# Patient Record
Sex: MALE | Race: WHITE | Employment: OTHER | URBAN - METROPOLITAN AREA
[De-identification: names, ages, dates, MRNs, and addresses within clinical notes are randomized per-mention and may not be internally consistent; named-entity substitution may affect disease eponyms.]

---

## 2017-02-24 ENCOUNTER — OFFICE VISIT (OUTPATIENT)
Dept: UROLOGY | Age: 72
End: 2017-02-24

## 2017-02-24 VITALS
HEIGHT: 71 IN | DIASTOLIC BLOOD PRESSURE: 88 MMHG | HEART RATE: 61 BPM | BODY MASS INDEX: 36.4 KG/M2 | WEIGHT: 260 LBS | SYSTOLIC BLOOD PRESSURE: 134 MMHG

## 2017-02-24 DIAGNOSIS — Z85.46 H/O PROSTATE CANCER: ICD-10-CM

## 2017-02-24 DIAGNOSIS — N52.9 ERECTILE DYSFUNCTION, UNSPECIFIED ERECTILE DYSFUNCTION TYPE: Primary | ICD-10-CM

## 2017-02-24 PROCEDURE — 99214 OFFICE O/P EST MOD 30 MIN: CPT | Performed by: UROLOGY

## 2017-02-24 RX ORDER — PANTOPRAZOLE SODIUM 40 MG/1
40 TABLET, DELAYED RELEASE ORAL DAILY
COMMUNITY
Start: 2017-01-25 | End: 2022-08-15 | Stop reason: ALTCHOICE

## 2017-02-24 RX ORDER — SILDENAFIL 50 MG/1
50 TABLET, FILM COATED ORAL PRN
Qty: 18 TABLET | Refills: 3 | Status: SHIPPED | OUTPATIENT
Start: 2017-02-24 | End: 2019-01-10 | Stop reason: ALTCHOICE

## 2017-02-24 RX ORDER — POTASSIUM CHLORIDE 1500 MG/1
TABLET, EXTENDED RELEASE ORAL
COMMUNITY
Start: 2017-01-25 | End: 2019-01-10 | Stop reason: ALTCHOICE

## 2017-02-24 ASSESSMENT — ENCOUNTER SYMPTOMS: SHORTNESS OF BREATH: 0

## 2017-03-21 ENCOUNTER — HOSPITAL ENCOUNTER (OUTPATIENT)
Dept: INFUSION THERAPY | Age: 72
Setting detail: INFUSION SERIES
Discharge: HOME | End: 2017-03-21
Payer: MEDICARE

## 2017-03-21 VITALS
DIASTOLIC BLOOD PRESSURE: 79 MMHG | TEMPERATURE: 98.5 F | RESPIRATION RATE: 18 BRPM | SYSTOLIC BLOOD PRESSURE: 144 MMHG | HEART RATE: 60 BPM

## 2017-03-21 DIAGNOSIS — M05.711 RHEUMATOID ARTHRITIS INVOLVING RIGHT SHOULDER WITH POSITIVE RHEUMATOID FACTOR (HCC): ICD-10-CM

## 2017-03-21 PROCEDURE — 96366 THER/PROPH/DIAG IV INF ADDON: CPT

## 2017-03-21 PROCEDURE — 2580000003 HC RX 258: Performed by: INTERNAL MEDICINE

## 2017-03-21 PROCEDURE — 96365 THER/PROPH/DIAG IV INF INIT: CPT

## 2017-03-21 PROCEDURE — 6360000002 HC RX W HCPCS: Performed by: INTERNAL MEDICINE

## 2017-03-21 PROCEDURE — 96413 CHEMO IV INFUSION 1 HR: CPT

## 2017-03-21 PROCEDURE — 6370000000 HC RX 637 (ALT 250 FOR IP): Performed by: INTERNAL MEDICINE

## 2017-03-21 PROCEDURE — 2580000003 HC RX 258

## 2017-03-21 PROCEDURE — 96415 CHEMO IV INFUSION ADDL HR: CPT

## 2017-03-21 RX ORDER — DIPHENHYDRAMINE HYDROCHLORIDE 50 MG/ML
25 INJECTION INTRAMUSCULAR; INTRAVENOUS ONCE
Status: CANCELLED | OUTPATIENT
Start: 2017-03-21 | End: 2017-03-21

## 2017-03-21 RX ORDER — METHYLPREDNISOLONE SODIUM SUCCINATE 125 MG/2ML
100 INJECTION, POWDER, LYOPHILIZED, FOR SOLUTION INTRAMUSCULAR; INTRAVENOUS ONCE
Status: CANCELLED | OUTPATIENT
Start: 2017-03-21 | End: 2017-03-21

## 2017-03-21 RX ORDER — SODIUM CHLORIDE 9 MG/ML
20 INJECTION, SOLUTION INTRAVENOUS ONCE
Status: CANCELLED | OUTPATIENT
Start: 2017-03-21 | End: 2017-03-21

## 2017-03-21 RX ORDER — DIPHENHYDRAMINE HYDROCHLORIDE 50 MG/ML
25 INJECTION INTRAMUSCULAR; INTRAVENOUS ONCE
Status: COMPLETED | OUTPATIENT
Start: 2017-03-21 | End: 2017-03-21

## 2017-03-21 RX ORDER — SODIUM CHLORIDE 9 MG/ML
20 INJECTION, SOLUTION INTRAVENOUS ONCE
Status: COMPLETED | OUTPATIENT
Start: 2017-03-21 | End: 2017-03-21

## 2017-03-21 RX ORDER — SODIUM CHLORIDE 9 MG/ML
INJECTION, SOLUTION INTRAVENOUS
Status: COMPLETED
Start: 2017-03-21 | End: 2017-03-21

## 2017-03-21 RX ORDER — METHYLPREDNISOLONE SODIUM SUCCINATE 125 MG/2ML
100 INJECTION, POWDER, LYOPHILIZED, FOR SOLUTION INTRAMUSCULAR; INTRAVENOUS ONCE
Status: COMPLETED | OUTPATIENT
Start: 2017-03-21 | End: 2017-03-21

## 2017-03-21 RX ORDER — ACETAMINOPHEN 500 MG
1000 TABLET ORAL ONCE
Status: COMPLETED | OUTPATIENT
Start: 2017-03-21 | End: 2017-03-21

## 2017-03-21 RX ORDER — ACETAMINOPHEN 500 MG
1000 TABLET ORAL ONCE
Status: CANCELLED | OUTPATIENT
Start: 2017-03-21 | End: 2017-03-21

## 2017-03-21 RX ADMIN — RITUXIMAB: 10 INJECTION, SOLUTION INTRAVENOUS at 11:31

## 2017-03-21 RX ADMIN — DIPHENHYDRAMINE HYDROCHLORIDE 25 MG: 50 INJECTION, SOLUTION INTRAMUSCULAR; INTRAVENOUS at 10:51

## 2017-03-21 RX ADMIN — ACETAMINOPHEN 1000 MG: 500 TABLET ORAL at 10:48

## 2017-03-21 RX ADMIN — SODIUM CHLORIDE 20 ML/HR: 9 INJECTION, SOLUTION INTRAVENOUS at 10:57

## 2017-03-21 RX ADMIN — METHYLPREDNISOLONE SODIUM SUCCINATE 100 MG: 125 INJECTION, POWDER, FOR SOLUTION INTRAMUSCULAR; INTRAVENOUS at 10:54

## 2017-03-21 ASSESSMENT — PAIN DESCRIPTION - LOCATION
LOCATION_2: SHOULDER
LOCATION: ANKLE;SHOULDER

## 2017-03-21 ASSESSMENT — PAIN DESCRIPTION - PROGRESSION: CLINICAL_PROGRESSION: NOT CHANGED

## 2017-03-21 ASSESSMENT — PAIN DESCRIPTION - ONSET
ONSET: ON-GOING
ONSET_2: AWAKENED FROM SLEEP

## 2017-03-21 ASSESSMENT — PAIN DESCRIPTION - DURATION: DURATION_2: CONTINUOUS

## 2017-03-21 ASSESSMENT — PAIN DESCRIPTION - FREQUENCY: FREQUENCY: CONTINUOUS

## 2017-03-21 ASSESSMENT — PAIN DESCRIPTION - INTENSITY: RATING_2: 4

## 2017-03-21 ASSESSMENT — PAIN DESCRIPTION - ORIENTATION
ORIENTATION: RIGHT
ORIENTATION_2: RIGHT;LEFT

## 2017-03-21 ASSESSMENT — PAIN DESCRIPTION - DESCRIPTORS
DESCRIPTORS: ACHING;SORE
DESCRIPTORS_2: ACHING

## 2017-03-21 ASSESSMENT — PAIN SCALES - GENERAL: PAINLEVEL_OUTOF10: 4

## 2017-04-05 ENCOUNTER — HOSPITAL ENCOUNTER (OUTPATIENT)
Dept: INFUSION THERAPY | Age: 72
Setting detail: INFUSION SERIES
Discharge: HOME | End: 2017-04-05
Payer: MEDICARE

## 2017-04-05 VITALS
TEMPERATURE: 98.3 F | SYSTOLIC BLOOD PRESSURE: 165 MMHG | RESPIRATION RATE: 20 BRPM | DIASTOLIC BLOOD PRESSURE: 68 MMHG | HEART RATE: 53 BPM

## 2017-04-05 DIAGNOSIS — M05.711 RHEUMATOID ARTHRITIS INVOLVING RIGHT SHOULDER WITH POSITIVE RHEUMATOID FACTOR (HCC): ICD-10-CM

## 2017-04-05 PROCEDURE — 2580000003 HC RX 258

## 2017-04-05 PROCEDURE — 6360000002 HC RX W HCPCS: Performed by: INTERNAL MEDICINE

## 2017-04-05 PROCEDURE — 96413 CHEMO IV INFUSION 1 HR: CPT

## 2017-04-05 PROCEDURE — 6370000000 HC RX 637 (ALT 250 FOR IP): Performed by: INTERNAL MEDICINE

## 2017-04-05 PROCEDURE — 96415 CHEMO IV INFUSION ADDL HR: CPT

## 2017-04-05 PROCEDURE — 2580000003 HC RX 258: Performed by: INTERNAL MEDICINE

## 2017-04-05 PROCEDURE — 96375 TX/PRO/DX INJ NEW DRUG ADDON: CPT

## 2017-04-05 RX ORDER — SODIUM CHLORIDE 9 MG/ML
INJECTION, SOLUTION INTRAVENOUS
Status: COMPLETED
Start: 2017-04-05 | End: 2017-04-05

## 2017-04-05 RX ORDER — METHYLPREDNISOLONE SODIUM SUCCINATE 125 MG/2ML
100 INJECTION, POWDER, LYOPHILIZED, FOR SOLUTION INTRAMUSCULAR; INTRAVENOUS ONCE
Status: CANCELLED | OUTPATIENT
Start: 2017-04-05 | End: 2017-04-05

## 2017-04-05 RX ORDER — DIPHENHYDRAMINE HYDROCHLORIDE 50 MG/ML
25 INJECTION INTRAMUSCULAR; INTRAVENOUS ONCE
Status: CANCELLED | OUTPATIENT
Start: 2017-04-05 | End: 2017-04-05

## 2017-04-05 RX ORDER — ACETAMINOPHEN 500 MG
1000 TABLET ORAL ONCE
Status: CANCELLED | OUTPATIENT
Start: 2017-04-05 | End: 2017-04-05

## 2017-04-05 RX ORDER — DIPHENHYDRAMINE HYDROCHLORIDE 50 MG/ML
25 INJECTION INTRAMUSCULAR; INTRAVENOUS ONCE
Status: COMPLETED | OUTPATIENT
Start: 2017-04-05 | End: 2017-04-05

## 2017-04-05 RX ORDER — ACETAMINOPHEN 500 MG
1000 TABLET ORAL ONCE
Status: COMPLETED | OUTPATIENT
Start: 2017-04-05 | End: 2017-04-05

## 2017-04-05 RX ORDER — SODIUM CHLORIDE 9 MG/ML
20 INJECTION, SOLUTION INTRAVENOUS ONCE
Status: COMPLETED | OUTPATIENT
Start: 2017-04-05 | End: 2017-04-05

## 2017-04-05 RX ORDER — METHYLPREDNISOLONE SODIUM SUCCINATE 125 MG/2ML
100 INJECTION, POWDER, LYOPHILIZED, FOR SOLUTION INTRAMUSCULAR; INTRAVENOUS ONCE
Status: COMPLETED | OUTPATIENT
Start: 2017-04-05 | End: 2017-04-05

## 2017-04-05 RX ORDER — SODIUM CHLORIDE 9 MG/ML
20 INJECTION, SOLUTION INTRAVENOUS ONCE
Status: CANCELLED | OUTPATIENT
Start: 2017-04-05 | End: 2017-04-05

## 2017-04-05 RX ADMIN — RITUXIMAB 1000 MG: 10 INJECTION, SOLUTION INTRAVENOUS at 11:49

## 2017-04-05 RX ADMIN — ACETAMINOPHEN 1000 MG: 500 TABLET ORAL at 11:11

## 2017-04-05 RX ADMIN — METHYLPREDNISOLONE SODIUM SUCCINATE 100 MG: 125 INJECTION, POWDER, FOR SOLUTION INTRAMUSCULAR; INTRAVENOUS at 11:20

## 2017-04-05 RX ADMIN — SODIUM CHLORIDE 20 ML/HR: 9 INJECTION, SOLUTION INTRAVENOUS at 11:46

## 2017-04-05 RX ADMIN — DIPHENHYDRAMINE HYDROCHLORIDE 25 MG: 50 INJECTION, SOLUTION INTRAMUSCULAR; INTRAVENOUS at 11:20

## 2017-04-05 ASSESSMENT — PAIN SCALES - GENERAL: PAINLEVEL_OUTOF10: 5

## 2017-04-05 ASSESSMENT — PAIN DESCRIPTION - PAIN TYPE: TYPE: CHRONIC PAIN

## 2017-04-05 ASSESSMENT — PAIN DESCRIPTION - LOCATION: LOCATION: ANKLE

## 2017-05-01 ENCOUNTER — APPOINTMENT (OUTPATIENT)
Dept: GENERAL RADIOLOGY | Age: 72
End: 2017-05-01
Payer: MEDICARE

## 2017-05-01 ENCOUNTER — HOSPITAL ENCOUNTER (EMERGENCY)
Age: 72
Discharge: HOME | End: 2017-05-01
Payer: MEDICARE

## 2017-05-01 VITALS
DIASTOLIC BLOOD PRESSURE: 73 MMHG | WEIGHT: 270 LBS | RESPIRATION RATE: 18 BRPM | HEIGHT: 70 IN | HEART RATE: 49 BPM | BODY MASS INDEX: 38.65 KG/M2 | TEMPERATURE: 99.1 F | OXYGEN SATURATION: 98 % | SYSTOLIC BLOOD PRESSURE: 168 MMHG

## 2017-05-01 DIAGNOSIS — M62.838 SPASM OF MUSCLE: ICD-10-CM

## 2017-05-01 DIAGNOSIS — M79.18 MUSCULOSKELETAL PAIN: Primary | ICD-10-CM

## 2017-05-01 PROCEDURE — 6360000002 HC RX W HCPCS: Performed by: PHYSICIAN ASSISTANT

## 2017-05-01 PROCEDURE — 72050 X-RAY EXAM NECK SPINE 4/5VWS: CPT

## 2017-05-01 PROCEDURE — 93005 ELECTROCARDIOGRAM TRACING: CPT

## 2017-05-01 PROCEDURE — 6370000000 HC RX 637 (ALT 250 FOR IP): Performed by: PHYSICIAN ASSISTANT

## 2017-05-01 PROCEDURE — 99284 EMERGENCY DEPT VISIT MOD MDM: CPT

## 2017-05-01 PROCEDURE — 96374 THER/PROPH/DIAG INJ IV PUSH: CPT

## 2017-05-01 PROCEDURE — 96375 TX/PRO/DX INJ NEW DRUG ADDON: CPT

## 2017-05-01 RX ORDER — HYDROCODONE BITARTRATE AND ACETAMINOPHEN 5; 325 MG/1; MG/1
1 TABLET ORAL EVERY 6 HOURS PRN
Qty: 12 TABLET | Refills: 0 | Status: SHIPPED | OUTPATIENT
Start: 2017-05-01 | End: 2017-05-08

## 2017-05-01 RX ORDER — CYCLOBENZAPRINE HCL 10 MG
10 TABLET ORAL ONCE
Status: COMPLETED | OUTPATIENT
Start: 2017-05-01 | End: 2017-05-01

## 2017-05-01 RX ORDER — CYCLOBENZAPRINE HCL 10 MG
10 TABLET ORAL 3 TIMES DAILY PRN
Qty: 12 TABLET | Refills: 0 | Status: SHIPPED | OUTPATIENT
Start: 2017-05-01 | End: 2017-05-11

## 2017-05-01 RX ORDER — KETOROLAC TROMETHAMINE 30 MG/ML
15 INJECTION, SOLUTION INTRAMUSCULAR; INTRAVENOUS ONCE
Status: COMPLETED | OUTPATIENT
Start: 2017-05-01 | End: 2017-05-01

## 2017-05-01 RX ORDER — ONDANSETRON 2 MG/ML
4 INJECTION INTRAMUSCULAR; INTRAVENOUS ONCE
Status: COMPLETED | OUTPATIENT
Start: 2017-05-01 | End: 2017-05-01

## 2017-05-01 RX ORDER — MORPHINE SULFATE 4 MG/ML
4 INJECTION, SOLUTION INTRAMUSCULAR; INTRAVENOUS ONCE
Status: COMPLETED | OUTPATIENT
Start: 2017-05-01 | End: 2017-05-01

## 2017-05-01 RX ADMIN — KETOROLAC TROMETHAMINE 15 MG: 30 INJECTION, SOLUTION INTRAMUSCULAR; INTRAVENOUS at 18:43

## 2017-05-01 RX ADMIN — MORPHINE SULFATE 4 MG: 4 INJECTION, SOLUTION INTRAMUSCULAR; INTRAVENOUS at 19:41

## 2017-05-01 RX ADMIN — ONDANSETRON 4 MG: 2 INJECTION, SOLUTION INTRAMUSCULAR; INTRAVENOUS at 19:41

## 2017-05-01 RX ADMIN — CYCLOBENZAPRINE HYDROCHLORIDE 10 MG: 10 TABLET, FILM COATED ORAL at 18:43

## 2017-05-01 ASSESSMENT — PAIN DESCRIPTION - ORIENTATION: ORIENTATION: LEFT

## 2017-05-01 ASSESSMENT — ENCOUNTER SYMPTOMS
ABDOMINAL PAIN: 0
VOMITING: 0
EYE DISCHARGE: 0
VOICE CHANGE: 0
PHOTOPHOBIA: 0
EYE PAIN: 0
COUGH: 0
ANAL BLEEDING: 0
SHORTNESS OF BREATH: 0
NAUSEA: 0
BACK PAIN: 0
ABDOMINAL DISTENTION: 0
APNEA: 0

## 2017-05-01 ASSESSMENT — PAIN SCALES - GENERAL
PAINLEVEL_OUTOF10: 7
PAINLEVEL_OUTOF10: 8
PAINLEVEL_OUTOF10: 5
PAINLEVEL_OUTOF10: 6

## 2017-05-01 ASSESSMENT — PAIN DESCRIPTION - PAIN TYPE: TYPE: ACUTE PAIN

## 2017-05-01 ASSESSMENT — PAIN DESCRIPTION - LOCATION: LOCATION: NECK

## 2017-05-01 ASSESSMENT — PAIN DESCRIPTION - FREQUENCY: FREQUENCY: CONTINUOUS

## 2017-05-01 ASSESSMENT — PAIN DESCRIPTION - DESCRIPTORS: DESCRIPTORS: SHARP;SHOOTING

## 2017-05-04 LAB
EKG ATRIAL RATE: 50 BPM
EKG P AXIS: 32 DEGREES
EKG P-R INTERVAL: 248 MS
EKG Q-T INTERVAL: 492 MS
EKG QRS DURATION: 82 MS
EKG QTC CALCULATION (BAZETT): 448 MS
EKG R AXIS: -2 DEGREES
EKG T AXIS: 9 DEGREES
EKG VENTRICULAR RATE: 50 BPM

## 2017-05-26 ENCOUNTER — HOSPITAL ENCOUNTER (EMERGENCY)
Age: 72
Discharge: HOME | End: 2017-05-26
Payer: MEDICARE

## 2017-05-26 VITALS
RESPIRATION RATE: 18 BRPM | TEMPERATURE: 98.7 F | HEART RATE: 59 BPM | SYSTOLIC BLOOD PRESSURE: 179 MMHG | BODY MASS INDEX: 38.5 KG/M2 | DIASTOLIC BLOOD PRESSURE: 92 MMHG | WEIGHT: 275 LBS | HEIGHT: 71 IN | OXYGEN SATURATION: 94 %

## 2017-05-26 DIAGNOSIS — J06.9 ACUTE UPPER RESPIRATORY INFECTION: Primary | ICD-10-CM

## 2017-05-26 LAB — S PYO AG THROAT QL: NEGATIVE

## 2017-05-26 PROCEDURE — 87081 CULTURE SCREEN ONLY: CPT

## 2017-05-26 PROCEDURE — 6360000002 HC RX W HCPCS: Performed by: PHYSICIAN ASSISTANT

## 2017-05-26 PROCEDURE — 87880 STREP A ASSAY W/OPTIC: CPT

## 2017-05-26 PROCEDURE — 99283 EMERGENCY DEPT VISIT LOW MDM: CPT

## 2017-05-26 RX ORDER — DEXAMETHASONE SODIUM PHOSPHATE 10 MG/ML
10 INJECTION, SOLUTION INTRAMUSCULAR; INTRAVENOUS ONCE
Status: COMPLETED | OUTPATIENT
Start: 2017-05-26 | End: 2017-05-26

## 2017-05-26 RX ORDER — AZITHROMYCIN 250 MG/1
TABLET, FILM COATED ORAL
Qty: 1 PACKET | Refills: 0 | Status: SHIPPED | OUTPATIENT
Start: 2017-05-26 | End: 2017-06-05

## 2017-05-26 RX ADMIN — DEXAMETHASONE SODIUM PHOSPHATE 10 MG: 10 INJECTION INTRAMUSCULAR; INTRAVENOUS at 18:33

## 2017-05-26 ASSESSMENT — ENCOUNTER SYMPTOMS
VOMITING: 0
SHORTNESS OF BREATH: 0
COUGH: 1
ABDOMINAL PAIN: 0
DIARRHEA: 0
SORE THROAT: 1
NAUSEA: 0

## 2017-05-26 ASSESSMENT — PAIN DESCRIPTION - LOCATION: LOCATION: THROAT

## 2017-05-26 ASSESSMENT — PAIN SCALES - GENERAL: PAINLEVEL_OUTOF10: 8

## 2017-05-26 ASSESSMENT — PAIN DESCRIPTION - ONSET: ONSET: PROGRESSIVE

## 2017-05-26 ASSESSMENT — PAIN DESCRIPTION - DESCRIPTORS: DESCRIPTORS: SHARP

## 2017-05-26 ASSESSMENT — PAIN DESCRIPTION - PAIN TYPE: TYPE: ACUTE PAIN

## 2017-05-26 ASSESSMENT — PAIN DESCRIPTION - FREQUENCY: FREQUENCY: INTERMITTENT

## 2017-05-29 LAB — S PYO THROAT QL CULT: NORMAL

## 2017-06-12 ENCOUNTER — HOSPITAL ENCOUNTER (OUTPATIENT)
Dept: GENERAL RADIOLOGY | Age: 72
Discharge: HOME | End: 2017-06-12
Payer: MEDICARE

## 2017-06-12 DIAGNOSIS — J20.9 BRONCHITIS, ACUTE, WITH BRONCHOSPASM: ICD-10-CM

## 2017-06-12 PROCEDURE — 71020 XR CHEST STANDARD TWO VW: CPT

## 2017-07-11 ENCOUNTER — ANESTHESIA EVENT (OUTPATIENT)
Dept: OPERATING ROOM | Age: 72
End: 2017-07-11
Payer: MEDICARE

## 2017-07-11 ENCOUNTER — HOSPITAL ENCOUNTER (OUTPATIENT)
Age: 72
Setting detail: OUTPATIENT SURGERY
Discharge: HOME | End: 2017-07-11
Attending: INTERNAL MEDICINE | Admitting: INTERNAL MEDICINE
Payer: MEDICARE

## 2017-07-11 ENCOUNTER — ANESTHESIA (OUTPATIENT)
Dept: OPERATING ROOM | Age: 72
End: 2017-07-11
Payer: MEDICARE

## 2017-07-11 VITALS
SYSTOLIC BLOOD PRESSURE: 103 MMHG | RESPIRATION RATE: 10 BRPM | OXYGEN SATURATION: 98 % | DIASTOLIC BLOOD PRESSURE: 53 MMHG

## 2017-07-11 VITALS
TEMPERATURE: 97.2 F | HEART RATE: 54 BPM | OXYGEN SATURATION: 96 % | DIASTOLIC BLOOD PRESSURE: 68 MMHG | SYSTOLIC BLOOD PRESSURE: 159 MMHG | RESPIRATION RATE: 16 BRPM | HEIGHT: 71 IN | WEIGHT: 275 LBS | BODY MASS INDEX: 38.5 KG/M2

## 2017-07-11 PROBLEM — K63.5 COLON POLYPS: Status: ACTIVE | Noted: 2017-07-11

## 2017-07-11 PROCEDURE — 7100000011 HC PHASE II RECOVERY - ADDTL 15 MIN: Performed by: INTERNAL MEDICINE

## 2017-07-11 PROCEDURE — 2500000003 HC RX 250 WO HCPCS: Performed by: NURSE ANESTHETIST, CERTIFIED REGISTERED

## 2017-07-11 PROCEDURE — 7100000010 HC PHASE II RECOVERY - FIRST 15 MIN: Performed by: INTERNAL MEDICINE

## 2017-07-11 PROCEDURE — 3700000001 HC ADD 15 MINUTES (ANESTHESIA): Performed by: INTERNAL MEDICINE

## 2017-07-11 PROCEDURE — 3700000000 HC ANESTHESIA ATTENDED CARE: Performed by: INTERNAL MEDICINE

## 2017-07-11 PROCEDURE — 88305 TISSUE EXAM BY PATHOLOGIST: CPT

## 2017-07-11 PROCEDURE — 6360000002 HC RX W HCPCS: Performed by: NURSE ANESTHETIST, CERTIFIED REGISTERED

## 2017-07-11 PROCEDURE — 3609027000 HC COLONOSCOPY: Performed by: INTERNAL MEDICINE

## 2017-07-11 RX ORDER — LIDOCAINE HYDROCHLORIDE 10 MG/ML
1 INJECTION, SOLUTION EPIDURAL; INFILTRATION; INTRACAUDAL; PERINEURAL
Status: DISCONTINUED | OUTPATIENT
Start: 2017-07-11 | End: 2017-07-11 | Stop reason: HOSPADM

## 2017-07-11 RX ORDER — PROPOFOL 10 MG/ML
INJECTION, EMULSION INTRAVENOUS PRN
Status: DISCONTINUED | OUTPATIENT
Start: 2017-07-11 | End: 2017-07-11 | Stop reason: SDUPTHER

## 2017-07-11 RX ORDER — SODIUM CHLORIDE 0.9 % (FLUSH) 0.9 %
10 SYRINGE (ML) INJECTION PRN
Status: DISCONTINUED | OUTPATIENT
Start: 2017-07-11 | End: 2017-07-11 | Stop reason: HOSPADM

## 2017-07-11 RX ORDER — SODIUM CHLORIDE, SODIUM LACTATE, POTASSIUM CHLORIDE, CALCIUM CHLORIDE 600; 310; 30; 20 MG/100ML; MG/100ML; MG/100ML; MG/100ML
INJECTION, SOLUTION INTRAVENOUS CONTINUOUS
Status: DISCONTINUED | OUTPATIENT
Start: 2017-07-11 | End: 2017-07-11 | Stop reason: HOSPADM

## 2017-07-11 RX ORDER — SODIUM CHLORIDE 0.9 % (FLUSH) 0.9 %
10 SYRINGE (ML) INJECTION EVERY 12 HOURS SCHEDULED
Status: DISCONTINUED | OUTPATIENT
Start: 2017-07-11 | End: 2017-07-11 | Stop reason: HOSPADM

## 2017-07-11 RX ORDER — GLYCOPYRROLATE 0.2 MG/ML
INJECTION INTRAMUSCULAR; INTRAVENOUS PRN
Status: DISCONTINUED | OUTPATIENT
Start: 2017-07-11 | End: 2017-07-11 | Stop reason: SDUPTHER

## 2017-07-11 RX ORDER — ONDANSETRON 2 MG/ML
4 INJECTION INTRAMUSCULAR; INTRAVENOUS
Status: DISCONTINUED | OUTPATIENT
Start: 2017-07-11 | End: 2017-07-11 | Stop reason: HOSPADM

## 2017-07-11 RX ORDER — LIDOCAINE HYDROCHLORIDE 20 MG/ML
INJECTION, SOLUTION INFILTRATION; PERINEURAL PRN
Status: DISCONTINUED | OUTPATIENT
Start: 2017-07-11 | End: 2017-07-11 | Stop reason: SDUPTHER

## 2017-07-11 RX ADMIN — PROPOFOL 20 MG: 10 INJECTION, EMULSION INTRAVENOUS at 15:52

## 2017-07-11 RX ADMIN — LIDOCAINE HYDROCHLORIDE 40 MG: 20 INJECTION, SOLUTION INFILTRATION; PERINEURAL at 15:42

## 2017-07-11 RX ADMIN — PROPOFOL 20 MG: 10 INJECTION, EMULSION INTRAVENOUS at 15:56

## 2017-07-11 RX ADMIN — PROPOFOL 20 MG: 10 INJECTION, EMULSION INTRAVENOUS at 15:50

## 2017-07-11 RX ADMIN — PROPOFOL 20 MG: 10 INJECTION, EMULSION INTRAVENOUS at 15:54

## 2017-07-11 RX ADMIN — GLYCOPYRROLATE 0.2 MG: 0.2 INJECTION INTRAMUSCULAR; INTRAVENOUS at 15:36

## 2017-07-11 RX ADMIN — PROPOFOL 10 MG: 10 INJECTION, EMULSION INTRAVENOUS at 15:46

## 2017-07-11 RX ADMIN — PROPOFOL 20 MG: 10 INJECTION, EMULSION INTRAVENOUS at 15:48

## 2017-07-11 RX ADMIN — PROPOFOL 20 MG: 10 INJECTION, EMULSION INTRAVENOUS at 15:44

## 2017-07-11 RX ADMIN — PROPOFOL 30 MG: 10 INJECTION, EMULSION INTRAVENOUS at 15:47

## 2017-07-11 RX ADMIN — GLYCOPYRROLATE 0.2 MG: 0.2 INJECTION INTRAMUSCULAR; INTRAVENOUS at 15:18

## 2017-07-11 RX ADMIN — GLYCOPYRROLATE 0.2 MG: 0.2 INJECTION INTRAMUSCULAR; INTRAVENOUS at 15:39

## 2017-07-11 RX ADMIN — PROPOFOL 50 MG: 10 INJECTION, EMULSION INTRAVENOUS at 15:42

## 2017-07-11 ASSESSMENT — PAIN - FUNCTIONAL ASSESSMENT: PAIN_FUNCTIONAL_ASSESSMENT: 0-10

## 2017-10-30 ENCOUNTER — HOSPITAL ENCOUNTER (OUTPATIENT)
Dept: PREADMISSION TESTING | Age: 72
Discharge: HOME | End: 2017-10-30
Payer: MEDICARE

## 2017-10-30 VITALS
RESPIRATION RATE: 16 BRPM | WEIGHT: 294 LBS | DIASTOLIC BLOOD PRESSURE: 75 MMHG | BODY MASS INDEX: 41.16 KG/M2 | HEART RATE: 53 BPM | HEIGHT: 71 IN | OXYGEN SATURATION: 96 % | SYSTOLIC BLOOD PRESSURE: 155 MMHG | TEMPERATURE: 96.3 F

## 2017-10-30 DIAGNOSIS — Z96.9 RETAINED ORTHOPEDIC HARDWARE: ICD-10-CM

## 2017-10-30 LAB
ANION GAP SERPL CALCULATED.3IONS-SCNC: 16 MEQ/L (ref 7–13)
BUN BLDV-MCNC: 29 MG/DL (ref 8–23)
CALCIUM SERPL-MCNC: 9.2 MG/DL (ref 8.6–10.2)
CHLORIDE BLD-SCNC: 104 MEQ/L (ref 98–107)
CO2: 23 MEQ/L (ref 22–29)
CREAT SERPL-MCNC: 1.1 MG/DL (ref 0.7–1.2)
EKG ATRIAL RATE: 54 BPM
EKG P AXIS: 17 DEGREES
EKG P-R INTERVAL: 204 MS
EKG Q-T INTERVAL: 468 MS
EKG QRS DURATION: 80 MS
EKG QTC CALCULATION (BAZETT): 443 MS
EKG R AXIS: 14 DEGREES
EKG T AXIS: 20 DEGREES
EKG VENTRICULAR RATE: 54 BPM
GFR AFRICAN AMERICAN: >60
GFR NON-AFRICAN AMERICAN: >60
GLUCOSE BLD-MCNC: 168 MG/DL (ref 74–109)
HCT VFR BLD CALC: 43.4 % (ref 42–52)
HEMOGLOBIN: 14.4 G/DL (ref 14–18)
MCH RBC QN AUTO: 32.3 PG (ref 27–31.3)
MCHC RBC AUTO-ENTMCNC: 33.2 % (ref 33–37)
MCV RBC AUTO: 97.1 FL (ref 80–100)
PDW BLD-RTO: 14 % (ref 11.5–14.5)
PLATELET # BLD: 274 K/UL (ref 130–400)
POTASSIUM SERPL-SCNC: 4 MEQ/L (ref 3.5–5.1)
RBC # BLD: 4.47 M/UL (ref 4.7–6.1)
SODIUM BLD-SCNC: 143 MEQ/L (ref 132–144)
WBC # BLD: 9 K/UL (ref 4.8–10.8)

## 2017-10-30 PROCEDURE — 85027 COMPLETE CBC AUTOMATED: CPT

## 2017-10-30 PROCEDURE — 80048 BASIC METABOLIC PNL TOTAL CA: CPT

## 2017-10-30 PROCEDURE — 93005 ELECTROCARDIOGRAM TRACING: CPT

## 2017-10-30 PROCEDURE — 93010 ELECTROCARDIOGRAM REPORT: CPT | Performed by: INTERNAL MEDICINE

## 2017-10-30 RX ORDER — SODIUM CHLORIDE 0.9 % (FLUSH) 0.9 %
10 SYRINGE (ML) INJECTION EVERY 12 HOURS SCHEDULED
Status: CANCELLED | OUTPATIENT
Start: 2017-10-30

## 2017-10-30 RX ORDER — ACETAMINOPHEN 325 MG/1
500 TABLET ORAL EVERY 4 HOURS PRN
COMMUNITY

## 2017-10-30 RX ORDER — SODIUM CHLORIDE 0.9 % (FLUSH) 0.9 %
10 SYRINGE (ML) INJECTION PRN
Status: CANCELLED | OUTPATIENT
Start: 2017-10-30

## 2017-10-30 RX ORDER — SODIUM CHLORIDE, SODIUM LACTATE, POTASSIUM CHLORIDE, CALCIUM CHLORIDE 600; 310; 30; 20 MG/100ML; MG/100ML; MG/100ML; MG/100ML
INJECTION, SOLUTION INTRAVENOUS CONTINUOUS
Status: CANCELLED | OUTPATIENT
Start: 2017-11-06

## 2017-10-30 RX ORDER — LIDOCAINE HYDROCHLORIDE 10 MG/ML
1 INJECTION, SOLUTION EPIDURAL; INFILTRATION; INTRACAUDAL; PERINEURAL
Status: CANCELLED | OUTPATIENT
Start: 2017-10-30 | End: 2017-10-30

## 2017-11-03 ENCOUNTER — ANESTHESIA EVENT (OUTPATIENT)
Dept: OPERATING ROOM | Age: 72
End: 2017-11-03
Payer: MEDICARE

## 2017-11-06 ENCOUNTER — ANESTHESIA (OUTPATIENT)
Dept: OPERATING ROOM | Age: 72
End: 2017-11-06
Payer: MEDICARE

## 2017-11-06 ENCOUNTER — HOSPITAL ENCOUNTER (OUTPATIENT)
Dept: GENERAL RADIOLOGY | Age: 72
Setting detail: OUTPATIENT SURGERY
Discharge: HOME | End: 2017-11-06
Attending: ORTHOPAEDIC SURGERY
Payer: MEDICARE

## 2017-11-06 ENCOUNTER — HOSPITAL ENCOUNTER (OUTPATIENT)
Age: 72
Setting detail: OUTPATIENT SURGERY
Discharge: HOME | End: 2017-11-06
Attending: ORTHOPAEDIC SURGERY | Admitting: ORTHOPAEDIC SURGERY
Payer: MEDICARE

## 2017-11-06 VITALS
OXYGEN SATURATION: 93 % | RESPIRATION RATE: 16 BRPM | HEIGHT: 71 IN | SYSTOLIC BLOOD PRESSURE: 174 MMHG | WEIGHT: 290 LBS | BODY MASS INDEX: 40.6 KG/M2 | HEART RATE: 51 BPM | DIASTOLIC BLOOD PRESSURE: 93 MMHG | TEMPERATURE: 97.4 F

## 2017-11-06 VITALS
SYSTOLIC BLOOD PRESSURE: 109 MMHG | RESPIRATION RATE: 12 BRPM | OXYGEN SATURATION: 92 % | DIASTOLIC BLOOD PRESSURE: 55 MMHG

## 2017-11-06 DIAGNOSIS — R52 PAIN: ICD-10-CM

## 2017-11-06 PROCEDURE — 3209999900 FLUORO FOR SURGICAL PROCEDURES

## 2017-11-06 PROCEDURE — 2500000003 HC RX 250 WO HCPCS: Performed by: NURSE ANESTHETIST, CERTIFIED REGISTERED

## 2017-11-06 PROCEDURE — 3600000013 HC SURGERY LEVEL 3 ADDTL 15MIN: Performed by: ORTHOPAEDIC SURGERY

## 2017-11-06 PROCEDURE — 2500000003 HC RX 250 WO HCPCS: Performed by: ORTHOPAEDIC SURGERY

## 2017-11-06 PROCEDURE — 3700000000 HC ANESTHESIA ATTENDED CARE: Performed by: ORTHOPAEDIC SURGERY

## 2017-11-06 PROCEDURE — 7100000011 HC PHASE II RECOVERY - ADDTL 15 MIN: Performed by: ORTHOPAEDIC SURGERY

## 2017-11-06 PROCEDURE — 7100000000 HC PACU RECOVERY - FIRST 15 MIN: Performed by: ORTHOPAEDIC SURGERY

## 2017-11-06 PROCEDURE — 2500000003 HC RX 250 WO HCPCS: Performed by: STUDENT IN AN ORGANIZED HEALTH CARE EDUCATION/TRAINING PROGRAM

## 2017-11-06 PROCEDURE — 2580000003 HC RX 258: Performed by: ORTHOPAEDIC SURGERY

## 2017-11-06 PROCEDURE — 3600000003 HC SURGERY LEVEL 3 BASE: Performed by: ORTHOPAEDIC SURGERY

## 2017-11-06 PROCEDURE — 6360000002 HC RX W HCPCS: Performed by: NURSE ANESTHETIST, CERTIFIED REGISTERED

## 2017-11-06 PROCEDURE — 7100000010 HC PHASE II RECOVERY - FIRST 15 MIN: Performed by: ORTHOPAEDIC SURGERY

## 2017-11-06 PROCEDURE — 7100000001 HC PACU RECOVERY - ADDTL 15 MIN: Performed by: ORTHOPAEDIC SURGERY

## 2017-11-06 PROCEDURE — 3700000001 HC ADD 15 MINUTES (ANESTHESIA): Performed by: ORTHOPAEDIC SURGERY

## 2017-11-06 PROCEDURE — 6360000002 HC RX W HCPCS: Performed by: ORTHOPAEDIC SURGERY

## 2017-11-06 PROCEDURE — 2580000003 HC RX 258: Performed by: STUDENT IN AN ORGANIZED HEALTH CARE EDUCATION/TRAINING PROGRAM

## 2017-11-06 PROCEDURE — 6360000002 HC RX W HCPCS: Performed by: STUDENT IN AN ORGANIZED HEALTH CARE EDUCATION/TRAINING PROGRAM

## 2017-11-06 PROCEDURE — 94640 AIRWAY INHALATION TREATMENT: CPT

## 2017-11-06 RX ORDER — SODIUM CHLORIDE, SODIUM LACTATE, POTASSIUM CHLORIDE, CALCIUM CHLORIDE 600; 310; 30; 20 MG/100ML; MG/100ML; MG/100ML; MG/100ML
INJECTION, SOLUTION INTRAVENOUS CONTINUOUS
Status: DISCONTINUED | OUTPATIENT
Start: 2017-11-06 | End: 2017-11-06 | Stop reason: HOSPADM

## 2017-11-06 RX ORDER — PROPOFOL 10 MG/ML
INJECTION, EMULSION INTRAVENOUS CONTINUOUS PRN
Status: DISCONTINUED | OUTPATIENT
Start: 2017-11-06 | End: 2017-11-06 | Stop reason: SDUPTHER

## 2017-11-06 RX ORDER — ONDANSETRON 2 MG/ML
4 INJECTION INTRAMUSCULAR; INTRAVENOUS EVERY 6 HOURS PRN
Status: DISCONTINUED | OUTPATIENT
Start: 2017-11-06 | End: 2017-11-06 | Stop reason: HOSPADM

## 2017-11-06 RX ORDER — LIDOCAINE HYDROCHLORIDE 10 MG/ML
INJECTION, SOLUTION EPIDURAL; INFILTRATION; INTRACAUDAL; PERINEURAL PRN
Status: DISCONTINUED | OUTPATIENT
Start: 2017-11-06 | End: 2017-11-06 | Stop reason: HOSPADM

## 2017-11-06 RX ORDER — MORPHINE SULFATE 4 MG/ML
4 INJECTION, SOLUTION INTRAMUSCULAR; INTRAVENOUS
Status: DISCONTINUED | OUTPATIENT
Start: 2017-11-06 | End: 2017-11-06 | Stop reason: HOSPADM

## 2017-11-06 RX ORDER — ONDANSETRON 2 MG/ML
INJECTION INTRAMUSCULAR; INTRAVENOUS PRN
Status: DISCONTINUED | OUTPATIENT
Start: 2017-11-06 | End: 2017-11-06 | Stop reason: SDUPTHER

## 2017-11-06 RX ORDER — FENTANYL CITRATE 50 UG/ML
50 INJECTION, SOLUTION INTRAMUSCULAR; INTRAVENOUS EVERY 10 MIN PRN
Status: DISCONTINUED | OUTPATIENT
Start: 2017-11-06 | End: 2017-11-06 | Stop reason: HOSPADM

## 2017-11-06 RX ORDER — ACETAMINOPHEN 325 MG/1
650 TABLET ORAL EVERY 4 HOURS PRN
Status: DISCONTINUED | OUTPATIENT
Start: 2017-11-06 | End: 2017-11-06 | Stop reason: HOSPADM

## 2017-11-06 RX ORDER — GLYCOPYRROLATE 0.2 MG/ML
INJECTION INTRAMUSCULAR; INTRAVENOUS PRN
Status: DISCONTINUED | OUTPATIENT
Start: 2017-11-06 | End: 2017-11-06 | Stop reason: SDUPTHER

## 2017-11-06 RX ORDER — SODIUM CHLORIDE 0.9 % (FLUSH) 0.9 %
10 SYRINGE (ML) INJECTION EVERY 12 HOURS SCHEDULED
Status: DISCONTINUED | OUTPATIENT
Start: 2017-11-06 | End: 2017-11-06 | Stop reason: HOSPADM

## 2017-11-06 RX ORDER — DIPHENHYDRAMINE HYDROCHLORIDE 50 MG/ML
12.5 INJECTION INTRAMUSCULAR; INTRAVENOUS
Status: DISCONTINUED | OUTPATIENT
Start: 2017-11-06 | End: 2017-11-06 | Stop reason: HOSPADM

## 2017-11-06 RX ORDER — DEXAMETHASONE SODIUM PHOSPHATE 10 MG/ML
INJECTION INTRAMUSCULAR; INTRAVENOUS PRN
Status: DISCONTINUED | OUTPATIENT
Start: 2017-11-06 | End: 2017-11-06 | Stop reason: SDUPTHER

## 2017-11-06 RX ORDER — SODIUM CHLORIDE 0.9 % (FLUSH) 0.9 %
10 SYRINGE (ML) INJECTION PRN
Status: DISCONTINUED | OUTPATIENT
Start: 2017-11-06 | End: 2017-11-06 | Stop reason: HOSPADM

## 2017-11-06 RX ORDER — METOCLOPRAMIDE HYDROCHLORIDE 5 MG/ML
10 INJECTION INTRAMUSCULAR; INTRAVENOUS
Status: DISCONTINUED | OUTPATIENT
Start: 2017-11-06 | End: 2017-11-06 | Stop reason: HOSPADM

## 2017-11-06 RX ORDER — MEPERIDINE HYDROCHLORIDE 25 MG/ML
12.5 INJECTION INTRAMUSCULAR; INTRAVENOUS; SUBCUTANEOUS EVERY 5 MIN PRN
Status: DISCONTINUED | OUTPATIENT
Start: 2017-11-06 | End: 2017-11-06 | Stop reason: HOSPADM

## 2017-11-06 RX ORDER — TRAMADOL HYDROCHLORIDE 50 MG/1
50 TABLET ORAL EVERY 6 HOURS PRN
Qty: 10 TABLET | Refills: 0 | Status: SHIPPED | OUTPATIENT
Start: 2017-11-06 | End: 2017-11-16

## 2017-11-06 RX ORDER — MORPHINE SULFATE 2 MG/ML
2 INJECTION, SOLUTION INTRAMUSCULAR; INTRAVENOUS
Status: DISCONTINUED | OUTPATIENT
Start: 2017-11-06 | End: 2017-11-06 | Stop reason: HOSPADM

## 2017-11-06 RX ORDER — OXYCODONE HYDROCHLORIDE AND ACETAMINOPHEN 5; 325 MG/1; MG/1
1 TABLET ORAL EVERY 4 HOURS PRN
Status: DISCONTINUED | OUTPATIENT
Start: 2017-11-06 | End: 2017-11-06 | Stop reason: HOSPADM

## 2017-11-06 RX ORDER — MIDAZOLAM HYDROCHLORIDE 1 MG/ML
INJECTION INTRAMUSCULAR; INTRAVENOUS PRN
Status: DISCONTINUED | OUTPATIENT
Start: 2017-11-06 | End: 2017-11-06 | Stop reason: SDUPTHER

## 2017-11-06 RX ORDER — LIDOCAINE HYDROCHLORIDE 10 MG/ML
1 INJECTION, SOLUTION EPIDURAL; INFILTRATION; INTRACAUDAL; PERINEURAL
Status: COMPLETED | OUTPATIENT
Start: 2017-11-06 | End: 2017-11-06

## 2017-11-06 RX ORDER — PROPOFOL 10 MG/ML
INJECTION, EMULSION INTRAVENOUS PRN
Status: DISCONTINUED | OUTPATIENT
Start: 2017-11-06 | End: 2017-11-06 | Stop reason: SDUPTHER

## 2017-11-06 RX ORDER — ALBUTEROL SULFATE 2.5 MG/3ML
2.5 SOLUTION RESPIRATORY (INHALATION) ONCE
Status: COMPLETED | OUTPATIENT
Start: 2017-11-06 | End: 2017-11-06

## 2017-11-06 RX ORDER — MAGNESIUM HYDROXIDE 1200 MG/15ML
LIQUID ORAL CONTINUOUS PRN
Status: DISCONTINUED | OUTPATIENT
Start: 2017-11-06 | End: 2017-11-06 | Stop reason: HOSPADM

## 2017-11-06 RX ORDER — ONDANSETRON 2 MG/ML
4 INJECTION INTRAMUSCULAR; INTRAVENOUS
Status: DISCONTINUED | OUTPATIENT
Start: 2017-11-06 | End: 2017-11-06 | Stop reason: HOSPADM

## 2017-11-06 RX ORDER — FENTANYL CITRATE 50 UG/ML
INJECTION, SOLUTION INTRAMUSCULAR; INTRAVENOUS PRN
Status: DISCONTINUED | OUTPATIENT
Start: 2017-11-06 | End: 2017-11-06 | Stop reason: SDUPTHER

## 2017-11-06 RX ORDER — HYDROCODONE BITARTRATE AND ACETAMINOPHEN 5; 325 MG/1; MG/1
1 TABLET ORAL PRN
Status: DISCONTINUED | OUTPATIENT
Start: 2017-11-06 | End: 2017-11-06 | Stop reason: HOSPADM

## 2017-11-06 RX ORDER — LIDOCAINE HYDROCHLORIDE 20 MG/ML
INJECTION, SOLUTION INFILTRATION; PERINEURAL PRN
Status: DISCONTINUED | OUTPATIENT
Start: 2017-11-06 | End: 2017-11-06 | Stop reason: SDUPTHER

## 2017-11-06 RX ORDER — OXYCODONE HYDROCHLORIDE AND ACETAMINOPHEN 5; 325 MG/1; MG/1
2 TABLET ORAL EVERY 4 HOURS PRN
Status: DISCONTINUED | OUTPATIENT
Start: 2017-11-06 | End: 2017-11-06 | Stop reason: HOSPADM

## 2017-11-06 RX ORDER — HYDROCODONE BITARTRATE AND ACETAMINOPHEN 5; 325 MG/1; MG/1
2 TABLET ORAL PRN
Status: DISCONTINUED | OUTPATIENT
Start: 2017-11-06 | End: 2017-11-06 | Stop reason: HOSPADM

## 2017-11-06 RX ORDER — LABETALOL HYDROCHLORIDE 5 MG/ML
INJECTION, SOLUTION INTRAVENOUS PRN
Status: DISCONTINUED | OUTPATIENT
Start: 2017-11-06 | End: 2017-11-06 | Stop reason: SDUPTHER

## 2017-11-06 RX ADMIN — Medication 3 G: at 09:50

## 2017-11-06 RX ADMIN — PROPOFOL 40 MG: 10 INJECTION, EMULSION INTRAVENOUS at 09:56

## 2017-11-06 RX ADMIN — MIDAZOLAM HYDROCHLORIDE 2 MG: 1 INJECTION, SOLUTION INTRAMUSCULAR; INTRAVENOUS at 09:49

## 2017-11-06 RX ADMIN — LIDOCAINE HYDROCHLORIDE 30 MG: 20 INJECTION, SOLUTION INFILTRATION; PERINEURAL at 09:56

## 2017-11-06 RX ADMIN — PROPOFOL 100 MCG/KG/MIN: 10 INJECTION, EMULSION INTRAVENOUS at 09:56

## 2017-11-06 RX ADMIN — LIDOCAINE HYDROCHLORIDE 0.1 ML: 10 INJECTION, SOLUTION EPIDURAL; INFILTRATION; INTRACAUDAL; PERINEURAL at 09:16

## 2017-11-06 RX ADMIN — DEXAMETHASONE SODIUM PHOSPHATE 4 MG: 10 INJECTION INTRAMUSCULAR; INTRAVENOUS at 10:10

## 2017-11-06 RX ADMIN — FENTANYL CITRATE 25 MCG: 50 INJECTION, SOLUTION INTRAMUSCULAR; INTRAVENOUS at 10:07

## 2017-11-06 RX ADMIN — ONDANSETRON 4 MG: 2 INJECTION INTRAMUSCULAR; INTRAVENOUS at 10:19

## 2017-11-06 RX ADMIN — GLYCOPYRROLATE 0.2 MG: 0.2 INJECTION INTRAMUSCULAR; INTRAVENOUS at 09:57

## 2017-11-06 RX ADMIN — FENTANYL CITRATE 25 MCG: 50 INJECTION, SOLUTION INTRAMUSCULAR; INTRAVENOUS at 10:00

## 2017-11-06 RX ADMIN — LABETALOL HYDROCHLORIDE 5 MG: 5 INJECTION, SOLUTION INTRAVENOUS at 10:04

## 2017-11-06 RX ADMIN — FENTANYL CITRATE 25 MCG: 50 INJECTION, SOLUTION INTRAMUSCULAR; INTRAVENOUS at 10:06

## 2017-11-06 RX ADMIN — FENTANYL CITRATE 25 MCG: 50 INJECTION, SOLUTION INTRAMUSCULAR; INTRAVENOUS at 10:05

## 2017-11-06 RX ADMIN — SODIUM CHLORIDE, POTASSIUM CHLORIDE, SODIUM LACTATE AND CALCIUM CHLORIDE: 600; 310; 30; 20 INJECTION, SOLUTION INTRAVENOUS at 09:16

## 2017-11-06 RX ADMIN — ALBUTEROL SULFATE 2.5 MG: 2.5 SOLUTION RESPIRATORY (INHALATION) at 10:57

## 2017-11-06 NOTE — H&P
History and physical note from 11/6/2017 was reviewed. The patient was re-evaluated and there are no changes to the exam or medical history. The procedure and expected postoperative course were discussed and reviewed with the patient. The risks and benefits were discussed and reviewed. The consent was reviewed and confirmed.     Debi Zamudio MD

## 2017-11-06 NOTE — ANESTHESIA PRE PROCEDURE
every 5 minutes as needed for Chest pain. 6/18/14   Camilo Fierro MD   ibuprofen (ADVIL;MOTRIN) 600 MG tablet Take 600 mg by mouth 2 times daily. Historical Provider, MD   colchicine (COLCRYS) 0.6 MG tablet Take 1 tablet by mouth 4 times daily as needed. 11/1/11 10/31/12  Ad Lucas MD       Current medications:    Current Facility-Administered Medications   Medication Dose Route Frequency Provider Last Rate Last Dose    lactated ringers infusion   Intravenous Continuous Omar Leyva, DO        lidocaine PF 1 % injection 1 mL  1 mL Intradermal Once PRN Walter Jacqueline, DO        sodium chloride flush 0.9 % injection 10 mL  10 mL Intravenous 2 times per day Walter Red House, DO        sodium chloride flush 0.9 % injection 10 mL  10 mL Intravenous PRN Walter Jacqueline, DO        ceFAZolin (ANCEF) 3 g in dextrose 5 % 100 mL IVPB  3 g Intravenous Once Po Snow MD           Allergies:     Allergies   Allergen Reactions    Ace Inhibitors      cough    Statins Depletion Therapy Other (See Comments)     OKAY WITH CRESTOR, weakness    Synvisc [Hylan G-F 20] Other (See Comments)     Swelling in leg       Problem List:    Patient Active Problem List   Diagnosis Code    Bradycardia R00.1    Hyperlipidemia E78.5    Osteoarthritis M19.90    Depression F32.9    HSV-2 (herpes simplex virus 2) infection B00.9    CAD (coronary artery disease) I25.10    RA [facility]heumatoid arthritis) ([facility]) M06.9    HTN (hypertension) I10    Numbness in feet R20.0    Stented coronary artery Z95.5    ACE-inhibitor cough R05, T46.4X5A    History of prostate cancer Z85.46    Acute pericarditis I30.9    Colon polyps K63.5    Retained orthopedic hardware Z96.9       Past Medical History:        Diagnosis Date    Bradycardia     CAD (coronary artery disease)     Cancer (HCC)     prostate- radiation     CHF (congestive heart failure) (HCC)     Depression     HSV-2 (herpes simplex virus 2) infection     Hyperlipidemia     Hypertension     Left knee DJD     Osteoarthritis     Rheumatoid arthritis(714.0)        Past Surgical History:        Procedure Laterality Date    ANKLE SURGERY Right     pin    APPENDECTOMY      ARTHRODESIS Right 10/31/2016    RIGHT ANKLE ARTHRODESIS OF RIGHT ANKLE AND SUBTALAR JOINT, C-ARM, ABHIJEET EQUIPMENT SYNTHETIC BONE GRAFT, ALLOGRAFT CANCELLOUS, SHARON ANKLE FUSION NAIL, SHANDA IMPLANT BONE STIMULATOR, CHOICE ANESTHESIA, BLOCK  performed by Jun Marcial MD at [place] [address]      stent x 5    COLONOSCOPY  7-19-11    FRACTURE SURGERY      right ankle    JOINT REPLACEMENT Bilateral     knees    [place] COLON CA SCRN NOT HI RSK IND N/A 7/11/2017    COLONOSCOPY performed by Milly Allen DO at [facility] Left     TONSILLECTOMY AND ADENOIDECTOMY         Social History:    Social History   Substance Use Topics    Smoking status: Former Smoker     Years: 2.00     Types: Cigarettes     Quit date: 6/3/1992    Smokeless tobacco: Never Used    Alcohol use 0.0 oz/week      Comment: weekly- 2 beers or wine                                Counseling given: Not Answered      Vital Signs (Current):   Vitals:    11/06/17 0837   BP: (!) 153/82   Pulse: (!) 46   Resp: 16   Temp: 97.8 °F (36.6 °C)   TempSrc: Temporal   SpO2: 95%   Weight: 290 lb (131.5 kg)   Height: 5' 11\" (1.803 m)                                              BP Readings from Last 3 Encounters:   11/06/17 (!) 153/82   10/30/17 (!) 155/75   07/11/17 (!) 159/68       NPO Status: Time of last liquid consumption: 0000                        Time of last solid consumption: 0000                        Date of last liquid consumption: 11/06/17                        Date of last solid food consumption: 11/06/17    BMI:   Wt Readings from Last 3 Encounters:   11/06/17 290 lb (131.5 kg)   10/30/17 294 lb (133.4 kg)   07/11/17 275 lb (124.7 kg)     Body mass index is 40.45 kg/m².     CBC:   Lab Results abnormality now evident in Lateral leads     Neuro/Psych:   (+) psychiatric history:            GI/Hepatic/Renal:             Endo/Other:    (+) : arthritis:. Pt had PAT visit. Abdominal:           Vascular:                                    Anesthesia Plan      MAC     ASA 3       Induction: intravenous. MIPS: Postoperative opioids intended and Prophylactic antiemetics administered. Anesthetic plan and risks discussed with patient. Plan discussed with CRNA.     Attending anesthesiologist reviewed and agrees with Pre Eval content              [address] [place] [address]   11/6/2017

## 2017-11-06 NOTE — OP NOTE
Orthopaedic Surgery Operative Note (CSN: [id number])  Date of Surgery: 11/6/2017  Admit Date: 11/6/2017      Preoperative Diagnosis: Retained orthopaedic hardware, right ankle    Postoperative Diagnosis: Same    Procedure: Right ankle hardware removal, deep     Surgeon: Zena Montemayor MD    Assistant: Roshni Hodge PA-C    Anesthesia: Monitor Anesthesia Care and local     Estimated Blood Loss: 2 mL    Complications: None known    Specimens: None    Indications for Surgery: Fanny Holt is a 67 y.o. male who had undergone a fusion of the tibiotalocalcaneal complex with a nail. One of the screws had backed out of the nail and was becoming irritating at the skin. He requested removal of the screw due to the soft tissue irritation. The benefits and risks of operative and nonoperative management were discussed prior to surgery. Risks of the procedure including but not limited to the risk of infection, bleeding, injury to nerves, tendons, and surrounding structures, and anesthesia risks were discussed. Additional risks of persistent pain, hardware failure, need for further surgery, and loss of life or limb were also discussed. The patient indicated an understanding of these risks, benefits, alternatives, and possible complications and consented to the procedure. Surgical Technique:    The patient was identified in the preoperative holding area where the surgical site was marked with indelible marker. The patient was taken to the OR and transferred to the operating table. Preoperative antibiotics were dosed and given. MAC anesthesia was administered. A well-padded pneumatic tourniquet was placed about the patient's right lower extremity but it was not used during the case. The patient's right lower extremity was prepped and draped in the usual sterile fashion. A preoperative timeout was called and the correct patient, correct procedure, correct operative site, and correct surgeon were confirmed by all present. The screw was palpable under the skin and this area was injected with 1% Lidocaine. Incision was made over the screw head and soft tissue was cleared. The screw was removed intact. Fluoroscopy confirmed complete removal of the screw. The wound was copiously irrigated with normal saline. 4-0 nylon was used to close the skin. A sterile dressing of Xeroform gauze, sterile gauze, and kerlix was applied. The foot and ankle were wrapped with an Ace wrap. All counts were determined to be correct. Anesthesia was reversed and the patient brought to the PACU in stable condition having tolerated the procedure well. Manolo Osei PA-C was present throughout the procedure and was integral in patient positioning and draping, limb manipulation and surgical assisting, and wound closure. Post-op Plan/Instructions: The patient will be discharged as an outpatient once adequate pain control is acheived. Weight-bearing as tolerated with the operative extremity. Follow-up in the office in 10-14 days for wound check and suture removal if appropriate. No X-rays needed unless clinically indicated.      Gus Finley   Date: 11/6/2017  Time: 10:38 AM

## 2018-02-26 ENCOUNTER — TELEPHONE (OUTPATIENT)
Dept: UROLOGY | Age: 73
End: 2018-02-26

## 2018-02-26 DIAGNOSIS — Z85.46 HISTORY OF PROSTATE CANCER: ICD-10-CM

## 2018-02-26 DIAGNOSIS — Z85.46 HISTORY OF PROSTATE CANCER: Primary | ICD-10-CM

## 2018-02-26 LAB — PROSTATE SPECIFIC ANTIGEN: <0.01 NG/ML (ref 0–6.22)

## 2018-03-05 ENCOUNTER — OFFICE VISIT (OUTPATIENT)
Dept: UROLOGY | Age: 73
End: 2018-03-05
Payer: MEDICARE

## 2018-03-05 VITALS
HEART RATE: 48 BPM | BODY MASS INDEX: 42 KG/M2 | DIASTOLIC BLOOD PRESSURE: 86 MMHG | HEIGHT: 71 IN | WEIGHT: 300 LBS | SYSTOLIC BLOOD PRESSURE: 144 MMHG

## 2018-03-05 DIAGNOSIS — N40.1 BPH WITH OBSTRUCTION/LOWER URINARY TRACT SYMPTOMS: Primary | ICD-10-CM

## 2018-03-05 DIAGNOSIS — Z85.46 H/O PROSTATE CANCER: ICD-10-CM

## 2018-03-05 DIAGNOSIS — N13.8 BPH WITH OBSTRUCTION/LOWER URINARY TRACT SYMPTOMS: Primary | ICD-10-CM

## 2018-03-05 PROCEDURE — 99213 OFFICE O/P EST LOW 20 MIN: CPT | Performed by: UROLOGY

## 2018-03-05 RX ORDER — TAMSULOSIN HYDROCHLORIDE 0.4 MG/1
0.4 CAPSULE ORAL DAILY
Qty: 90 CAPSULE | Refills: 0 | Status: SHIPPED | OUTPATIENT
Start: 2018-03-05 | End: 2018-06-09 | Stop reason: SDUPTHER

## 2018-03-05 ASSESSMENT — ENCOUNTER SYMPTOMS
ABDOMINAL PAIN: 0
SHORTNESS OF BREATH: 0

## 2018-03-05 NOTE — PROGRESS NOTES
Subjective:      Patient ID: Polina Diaz is a 67 y.o. male. HPI  this is a 68 yo white male with CAD, HTN, AFib, BPH w/LUTs on Flomax and [place] 6 prostate cancer s/p seed implant 10/09 at [facility] back in follow-up. Since last seen on 2/24/17, he has occasional frequency, but no urgency or UI. He denies dysuria or hematuria. He continues on Flomax daily. He has a good FOS and no pain. He denies UTIs and has no GI complaints. He has no bone pains or wt loss. He has no new medical problems. I reviewed his interval PSA.        Past Medical History:   Diagnosis Date    Bradycardia     CAD (coronary artery disease)     Cancer (HCC)     prostate- radiation     CHF (congestive heart failure) (HCC)     Depression     HSV-2 (herpes simplex virus 2) infection     Hyperlipidemia     Hypertension     Left knee DJD     Osteoarthritis     Rheumatoid arthritis(714.0)      Past Surgical History:   Procedure Laterality Date    ANKLE SURGERY Right     pin    APPENDECTOMY      ARTHRODESIS Right 10/31/2016    RIGHT ANKLE ARTHRODESIS OF RIGHT ANKLE AND SUBTALAR JOINT, C-ARM, ABHIJEET EQUIPMENT SYNTHETIC BONE GRAFT, ALLOGRAFT CANCELLOUS, SHARON ANKLE FUSION NAIL, SHANDA IMPLANT BONE STIMULATOR, CHOICE ANESTHESIA, BLOCK  performed by Justus G[facility]mez MD at [facility]      stent x 5    COLONOSCOPY  7-19-11    FRACTURE SURGERY      right ankle    HARDWARE REMOVAL FOOT / ANKLE Right 11/6/2017    RIGHT ANKLE HARDWARE REMOVAL performed by Bertrand Hopson MD at [address] [place] [address] Bilateral     knees    [place] COLON CA SCRN NOT  [address] IND N/A 7/11/2017    COLONOSCOPY performed by Nancy Clement DO at [facility] Left     TONSILLECTOMY AND ADENOIDECTOMY       Social History     Social History    Marital status:      Spouse name: N/A    Number of children: N/A    Years of education: N/A     Social History Main Topics    Smoking status: Former Smoker     Years: 2.00 Types: Cigarettes     Quit date: 6/3/1992    Smokeless tobacco: Never Used    Alcohol use 0.0 oz/week      Comment: weekly- 2 beers or wine    Drug use: No    Sexual activity: Not Asked     Other Topics Concern    None     Social History Narrative    None     Family History   Problem Relation Age of Onset    Heart Attack Father     Cancer Father      colon    High Cholesterol Mother     Heart Disease Mother     Macular Degen Mother     Arthritis Sister      hip replacement    Other Brother      vertigo    No Known Problems Son     No Known Problems Son      Current Outpatient Prescriptions   Medication Sig Dispense Refill    acetaminophen (TYLENOL) 500 MG tablet Take 1,000 mg by mouth every 6 hours as needed for Pain      fluticasone (FLONASE) 50 MCG/ACT nasal spray 2 sprays by Nasal route daily 1 Bottle 0    cetirizine (ZYRTEC) 10 MG tablet Take 1 tablet by mouth daily 30 tablet 0    KLOR-CON M20 20 MEQ extended release tablet       pantoprazole (PROTONIX) 40 MG tablet       sildenafil (VIAGRA) 50 MG tablet Take 1 tablet by mouth as needed for Erectile Dysfunction 18 tablet 3    predniSONE (DELTASONE) 5 MG tablet Take 0.5 tablets by mouth daily 15 tablet 0    sotalol (BETAPACE) 80 MG tablet Take 0.5 tablets by mouth every 12 hours 60 tablet 3    LORazepam (ATIVAN) 0.5 MG tablet Take 0.5 mg by mouth 2 times daily as needed for Anxiety      naproxen-diphenhydramine (ALEVE PM) 220-25 MG TABS Take 2 tablets by mouth nightly as needed      aspirin 81 MG tablet Take 81 mg by mouth daily      furosemide (LASIX) 20 MG tablet nightly       nitroGLYCERIN (NITROSTAT) 0.4 MG SL tablet Place 1 tablet under the tongue every 5 minutes as needed for Chest pain. 25 tablet 1    tamsulosin (FLOMAX) 0.4 MG capsule Take 1 capsule by mouth daily. 90 capsule 3    rosuvastatin (CRESTOR) 20 MG tablet Take 1 tablet by mouth daily.  90 tablet 3    ibuprofen (ADVIL;MOTRIN) 600 MG tablet Take 600 mg by mouth 2

## 2018-06-09 DIAGNOSIS — N13.8 BPH WITH OBSTRUCTION/LOWER URINARY TRACT SYMPTOMS: ICD-10-CM

## 2018-06-09 DIAGNOSIS — N40.1 BPH WITH OBSTRUCTION/LOWER URINARY TRACT SYMPTOMS: ICD-10-CM

## 2018-06-12 RX ORDER — TAMSULOSIN HYDROCHLORIDE 0.4 MG/1
0.4 CAPSULE ORAL DAILY
Qty: 90 CAPSULE | Refills: 3 | Status: SHIPPED | OUTPATIENT
Start: 2018-06-12 | End: 2019-04-10

## 2019-01-10 ENCOUNTER — OFFICE VISIT (OUTPATIENT)
Dept: FAMILY MEDICINE CLINIC | Age: 74
End: 2019-01-10
Payer: MEDICARE

## 2019-01-10 VITALS
DIASTOLIC BLOOD PRESSURE: 70 MMHG | SYSTOLIC BLOOD PRESSURE: 110 MMHG | BODY MASS INDEX: 40.6 KG/M2 | HEIGHT: 71 IN | OXYGEN SATURATION: 97 % | TEMPERATURE: 97.7 F | WEIGHT: 290 LBS | RESPIRATION RATE: 12 BRPM | HEART RATE: 52 BPM

## 2019-01-10 DIAGNOSIS — R80.9 TYPE 2 DIABETES MELLITUS WITH MICROALBUMINURIA, WITHOUT LONG-TERM CURRENT USE OF INSULIN (HCC): ICD-10-CM

## 2019-01-10 DIAGNOSIS — I25.10 CORONARY ARTERY DISEASE INVOLVING NATIVE CORONARY ARTERY OF NATIVE HEART WITHOUT ANGINA PECTORIS: ICD-10-CM

## 2019-01-10 DIAGNOSIS — E55.9 VITAMIN D DEFICIENCY: ICD-10-CM

## 2019-01-10 DIAGNOSIS — E79.0 HYPERURICEMIA: ICD-10-CM

## 2019-01-10 DIAGNOSIS — I10 ESSENTIAL HYPERTENSION: Primary | ICD-10-CM

## 2019-01-10 DIAGNOSIS — E78.2 MIXED HYPERLIPIDEMIA: ICD-10-CM

## 2019-01-10 DIAGNOSIS — E11.29 TYPE 2 DIABETES MELLITUS WITH MICROALBUMINURIA, WITHOUT LONG-TERM CURRENT USE OF INSULIN (HCC): ICD-10-CM

## 2019-01-10 DIAGNOSIS — K29.50 CHRONIC GASTRITIS WITHOUT BLEEDING, UNSPECIFIED GASTRITIS TYPE: ICD-10-CM

## 2019-01-10 DIAGNOSIS — Z86.010 HISTORY OF COLON POLYPS: ICD-10-CM

## 2019-01-10 PROBLEM — E11.9 TYPE 2 DIABETES MELLITUS WITHOUT COMPLICATION, WITHOUT LONG-TERM CURRENT USE OF INSULIN (HCC): Status: ACTIVE | Noted: 2019-01-10

## 2019-01-10 PROCEDURE — 99203 OFFICE O/P NEW LOW 30 MIN: CPT | Performed by: FAMILY MEDICINE

## 2019-01-10 RX ORDER — TELMISARTAN 40 MG/1
40 TABLET ORAL DAILY
COMMUNITY
End: 2019-04-10

## 2019-01-10 RX ORDER — AMLODIPINE BESYLATE 5 MG/1
5 TABLET ORAL DAILY
Status: ON HOLD | COMMUNITY
End: 2021-12-15 | Stop reason: HOSPADM

## 2019-01-10 RX ORDER — MELOXICAM 15 MG/1
15 TABLET ORAL EVERY EVENING
COMMUNITY
End: 2022-08-15 | Stop reason: ALTCHOICE

## 2019-01-10 RX ORDER — ALLOPURINOL 100 MG/1
100 TABLET ORAL EVERY EVENING
COMMUNITY
End: 2022-08-15 | Stop reason: ALTCHOICE

## 2019-01-10 RX ORDER — FOLIC ACID 1 MG/1
1 TABLET ORAL DAILY
COMMUNITY
End: 2022-08-15 | Stop reason: ALTCHOICE

## 2019-01-10 RX ORDER — VALSARTAN AND HYDROCHLOROTHIAZIDE 160; 12.5 MG/1; MG/1
1 TABLET, FILM COATED ORAL DAILY
COMMUNITY
End: 2019-04-10

## 2019-01-10 ASSESSMENT — ENCOUNTER SYMPTOMS
RESPIRATORY NEGATIVE: 1
GASTROINTESTINAL NEGATIVE: 1
ALLERGIC/IMMUNOLOGIC NEGATIVE: 1
EYES NEGATIVE: 1

## 2019-01-10 ASSESSMENT — PATIENT HEALTH QUESTIONNAIRE - PHQ9
SUM OF ALL RESPONSES TO PHQ QUESTIONS 1-9: 0
2. FEELING DOWN, DEPRESSED OR HOPELESS: 0
SUM OF ALL RESPONSES TO PHQ9 QUESTIONS 1 & 2: 0
SUM OF ALL RESPONSES TO PHQ QUESTIONS 1-9: 0
DEPRESSION UNABLE TO ASSESS: FUNCTIONAL CAPACITY MOTIVATION LIMITS ACCURACY
1. LITTLE INTEREST OR PLEASURE IN DOING THINGS: 0

## 2019-01-16 RX ORDER — AZITHROMYCIN 250 MG/1
250 TABLET, FILM COATED ORAL SEE ADMIN INSTRUCTIONS
Qty: 6 TABLET | Refills: 0 | Status: SHIPPED | OUTPATIENT
Start: 2019-01-16 | End: 2019-01-21

## 2019-02-27 ENCOUNTER — TELEPHONE (OUTPATIENT)
Dept: FAMILY MEDICINE CLINIC | Age: 74
End: 2019-02-27

## 2019-03-04 DIAGNOSIS — Z85.46 H/O PROSTATE CANCER: ICD-10-CM

## 2019-03-04 LAB — PROSTATE SPECIFIC ANTIGEN: <0.01 NG/ML (ref 0–6.22)

## 2019-03-07 ENCOUNTER — OFFICE VISIT (OUTPATIENT)
Dept: UROLOGY | Age: 74
End: 2019-03-07
Payer: MEDICARE

## 2019-03-07 ENCOUNTER — TELEPHONE (OUTPATIENT)
Dept: FAMILY MEDICINE CLINIC | Age: 74
End: 2019-03-07

## 2019-03-07 VITALS
SYSTOLIC BLOOD PRESSURE: 132 MMHG | WEIGHT: 295 LBS | BODY MASS INDEX: 41.3 KG/M2 | HEART RATE: 60 BPM | DIASTOLIC BLOOD PRESSURE: 86 MMHG | HEIGHT: 71 IN

## 2019-03-07 DIAGNOSIS — N52.9 ERECTILE DYSFUNCTION, UNSPECIFIED ERECTILE DYSFUNCTION TYPE: ICD-10-CM

## 2019-03-07 DIAGNOSIS — Z85.46 H/O PROSTATE CANCER: Primary | ICD-10-CM

## 2019-03-07 PROCEDURE — 99213 OFFICE O/P EST LOW 20 MIN: CPT | Performed by: UROLOGY

## 2019-03-07 RX ORDER — TAMSULOSIN HYDROCHLORIDE 0.4 MG/1
0.4 CAPSULE ORAL DAILY
Qty: 90 CAPSULE | Refills: 3 | Status: SHIPPED | OUTPATIENT
Start: 2019-03-07

## 2019-03-07 RX ORDER — SILDENAFIL 100 MG/1
100 TABLET, FILM COATED ORAL PRN
Qty: 6 TABLET | Refills: 3 | Status: ON HOLD | OUTPATIENT
Start: 2019-03-07 | End: 2021-12-15 | Stop reason: HOSPADM

## 2019-03-07 ASSESSMENT — ENCOUNTER SYMPTOMS
SHORTNESS OF BREATH: 0
ABDOMINAL DISTENTION: 0
ABDOMINAL PAIN: 0

## 2019-04-10 ENCOUNTER — OFFICE VISIT (OUTPATIENT)
Dept: FAMILY MEDICINE CLINIC | Age: 74
End: 2019-04-10
Payer: MEDICARE

## 2019-04-10 VITALS
WEIGHT: 301.8 LBS | HEIGHT: 71 IN | HEART RATE: 62 BPM | RESPIRATION RATE: 14 BRPM | DIASTOLIC BLOOD PRESSURE: 72 MMHG | TEMPERATURE: 96.5 F | SYSTOLIC BLOOD PRESSURE: 168 MMHG | BODY MASS INDEX: 42.25 KG/M2 | OXYGEN SATURATION: 98 %

## 2019-04-10 DIAGNOSIS — I25.10 CORONARY ARTERY DISEASE INVOLVING NATIVE CORONARY ARTERY OF NATIVE HEART WITHOUT ANGINA PECTORIS: ICD-10-CM

## 2019-04-10 DIAGNOSIS — M25.571 CHRONIC PAIN OF RIGHT ANKLE: ICD-10-CM

## 2019-04-10 DIAGNOSIS — R80.9 TYPE 2 DIABETES MELLITUS WITH MICROALBUMINURIA, WITHOUT LONG-TERM CURRENT USE OF INSULIN (HCC): ICD-10-CM

## 2019-04-10 DIAGNOSIS — E11.29 TYPE 2 DIABETES MELLITUS WITH MICROALBUMINURIA, WITHOUT LONG-TERM CURRENT USE OF INSULIN (HCC): ICD-10-CM

## 2019-04-10 DIAGNOSIS — I10 ESSENTIAL HYPERTENSION: Primary | ICD-10-CM

## 2019-04-10 DIAGNOSIS — G89.29 CHRONIC PAIN OF RIGHT ANKLE: ICD-10-CM

## 2019-04-10 LAB — HBA1C MFR BLD: 6.2 % (ref 4.8–5.9)

## 2019-04-10 PROCEDURE — 99214 OFFICE O/P EST MOD 30 MIN: CPT | Performed by: FAMILY MEDICINE

## 2019-04-10 RX ORDER — KETOROLAC TROMETHAMINE 4 MG/ML
SOLUTION/ DROPS OPHTHALMIC
COMMUNITY
Start: 2019-04-08 | End: 2019-04-10

## 2019-04-10 RX ORDER — HYDRALAZINE HYDROCHLORIDE 50 MG/1
50 TABLET, FILM COATED ORAL NIGHTLY
COMMUNITY
End: 2022-08-15 | Stop reason: ALTCHOICE

## 2019-04-10 RX ORDER — KETOROLAC TROMETHAMINE 4 MG/ML
1 SOLUTION/ DROPS OPHTHALMIC
COMMUNITY
Start: 2019-04-07 | End: 2019-04-10

## 2019-04-10 ASSESSMENT — ENCOUNTER SYMPTOMS
ABDOMINAL PAIN: 0
SHORTNESS OF BREATH: 0

## 2019-04-10 NOTE — PROGRESS NOTES
Packs/day: 0.25     Years: 7.00     Pack years: 1.75     Types: Cigarettes     Start date: 5     Last attempt to quit: 6/3/1992     Years since quittin.8    Smokeless tobacco: Never Used   Substance and Sexual Activity    Alcohol use:  Yes     Alcohol/week: 0.0 oz     Comment: weekly- 2 beers or wine    Drug use: No    Sexual activity: None   Lifestyle    Physical activity:     Days per week: None     Minutes per session: None    Stress: None   Relationships    Social connections:     Talks on phone: None     Gets together: None     Attends Christianity service: None     Active member of club or organization: None     Attends meetings of clubs or organizations: None     Relationship status: None    Intimate partner violence:     Fear of current or ex partner: None     Emotionally abused: None     Physically abused: None     Forced sexual activity: None   Other Topics Concern    None   Social History Narrative    None     Current Outpatient Medications   Medication Sig Dispense Refill    tamsulosin (FLOMAX) 0.4 MG capsule Take 1 capsule by mouth daily 90 capsule 3    sildenafil (VIAGRA) 100 MG tablet Take 1 tablet by mouth as needed for Erectile Dysfunction 6 tablet 3    folic acid (FOLVITE) 1 MG tablet Take 1 mg by mouth daily      amLODIPine (NORVASC) 5 MG tablet Take 5 mg by mouth daily      telmisartan (MICARDIS) 40 MG tablet Take 40 mg by mouth daily      valsartan-hydrochlorothiazide (DIOVAN HCT) 160-12.5 MG per tablet Take 1 tablet by mouth daily      meloxicam (MOBIC) 15 MG tablet Take 15 mg by mouth daily      etanercept (ENBREL) 25 MG/0.5ML SOSY Inject 40 mg into the skin once a week      allopurinol (ZYLOPRIM) 100 MG tablet Take 100 mg by mouth daily      acetaminophen (TYLENOL) 500 MG tablet Take 1,000 mg by mouth every 6 hours as needed for Pain      pantoprazole (PROTONIX) 40 MG tablet Take 40 mg by mouth daily       aspirin 81 MG tablet Take 81 mg by mouth daily      artery of native heart without angina pectoris     4.  Chronic pain of right ankle  External Referral - Rubin Woods MD         Plan:    increase hydralazine to 50 mg bid   Orders Placed This Encounter   Procedures    Hemoglobin A1C     Standing Status:   Future     Standing Expiration Date:   4/10/2020    External Referral - Rubin Woods MD     Referral Priority:   Routine     Referral Type:   Eval and Treat     Referral Reason:   Specialty Services Required     Referred to Provider:   Bonifacio Nava MD     Requested Specialty:   Orthopedic Surgery     Number of Visits Requested:   1   f/u in 2 weeks

## 2019-04-24 ENCOUNTER — TELEPHONE (OUTPATIENT)
Dept: FAMILY MEDICINE CLINIC | Age: 74
End: 2019-04-24

## 2019-04-24 ENCOUNTER — OFFICE VISIT (OUTPATIENT)
Dept: FAMILY MEDICINE CLINIC | Age: 74
End: 2019-04-24
Payer: MEDICARE

## 2019-04-24 VITALS
TEMPERATURE: 98 F | OXYGEN SATURATION: 98 % | HEIGHT: 71 IN | BODY MASS INDEX: 43.12 KG/M2 | WEIGHT: 308 LBS | HEART RATE: 67 BPM | DIASTOLIC BLOOD PRESSURE: 68 MMHG | SYSTOLIC BLOOD PRESSURE: 144 MMHG

## 2019-04-24 DIAGNOSIS — E11.29 TYPE 2 DIABETES MELLITUS WITH MICROALBUMINURIA, WITHOUT LONG-TERM CURRENT USE OF INSULIN (HCC): ICD-10-CM

## 2019-04-24 DIAGNOSIS — I10 ESSENTIAL HYPERTENSION: Primary | ICD-10-CM

## 2019-04-24 DIAGNOSIS — R80.9 TYPE 2 DIABETES MELLITUS WITH MICROALBUMINURIA, WITHOUT LONG-TERM CURRENT USE OF INSULIN (HCC): ICD-10-CM

## 2019-04-24 DIAGNOSIS — Z99.89 OSA ON CPAP: ICD-10-CM

## 2019-04-24 DIAGNOSIS — G47.33 OSA ON CPAP: ICD-10-CM

## 2019-04-24 PROCEDURE — 99213 OFFICE O/P EST LOW 20 MIN: CPT | Performed by: FAMILY MEDICINE

## 2019-04-24 ASSESSMENT — ENCOUNTER SYMPTOMS: SHORTNESS OF BREATH: 0

## 2019-04-24 NOTE — PROGRESS NOTES
Subjective:      Patient ID: Pama Burkitt is a 68 y.o. male    HPI  Here in follow up for htn. Tolerating increased dose of hydralazine since last visit. Using cpap of vickie and benefiting from using this -does need new supplies    Review of Systems   Respiratory: Negative for shortness of breath. Skin: Negative for rash. Neurological: Negative for dizziness and weakness. Reviewed allergy, medical, social, surgical, family and med list changes and updated   Files--reviewed blood work which was acceptable      Social History     Socioeconomic History    Marital status:      Spouse name: None    Number of children: None    Years of education: None    Highest education level: None   Occupational History    None   Social Needs    Financial resource strain: None    Food insecurity:     Worry: None     Inability: None    Transportation needs:     Medical: None     Non-medical: None   Tobacco Use    Smoking status: Former Smoker     Packs/day: 0.25     Years: 7.00     Pack years: 1.75     Types: Cigarettes     Start date:      Last attempt to quit: 6/3/1992     Years since quittin.9    Smokeless tobacco: Never Used   Substance and Sexual Activity    Alcohol use:  Yes     Alcohol/week: 0.0 oz     Comment: weekly- 2 beers or wine    Drug use: No    Sexual activity: None   Lifestyle    Physical activity:     Days per week: None     Minutes per session: None    Stress: None   Relationships    Social connections:     Talks on phone: None     Gets together: None     Attends Christianity service: None     Active member of club or organization: None     Attends meetings of clubs or organizations: None     Relationship status: None    Intimate partner violence:     Fear of current or ex partner: None     Emotionally abused: None     Physically abused: None     Forced sexual activity: None   Other Topics Concern    None   Social History Narrative    None     Current Outpatient

## 2019-07-31 ENCOUNTER — TELEPHONE (OUTPATIENT)
Dept: FAMILY MEDICINE CLINIC | Age: 74
End: 2019-07-31

## 2019-07-31 DIAGNOSIS — E78.2 MIXED HYPERLIPIDEMIA: ICD-10-CM

## 2019-07-31 DIAGNOSIS — E11.29 TYPE 2 DIABETES MELLITUS WITH MICROALBUMINURIA, WITHOUT LONG-TERM CURRENT USE OF INSULIN (HCC): Primary | ICD-10-CM

## 2019-07-31 DIAGNOSIS — R80.9 TYPE 2 DIABETES MELLITUS WITH MICROALBUMINURIA, WITHOUT LONG-TERM CURRENT USE OF INSULIN (HCC): Primary | ICD-10-CM

## 2019-07-31 NOTE — TELEPHONE ENCOUNTER
Pt called office asking what blood work Dr Noe Serna would like to order prior to his appt.    Pt's f/u scheduled 8/8/2019

## 2019-08-02 DIAGNOSIS — E78.2 MIXED HYPERLIPIDEMIA: ICD-10-CM

## 2019-08-02 DIAGNOSIS — R80.9 TYPE 2 DIABETES MELLITUS WITH MICROALBUMINURIA, WITHOUT LONG-TERM CURRENT USE OF INSULIN (HCC): ICD-10-CM

## 2019-08-02 DIAGNOSIS — E11.29 TYPE 2 DIABETES MELLITUS WITH MICROALBUMINURIA, WITHOUT LONG-TERM CURRENT USE OF INSULIN (HCC): ICD-10-CM

## 2019-08-02 LAB
ALBUMIN SERPL-MCNC: 4.4 G/DL (ref 3.5–4.6)
ALP BLD-CCNC: 106 U/L (ref 35–104)
ALT SERPL-CCNC: 32 U/L (ref 0–41)
ANION GAP SERPL CALCULATED.3IONS-SCNC: 15 MEQ/L (ref 9–15)
AST SERPL-CCNC: 27 U/L (ref 0–40)
BASOPHILS ABSOLUTE: 0.1 K/UL (ref 0–0.2)
BASOPHILS RELATIVE PERCENT: 0.8 %
BILIRUB SERPL-MCNC: 0.7 MG/DL (ref 0.2–0.7)
BUN BLDV-MCNC: 22 MG/DL (ref 8–23)
CALCIUM SERPL-MCNC: 9.1 MG/DL (ref 8.5–9.9)
CHLORIDE BLD-SCNC: 103 MEQ/L (ref 95–107)
CHOLESTEROL, TOTAL: 160 MG/DL (ref 0–199)
CO2: 26 MEQ/L (ref 20–31)
CREAT SERPL-MCNC: 0.9 MG/DL (ref 0.7–1.2)
EOSINOPHILS ABSOLUTE: 0.2 K/UL (ref 0–0.7)
EOSINOPHILS RELATIVE PERCENT: 2.5 %
GFR AFRICAN AMERICAN: >60
GFR NON-AFRICAN AMERICAN: >60
GLOBULIN: 2.6 G/DL (ref 2.3–3.5)
GLUCOSE BLD-MCNC: 121 MG/DL (ref 70–99)
HBA1C MFR BLD: 6.5 % (ref 4.8–5.9)
HCT VFR BLD CALC: 40.8 % (ref 42–52)
HDLC SERPL-MCNC: 50 MG/DL (ref 40–59)
HEMOGLOBIN: 14.2 G/DL (ref 14–18)
LDL CHOLESTEROL CALCULATED: 82 MG/DL (ref 0–129)
LYMPHOCYTES ABSOLUTE: 1.2 K/UL (ref 1–4.8)
LYMPHOCYTES RELATIVE PERCENT: 16.9 %
MCH RBC QN AUTO: 34 PG (ref 27–31.3)
MCHC RBC AUTO-ENTMCNC: 34.9 % (ref 33–37)
MCV RBC AUTO: 97.5 FL (ref 80–100)
MONOCYTES ABSOLUTE: 0.6 K/UL (ref 0.2–0.8)
MONOCYTES RELATIVE PERCENT: 9.1 %
NEUTROPHILS ABSOLUTE: 4.9 K/UL (ref 1.4–6.5)
NEUTROPHILS RELATIVE PERCENT: 70.7 %
PDW BLD-RTO: 14.8 % (ref 11.5–14.5)
PLATELET # BLD: 231 K/UL (ref 130–400)
POTASSIUM SERPL-SCNC: 4.2 MEQ/L (ref 3.4–4.9)
RBC # BLD: 4.19 M/UL (ref 4.7–6.1)
SODIUM BLD-SCNC: 144 MEQ/L (ref 135–144)
TOTAL PROTEIN: 7 G/DL (ref 6.3–8)
TRIGL SERPL-MCNC: 140 MG/DL (ref 0–150)
WBC # BLD: 6.9 K/UL (ref 4.8–10.8)

## 2019-08-08 ENCOUNTER — OFFICE VISIT (OUTPATIENT)
Dept: FAMILY MEDICINE CLINIC | Age: 74
End: 2019-08-08
Payer: MEDICARE

## 2019-08-08 VITALS
SYSTOLIC BLOOD PRESSURE: 128 MMHG | HEART RATE: 56 BPM | RESPIRATION RATE: 14 BRPM | DIASTOLIC BLOOD PRESSURE: 72 MMHG | TEMPERATURE: 97.2 F | BODY MASS INDEX: 42.98 KG/M2 | WEIGHT: 307 LBS | OXYGEN SATURATION: 98 % | HEIGHT: 71 IN

## 2019-08-08 DIAGNOSIS — E11.29 TYPE 2 DIABETES MELLITUS WITH MICROALBUMINURIA, WITHOUT LONG-TERM CURRENT USE OF INSULIN (HCC): ICD-10-CM

## 2019-08-08 DIAGNOSIS — I10 ESSENTIAL HYPERTENSION: ICD-10-CM

## 2019-08-08 DIAGNOSIS — E78.2 MIXED HYPERLIPIDEMIA: ICD-10-CM

## 2019-08-08 DIAGNOSIS — E11.29 TYPE 2 DIABETES MELLITUS WITH MICROALBUMINURIA, WITHOUT LONG-TERM CURRENT USE OF INSULIN (HCC): Primary | ICD-10-CM

## 2019-08-08 DIAGNOSIS — R80.9 TYPE 2 DIABETES MELLITUS WITH MICROALBUMINURIA, WITHOUT LONG-TERM CURRENT USE OF INSULIN (HCC): ICD-10-CM

## 2019-08-08 DIAGNOSIS — R80.9 TYPE 2 DIABETES MELLITUS WITH MICROALBUMINURIA, WITHOUT LONG-TERM CURRENT USE OF INSULIN (HCC): Primary | ICD-10-CM

## 2019-08-08 LAB
CREATININE URINE: 56.9 MG/DL
MICROALBUMIN UR-MCNC: 16.6 MG/DL
MICROALBUMIN/CREAT UR-RTO: 291.7 MG/G (ref 0–30)

## 2019-08-08 PROCEDURE — 99214 OFFICE O/P EST MOD 30 MIN: CPT | Performed by: FAMILY MEDICINE

## 2019-08-08 RX ORDER — PREDNISOLONE ACETATE 10 MG/ML
SUSPENSION/ DROPS OPHTHALMIC
COMMUNITY
Start: 2019-05-15 | End: 2021-12-20

## 2019-08-08 RX ORDER — ROSUVASTATIN CALCIUM 20 MG/1
20 TABLET, COATED ORAL
Status: ON HOLD | COMMUNITY
End: 2021-12-15 | Stop reason: HOSPADM

## 2019-08-08 RX ORDER — HYDRALAZINE HYDROCHLORIDE 50 MG/1
50 TABLET, FILM COATED ORAL
COMMUNITY
Start: 2019-05-26 | End: 2019-08-08

## 2019-08-08 RX ORDER — KETOROLAC TROMETHAMINE 4 MG/ML
1 SOLUTION/ DROPS OPHTHALMIC 2 TIMES DAILY
Refills: 1 | COMMUNITY
Start: 2019-05-19

## 2019-08-08 ASSESSMENT — ENCOUNTER SYMPTOMS
ABDOMINAL PAIN: 0
SHORTNESS OF BREATH: 0

## 2019-08-08 NOTE — PROGRESS NOTES
Assessment:       Diagnosis Orders   1. Type 2 diabetes mellitus with microalbuminuria, without long-term current use of insulin ([facility] [address]     2. Essential hypertension     3.  Mixed hyperlipidemia           Plan:       Orders Placed This Encounter   Procedures    Lipid Panel     Standing Status:   Future     Standing Expiration Date:   8/8/2020     Order Specific Question:   Is Patient Fasting?/# of Hours     Answer:   9    Hemoglobin A1C     Standing Status:   Future     Standing Expiration Date:   8/8/2020    Microalbumin / Creatinine Urine Ratio     Standing Status:   Future     Standing Expiration Date:   8/8/2020   continue current medications as he does not wish to increase crestor   Fasting blood work in 3 months and f/u after done

## 2019-08-31 ENCOUNTER — OFFICE VISIT (OUTPATIENT)
Dept: FAMILY MEDICINE CLINIC | Age: 74
End: 2019-08-31
Payer: MEDICARE

## 2019-08-31 VITALS
BODY MASS INDEX: 42.14 KG/M2 | TEMPERATURE: 97.2 F | DIASTOLIC BLOOD PRESSURE: 82 MMHG | SYSTOLIC BLOOD PRESSURE: 154 MMHG | HEART RATE: 60 BPM | OXYGEN SATURATION: 97 % | HEIGHT: 71 IN | WEIGHT: 301 LBS | RESPIRATION RATE: 15 BRPM

## 2019-08-31 DIAGNOSIS — T78.40XA ALLERGIC REACTION, INITIAL ENCOUNTER: ICD-10-CM

## 2019-08-31 DIAGNOSIS — T63.461A YELLOW JACKET STING, ACCIDENTAL OR UNINTENTIONAL, INITIAL ENCOUNTER: Primary | ICD-10-CM

## 2019-08-31 PROCEDURE — 96372 THER/PROPH/DIAG INJ SC/IM: CPT | Performed by: INTERNAL MEDICINE

## 2019-08-31 PROCEDURE — 99214 OFFICE O/P EST MOD 30 MIN: CPT | Performed by: INTERNAL MEDICINE

## 2019-08-31 RX ORDER — PREDNISONE 10 MG/1
TABLET ORAL
Qty: 16 TABLET | Refills: 0 | Status: SHIPPED | OUTPATIENT
Start: 2019-08-31 | End: 2020-03-06 | Stop reason: ALTCHOICE

## 2019-08-31 RX ORDER — TRIAMCINOLONE ACETONIDE 40 MG/ML
40 INJECTION, SUSPENSION INTRA-ARTICULAR; INTRAMUSCULAR ONCE
Status: COMPLETED | OUTPATIENT
Start: 2019-08-31 | End: 2019-08-31

## 2019-08-31 RX ADMIN — TRIAMCINOLONE ACETONIDE 40 MG: 40 INJECTION, SUSPENSION INTRA-ARTICULAR; INTRAMUSCULAR at 10:08

## 2019-08-31 ASSESSMENT — ENCOUNTER SYMPTOMS
NAUSEA: 0
PHOTOPHOBIA: 0
EYE DISCHARGE: 0
EYE PAIN: 0
DIARRHEA: 0
ABDOMINAL DISTENTION: 0
ABDOMINAL PAIN: 0
CHEST TIGHTNESS: 0
COUGH: 0
SHORTNESS OF BREATH: 0
RHINORRHEA: 0
SORE THROAT: 0
VOMITING: 0
SINUS PAIN: 0
RECTAL PAIN: 0
SINUS PRESSURE: 0
BACK PAIN: 0
FACIAL SWELLING: 0
TROUBLE SWALLOWING: 0
APNEA: 0
BLOOD IN STOOL: 0
EYE ITCHING: 0
EYE REDNESS: 0
WHEEZING: 0
CONSTIPATION: 0
VOICE CHANGE: 0
COLOR CHANGE: 0

## 2019-11-06 DIAGNOSIS — R80.9 TYPE 2 DIABETES MELLITUS WITH MICROALBUMINURIA, WITHOUT LONG-TERM CURRENT USE OF INSULIN (HCC): ICD-10-CM

## 2019-11-06 DIAGNOSIS — E11.29 TYPE 2 DIABETES MELLITUS WITH MICROALBUMINURIA, WITHOUT LONG-TERM CURRENT USE OF INSULIN (HCC): ICD-10-CM

## 2019-11-06 DIAGNOSIS — E78.2 MIXED HYPERLIPIDEMIA: ICD-10-CM

## 2019-11-06 DIAGNOSIS — I10 ESSENTIAL HYPERTENSION: ICD-10-CM

## 2019-11-06 LAB
CHOLESTEROL, TOTAL: 200 MG/DL (ref 0–199)
HBA1C MFR BLD: 6.6 % (ref 4.8–5.9)
HDLC SERPL-MCNC: 55 MG/DL (ref 40–59)
LDL CHOLESTEROL CALCULATED: 109 MG/DL (ref 0–129)
TRIGL SERPL-MCNC: 182 MG/DL (ref 0–150)

## 2019-11-13 ENCOUNTER — OFFICE VISIT (OUTPATIENT)
Dept: FAMILY MEDICINE CLINIC | Age: 74
End: 2019-11-13
Payer: MEDICARE

## 2019-11-13 VITALS
WEIGHT: 300 LBS | SYSTOLIC BLOOD PRESSURE: 120 MMHG | TEMPERATURE: 97.5 F | RESPIRATION RATE: 16 BRPM | BODY MASS INDEX: 42 KG/M2 | OXYGEN SATURATION: 97 % | DIASTOLIC BLOOD PRESSURE: 82 MMHG | HEART RATE: 69 BPM | HEIGHT: 71 IN

## 2019-11-13 DIAGNOSIS — E11.29 TYPE 2 DIABETES MELLITUS WITH MICROALBUMINURIA, WITHOUT LONG-TERM CURRENT USE OF INSULIN (HCC): ICD-10-CM

## 2019-11-13 DIAGNOSIS — R80.9 TYPE 2 DIABETES MELLITUS WITH MICROALBUMINURIA, WITHOUT LONG-TERM CURRENT USE OF INSULIN (HCC): ICD-10-CM

## 2019-11-13 DIAGNOSIS — E78.2 MIXED HYPERLIPIDEMIA: ICD-10-CM

## 2019-11-13 DIAGNOSIS — E79.0 HYPERURICEMIA: ICD-10-CM

## 2019-11-13 DIAGNOSIS — I10 ESSENTIAL HYPERTENSION: Primary | ICD-10-CM

## 2019-11-13 PROCEDURE — 99214 OFFICE O/P EST MOD 30 MIN: CPT | Performed by: FAMILY MEDICINE

## 2019-11-13 ASSESSMENT — ENCOUNTER SYMPTOMS
SHORTNESS OF BREATH: 0
ABDOMINAL PAIN: 0

## 2020-01-13 PROBLEM — Z01.818 ENCOUNTER FOR OTHER PREPROCEDURAL EXAMINATION: Status: ACTIVE | Noted: 2017-11-02

## 2020-01-13 PROBLEM — M19.071 PRIMARY OSTEOARTHRITIS, RIGHT ANKLE AND FOOT: Status: ACTIVE | Noted: 2017-09-29

## 2020-01-13 PROBLEM — Z98.1 ARTHRODESIS STATUS: Status: ACTIVE | Noted: 2017-11-02

## 2020-01-13 PROBLEM — H35.63 RETINAL HEMORRHAGE, BILATERAL: Status: ACTIVE | Noted: 2019-04-04

## 2020-01-13 PROBLEM — Z47.89 ENCOUNTER FOR OTHER ORTHOPEDIC AFTERCARE: Status: ACTIVE | Noted: 2017-09-29

## 2020-01-13 PROBLEM — H35.3132 INTERMEDIATE STAGE NONEXUDATIVE AGE-RELATED MACULAR DEGENERATION OF BOTH EYES: Status: ACTIVE | Noted: 2019-04-04

## 2020-01-13 PROBLEM — M25.571 PAIN IN RIGHT ANKLE: Status: ACTIVE | Noted: 2017-09-29

## 2020-02-12 PROBLEM — Z01.818 ENCOUNTER FOR OTHER PREPROCEDURAL EXAMINATION: Status: RESOLVED | Noted: 2017-11-02 | Resolved: 2020-02-12

## 2020-03-03 DIAGNOSIS — Z85.46 H/O PROSTATE CANCER: ICD-10-CM

## 2020-03-03 LAB — PROSTATE SPECIFIC ANTIGEN: <0.01 NG/ML (ref 0–6.22)

## 2020-03-06 ENCOUNTER — OFFICE VISIT (OUTPATIENT)
Dept: UROLOGY | Age: 75
End: 2020-03-06
Payer: MEDICARE

## 2020-03-06 VITALS
BODY MASS INDEX: 42 KG/M2 | HEART RATE: 50 BPM | SYSTOLIC BLOOD PRESSURE: 138 MMHG | WEIGHT: 300 LBS | HEIGHT: 71 IN | DIASTOLIC BLOOD PRESSURE: 78 MMHG

## 2020-03-06 PROCEDURE — 99213 OFFICE O/P EST LOW 20 MIN: CPT | Performed by: UROLOGY

## 2020-03-06 RX ORDER — SILDENAFIL 100 MG/1
100 TABLET, FILM COATED ORAL PRN
Qty: 6 TABLET | Refills: 3 | Status: ON HOLD | OUTPATIENT
Start: 2020-03-06 | End: 2021-12-15 | Stop reason: HOSPADM

## 2020-03-06 RX ORDER — TAMSULOSIN HYDROCHLORIDE 0.4 MG/1
0.4 CAPSULE ORAL DAILY
Qty: 90 CAPSULE | Refills: 3 | Status: SHIPPED | OUTPATIENT
Start: 2020-03-06 | End: 2021-06-10

## 2020-03-06 RX ORDER — SOTALOL HYDROCHLORIDE 120 MG/1
TABLET ORAL
Status: ON HOLD | COMMUNITY
Start: 2019-12-02 | End: 2021-12-15 | Stop reason: HOSPADM

## 2020-03-06 RX ORDER — POTASSIUM CHLORIDE 1500 MG/1
TABLET, EXTENDED RELEASE ORAL
Status: ON HOLD | COMMUNITY
Start: 2020-01-07 | End: 2021-12-15 | Stop reason: HOSPADM

## 2020-03-06 ASSESSMENT — ENCOUNTER SYMPTOMS
ABDOMINAL PAIN: 0
ABDOMINAL DISTENTION: 0
SHORTNESS OF BREATH: 0

## 2020-03-06 NOTE — PROGRESS NOTES
Subjective:      Patient ID: Wayne Steve is a 76 y.o. male. HPI  this is a 65 yo white male with CAD, HTN, AFib, BPH w/LUTs on Flomax and [place] 6 prostate cancer s/p seed implant 10/09 at [facility] back in follow-up. Since last seen on 3/7/19, he has no frequency, urgency or UI. He denies dysuria and has no PVD. He denies UTIs and has no GI complaints. He has no bone pains or wt loss. He has no new medical or surgical problems. I reviewed his interval PSA. he uses Viagra prn in the past and wants a refill. He knows not to use with NTG. He has no CP or CAD.  He has clearance from Dr Ciara Montaño to use the Viagra.      Past Medical History:   Diagnosis Date    Bradycardia     CAD (coronary artery disease)     Cancer (HCC)     prostate- radiation     CHF (congestive heart failure) (HCC)     Depression     HSV-2 (herpes simplex virus 2) infection     Hyperlipidemia     Hypertension     Left knee DJD     Osteoarthritis     Rheumatoid arthritis(714.0)     Type 2 diabetes mellitus without complication, without long-term current use of insulin ([facility] [address] 1/10/2019     Past Surgical History:   Procedure Laterality Date    ANKLE SURGERY Right     pin    APPENDECTOMY      ARTHRODESIS Right 10/31/2016    RIGHT ANKLE ARTHRODESIS OF RIGHT ANKLE AND SUBTALAR JOINT, C-ARM, ABHIJEET EQUIPMENT SYNTHETIC BONE GRAFT, ALLOGRAFT CANCELLOUS, SHARON ANKLE FUSION NAIL, SHANDA IMPLANT BONE STIMULATOR, CHOICE ANESTHESIA, BLOCK  performed by Tiffani Gutierrez MD at [facility]      stent x 5    COLONOSCOPY  7-19-11    FRACTURE SURGERY      right ankle    HARDWARE REMOVAL FOOT / ANKLE Right 11/6/2017    RIGHT ANKLE HARDWARE REMOVAL performed [facility] Dano Grace [facility] Bilateral     knees    [place] COLON CA SCRN NOT  [address] IND N/A 7/11/2017    COLONOSCOPY performed by Bri Shen DO at [facility]       Social History  prednisoLONE acetate (PRED FORTE) 1 % ophthalmic suspension       rosuvastatin (CRESTOR) 20 MG tablet Take 20 mg by mouth      hydrALAZINE (APRESOLINE) 50 MG tablet Take 50 mg by mouth daily      tamsulosin (FLOMAX) 0.4 MG capsule Take 1 capsule by mouth daily 90 capsule 3    sildenafil (VIAGRA) 100 MG tablet Take 1 tablet by mouth as needed for Erectile Dysfunction 6 tablet 3    folic acid (FOLVITE) 1 MG tablet Take 1 mg by mouth daily      amLODIPine (NORVASC) 5 MG tablet Take 5 mg by mouth daily      meloxicam (MOBIC) 15 MG tablet Take 15 mg by mouth daily      etanercept (ENBREL) 25 MG/0.5ML SOSY Inject 40 mg into the skin once a week      allopurinol (ZYLOPRIM) 100 MG tablet Take 100 mg by mouth daily      acetaminophen (TYLENOL) 500 MG tablet Take 1,000 mg by mouth every 6 hours as needed for Pain      pantoprazole (PROTONIX) 40 MG tablet Take 40 mg by mouth daily       aspirin 81 MG tablet Take 81 mg by mouth daily      furosemide (LASIX) 20 MG tablet Take 20 mg by mouth daily       nitroGLYCERIN (NITROSTAT) 0.4 MG SL tablet Place 1 tablet under the tongue every 5 minutes as needed for Chest pain. (Patient not taking: Reported on 3/6/2020) 25 tablet 1     No current facility-administered medications for this visit. Hylan g-f 20; Ace inhibitors; and Statins depletion therapy  reviewed    Review of Systems   Constitutional: Negative for unexpected weight change. Respiratory: Negative for shortness of breath. Cardiovascular: Negative for chest pain. Gastrointestinal: Negative for abdominal distention and abdominal pain. Genitourinary: Negative for difficulty urinating, dysuria, flank pain and hematuria. Objective:   Physical Exam  Abdominal:      Palpations: Abdomen is soft. There is no mass. Tenderness: There is no abdominal tenderness. There is no guarding or rebound. Neurological:      Mental Status: He is alert.    Psychiatric:         Mood and Affect: Mood normal. PSA   Date Value Ref Range Status   03/03/2020 <0.01 0.00 - 6.22 ng/mL Final   03/04/2019 <0.01 0.00 - 6.22 ng/mL Final   02/26/2018 <0.01 0.00 - 6.22 ng/mL Final   02/20/2017 <0.01 0.00 - 6.22 ng/mL Final   11/25/2015 0.02 0.00 - 6.22 ng/mL Final     Diagnostic Psa   Date Value Ref Range Status   11/21/2014 0.01 0.00 - 6.22 ng/mL Final   05/14/2013 0.0 0.0 - 4.0 ng/mL Final       Assessment: This is a 67 yo white male with CAD, HTN, AFib, BPH w/LUTs on Flomax (stable) and [place] 6 prostate cancer s/p seed implant in 10/09 with continued excellent/stable PSA response to radiation therapy. He has no other complaints and will continue with yearly follow-up. The proper dosing and SE of Viagra were again reviewed including but not limited to facial flushing,  headache, stomach upset, priapism, blind and deaf and pt wants to proceed. He knows not to use with NTG. Plan:      1. F/U 1 yr for PSA  2.    Orders Placed This Encounter   Medications    tamsulosin (FLOMAX) 0.4 MG capsule     Sig: Take 1 capsule by mouth daily     Dispense:  90 capsule     Refill:  3    sildenafil (VIAGRA) 100 MG tablet     Sig: Take 1 tablet by mouth as needed for Erectile Dysfunction     Dispense:  6 tablet     Refill:  3               Rebecca Newman MD

## 2020-03-16 DIAGNOSIS — I10 ESSENTIAL HYPERTENSION: ICD-10-CM

## 2020-03-16 DIAGNOSIS — E11.29 TYPE 2 DIABETES MELLITUS WITH MICROALBUMINURIA, WITHOUT LONG-TERM CURRENT USE OF INSULIN (HCC): ICD-10-CM

## 2020-03-16 DIAGNOSIS — E79.0 HYPERURICEMIA: ICD-10-CM

## 2020-03-16 DIAGNOSIS — R80.9 TYPE 2 DIABETES MELLITUS WITH MICROALBUMINURIA, WITHOUT LONG-TERM CURRENT USE OF INSULIN (HCC): ICD-10-CM

## 2020-03-16 DIAGNOSIS — E78.2 MIXED HYPERLIPIDEMIA: ICD-10-CM

## 2020-03-16 LAB
ALBUMIN SERPL-MCNC: 4.3 G/DL (ref 3.5–4.6)
ALP BLD-CCNC: 92 U/L (ref 35–104)
ALT SERPL-CCNC: 21 U/L (ref 0–41)
ANION GAP SERPL CALCULATED.3IONS-SCNC: 14 MEQ/L (ref 9–15)
AST SERPL-CCNC: 22 U/L (ref 0–40)
BASOPHILS ABSOLUTE: 0.1 K/UL (ref 0–0.2)
BASOPHILS RELATIVE PERCENT: 0.8 %
BILIRUB SERPL-MCNC: 0.6 MG/DL (ref 0.2–0.7)
BUN BLDV-MCNC: 29 MG/DL (ref 8–23)
CALCIUM SERPL-MCNC: 9.2 MG/DL (ref 8.5–9.9)
CHLORIDE BLD-SCNC: 103 MEQ/L (ref 95–107)
CHOLESTEROL, TOTAL: 151 MG/DL (ref 0–199)
CO2: 23 MEQ/L (ref 20–31)
CREAT SERPL-MCNC: 0.87 MG/DL (ref 0.7–1.2)
EOSINOPHILS ABSOLUTE: 0.3 K/UL (ref 0–0.7)
EOSINOPHILS RELATIVE PERCENT: 4.1 %
GFR AFRICAN AMERICAN: >60
GFR NON-AFRICAN AMERICAN: >60
GLOBULIN: 2.4 G/DL (ref 2.3–3.5)
GLUCOSE BLD-MCNC: 138 MG/DL (ref 70–99)
HBA1C MFR BLD: 6.5 % (ref 4.8–5.9)
HCT VFR BLD CALC: 43.9 % (ref 42–52)
HDLC SERPL-MCNC: 44 MG/DL (ref 40–59)
HEMOGLOBIN: 14.6 G/DL (ref 14–18)
LDL CHOLESTEROL CALCULATED: 72 MG/DL (ref 0–129)
LYMPHOCYTES ABSOLUTE: 1.3 K/UL (ref 1–4.8)
LYMPHOCYTES RELATIVE PERCENT: 16.1 %
MCH RBC QN AUTO: 32.4 PG (ref 27–31.3)
MCHC RBC AUTO-ENTMCNC: 33.3 % (ref 33–37)
MCV RBC AUTO: 97.1 FL (ref 80–100)
MONOCYTES ABSOLUTE: 0.9 K/UL (ref 0.2–0.8)
MONOCYTES RELATIVE PERCENT: 10.9 %
NEUTROPHILS ABSOLUTE: 5.5 K/UL (ref 1.4–6.5)
NEUTROPHILS RELATIVE PERCENT: 68.1 %
PDW BLD-RTO: 14.5 % (ref 11.5–14.5)
PLATELET # BLD: 231 K/UL (ref 130–400)
POTASSIUM SERPL-SCNC: 4.9 MEQ/L (ref 3.4–4.9)
RBC # BLD: 4.52 M/UL (ref 4.7–6.1)
SODIUM BLD-SCNC: 140 MEQ/L (ref 135–144)
TOTAL PROTEIN: 6.7 G/DL (ref 6.3–8)
TRIGL SERPL-MCNC: 173 MG/DL (ref 0–150)
URIC ACID, SERUM: 5.9 MG/DL (ref 3.4–7)
WBC # BLD: 8.1 K/UL (ref 4.8–10.8)

## 2020-03-25 PROBLEM — M19.071 PRIMARY OSTEOARTHRITIS, RIGHT ANKLE AND FOOT: Status: RESOLVED | Noted: 2017-09-29 | Resolved: 2020-03-24

## 2020-03-25 PROBLEM — Z98.1 ARTHRODESIS STATUS: Status: RESOLVED | Noted: 2017-11-02 | Resolved: 2020-03-24

## 2020-06-01 ENCOUNTER — TELEPHONE (OUTPATIENT)
Dept: FAMILY MEDICINE CLINIC | Age: 75
End: 2020-06-01

## 2020-06-01 ENCOUNTER — OFFICE VISIT (OUTPATIENT)
Dept: FAMILY MEDICINE CLINIC | Age: 75
End: 2020-06-01
Payer: MEDICARE

## 2020-06-01 VITALS
SYSTOLIC BLOOD PRESSURE: 130 MMHG | BODY MASS INDEX: 40.88 KG/M2 | WEIGHT: 292 LBS | DIASTOLIC BLOOD PRESSURE: 78 MMHG | RESPIRATION RATE: 16 BRPM | TEMPERATURE: 98.4 F | HEART RATE: 69 BPM | HEIGHT: 71 IN | OXYGEN SATURATION: 97 %

## 2020-06-01 PROCEDURE — 99214 OFFICE O/P EST MOD 30 MIN: CPT | Performed by: FAMILY MEDICINE

## 2020-06-01 RX ORDER — ROSUVASTATIN CALCIUM 40 MG/1
40 TABLET, COATED ORAL EVERY EVENING
COMMUNITY
Start: 2020-04-14

## 2020-06-01 ASSESSMENT — ENCOUNTER SYMPTOMS
SHORTNESS OF BREATH: 0
DIARRHEA: 0
VOMITING: 0

## 2020-06-09 ENCOUNTER — TELEPHONE (OUTPATIENT)
Dept: FAMILY MEDICINE CLINIC | Age: 75
End: 2020-06-09

## 2021-01-07 ENCOUNTER — VIRTUAL VISIT (OUTPATIENT)
Dept: FAMILY MEDICINE CLINIC | Age: 76
End: 2021-01-07
Payer: MEDICARE

## 2021-01-07 DIAGNOSIS — B34.9 ACUTE VIRAL SYNDROME: Primary | ICD-10-CM

## 2021-01-07 DIAGNOSIS — Z20.822 CLOSE EXPOSURE TO COVID-19 VIRUS: ICD-10-CM

## 2021-01-07 PROCEDURE — 99441 PR PHYS/QHP TELEPHONE EVALUATION 5-10 MIN: CPT | Performed by: NURSE PRACTITIONER

## 2021-01-07 SDOH — ECONOMIC STABILITY: INCOME INSECURITY: HOW HARD IS IT FOR YOU TO PAY FOR THE VERY BASICS LIKE FOOD, HOUSING, MEDICAL CARE, AND HEATING?: NOT ASKED

## 2021-01-07 ASSESSMENT — ENCOUNTER SYMPTOMS
WHEEZING: 0
SHORTNESS OF BREATH: 1
VOMITING: 0
RHINORRHEA: 1
SINUS PAIN: 0
DIARRHEA: 1
NAUSEA: 0
SINUS PRESSURE: 0
SORE THROAT: 0
COUGH: 1

## 2021-01-07 NOTE — PROGRESS NOTES
TELEHEALTH EVALUATION -- Audio/Visual (During URNXD-77 public health emergency)    -   Tish Cai is a 76 y.o. male being evaluated by a Virtual Visit (video visit) encounter to address concerns as mentioned above. A caregiver was present when appropriate. Due to this being a TeleHealth encounter (During IYESU-33 public health emergency), evaluation of the following organ systems was limited: Vitals/Constitutional/EENT/Resp/CV/GI//MS/Neuro/Skin/Heme-Lymph-Imm. Pursuant to the emergency declaration under the [address] [place] [address], [address] [place] [address] authority and the [employer] Act, this Virtual Visit was conducted with patient's (and/or legal guardian's) consent, to reduce the patient's risk of exposure to COVID-19 and provide necessary medical care. The patient (and/or legal guardian) has also been advised to contact this office for worsening conditions or problems, and seek emergency medical treatment and/or call 911 if deemed necessary. Patient was contacted and agreed to proceed with a virtual visit via Telephone Visit  The risks and benefits of converting to a virtual visit were discussed in light of the current infectious disease epidemic. Patient also understood that insurance coverage and co-pays are up to their individual insurance plans. Patient was located at their home. Provider was located at their office. 2021  Tish Cai (:  ) has requested an audio/video evaluation for the following concern(s):    HPI       His nose is runny  Cough  Achy  No chronics ;lung problems  He got sick 1/3  His sister in law was positive for covid and they were with them         Review of Systems   Constitutional: Positive for chills and fatigue. Negative for fever. HENT: Positive for rhinorrhea. Negative for congestion, sinus pressure, sinus pain and sore throat. Respiratory: Positive for cough and shortness of breath (With excertion). Negative for wheezing. Gastrointestinal: Positive for diarrhea. Negative for nausea and vomiting. Musculoskeletal: Positive for myalgias. Skin: Negative for rash. Neurological: Negative for dizziness, light-headedness and headaches. Prior to Visit Medications    Medication Sig Taking? Authorizing Provider   rosuvastatin (CRESTOR) 40 MG tablet   Historical Provider, MD   KLOR-CON M20 20 MEQ extended release tablet   Historical Provider, MD   sotalol (BETAPACE) 120 MG tablet   Historical Provider, MD   tamsulosin (FLOMAX) 0.4 MG capsule Take 1 capsule by mouth daily  Aiyana Denton MD   sildenafil (VIAGRA) 100 MG tablet Take 1 tablet by mouth as needed for Erectile Dysfunction  Aiyana Denton MD   ketorolac 0.4 % SOLN ophthalmic solution Use 1 Drop in both eyes twice daily.   Historical Provider, MD   prednisoLONE acetate (PRED FORTE) 1 % ophthalmic suspension   Historical Provider, MD   rosuvastatin (CRESTOR) 20 MG tablet Take 20 mg by mouth  Historical Provider, MD   hydrALAZINE (APRESOLINE) 50 MG tablet Take 50 mg by mouth daily  Historical Provider, MD   tamsulosin (FLOMAX) 0.4 MG capsule Take 1 capsule by mouth daily  Aiyana Denton MD   sildenafil (VIAGRA) 100 MG tablet Take 1 tablet by mouth as needed for Erectile Dysfunction  Aiyana Denton MD   folic acid (FOLVITE) 1 MG tablet Take 1 mg by mouth daily  Historical Provider, MD   amLODIPine (NORVASC) 5 MG tablet Take 5 mg by mouth daily  Historical Provider, MD   meloxicam (MOBIC) 15 MG tablet Take 15 mg by mouth daily  Historical Provider, MD   etanercept (ENBREL) 25 MG/0.5ML SOSY Inject 40 mg into the skin once a week  Historical Provider, MD   allopurinol (ZYLOPRIM) 100 MG tablet Take 100 mg by mouth daily  Historical Provider, MD   acetaminophen (TYLENOL) 500 MG tablet Take 1,000 mg by mouth every 6 hours as needed for Pain  Historical Provider, MD pantoprazole (PROTONIX) 40 MG tablet Take 40 mg by mouth daily   Historical Provider, MD   aspirin 81 MG tablet Take 81 mg by mouth daily  Historical Provider, MD   furosemide (LASIX) 20 MG tablet Take 20 mg by mouth daily   Historical Provider, MD   nitroGLYCERIN (NITROSTAT) 0.4 MG SL tablet Place 1 tablet under the tongue every 5 minutes as needed for Chest pain.   Patient not taking: Reported on 3/6/2020  Rancho Rainey MD       Past Medical History:   Diagnosis Date    Bradycardia     CAD (coronary artery disease)     Cancer [facility])     prostate- radiation     [facility]     Depression     HSV-2 (herpes simplex virus 2) infection     Hyperlipidemia     Hypertension     Left knee DJD     Osteoarthritis     Rheumatoid arthritis(714.0)     Type 2 diabetes mellitus without complication, without long-term current use of insulin [facility] 1/10/2019     Past Surgical History:   Procedure Laterality Date    ANKLE SURGERY Right     pin    APPENDECTOMY      ARTHRODESIS Right 10/31/2016    RIGHT ANKLE ARTHRODESIS OF RIGHT ANKLE AND SUBTALAR JOINT, C-ARM, ABHIJEET EQUIPMENT SYNTHETIC BONE GRAFT, ALLOGRAFT CANCELLOUS, SHARON ANKLE FUSION NAIL, SHANDA IMPLANT BONE STIMULATOR, CHOICE ANESTHESIA, BLOCK  performed by [facility] Barron [facility] at [facility]      stent x 5    COLONOSCOPY  7-19-11    FRACTURE SURGERY      right ankle    HARDWARE REMOVAL FOOT / ANKLE Right 11/6/2017    RIGHT ANKLE HARDWARE REMOVAL performed by Nate Aguilar MD at [facility] Bilateral     knees    CA COLON CA SCRN NOT  [address] [place] [address] N/A 7/11/2017    COLONOSCOPY performed by Keven Bruno DO at [facility] Left     TONSILLECTOMY AND ADENOIDECTOMY       Social History     Socioeconomic History    Marital status:      Spouse name: Not on file    Number of children: Not on file    Years of education: Not on file    Highest education level: Not on file updated. Health Maintenance reviewed. PHYSICAL EXAMINATION:  \"[x]\" Indicates a positive item  \"[]\" Indicates a negative item    Vital Signs: (As obtained by patient/caregiver or practitioner observation)    Blood pressure-  Heart rate-    Respiratory rate-    Temperature-  Pulse oximetry-     Constitutional: [] Appears well-developed and well-nourished [x] No apparent distress      [] Abnormal-   Mental status  [x] Alert and awake  [x] Oriented to person/place/time [x]Able to follow commands      Eyes:  EOM    []  Normal  [] Abnormal-  Sclera  []  Normal  [] Abnormal -         Discharge []  None visible  [] Abnormal -    HENT:   [] Normocephalic, atraumatic.   [] Abnormal   [] Mouth/Throat: Mucous membranes are moist.     External Ears [] Normal  [] Abnormal-     Neck: [] No visualized mass     Pulmonary/Chest: [x] Respiratory effort normal.  [x] No heard signs of difficulty breathing or respiratory distress        [] Abnormal-      Musculoskeletal:   [] Normal gait with no signs of ataxia         [] Normal range of motion of neck        [] Abnormal-       Neurological:       [] No Facial Asymmetry (Cranial nerve 7 motor function) (limited exam to video visit)          [] No gaze palsy        [] Abnormal-         Skin:        [] No significant exanthematous lesions or discoloration noted on facial skin         [] Abnormal-            Psychiatric:       [x] Normal Affect [x] No Hallucinations        [] Abnormal-     Other pertinent observable physical exam findings-   Results for orders placed or performed in visit on 03/16/20   Uric Acid   Result Value Ref Range    Uric Acid, Serum 5.9 3.4 - 7.0 mg/dL   Hemoglobin A1C   Result Value Ref Range    Hemoglobin A1C 6.5 (H) 4.8 - 5.9 %   CBC Auto Differential   Result Value Ref Range    WBC 8.1 4.8 - 10.8 K/uL    RBC 4.52 (L) 4.70 - 6.10 M/uL    Hemoglobin 14.6 14.0 - 18.0 g/dL    Hematocrit 43.9 42.0 - 52.0 %    MCV 97.1 80.0 - 100.0 fL    MCH 32.4 (H) 27.0 - 31.3 pg MCHC 33.3 33.0 - 37.0 %    RDW 14.5 11.5 - 14.5 %    Platelets 355 376 - 349 K/uL    Neutrophils % 68.1 %    Lymphocytes % 16.1 %    Monocytes % 10.9 %    Eosinophils % 4.1 %    Basophils % 0.8 %    Neutrophils Absolute 5.5 1.4 - 6.5 K/uL    Lymphocytes Absolute 1.3 1.0 - 4.8 K/uL    Monocytes Absolute 0.9 (H) 0.2 - 0.8 K/uL    Eosinophils Absolute 0.3 0.0 - 0.7 K/uL    Basophils Absolute 0.1 0.0 - 0.2 K/uL   Comprehensive Metabolic Panel   Result Value Ref Range    Sodium 140 135 - 144 mEq/L    Potassium 4.9 3.4 - 4.9 mEq/L    Chloride 103 95 - 107 mEq/L    CO2 23 20 - 31 mEq/L    Anion Gap 14 9 - 15 mEq/L    Glucose 138 (H) 70 - 99 mg/dL    BUN 29 (H) 8 - 23 mg/dL    CREATININE 0.87 0.70 - 1.20 mg/dL    GFR Non-African American >60.0 >60    GFR  >60.0 >60    Calcium 9.2 8.5 - 9.9 mg/dL    Total Protein 6.7 6.3 - 8.0 g/dL    Alb 4.3 3.5 - 4.6 g/dL    Total Bilirubin 0.6 0.2 - 0.7 mg/dL    Alkaline Phosphatase 92 35 - 104 U/L    ALT 21 0 - 41 U/L    AST 22 0 - 40 U/L    Globulin 2.4 2.3 - 3.5 g/dL   Lipid Panel   Result Value Ref Range    Cholesterol, Total 151 0 - 199 mg/dL    Triglycerides 173 (H) 0 - 150 mg/dL    HDL 44 40 - 59 mg/dL    LDL Calculated 72 0 - 129 mg/dL       ASSESSMENT/PLAN:      Will need testing for COVID-19 and quarantine until test resulted is negative with symptom improvement or 10 days from of onset of symptoms. Discussed OTC comfort care. Discussed hydration and self proning for coughing or SOB. Pt to f/u if not improving as expected or to Er if symptoms severe. Did discuss with him that his age is a risk factor. Spent much time educating the patient on COVID and answering questions. Assessment & Plan   Diagnoses and all orders for this visit:    Acute viral syndrome  -     COVID-19 Ambulatory; Future    Close exposure to COVID-19 virus  -     COVID-19 Ambulatory;  Future      Orders Placed This Encounter   Procedures    COVID-19 Ambulatory     Standing Status: Future     Standing Expiration Date:   1/7/2022     Scheduling Instructions:      Saline media preferred given current shortage of viral transport media but both acceptable     Order Specific Question:   Is this test for diagnosis or screening? Answer:   Diagnosis of ill patient     Order Specific Question:   Symptomatic for COVID-19 as defined by CDC? Answer:   Yes     Order Specific Question:   Date of Symptom Onset     Answer:   1/3/2021     Order Specific Question:   Hospitalized for COVID-19? Answer:   No     Order Specific Question:   Admitted to ICU for COVID-19? Answer:   No     Order Specific Question:   Employed in healthcare setting? Answer:   No     Order Specific Question:   Resident in a congregate (group) care setting? Answer:   No     Order Specific Question:   Pregnant: Answer:   No     Order Specific Question:   Previously tested for COVID-19? Answer:   No     No orders of the defined types were placed in this encounter. There are no discontinued medications. Return for Follow up with PCP. Reviewed with the patient: current clinical status, medications, activities and diet. Side effects, adverse effects of the medication prescribed today, as well as treatment plan/ rationale and result expectations have been discussed with the patient who expresses understanding and desires to proceed. Close follow up to evaluate treatment results and for coordination of care. I have reviewed the patient's medical history in detail and updated the computerized patient record. Patient identification was verified at the start of the visit: Yes    Total time spent on this encounter: Not billed by time      --GIUSEPPE Sinclair CNP on 1/7/2021 at 5:51 PM    An electronic signature was used to authenticate this note.

## 2021-01-07 NOTE — PATIENT INSTRUCTIONS
Patient Education        Learning About Coronavirus [phone])  Coronavirus [phone]): Overview  What is coronavirus (ZYNSY-74)? The coronavirus disease (COVID-19) is caused by a virus. It is an illness that was first found in December 2019. It has since spread worldwide. The virus can cause fever, cough, and trouble breathing. In severe cases, it can cause pneumonia and make it hard to breathe without help. It can cause death. This virus spreads person-to-person through droplets from coughing and sneezing. It can also spread when you are close to someone who is infected. And it can spread when you touch something that has the virus on it, such as a doorknob or a tabletop. Coronaviruses are a large group of viruses. They cause the common cold. They also cause more serious illnesses like Middle East respiratory syndrome (MERS) and severe acute respiratory syndrome (SARS). COVID-19 is caused by a novel coronavirus. That means it's a new type that has not been seen in people before. How is COVID-19 treated? Mild illness can be treated at home, but more serious illness needs to be treated in the hospital. Treatment may include medicines to reduce symptoms, plus breathing support such as oxygen therapy or a ventilator. Other treatments, such as antiviral medicines, may help people who have COVID-19. What can you do to protect yourself from COVID-19? The best way to protect yourself from getting sick is to:  · Avoid areas where there is an outbreak. · Avoid contact with people who may be infected. · Avoid crowds and try to stay at least 6 feet away from other people. · Wash your hands often, especially after you cough or sneeze. Use soap and water, and scrub for at least 20 seconds. If soap and water aren't available, use an alcohol-based hand . · Avoid touching your mouth, nose, and eyes. What can you do to avoid spreading the virus to others?   To help avoid spreading the virus to others:  · [employer] your hands often with soap or alcohol-based hand sanitizers. · Cover your mouth with a tissue when you cough or sneeze. Then throw the tissue in the trash. · Use a disinfectant to clean things that you touch often. These include doorknobs, remote controls, phones, and handles on your refrigerator and microwave. And don't forget countertops, tabletops, bathrooms, and computer keyboards. · Wear a cloth face cover if you have to go to public areas. If you know or suspect that you have COVID-19:  · Stay home. Don't go to school, work, or public areas. And don't use public transportation, ride-shares, or taxis unless you have no choice. · Leave your home only if you need to get medical care or testing. But call the doctor's office first so they know you're coming. And wear a face cover. · Limit contact with people in your home. If possible, stay in a separate bedroom and use a separate bathroom. · Wear a face cover whenever you're around other people. It can help stop the spread of the virus when you cough or sneeze. · Clean and disinfect your home every day. Use household  and disinfectant wipes or sprays. Take special care to clean things that you grab with your hands. · Self-isolate until it's safe to be around others again. ? If you have symptoms, it's safe when you haven't had a fever for 3 days and your symptoms have improved and it's been at least 10 days since your symptoms started. ? If you were exposed to the virus but don't have symptoms, it's safe to be around others 14 days after exposure. ? Talk to your doctor about whether you also need testing, especially if you have a weakened immune system. When to call for help  Call 911 anytime you think you may need emergency care. For example, call if:  · You have severe trouble breathing. (You can't talk at all.)  · You have constant chest pain or pressure. · You are severely dizzy or lightheaded.   · You are confused or can't think clearly. · Your face and lips have a blue color. · You passed out (lost consciousness) or are very hard to wake up. Call your doctor now if you develop symptoms such as:  · Shortness of breath. · Fever. · Cough. If you need to get care, call ahead to the doctor's office for instructions before you go. Make sure you wear a face cover to prevent exposing other people to the virus. Where can you get the latest information? The following health organizations are tracking and studying this virus. Their websites contain the most up-to-date information. [place] also learn what to do if you think you may have been exposed to the virus. · U.S. Centers for Disease Control and Prevention (CDC): The CDC provides updated news about the disease and travel advice. The website also tells you how to prevent the spread of infection. www.cdc.gov  · World Health Organization [facility]): WHO offers information about the virus outbreaks. WHO also has travel advice. www.who.int  Current as of: July 10, 2020               Content Version: 12.6  © 2006-2020 [employer]. Care instructions adapted under license by [facility] ([facility]). If you have questions about a medical condition or this instruction, always ask your healthcare professional. [address] any warranty or liability for your use of this information. Patient Education        Learning About Being High-Risk for COVID-19  Who is at high risk? COVID-19 causes a mild illness in many people who have it. But certain things may increase your risk for more serious illness. These include:  · Age. The risk increases with age. Older adults are at highest risk. · Smoking. · Obesity. · Living in a long-term care facility. · Having ongoing serious health issues. Some examples are:  ? Chronic lung disease or asthma. ? Heart problems. ? A weakened immune system. ? Cancer treatment. ? Diabetes. ?  Chronic kidney disease and having dialysis. ? Sickle cell disease. This is not a complete list. If you have a chronic health problem, ask your doctor if you should take extra precautions during the outbreak. If you are pregnant, it's safest to consider yourself at higher risk. You and your baby may be at risk for pregnancy problems, such as  birth. And you may be at higher risk for serious illness if you get COVID-19. How do you stay safe? · Stay home. ? Stay home as much as you can. This may be the easiest way to avoid exposure, as long as no one else in your household has the virus. ? If there are a lot of COVID-19 cases in your community, do not leave your home except to seek medical care. ? Limit visitors right now. It's especially important to avoid contact with anyone who is sick or who might have been exposed. Remember that people may have been exposed without knowing it or having any symptoms. ? Have enough food, medicines, and other supplies on hand so that you don't have to go out. Try some of these options if you don't have what you need. ? Use delivery and takeout services for groceries and meals. ? Have a healthy family member, friend, or neighbor shop for you. ? Ask your doctor for extra prescription medicine. ? Routinely clean and disinfect high-touch surfaces. These include countertops, faucets, door handles, doorknobs, and phones. ? Do not travel. · Wash your hands often and well. ? Wash your hands often, especially after you cough or sneeze. Use soap and water, and scrub for at least 20 seconds. If soap and water aren't available, use an alcohol-based hand . · Be extra careful if you have to go out. ? Avoid crowds and crowded places. Try to keep 6 feet of space between yourself and others. ? Don't use public transportation, ride-shares, or taxis unless you have no choice. ? Try not to touch things that many other people have touched.  Door handles, elevator buttons, shopping cart handles, and test results and keep a list of the medicines you take. How can you care for yourself at home? · Get extra rest. It can help you feel better. · Drink plenty of fluids. This helps replace fluids lost from fever. Fluids also help ease a scratchy throat. Water, soup, fruit juice, and hot tea with lemon are good choices. · Take acetaminophen (such as Tylenol) to reduce a fever. It may also help with muscle aches. Read and follow all instructions on the label. · Use petroleum jelly on sore skin. This can help if the skin around your nose and lips becomes sore from rubbing a lot with tissues. Tips for self-isolation  · Limit contact with people in your home. If possible, stay in a separate bedroom and use a separate bathroom. · Wear a cloth face cover when you are around other people. It can help stop the spread of the virus when you cough or sneeze. · If you have to leave home, avoid crowds and try to stay at least 6 feet away from other people. · Avoid contact with pets and other animals. · Cover your mouth and nose with a tissue when you cough or sneeze. Then throw it in the trash right away. · Wash your hands often, especially after you cough or sneeze. Use soap and water, and scrub for at least 20 seconds. If soap and water aren't available, use an alcohol-based hand . · Don't share personal household items. These include bedding, towels, cups and glasses, and eating utensils. · [address] in the warmest water allowed for the fabric type, and dry it completely. It's okay to wash other people's laundry with yours. · Clean and disinfect your home every day. Use household  and disinfectant wipes or sprays. Take special care to clean things that you grab with your hands. These include doorknobs, remote controls, phones, and handles on your refrigerator and microwave. And don't forget countertops, tabletops, bathrooms, and computer keyboards.   When you can end self-isolation  · If you know or suspect that you have COVID-19, stay in self-isolation until:  ? You haven't had a fever for 3 days, and  ? Your symptoms have improved, and  ? It's been at least 10 days since your symptoms started. · Talk to your doctor about whether you also need testing, especially if you have a weakened immune system. When should you call for help? Call 911 anytime you think you may need emergency care. For example, call if you have life-threatening symptoms, such as:    · You have severe trouble breathing. (You can't talk at all.)     · You have constant chest pain or pressure.     · You are severely dizzy or lightheaded.     · You are confused or can't think clearly.     · Your face and lips have a blue color.     · You pass out (lose consciousness) or are very hard to wake up. Call your doctor now or seek immediate medical care if:    · You have moderate trouble breathing. (You can't speak a full sentence.)     · You are coughing up blood (more than about 1 teaspoon).     · You have signs of low blood pressure. These include feeling lightheaded; being too weak to stand; and having cold, pale, clammy skin. Watch closely for changes in your health, and be sure to contact your doctor if:    · Your symptoms get worse.     · You are not getting better as expected. Call before you go to the doctor's office. Follow their instructions. And wear a cloth face cover. Current as of: July 10, 2020               Content Version: 12.6  © 2006-2020 [employer]. Care instructions adapted under license by [facility] ([facility]). If you have questions about a medical condition or this instruction, always ask your healthcare professional. [place] [address] any warranty or liability for your use of this information.

## 2021-01-08 ENCOUNTER — NURSE ONLY (OUTPATIENT)
Dept: PRIMARY CARE CLINIC | Age: 76
End: 2021-01-08

## 2021-01-08 DIAGNOSIS — B34.9 ACUTE VIRAL SYNDROME: ICD-10-CM

## 2021-01-08 DIAGNOSIS — Z20.822 CLOSE EXPOSURE TO COVID-19 VIRUS: ICD-10-CM

## 2021-01-11 ENCOUNTER — VIRTUAL VISIT (OUTPATIENT)
Dept: FAMILY MEDICINE CLINIC | Age: 76
End: 2021-01-11
Payer: MEDICARE

## 2021-01-11 DIAGNOSIS — M05.711 RHEUMATOID ARTHRITIS INVOLVING RIGHT SHOULDER WITH POSITIVE RHEUMATOID FACTOR (HCC): ICD-10-CM

## 2021-01-11 DIAGNOSIS — E11.29 TYPE 2 DIABETES MELLITUS WITH MICROALBUMINURIA, WITHOUT LONG-TERM CURRENT USE OF INSULIN (HCC): ICD-10-CM

## 2021-01-11 DIAGNOSIS — R80.9 TYPE 2 DIABETES MELLITUS WITH MICROALBUMINURIA, WITHOUT LONG-TERM CURRENT USE OF INSULIN (HCC): ICD-10-CM

## 2021-01-11 DIAGNOSIS — I25.10 CORONARY ARTERY DISEASE INVOLVING NATIVE CORONARY ARTERY OF NATIVE HEART WITHOUT ANGINA PECTORIS: ICD-10-CM

## 2021-01-11 DIAGNOSIS — U07.1 COVID-19: Primary | ICD-10-CM

## 2021-01-11 PROCEDURE — 99442 PR PHYS/QHP TELEPHONE EVALUATION 11-20 MIN: CPT | Performed by: FAMILY MEDICINE

## 2021-01-11 ASSESSMENT — ENCOUNTER SYMPTOMS: ABDOMINAL PAIN: 0

## 2021-01-11 NOTE — PROGRESS NOTES
Subjective:      Patient ID: Reese Whitehead is a 76 y.o. male    HPI  Here with continued fever and chills and cough. Symptoms started on Wednesday. Phlegm is yellow. No chest pain. Some sob with exertion. Did have diarrhea earlier in week which has resolved. Wife just dx'd with covid yesterday. Review of Systems   Constitutional: Positive for activity change and fatigue. Cardiovascular: Negative for chest pain. Gastrointestinal: Negative for abdominal pain. Musculoskeletal: Positive for myalgias. Skin: Negative for rash. Neurological: Negative for weakness. Reese Whitehead is a 76 y.o. male evaluated via telephone on 1/11/2021. Consent:  He and/or health care decision maker is aware that that he may receive a bill for this telephone service, depending on his insurance coverage, and has provided verbal consent to proceed: Yes      Documentation:  I communicated with the patient and/or health care decision maker about   Details of this discussion including any medical advice provided: This visit was a telephone encounter. Patient was located at their home. Provider was located at their ___ home or        ___x_ office. I affirm this is a Patient Initiated Episode with an Established Patient who has not had a related appointment within my department in the past 7 days or scheduled within the next 24 hours.     Total Time: minutes: 11-20 minutes    Note: not billable if this call serves to triage the patient into an appointment for the relevant concern      Ken Amor   Reviewed allergy, medical, social, surgical, family and med list changes and updated   Files     Social History     Socioeconomic History    Marital status:      Spouse name: Not on file    Number of children: Not on file    Years of education: Not on file    Highest education level: Not on file   Occupational History    Not on file   Social Needs    Financial resource strain: Not on file capsule Take 1 capsule by mouth daily 90 capsule 3    sildenafil (VIAGRA) 100 MG tablet Take 1 tablet by mouth as needed for Erectile Dysfunction 6 tablet 3    folic acid (FOLVITE) 1 MG tablet Take 1 mg by mouth daily      amLODIPine (NORVASC) 5 MG tablet Take 5 mg by mouth daily      meloxicam (MOBIC) 15 MG tablet Take 15 mg by mouth daily      etanercept (ENBREL) 25 MG/0.5ML SOSY Inject 40 mg into the skin once a week      allopurinol (ZYLOPRIM) 100 MG tablet Take 100 mg by mouth daily      acetaminophen (TYLENOL) 500 MG tablet Take 1,000 mg by mouth every 6 hours as needed for Pain      pantoprazole (PROTONIX) 40 MG tablet Take 40 mg by mouth daily       aspirin 81 MG tablet Take 81 mg by mouth daily      furosemide (LASIX) 20 MG tablet Take 20 mg by mouth daily       nitroGLYCERIN (NITROSTAT) 0.4 MG SL tablet Place 1 tablet under the tongue every 5 minutes as needed for Chest pain. (Patient not taking: Reported on 3/6/2020) 25 tablet 1     No current facility-administered medications for this visit. Family History   Problem Relation Age of Onset    Heart Attack Father     Cancer Father         colon    High Cholesterol Mother     Heart Disease Mother     Macular Degen Mother     Arthritis Sister         hip replacement    Other Brother         vertigo    No Known Problems Son     No Known Problems Son      Past Medical History:   Diagnosis Date    Bradycardia     CAD (coronary artery disease)     Cancer ([facility] [address]     prostate- radiation     CHF (congestive heart failure) (HCC)     Depression     HSV-2 (herpes simplex virus 2) infection     Hyperlipidemia     Hypertension     Left knee DJD     Osteoarthritis     Rheumatoid arthritis(714.0)     Type 2 diabetes mellitus without complication, without long-term current use of insulin ([facility] [address] 1/10/2019     Objective: There were no vitals taken for this visit. Physical Exam  No exam done   Assessment:       Diagnosis Orders   1.

## 2021-01-12 ENCOUNTER — TELEPHONE (OUTPATIENT)
Dept: FAMILY MEDICINE CLINIC | Age: 76
End: 2021-01-12

## 2021-01-12 LAB
SARS-COV-2: DETECTED
SOURCE: ABNORMAL

## 2021-01-12 NOTE — TELEPHONE ENCOUNTER
LVM for patient Per Dr. Onesimo Valentine We will need to hold off on Infusion until we get Positive Covid results. Spoke with lab today they have not received any info not sure what the hold up is.

## 2021-01-19 ENCOUNTER — VIRTUAL VISIT (OUTPATIENT)
Dept: FAMILY MEDICINE CLINIC | Age: 76
End: 2021-01-19
Payer: MEDICARE

## 2021-01-19 DIAGNOSIS — U07.1 COVID-19: Primary | ICD-10-CM

## 2021-01-19 PROCEDURE — 99442 PR PHYS/QHP TELEPHONE EVALUATION 11-20 MIN: CPT | Performed by: FAMILY MEDICINE

## 2021-01-19 ASSESSMENT — PATIENT HEALTH QUESTIONNAIRE - PHQ9
SUM OF ALL RESPONSES TO PHQ9 QUESTIONS 1 & 2: 0
SUM OF ALL RESPONSES TO PHQ QUESTIONS 1-9: 0
SUM OF ALL RESPONSES TO PHQ QUESTIONS 1-9: 0

## 2021-01-19 ASSESSMENT — ENCOUNTER SYMPTOMS: SHORTNESS OF BREATH: 0

## 2021-01-19 NOTE — PROGRESS NOTES
needed for Erectile Dysfunction 6 tablet 3    folic acid (FOLVITE) 1 MG tablet Take 1 mg by mouth daily      amLODIPine (NORVASC) 5 MG tablet Take 5 mg by mouth daily      meloxicam (MOBIC) 15 MG tablet Take 15 mg by mouth daily      etanercept (ENBREL) 25 MG/0.5ML SOSY Inject 40 mg into the skin once a week      allopurinol (ZYLOPRIM) 100 MG tablet Take 100 mg by mouth daily      acetaminophen (TYLENOL) 500 MG tablet Take 1,000 mg by mouth every 6 hours as needed for Pain      pantoprazole (PROTONIX) 40 MG tablet Take 40 mg by mouth daily       aspirin 81 MG tablet Take 81 mg by mouth daily      furosemide (LASIX) 20 MG tablet Take 20 mg by mouth daily       nitroGLYCERIN (NITROSTAT) 0.4 MG SL tablet Place 1 tablet under the tongue every 5 minutes as needed for Chest pain. (Patient not taking: Reported on 3/6/2020) 25 tablet 1     No current facility-administered medications for this visit. Family History   Problem Relation Age of Onset    Heart Attack Father     Cancer Father         colon    High Cholesterol Mother     Heart Disease Mother     Macular Degen Mother     Arthritis Sister         hip replacement    Other Brother         vertigo    No Known Problems Son     No Known Problems Son      Past Medical History:   Diagnosis Date    Bradycardia     CAD (coronary artery disease)     Cancer ([address]     prostate- radiation     CHF (congestive heart failure) (HCC)     Depression     HSV-2 (herpes simplex virus 2) infection     Hyperlipidemia     Hypertension     Left knee DJD     Osteoarthritis     Rheumatoid arthritis(714.0)     Type 2 diabetes mellitus without complication, without long-term current use of insulin ([facility] [address] 1/10/2019     Objective: There were no vitals taken for this visit. Physical Exam  No exam done  Assessment:       Diagnosis Orders   1. COVID-19           Plan:       At this point continues to improve from covid standpoint   F/u in a week if needed otherwise later in spring as already planned

## 2021-03-02 DIAGNOSIS — Z85.46 H/O PROSTATE CANCER: ICD-10-CM

## 2021-03-02 LAB — PROSTATE SPECIFIC ANTIGEN: <0.01 NG/ML (ref 0–6.22)

## 2021-03-05 ENCOUNTER — OFFICE VISIT (OUTPATIENT)
Dept: UROLOGY | Age: 76
End: 2021-03-05
Payer: MEDICARE

## 2021-03-05 VITALS
SYSTOLIC BLOOD PRESSURE: 144 MMHG | HEART RATE: 69 BPM | BODY MASS INDEX: 41.3 KG/M2 | DIASTOLIC BLOOD PRESSURE: 88 MMHG | HEIGHT: 71 IN | WEIGHT: 295 LBS

## 2021-03-05 DIAGNOSIS — N52.9 ERECTILE DYSFUNCTION, UNSPECIFIED ERECTILE DYSFUNCTION TYPE: ICD-10-CM

## 2021-03-05 DIAGNOSIS — N40.1 HYPERTROPHY OF PROSTATE WITH URINARY OBSTRUCTION: Primary | ICD-10-CM

## 2021-03-05 DIAGNOSIS — N13.8 HYPERTROPHY OF PROSTATE WITH URINARY OBSTRUCTION: Primary | ICD-10-CM

## 2021-03-05 PROCEDURE — 99213 OFFICE O/P EST LOW 20 MIN: CPT | Performed by: UROLOGY

## 2021-03-05 ASSESSMENT — ENCOUNTER SYMPTOMS
ABDOMINAL PAIN: 0
ABDOMINAL DISTENTION: 0
SHORTNESS OF BREATH: 0

## 2021-03-05 NOTE — PROGRESS NOTES
Subjective:      Patient ID: Emili Huerta is a 76 y.o. male. HPI  this is a 70 yo white male with CAD, HTN, AFib, BPH w/LUTs on Flomax and Mago 6 prostate cancer s/p seed implant 10/09 at [facility] in follow-up. Since last seen on 3/6/20, he has occasional urgency and frequency with coffee use. He denies UI. He denies dysuria and has no PVD. He denies UTIs and has no GI complaints. He has no bone pains or wt loss. He has no new medical or surgical problems. I reviewed his interval PSA. He uses Viagra prn and he has no CP or CAD.  He has clearance from Dr Rupert Garrett to use the Viagra.       Past Medical History:   Diagnosis Date    Bradycardia     CAD (coronary artery disease)     Cancer ([facility] [address]     prostate- radiation     CHF (congestive heart failure) (HCC)     Depression     HSV-2 (herpes simplex virus 2) infection     Hyperlipidemia     Hypertension     Left knee DJD     Osteoarthritis     Rheumatoid arthritis(714.0)     Type 2 diabetes mellitus without complication, without long-term current use of insulin ([facility] [address] 1/10/2019     Past Surgical History:   Procedure Laterality Date    ANKLE SURGERY Right     pin    APPENDECTOMY      ARTHRODESIS Right 10/31/2016    RIGHT ANKLE ARTHRODESIS OF RIGHT ANKLE AND SUBTALAR JOINT, C-ARM, ABHIJEET EQUIPMENT SYNTHETIC BONE GRAFT, ALLOGRAFT CANCELLOUS, SHARON ANKLE FUSION NAIL, SHANDA IMPLANT BONE STIMULATOR, CHOICE ANESTHESIA, BLOCK  performed by Myranda Park MD at [address]      stent x 5    COLONOSCOPY  7-19-11    FRACTURE SURGERY      right ankle    HARDWARE REMOVAL FOOT / ANKLE Right 11/6/2017    RIGHT ANKLE HARDWARE REMOVAL performed by Olena Muhammad MD at [address] Bilateral     knees    MD COLON CA SCRN NOT  [address] IND N/A 7/11/2017    COLONOSCOPY performed by Basilio Tinajero DO at [address] AND ADENOIDECTOMY       Social History     Socioeconomic History    Marital status:      Spouse name: None    Number of children: None    Years of education: None    Highest education level: None   Occupational History    None   Social Needs    Financial resource strain: None    Food insecurity     Worry: Never true     Inability: Never true    Transportation needs     Medical: No     Non-medical: No   Tobacco Use    Smoking status: Former Smoker     Packs/day: 0.25     Years: 7.00     Pack years: 1.75     Types: Cigarettes     Start date: 5     Quit date: 6/3/1992     Years since quittin.7    Smokeless tobacco: Never Used   Substance and Sexual Activity    Alcohol use:  Yes     Alcohol/week: 0.0 standard drinks     Comment: weekly- 2 beers or wine    Drug use: No    Sexual activity: None   Lifestyle    Physical activity     Days per week: None     Minutes per session: None    Stress: None   Relationships    Social connections     Talks on phone: None     Gets together: None     Attends [employer] service: None     Active member of club or organization: None     Attends meetings of clubs or organizations: None     Relationship status: None    Intimate partner violence     Fear of current or ex partner: None     Emotionally abused: None     Physically abused: None     Forced sexual activity: None   Other Topics Concern    None   Social History Narrative    None     Family History   Problem Relation Age of Onset    Heart Attack Father     Cancer Father         colon    High Cholesterol Mother     Heart Disease Mother     Macular Degen Mother     Arthritis Sister         hip replacement    Other Brother         vertigo    No Known Problems Son     No Known Problems Son      Current Outpatient Medications   Medication Sig Dispense Refill    rosuvastatin (CRESTOR) 40 MG tablet       KLOR-CON M20 20 MEQ extended release tablet       sotalol (BETAPACE) 120 MG tablet       tamsulosin (FLOMAX) 0.4 MG capsule Take 1 capsule by mouth daily 90 capsule 3    sildenafil (VIAGRA) 100 MG tablet Take 1 tablet by mouth as needed for Erectile Dysfunction 6 tablet 3    ketorolac 0.4 % SOLN ophthalmic solution Use 1 Drop in both eyes twice daily. 1    prednisoLONE acetate (PRED FORTE) 1 % ophthalmic suspension       rosuvastatin (CRESTOR) 20 MG tablet Take 20 mg by mouth      hydrALAZINE (APRESOLINE) 50 MG tablet Take 50 mg by mouth daily      tamsulosin (FLOMAX) 0.4 MG capsule Take 1 capsule by mouth daily 90 capsule 3    sildenafil (VIAGRA) 100 MG tablet Take 1 tablet by mouth as needed for Erectile Dysfunction 6 tablet 3    folic acid (FOLVITE) 1 MG tablet Take 1 mg by mouth daily      amLODIPine (NORVASC) 5 MG tablet Take 5 mg by mouth daily      meloxicam (MOBIC) 15 MG tablet Take 15 mg by mouth daily      etanercept (ENBREL) 25 MG/0.5ML SOSY Inject 40 mg into the skin once a week      allopurinol (ZYLOPRIM) 100 MG tablet Take 100 mg by mouth daily      acetaminophen (TYLENOL) 500 MG tablet Take 1,000 mg by mouth every 6 hours as needed for Pain      pantoprazole (PROTONIX) 40 MG tablet Take 40 mg by mouth daily       aspirin 81 MG tablet Take 81 mg by mouth daily      furosemide (LASIX) 20 MG tablet Take 20 mg by mouth daily       nitroGLYCERIN (NITROSTAT) 0.4 MG SL tablet Place 1 tablet under the tongue every 5 minutes as needed for Chest pain. 25 tablet 1     No current facility-administered medications for this visit. Hylan g-f 20, Ace inhibitors, and Statins depletion therapy  reviewed    Review of Systems   Constitutional: Negative for unexpected weight change. Respiratory: Negative for shortness of breath. Cardiovascular: Negative for chest pain. Gastrointestinal: Negative for abdominal distention and abdominal pain. Genitourinary: Negative for difficulty urinating, dysuria, enuresis, flank pain and hematuria. Objective:   Physical Exam  Constitutional:       Appearance: Normal appearance.    Genitourinary:     Prostate: Not enlarged, not tender and no nodules present. Rectum: No external hemorrhoid. Neurological:      Mental Status: He is alert. PSA   Date Value Ref Range Status   03/02/2021 <0.01 0.00 - 6.22 ng/mL Final   03/03/2020 <0.01 0.00 - 6.22 ng/mL Final   03/04/2019 <0.01 0.00 - 6.22 ng/mL Final   02/26/2018 <0.01 0.00 - 6.22 ng/mL Final   02/20/2017 <0.01 0.00 - 6.22 ng/mL Final     Diagnostic Psa   Date Value Ref Range Status   11/21/2014 0.01 0.00 - 6.22 ng/mL Final   05/14/2013 0.0 0.0 - 4.0 ng/mL Final       Assessment: This is a 70 yo white male with CAD, HTN, AFib, BPH w/LUTs on Flomax (stable) and Mago 6 prostate cancer s/p seed implant in 10/09 with continued excellent/stable PSA response to radiation therapy. He has no other complaints and will continue with yearly follow-up. He wants to try to stop Flomax and this is reasonable and if he develops worsened LUTs off Flomax, will resume. Plan:      1.  F/U 1 yr for PSA        Khurram Do MD

## 2021-03-30 LAB
ALBUMIN SERPL-MCNC: 4.2 G/DL (ref 3.5–4.6)
ALP BLD-CCNC: 97 U/L (ref 35–104)
ALT SERPL-CCNC: 31 U/L (ref 0–41)
AST SERPL-CCNC: 30 U/L (ref 0–40)
BILIRUB SERPL-MCNC: 0.6 MG/DL (ref 0.2–0.7)
BILIRUBIN DIRECT: <0.2 MG/DL (ref 0–0.4)
BILIRUBIN, INDIRECT: NORMAL MG/DL (ref 0–0.6)
CHOLESTEROL, TOTAL: 218 MG/DL (ref 0–199)
HDLC SERPL-MCNC: 58 MG/DL (ref 40–59)
LDL CHOLESTEROL CALCULATED: 128 MG/DL (ref 0–129)
TOTAL PROTEIN: 6.6 G/DL (ref 6.3–8)
TRIGL SERPL-MCNC: 162 MG/DL (ref 0–150)

## 2021-05-07 ENCOUNTER — TELEPHONE (OUTPATIENT)
Dept: FAMILY MEDICINE CLINIC | Age: 76
End: 2021-05-07

## 2021-05-27 ENCOUNTER — TELEPHONE (OUTPATIENT)
Dept: PRIMARY CARE CLINIC | Age: 76
End: 2021-05-27

## 2021-06-10 RX ORDER — TAMSULOSIN HYDROCHLORIDE 0.4 MG/1
CAPSULE ORAL
Qty: 90 CAPSULE | Refills: 3 | Status: ON HOLD | OUTPATIENT
Start: 2021-06-10 | End: 2021-12-15 | Stop reason: HOSPADM

## 2021-12-09 ENCOUNTER — APPOINTMENT (OUTPATIENT)
Dept: CT IMAGING | Age: 76
DRG: 948 | End: 2021-12-09
Payer: MEDICARE

## 2021-12-09 ENCOUNTER — HOSPITAL ENCOUNTER (INPATIENT)
Age: 76
LOS: 1 days | Discharge: HOME | DRG: 948 | End: 2021-12-10
Attending: EMERGENCY MEDICINE | Admitting: INTERNAL MEDICINE
Payer: MEDICARE

## 2021-12-09 DIAGNOSIS — I50.9 ACUTE ON CHRONIC CONGESTIVE HEART FAILURE, UNSPECIFIED HEART FAILURE TYPE (HCC): ICD-10-CM

## 2021-12-09 DIAGNOSIS — R53.1 GENERAL WEAKNESS: ICD-10-CM

## 2021-12-09 DIAGNOSIS — R53.1 GENERALIZED WEAKNESS: Primary | ICD-10-CM

## 2021-12-09 LAB
ALBUMIN SERPL-MCNC: 4.5 G/DL (ref 3.5–4.6)
ALP BLD-CCNC: 99 U/L (ref 35–104)
ALT SERPL-CCNC: 32 U/L (ref 0–41)
ANION GAP SERPL CALCULATED.3IONS-SCNC: 15 MEQ/L (ref 9–15)
AST SERPL-CCNC: 37 U/L (ref 0–40)
BACTERIA: NEGATIVE /HPF
BASOPHILS ABSOLUTE: 0.1 K/UL (ref 0–0.2)
BASOPHILS RELATIVE PERCENT: 0.4 %
BILIRUB SERPL-MCNC: 2.3 MG/DL (ref 0.2–0.7)
BILIRUBIN URINE: NEGATIVE
BLOOD, URINE: ABNORMAL
BUN BLDV-MCNC: 17 MG/DL (ref 8–23)
CALCIUM SERPL-MCNC: 9.7 MG/DL (ref 8.5–9.9)
CHLORIDE BLD-SCNC: 96 MEQ/L (ref 95–107)
CLARITY: CLEAR
CO2: 26 MEQ/L (ref 20–31)
COLOR: YELLOW
CREAT SERPL-MCNC: 0.74 MG/DL (ref 0.7–1.2)
EOSINOPHILS ABSOLUTE: 0.1 K/UL (ref 0–0.7)
EOSINOPHILS RELATIVE PERCENT: 1.1 %
EPITHELIAL CELLS, UA: ABNORMAL /HPF (ref 0–5)
GFR AFRICAN AMERICAN: >60
GFR NON-AFRICAN AMERICAN: >60
GLOBULIN: 2.7 G/DL (ref 2.3–3.5)
GLUCOSE BLD-MCNC: 123 MG/DL (ref 70–99)
GLUCOSE URINE: NEGATIVE MG/DL
HCT VFR BLD CALC: 52.6 % (ref 42–52)
HEMOGLOBIN: 17.4 G/DL (ref 14–18)
HYALINE CASTS: ABNORMAL /HPF (ref 0–5)
KETONES, URINE: ABNORMAL MG/DL
LEUKOCYTE ESTERASE, URINE: NEGATIVE
LYMPHOCYTES ABSOLUTE: 0.8 K/UL (ref 1–4.8)
LYMPHOCYTES RELATIVE PERCENT: 7 %
MCH RBC QN AUTO: 32.5 PG (ref 27–31.3)
MCHC RBC AUTO-ENTMCNC: 33 % (ref 33–37)
MCV RBC AUTO: 98.4 FL (ref 80–100)
MONOCYTES ABSOLUTE: 1.2 K/UL (ref 0.2–0.8)
MONOCYTES RELATIVE PERCENT: 10.5 %
NEUTROPHILS ABSOLUTE: 9.6 K/UL (ref 1.4–6.5)
NEUTROPHILS RELATIVE PERCENT: 81 %
NITRITE, URINE: NEGATIVE
PDW BLD-RTO: 15.9 % (ref 11.5–14.5)
PH UA: 6.5 (ref 5–9)
PLATELET # BLD: 178 K/UL (ref 130–400)
POTASSIUM SERPL-SCNC: 3.8 MEQ/L (ref 3.4–4.9)
PRO-BNP: 4130 PG/ML
PROTEIN UA: >=300 MG/DL
RBC # BLD: 5.35 M/UL (ref 4.7–6.1)
RBC UA: ABNORMAL /HPF (ref 0–5)
REASON FOR REJECTION: NORMAL
REJECTED TEST: NORMAL
SODIUM BLD-SCNC: 137 MEQ/L (ref 135–144)
SPECIFIC GRAVITY UA: 1.01 (ref 1–1.03)
TOTAL PROTEIN: 7.2 G/DL (ref 6.3–8)
TROPONIN: 0.03 NG/ML (ref 0–0.01)
URINE REFLEX TO CULTURE: ABNORMAL
UROBILINOGEN, URINE: 1 E.U./DL
WBC # BLD: 11.8 K/UL (ref 4.8–10.8)
WBC UA: ABNORMAL /HPF (ref 0–5)

## 2021-12-09 PROCEDURE — 84484 ASSAY OF TROPONIN QUANT: CPT

## 2021-12-09 PROCEDURE — 80053 COMPREHEN METABOLIC PANEL: CPT

## 2021-12-09 PROCEDURE — 36415 COLL VENOUS BLD VENIPUNCTURE: CPT

## 2021-12-09 PROCEDURE — 93005 ELECTROCARDIOGRAM TRACING: CPT | Performed by: EMERGENCY MEDICINE

## 2021-12-09 PROCEDURE — 83880 ASSAY OF NATRIURETIC PEPTIDE: CPT

## 2021-12-09 PROCEDURE — 81001 URINALYSIS AUTO W/SCOPE: CPT

## 2021-12-09 PROCEDURE — 71250 CT THORAX DX C-: CPT

## 2021-12-09 PROCEDURE — 6830039000 HC L3 TRAUMA ALERT

## 2021-12-09 PROCEDURE — 85025 COMPLETE CBC W/AUTO DIFF WBC: CPT

## 2021-12-09 PROCEDURE — 96374 THER/PROPH/DIAG INJ IV PUSH: CPT

## 2021-12-09 PROCEDURE — 99285 EMERGENCY DEPT VISIT HI MDM: CPT

## 2021-12-09 PROCEDURE — 72125 CT NECK SPINE W/O DYE: CPT

## 2021-12-09 PROCEDURE — 70450 CT HEAD/BRAIN W/O DYE: CPT

## 2021-12-09 PROCEDURE — 6360000002 HC RX W HCPCS: Performed by: EMERGENCY MEDICINE

## 2021-12-09 RX ORDER — FUROSEMIDE 10 MG/ML
60 INJECTION INTRAMUSCULAR; INTRAVENOUS ONCE
Status: COMPLETED | OUTPATIENT
Start: 2021-12-09 | End: 2021-12-09

## 2021-12-09 RX ADMIN — FUROSEMIDE 60 MG: 10 INJECTION INTRAMUSCULAR; INTRAVENOUS at 19:54

## 2021-12-09 ASSESSMENT — ENCOUNTER SYMPTOMS
PHOTOPHOBIA: 0
ABDOMINAL DISTENTION: 0
ABDOMINAL PAIN: 0
CHEST TIGHTNESS: 0
SORE THROAT: 0
VOMITING: 0
SHORTNESS OF BREATH: 1
WHEEZING: 0
EYE DISCHARGE: 0
COUGH: 0

## 2021-12-09 ASSESSMENT — PAIN SCALES - GENERAL: PAINLEVEL_OUTOF10: 7

## 2021-12-09 ASSESSMENT — PAIN DESCRIPTION - PAIN TYPE: TYPE: ACUTE PAIN

## 2021-12-09 ASSESSMENT — PAIN DESCRIPTION - LOCATION: LOCATION: GENERALIZED

## 2021-12-09 NOTE — ED TRIAGE NOTES
Pt presents to ED c/o generalized weakness x2-3 days. Pt c/o fall down 5-6 steps x24h ago. + head strike on staircase railing; -LOC. Abrasion to right FH. Pt states he is no longer taking his lasix because, \"I don't like having to pee\". Pt extremities +2 pitting edema.

## 2021-12-10 ENCOUNTER — HOSPITAL ENCOUNTER (INPATIENT)
Age: 76
LOS: 7 days | Discharge: NURSING, REHAB OR LONG-TERM CARE | DRG: 291 | End: 2021-12-17
Attending: INTERNAL MEDICINE | Admitting: INTERNAL MEDICINE
Payer: MEDICARE

## 2021-12-10 VITALS
BODY MASS INDEX: 42 KG/M2 | WEIGHT: 300 LBS | OXYGEN SATURATION: 93 % | HEART RATE: 46 BPM | TEMPERATURE: 97.1 F | HEIGHT: 71 IN | DIASTOLIC BLOOD PRESSURE: 58 MMHG | SYSTOLIC BLOOD PRESSURE: 120 MMHG | RESPIRATION RATE: 22 BRPM

## 2021-12-10 PROBLEM — R26.2 UNABLE TO AMBULATE: Status: ACTIVE | Noted: 2021-12-10

## 2021-12-10 PROBLEM — I48.0 PAF (PAROXYSMAL ATRIAL FIBRILLATION) (HCC): Status: ACTIVE | Noted: 2021-12-10

## 2021-12-10 LAB
ALBUMIN SERPL-MCNC: 3.6 G/DL (ref 3.5–4.6)
ALP BLD-CCNC: 86 U/L (ref 35–104)
ALT SERPL-CCNC: 29 U/L (ref 0–41)
ANION GAP SERPL CALCULATED.3IONS-SCNC: 19 MEQ/L (ref 9–15)
AST SERPL-CCNC: 40 U/L (ref 0–40)
BILIRUB SERPL-MCNC: 2.3 MG/DL (ref 0.2–0.7)
BUN BLDV-MCNC: 17 MG/DL (ref 8–23)
CALCIUM SERPL-MCNC: 9.2 MG/DL (ref 8.5–9.9)
CHLORIDE BLD-SCNC: 100 MEQ/L (ref 95–107)
CO2: 20 MEQ/L (ref 20–31)
CREAT SERPL-MCNC: 0.52 MG/DL (ref 0.7–1.2)
EKG ATRIAL RATE: 394 BPM
EKG ATRIAL RATE: 40 BPM
EKG Q-T INTERVAL: 446 MS
EKG Q-T INTERVAL: 474 MS
EKG QRS DURATION: 84 MS
EKG QRS DURATION: 84 MS
EKG QTC CALCULATION (BAZETT): 434 MS
EKG QTC CALCULATION (BAZETT): 485 MS
EKG R AXIS: -6 DEGREES
EKG R AXIS: -6 DEGREES
EKG T AXIS: 137 DEGREES
EKG T AXIS: 248 DEGREES
EKG VENTRICULAR RATE: 57 BPM
EKG VENTRICULAR RATE: 63 BPM
GFR AFRICAN AMERICAN: >60
GFR NON-AFRICAN AMERICAN: >60
GLOBULIN: 2.9 G/DL (ref 2.3–3.5)
GLUCOSE BLD-MCNC: 106 MG/DL (ref 70–99)
GLUCOSE BLD-MCNC: 136 MG/DL (ref 60–115)
GLUCOSE BLD-MCNC: 169 MG/DL (ref 60–115)
MAGNESIUM: 1.9 MG/DL (ref 1.7–2.4)
PERFORMED ON: ABNORMAL
PERFORMED ON: ABNORMAL
POTASSIUM SERPL-SCNC: 4.5 MEQ/L (ref 3.4–4.9)
REASON FOR REJECTION: NORMAL
REJECTED TEST: NORMAL
SARS-COV-2, NAAT: NOT DETECTED
SODIUM BLD-SCNC: 139 MEQ/L (ref 135–144)
TOTAL PROTEIN: 6.5 G/DL (ref 6.3–8)
TROPONIN: 0.04 NG/ML (ref 0–0.01)

## 2021-12-10 PROCEDURE — 87635 SARS-COV-2 COVID-19 AMP PRB: CPT

## 2021-12-10 PROCEDURE — G0378 HOSPITAL OBSERVATION PER HR: HCPCS

## 2021-12-10 PROCEDURE — 83735 ASSAY OF MAGNESIUM: CPT

## 2021-12-10 PROCEDURE — 2580000003 HC RX 258: Performed by: INTERNAL MEDICINE

## 2021-12-10 PROCEDURE — 93010 ELECTROCARDIOGRAM REPORT: CPT | Performed by: INTERNAL MEDICINE

## 2021-12-10 PROCEDURE — 1210000000 HC MED SURG R&B

## 2021-12-10 PROCEDURE — 6360000002 HC RX W HCPCS: Performed by: INTERNAL MEDICINE

## 2021-12-10 PROCEDURE — 2580000003 HC RX 258: Performed by: NURSE PRACTITIONER

## 2021-12-10 PROCEDURE — 96376 TX/PRO/DX INJ SAME DRUG ADON: CPT

## 2021-12-10 PROCEDURE — 93005 ELECTROCARDIOGRAM TRACING: CPT | Performed by: NURSE PRACTITIONER

## 2021-12-10 PROCEDURE — 6370000000 HC RX 637 (ALT 250 FOR IP): Performed by: INTERNAL MEDICINE

## 2021-12-10 PROCEDURE — 97162 PT EVAL MOD COMPLEX 30 MIN: CPT

## 2021-12-10 PROCEDURE — 80053 COMPREHEN METABOLIC PANEL: CPT

## 2021-12-10 PROCEDURE — 36415 COLL VENOUS BLD VENIPUNCTURE: CPT

## 2021-12-10 PROCEDURE — 84484 ASSAY OF TROPONIN QUANT: CPT

## 2021-12-10 PROCEDURE — 6360000002 HC RX W HCPCS: Performed by: NURSE PRACTITIONER

## 2021-12-10 RX ORDER — ACETAMINOPHEN 650 MG/1
650 SUPPOSITORY RECTAL EVERY 6 HOURS PRN
Status: DISCONTINUED | OUTPATIENT
Start: 2021-12-10 | End: 2021-12-10 | Stop reason: HOSPADM

## 2021-12-10 RX ORDER — HYDRALAZINE HYDROCHLORIDE 50 MG/1
50 TABLET, FILM COATED ORAL DAILY
Status: DISCONTINUED | OUTPATIENT
Start: 2021-12-10 | End: 2021-12-10 | Stop reason: HOSPADM

## 2021-12-10 RX ORDER — DEXTROSE MONOHYDRATE 25 G/50ML
12.5 INJECTION, SOLUTION INTRAVENOUS PRN
Status: DISCONTINUED | OUTPATIENT
Start: 2021-12-10 | End: 2021-12-11

## 2021-12-10 RX ORDER — SOTALOL HYDROCHLORIDE 120 MG/1
60 TABLET ORAL EVERY 12 HOURS SCHEDULED
Status: DISCONTINUED | OUTPATIENT
Start: 2021-12-10 | End: 2021-12-10

## 2021-12-10 RX ORDER — LIDOCAINE 4 G/G
1 PATCH TOPICAL DAILY
Status: DISCONTINUED | OUTPATIENT
Start: 2021-12-10 | End: 2021-12-10 | Stop reason: HOSPADM

## 2021-12-10 RX ORDER — HYDRALAZINE HYDROCHLORIDE 20 MG/ML
10 INJECTION INTRAMUSCULAR; INTRAVENOUS EVERY 4 HOURS PRN
Status: DISCONTINUED | OUTPATIENT
Start: 2021-12-10 | End: 2021-12-10 | Stop reason: HOSPADM

## 2021-12-10 RX ORDER — FUROSEMIDE 10 MG/ML
40 INJECTION INTRAMUSCULAR; INTRAVENOUS ONCE
Status: COMPLETED | OUTPATIENT
Start: 2021-12-10 | End: 2021-12-10

## 2021-12-10 RX ORDER — POLYETHYLENE GLYCOL 3350 17 G/17G
17 POWDER, FOR SOLUTION ORAL DAILY PRN
Status: DISCONTINUED | OUTPATIENT
Start: 2021-12-10 | End: 2021-12-10 | Stop reason: HOSPADM

## 2021-12-10 RX ORDER — TAMSULOSIN HYDROCHLORIDE 0.4 MG/1
0.4 CAPSULE ORAL DAILY
Status: DISCONTINUED | OUTPATIENT
Start: 2021-12-10 | End: 2021-12-10 | Stop reason: HOSPADM

## 2021-12-10 RX ORDER — ROSUVASTATIN CALCIUM 20 MG/1
20 TABLET, COATED ORAL NIGHTLY
Status: DISCONTINUED | OUTPATIENT
Start: 2021-12-10 | End: 2021-12-10 | Stop reason: HOSPADM

## 2021-12-10 RX ORDER — DEXTROSE MONOHYDRATE 50 MG/ML
100 INJECTION, SOLUTION INTRAVENOUS PRN
Status: DISCONTINUED | OUTPATIENT
Start: 2021-12-10 | End: 2021-12-11

## 2021-12-10 RX ORDER — ACETAMINOPHEN 325 MG/1
650 TABLET ORAL EVERY 6 HOURS PRN
Status: DISCONTINUED | OUTPATIENT
Start: 2021-12-10 | End: 2021-12-11

## 2021-12-10 RX ORDER — SODIUM CHLORIDE 0.9 % (FLUSH) 0.9 %
5-40 SYRINGE (ML) INJECTION PRN
Status: DISCONTINUED | OUTPATIENT
Start: 2021-12-10 | End: 2021-12-10 | Stop reason: HOSPADM

## 2021-12-10 RX ORDER — ASPIRIN 81 MG/1
81 TABLET, CHEWABLE ORAL DAILY
Status: DISCONTINUED | OUTPATIENT
Start: 2021-12-10 | End: 2021-12-10 | Stop reason: HOSPADM

## 2021-12-10 RX ORDER — MELOXICAM 7.5 MG/1
15 TABLET ORAL DAILY
Status: DISCONTINUED | OUTPATIENT
Start: 2021-12-10 | End: 2021-12-10 | Stop reason: HOSPADM

## 2021-12-10 RX ORDER — ACETAMINOPHEN 325 MG/1
650 TABLET ORAL EVERY 6 HOURS PRN
Status: DISCONTINUED | OUTPATIENT
Start: 2021-12-10 | End: 2021-12-10 | Stop reason: HOSPADM

## 2021-12-10 RX ORDER — DEXTROSE MONOHYDRATE 25 G/50ML
12.5 INJECTION, SOLUTION INTRAVENOUS PRN
Status: DISCONTINUED | OUTPATIENT
Start: 2021-12-10 | End: 2021-12-10 | Stop reason: HOSPADM

## 2021-12-10 RX ORDER — KETOROLAC TROMETHAMINE 5 MG/ML
1 SOLUTION OPHTHALMIC 2 TIMES DAILY
Status: DISCONTINUED | OUTPATIENT
Start: 2021-12-10 | End: 2021-12-10 | Stop reason: HOSPADM

## 2021-12-10 RX ORDER — TRAMADOL HYDROCHLORIDE 50 MG/1
50 TABLET ORAL EVERY 6 HOURS PRN
Status: DISCONTINUED | OUTPATIENT
Start: 2021-12-10 | End: 2021-12-10 | Stop reason: HOSPADM

## 2021-12-10 RX ORDER — PROMETHAZINE HYDROCHLORIDE 12.5 MG/1
12.5 TABLET ORAL EVERY 6 HOURS PRN
Status: DISCONTINUED | OUTPATIENT
Start: 2021-12-10 | End: 2021-12-17 | Stop reason: HOSPADM

## 2021-12-10 RX ORDER — ONDANSETRON 2 MG/ML
4 INJECTION INTRAMUSCULAR; INTRAVENOUS EVERY 6 HOURS PRN
Status: DISCONTINUED | OUTPATIENT
Start: 2021-12-10 | End: 2021-12-17 | Stop reason: HOSPADM

## 2021-12-10 RX ORDER — PANTOPRAZOLE SODIUM 40 MG/1
40 TABLET, DELAYED RELEASE ORAL DAILY
Status: DISCONTINUED | OUTPATIENT
Start: 2021-12-10 | End: 2021-12-10 | Stop reason: HOSPADM

## 2021-12-10 RX ORDER — SODIUM CHLORIDE 9 MG/ML
25 INJECTION, SOLUTION INTRAVENOUS PRN
Status: DISCONTINUED | OUTPATIENT
Start: 2021-12-10 | End: 2021-12-11

## 2021-12-10 RX ORDER — POLYETHYLENE GLYCOL 3350 17 G/17G
17 POWDER, FOR SOLUTION ORAL DAILY PRN
Status: DISCONTINUED | OUTPATIENT
Start: 2021-12-10 | End: 2021-12-11

## 2021-12-10 RX ORDER — OXYCODONE HYDROCHLORIDE AND ACETAMINOPHEN 5; 325 MG/1; MG/1
1 TABLET ORAL EVERY 4 HOURS PRN
Status: DISCONTINUED | OUTPATIENT
Start: 2021-12-10 | End: 2021-12-17 | Stop reason: HOSPADM

## 2021-12-10 RX ORDER — ACETAMINOPHEN 650 MG/1
650 SUPPOSITORY RECTAL EVERY 6 HOURS PRN
Status: DISCONTINUED | OUTPATIENT
Start: 2021-12-10 | End: 2021-12-11

## 2021-12-10 RX ORDER — DEXTROSE MONOHYDRATE 50 MG/ML
100 INJECTION, SOLUTION INTRAVENOUS PRN
Status: DISCONTINUED | OUTPATIENT
Start: 2021-12-10 | End: 2021-12-10 | Stop reason: HOSPADM

## 2021-12-10 RX ORDER — SODIUM CHLORIDE 0.9 % (FLUSH) 0.9 %
5-40 SYRINGE (ML) INJECTION EVERY 12 HOURS SCHEDULED
Status: DISCONTINUED | OUTPATIENT
Start: 2021-12-10 | End: 2021-12-10 | Stop reason: HOSPADM

## 2021-12-10 RX ORDER — METOPROLOL SUCCINATE 25 MG/1
25 TABLET, EXTENDED RELEASE ORAL DAILY
Status: DISCONTINUED | OUTPATIENT
Start: 2021-12-10 | End: 2021-12-10 | Stop reason: HOSPADM

## 2021-12-10 RX ORDER — HYDRALAZINE HYDROCHLORIDE 20 MG/ML
10 INJECTION INTRAMUSCULAR; INTRAVENOUS EVERY 4 HOURS PRN
Status: DISCONTINUED | OUTPATIENT
Start: 2021-12-10 | End: 2021-12-11 | Stop reason: SDUPTHER

## 2021-12-10 RX ORDER — ALLOPURINOL 100 MG/1
100 TABLET ORAL DAILY
Status: DISCONTINUED | OUTPATIENT
Start: 2021-12-10 | End: 2021-12-10 | Stop reason: HOSPADM

## 2021-12-10 RX ORDER — ONDANSETRON 2 MG/ML
4 INJECTION INTRAMUSCULAR; INTRAVENOUS EVERY 6 HOURS PRN
Status: DISCONTINUED | OUTPATIENT
Start: 2021-12-10 | End: 2021-12-10 | Stop reason: HOSPADM

## 2021-12-10 RX ORDER — AMLODIPINE BESYLATE 5 MG/1
5 TABLET ORAL DAILY
Status: DISCONTINUED | OUTPATIENT
Start: 2021-12-10 | End: 2021-12-10 | Stop reason: HOSPADM

## 2021-12-10 RX ORDER — ACETAMINOPHEN 80 MG
TABLET,CHEWABLE ORAL ONCE
Status: COMPLETED | OUTPATIENT
Start: 2021-12-10 | End: 2021-12-10

## 2021-12-10 RX ORDER — NICOTINE POLACRILEX 4 MG
15 LOZENGE BUCCAL PRN
Status: DISCONTINUED | OUTPATIENT
Start: 2021-12-10 | End: 2021-12-10 | Stop reason: HOSPADM

## 2021-12-10 RX ORDER — FOLIC ACID 1 MG/1
1 TABLET ORAL DAILY
Status: DISCONTINUED | OUTPATIENT
Start: 2021-12-10 | End: 2021-12-10 | Stop reason: HOSPADM

## 2021-12-10 RX ORDER — SODIUM CHLORIDE 9 MG/ML
25 INJECTION, SOLUTION INTRAVENOUS PRN
Status: DISCONTINUED | OUTPATIENT
Start: 2021-12-10 | End: 2021-12-10 | Stop reason: HOSPADM

## 2021-12-10 RX ORDER — NICOTINE POLACRILEX 4 MG
15 LOZENGE BUCCAL PRN
Status: DISCONTINUED | OUTPATIENT
Start: 2021-12-10 | End: 2021-12-17 | Stop reason: HOSPADM

## 2021-12-10 RX ORDER — FUROSEMIDE 20 MG/1
20 TABLET ORAL DAILY
Status: DISCONTINUED | OUTPATIENT
Start: 2021-12-10 | End: 2021-12-10 | Stop reason: HOSPADM

## 2021-12-10 RX ORDER — SODIUM CHLORIDE 0.9 % (FLUSH) 0.9 %
10 SYRINGE (ML) INJECTION EVERY 12 HOURS SCHEDULED
Status: DISCONTINUED | OUTPATIENT
Start: 2021-12-10 | End: 2021-12-11

## 2021-12-10 RX ORDER — ONDANSETRON 4 MG/1
4 TABLET, ORALLY DISINTEGRATING ORAL EVERY 8 HOURS PRN
Status: DISCONTINUED | OUTPATIENT
Start: 2021-12-10 | End: 2021-12-10 | Stop reason: HOSPADM

## 2021-12-10 RX ORDER — SODIUM CHLORIDE 0.9 % (FLUSH) 0.9 %
10 SYRINGE (ML) INJECTION PRN
Status: DISCONTINUED | OUTPATIENT
Start: 2021-12-10 | End: 2021-12-17 | Stop reason: HOSPADM

## 2021-12-10 RX ADMIN — ALLOPURINOL 100 MG: 100 TABLET ORAL at 18:43

## 2021-12-10 RX ADMIN — FOLIC ACID 1 MG: 1 TABLET ORAL at 18:43

## 2021-12-10 RX ADMIN — PANTOPRAZOLE SODIUM 40 MG: 40 TABLET, DELAYED RELEASE ORAL at 18:42

## 2021-12-10 RX ADMIN — METOPROLOL SUCCINATE 25 MG: 25 TABLET, EXTENDED RELEASE ORAL at 19:06

## 2021-12-10 RX ADMIN — Medication: at 18:48

## 2021-12-10 RX ADMIN — AMLODIPINE BESYLATE 5 MG: 5 TABLET ORAL at 18:43

## 2021-12-10 RX ADMIN — Medication 10 ML: at 08:44

## 2021-12-10 RX ADMIN — SODIUM CHLORIDE, PRESERVATIVE FREE 10 ML: 5 INJECTION INTRAVENOUS at 23:44

## 2021-12-10 RX ADMIN — OXYCODONE AND ACETAMINOPHEN 1 TABLET: 5; 325 TABLET ORAL at 23:47

## 2021-12-10 RX ADMIN — ENOXAPARIN SODIUM 40 MG: 100 INJECTION SUBCUTANEOUS at 08:40

## 2021-12-10 RX ADMIN — MELOXICAM 15 MG: 7.5 TABLET ORAL at 18:42

## 2021-12-10 RX ADMIN — TRAMADOL HYDROCHLORIDE 50 MG: 50 TABLET ORAL at 18:43

## 2021-12-10 RX ADMIN — TRAMADOL HYDROCHLORIDE 50 MG: 50 TABLET ORAL at 12:27

## 2021-12-10 RX ADMIN — ASPIRIN 81 MG: 81 TABLET, CHEWABLE ORAL at 18:43

## 2021-12-10 RX ADMIN — FUROSEMIDE 20 MG: 20 TABLET ORAL at 18:43

## 2021-12-10 RX ADMIN — TAMSULOSIN HYDROCHLORIDE 0.4 MG: 0.4 CAPSULE ORAL at 18:42

## 2021-12-10 RX ADMIN — FUROSEMIDE 40 MG: 10 INJECTION, SOLUTION INTRAMUSCULAR; INTRAVENOUS at 18:42

## 2021-12-10 RX ADMIN — HYDRALAZINE HYDROCHLORIDE 50 MG: 50 TABLET, FILM COATED ORAL at 18:42

## 2021-12-10 ASSESSMENT — ENCOUNTER SYMPTOMS
WHEEZING: 0
DIARRHEA: 0
RHINORRHEA: 0
COUGH: 0
BACK PAIN: 0
SHORTNESS OF BREATH: 1
SORE THROAT: 0
ABDOMINAL PAIN: 0
PHOTOPHOBIA: 0
NAUSEA: 0
VOMITING: 0

## 2021-12-10 ASSESSMENT — PAIN DESCRIPTION - ORIENTATION: ORIENTATION: RIGHT

## 2021-12-10 ASSESSMENT — PAIN SCALES - GENERAL
PAINLEVEL_OUTOF10: 7
PAINLEVEL_OUTOF10: 4
PAINLEVEL_OUTOF10: 7
PAINLEVEL_OUTOF10: 10
PAINLEVEL_OUTOF10: 10
PAINLEVEL_OUTOF10: 7
PAINLEVEL_OUTOF10: 7

## 2021-12-10 ASSESSMENT — PAIN DESCRIPTION - FREQUENCY: FREQUENCY: INTERMITTENT

## 2021-12-10 ASSESSMENT — PAIN DESCRIPTION - PAIN TYPE: TYPE: ACUTE PAIN

## 2021-12-10 ASSESSMENT — PAIN DESCRIPTION - DESCRIPTORS: DESCRIPTORS: SHARP

## 2021-12-10 ASSESSMENT — PAIN DESCRIPTION - ONSET: ONSET: PROGRESSIVE

## 2021-12-10 ASSESSMENT — PAIN DESCRIPTION - LOCATION: LOCATION: RIB CAGE

## 2021-12-10 NOTE — CARE COORDINATION
Colby Wolfe from [facility] called me back and said that Dr Camila Julian is approving the pt to have a medical bed there. Rufus Livingston is aware and agrees that this pt will need to go from their medical bed to their skilled bed. Dr Jenn Estrada is aware that Dr Camila Julian is approving admission of this pt, that meds were reconciled and that he needs to do a doctor to doctor report. The bed that was assigned to him there is . We will be putting in for transport for this pt.

## 2021-12-10 NOTE — DISCHARGE SUMMARY
Hospital Medicine Discharge Summary    Yamileth Smith  :    MRN:  [id number]    Admit date:  2021  Discharge date:  12/10/2021    Admitting Physician: Yamini Carrizales MD  Primary Care Physician:  Monique Sierra MD      Discharge Diagnoses:    Principal Problem:    Unable to ambulate  Active Problems:    Mixed hyperlipidemia    CAD (coronary artery disease)    Essential hypertension    PAF (paroxysmal atrial fibrillation)   Resolved Problems:    * No resolved hospital problems. [facility] Course: Yamileth Smith is a 68 y.o. male that was admitted and treated at [facility] for the following medical issues:     Generalized weakness and fall  - initial trauma w/u including CT head/neck/chest negative  - continued PT/OT     Acute / chronic HF  - s/p IV lasix  - PO lasix resumed per cardiology      Hypertensive urgency  - with SBP in the 200s on arrival  - improved  - resumed home regimen    AFIB, bradycardia  - sotalol stopped, started on toprol by cardiology  - no OAC due to patient refusal     DM2 with hyperglycemia  - ISS    Disposition - patient was transferred to [facility] due to lack of beds at [facility]    Patient was seen by the following consultants while admitted to [facility]:   Consults:  [address] TO CASE MANAGEMENT    Significant Diagnostic Studies:    CT Head WO Contrast    Result Date: 12/10/2021  EXAMINATION:  CT HEAD WO CONTRAST HISTORY:  Status post fall TECHNIQUE:  Serial axial images without IV contrast were obtained from the vertex to the foramen magnum. Sagittal and coronal reconstructions. All CT scans at this facility use dose modulation, iterative reconstruction, and/or weight based dosing when appropriate to reduce radiation dose to as low as reasonably achievable. COMPARISON:  CT head 2015 RESULT: Acute change:   No evidence of an acute infarct or other acute parenchymal process. Hemorrhage:    No evidence of acute intracranial hemorrhage. Mass Lesion / Mass Effect:   There is no evidence of an intracranial mass or extraaxial fluid collection. No significant mass effect. Chronic change:   Scattered patchy foci of low attenuation are present within supratentorial white matter which is a nonspecific finding but likely represents mild microvascular ischemia. Parenchyma: There is mild generalized volume loss. Ventricles:   Ventricular enlargement concordant with the degree of parenchymal volume loss. Paranasal sinuses and skull base: There is mild mucosal thickening of the bilateral maxillary sinuses. Mastoid air cells are clear. The skull base is unremarkable. Soft tissues unremarkable. No acute intracranial process. Chronic microvascular ischemic changes. CT CHEST WO CONTRAST    Result Date: 12/10/2021  Exam: CT CHEST WO CONTRAST dated 12/9/2021 8:04 PM CLINICAL HISTORY:  fall/trauma COMPARISON: None. TECHNIQUE: Multidetector CT imaging was obtained through the chest in the supine position without intravenous contrast. The images were reconstructed with 5 mm collimation. Additional axial MIP images were reconstructed. All CT scans at this facility use dose modulation, iterative reconstruction, and/or weight based dosing when appropriate to reduce radiation dose to as low as reasonably achievable. CONTRAST: None. FINDINGS: AIRWAYS & LUNGS: The central airways are patent. There is bilateral posterior compressive atelectasis secondary to overlying pleural effusions. PLEURA: Bilateral pleural effusions, small right and trace left. No pneumothorax. LOWER NECK: Unremarkable HEART: The heart is enlarged. No significant pericardial effusion. Moderate to severe calcified plaque throughout the coronary arteries. Aortic valve calcifications. THORACIC AORTA: Normal caliber and contour.  There is a left-sided aortic arch with a common trunk of the left common carotid and right brachiocephalic arteries, a normal variant. There are scattered calcified atherosclerotic plaques noted throughout the aorta and its branches. MEDIASTINUM AND SINDHU: No pathologically enlarged lymph nodes are identified. Few calcified mediastinal and hilar lymph nodes. CHEST WALL AND AXILLA: Unremarkable. UPPER ABDOMEN: Unremarkable. MUSCULOSKELETAL: No suspicious osteolytic or osteoblastic lesions. There are multilevel degenerative changes throughout the spine. No acute traumatic intrathoracic findings. Bilateral pleural effusions, small right and trace left, with associated compressive atelectasis. CT CERVICAL SPINE WO CONTRAST    Result Date: 12/10/2021  EXAMINATION:  CT CERVICAL SPINE WITHOUT CONTRAST HISTORY:   fall . TECHNIQUE:  CT of the cervical spine without IV contrast.   Spiral, high resolution axial images were obtained from the skull base to the cervicothoracic junction with sagittal and coronal planar reconstructions. All CT scans at this facility use dose modulation, iterative reconstruction, and/or weight based dosing when appropriate to reduce radiation dose to as low as reasonably achievable. COMPARISON: Cervical spine radiographs 5/1/2017 RESULT: Counting reference:  Craniocervical junction. Cervical soft tissues: The paraspinal soft tissues planes are maintained. Alignment:    Alignment is anatomic. Craniocervical junction:    Craniocervical junction is normal. Osseous structures/fracture:    No acute fracture. No destructive osseous lesions. Osteopenia. Intervertebral disc spaces: There is multilevel loss of intervertebral disc height and anterior endplate osteophytosis, most prominent in the lower cervical spine. C2-T1: There is multilevel degenerative changes, predominantly due to disc osteophyte complexes and bilateral facet arthropathy, resulting in varying degrees of osseous spinal canal stenosis and bilateral neural foraminal narrowing.      No acute fracture or traumatic malalignment. Multilevel degenerative changes of the cervical spine.        Discharge Meds:    Current Facility-Administered Medications:     sodium chloride flush 0.9 % injection 5-40 mL, 5-40 mL, IntraVENous, 2 times per day, Abeba Chavez-Roche, APRN - CNP, 10 mL at 12/10/21 0844    sodium chloride flush 0.9 % injection 5-40 mL, 5-40 mL, IntraVENous, PRN, Abeba Ramn-Roche, APRN - CNP    0.9 % sodium chloride infusion, 25 mL, IntraVENous, PRN, Abeba Chavez-Roche, APRN - CNP    enoxaparin (LOVENOX) injection 40 mg, 40 mg, SubCUTAneous, Daily, Abeba Chavez-Roche APRN - CNP, 40 mg at 12/10/21 0840    ondansetron (ZOFRAN-ODT) disintegrating tablet 4 mg, 4 mg, Oral, Q8H PRN **OR** ondansetron (ZOFRAN) injection 4 mg, 4 mg, IntraVENous, Q6H PRN, Abeba Chavez-Roche, APRN - CNP    polyethylene glycol (GLYCOLAX) packet 17 g, 17 g, Oral, Daily PRN, Abeba Chavez-Roche, APRN - CNP    acetaminophen (TYLENOL) tablet 650 mg, 650 mg, Oral, Q6H PRN **OR** acetaminophen (TYLENOL) suppository 650 mg, 650 mg, Rectal, Q6H PRN, Abeba Chavez-Roche, APRN - CNP    lidocaine 4 % external patch 1 patch, 1 patch, TransDERmal, Daily, See Traore MD, 1 patch at 12/10/21 0840    hydrALAZINE (APRESOLINE) injection 10 mg, 10 mg, IntraVENous, Q4H PRN, See Traore MD    insulin lispro (HUMALOG) injection vial 0-12 Units, 0-12 Units, SubCUTAneous, TID WC, See Traore MD    insulin lispro (HUMALOG) injection vial 0-6 Units, 0-6 Units, SubCUTAneous, Nightly, See Traore MD    glucose (GLUTOSE) 40 % oral gel 15 g, 15 g, Oral, PRN, See Traore MD    dextrose 50 % IV solution, 12.5 g, IntraVENous, PRN, See Traore MD    glucagon (rDNA) injection 1 mg, 1 mg, IntraMUSCular, PRN, See Traore MD    dextrose 5 % solution, 100 mL/hr, IntraVENous, PRN, See Traore MD    traMADol Gregory Marcie) tablet 50 mg, 50 mg, Oral, Q6H PRN, Camilo Hoskins MD, 50 mg at 12/10/21 1227    furosemide (LASIX) injection 40 mg, 40 mg, IntraVENous, Once, Terry Cruz MD    allopurinol (ZYLOPRIM) tablet 100 mg, 100 mg, Oral, Daily, Terry Cruz MD    amLODIPine (NORVASC) tablet 5 mg, 5 mg, Oral, Daily, Terry Cruz MD    aspirin chewable tablet 81 mg, 81 mg, Oral, Daily, Terry Cruz MD    folic acid (FOLVITE) tablet 1 mg, 1 mg, Oral, Daily, Terry Cruz MD    furosemide (LASIX) tablet 20 mg, 20 mg, Oral, Daily, Terry Cruz MD    hydrALAZINE (APRESOLINE) tablet 50 mg, 50 mg, Oral, Daily, Terry Cruz MD    ketorolac (ACULAR) 0.5 % ophthalmic solution 1 drop, 1 drop, Both Eyes, BID, Terry Cruz MD    meloxicam (MOBIC) tablet 15 mg, 15 mg, Oral, Daily, Terry Cruz MD    pantoprazole (PROTONIX) tablet 40 mg, 40 mg, Oral, Daily, Terry Cruz MD    rosuvastatin (CRESTOR) tablet 20 mg, 20 mg, Oral, Nightly, Terry Cruz MD    tamsulosin (FLOMAX) capsule 0.4 mg, 0.4 mg, Oral, Daily, Terry Cruz MD    pill splitter, , Does not apply, Once, Terry Cruz MD    metoprolol succinate (TOPROL XL) extended release tablet 25 mg, 25 mg, Oral, Daily, Brad Vega MD    Current Outpatient Medications:     rosuvastatin (CRESTOR) 40 MG tablet, , Disp: , Rfl:     KLOR-CON M20 20 MEQ extended release tablet, , Disp: , Rfl:     sotalol (BETAPACE) 120 MG tablet, , Disp: , Rfl:     sildenafil (VIAGRA) 100 MG tablet, Take 1 tablet by mouth as needed for Erectile Dysfunction, Disp: 6 tablet, Rfl: 3    prednisoLONE acetate (PRED FORTE) 1 % ophthalmic suspension, , Disp: , Rfl:     rosuvastatin (CRESTOR) 20 MG tablet, Take 20 mg by mouth, Disp: , Rfl:     hydrALAZINE (APRESOLINE) 50 MG tablet, Take 50 mg by mouth daily, Disp: , Rfl:     tamsulosin (FLOMAX) 0.4 MG capsule, Take 1 capsule by mouth daily, Disp: 90 capsule, Rfl: 3    sildenafil (VIAGRA) 100 MG tablet, Take 1 tablet by mouth as needed for Erectile Dysfunction, Disp: 6 tablet, Rfl: 3    folic acid (FOLVITE) 1 MG tablet, Take 1 mg by mouth daily, Disp: , Rfl:     amLODIPine (NORVASC) 5 MG tablet, Take 5 mg by mouth daily, Disp: , Rfl:     meloxicam (MOBIC) 15 MG tablet, Take 15 mg by mouth daily, Disp: , Rfl:     etanercept (ENBREL) 25 MG/0.5ML SOSY, Inject 40 mg into the skin once a week, Disp: , Rfl:     allopurinol (ZYLOPRIM) 100 MG tablet, Take 100 mg by mouth daily, Disp: , Rfl:     pantoprazole (PROTONIX) 40 MG tablet, Take 40 mg by mouth daily , Disp: , Rfl:     nitroGLYCERIN (NITROSTAT) 0.4 MG SL tablet, Place 1 tablet under the tongue every 5 minutes as needed for Chest pain., Disp: 25 tablet, Rfl: 1    tamsulosin (FLOMAX) 0.4 MG capsule, TAKE 1 CAPSULE BY MOUTH EVERY DAY, Disp: 90 capsule, Rfl: 3    ketorolac 0.4 % SOLN ophthalmic solution, Use 1 Drop in both eyes twice daily. , Disp: , Rfl: 1    acetaminophen (TYLENOL) 500 MG tablet, Take 1,000 mg by mouth every 6 hours as needed for Pain, Disp: , Rfl:     aspirin 81 MG tablet, Take 81 mg by mouth daily, Disp: , Rfl:     furosemide (LASIX) 20 MG tablet, Take 20 mg by mouth daily , Disp: , Rfl:           Disposition: transferred to [facility].         Signed:  Trey Kearns MD  12/10/2021, 6:12 PM  ----------------------------------------------------------------------------------------------------------------------

## 2021-12-10 NOTE — CARE COORDINATION
[facility] Case Management Initial Discharge Assessment    Met with Patient to discuss discharge plan. PT REALLY MISSES HIS WIFE AND HER CARE AND HE IS NOT BEING AS FORTHRIGHT AS NEEDED ANSWERING THE FOLLOWING QUESTIONS:   POOR HISTORIAN  AT THIS TIME OR CONFUSED. PCP: Delisa Lloyd MD      Date of Last Visit: 3 weeks ago    If no PCP, list provided? N/A    Discharge Planning    Living Arrangements: independently at home    Who do you live with? WIFE MERCY PASSED ABOUT A MONTH AGO, HE IS HOME ALONE NOW    Who helps you with your care:  friend or WIFE MOWED THE LAWN, CLEANED THE HOUSE, [address]. If lives at home:     Do you have any barriers navigating in your home? yes - SINCE PT HAS FALLEN 2 DAYS AGO, SHE HAS PAIN IN VARIOUS AREAS THAT CAUSE HIM    Patient can perform ADL? Yes    Current Services (outpatient and in home) :  [employer] Whe[employer] unknown)    Dialysis: No    Is transportation available to get to your appointments? Yes, PT DRIVES    DME Equipment:  yes - WALKER, CANE    Respiratory equipment: None and CPAP without O2    Respiratory provider:  yes - [address] [place] [address],[facility]:  yes - [facility] [address]  with Medication Assistance Program?  No      Patient agreeable to [address]? Yes, [address]    Patient agreeable to SNF/Rehab? Yes, Company JUST NOW DECIDED TO [address]    Other discharge needs identified? Other TBD    Does Patient Have a High-Risk for Readmission Diagnosis (CHF, PN, MI, COPD)? Yes      Initial Discharge Plan? (Note: please see concurrent daily documentation for any updates after initial note). TO SKILLED AND THEN TO HOME WITH [address]. Readmission Risk              Risk of Unplanned Readmission:  8         Electronically signed by Chris Leggett.  DAMIEN Azul on 12/10/2021 at 3:23 PM

## 2021-12-10 NOTE — CARE COORDINATION
The following was perfect served to Dr Ethan Barragan:  [phone] From: Abelardo Brown [address]: Meggan Patrick Pt would benefit from rehabing at [facility] so that he can get stronger and go home alone safely. Do you feel he needs to stay here at [facility] [place] for any cardiac reasons or inpt following by cardiac? Olga Romero MD - 12/10/21 3:45 PM responded:  OK from my standpoint to transfer to rehab. He has known atrial fibrillation and heart rates tend to run low. He should have gotten at least 40 mg of Ive Lasix and start back on Lasix 20 mg daily. Also continue sotalol 120 mg 1/2 tab bid. [facility]gan and consider eventual cardioversion. Message out to Dr Azalea Braun to please call Dr. Sreekanth Buck to give him report so that pt will be able to transfer to their medical bed and then from their go to their skilled unit. Pt is in agreement to go as he had his post-op knee surgery therapy there.

## 2021-12-10 NOTE — CARE COORDINATION
Dr Tim Banks was not able to complete this discharge order since he is still in the ER and the template will not allow him to click on discharge. I explained this to Dr Ina Grace and Dr Ina Grace was able to write a transfer order for this pt. Dr Tim Banks is aware of this.

## 2021-12-10 NOTE — ED NOTES
Saul labs via [address] guided IV   Labs sent   Notified Lab that labs were sent with white stickers and not yellow stickers      Maria Elena Chiang RN  12/09/21 1227

## 2021-12-10 NOTE — PROGRESS NOTES
Osteoarthritis     Rheumatoid arthritis(714.0)     Type 2 diabetes mellitus without complication, without long-term current use of insulin ([facility] [address] 1/10/2019     Past Surgical History:   Procedure Laterality Date    ANKLE SURGERY Right     pin    APPENDECTOMY      ARTHRODESIS Right 10/31/2016    RIGHT ANKLE ARTHRODESIS OF RIGHT ANKLE AND SUBTALAR JOINT, C-ARM, ABHIJEET EQUIPMENT SYNTHETIC BONE GRAFT, ALLOGRAFT CANCELLOUS, SHARON ANKLE FUSION NAIL, SHANDA IMPLANT BONE STIMULATOR, CHOICE ANESTHESIA, BLOCK  performed by Mary Bowling MD at [facility] 30      stent x 5    COLONOSCOPY  7-19-11    FRACTURE SURGERY      right ankle    HARDWARE REMOVAL FOOT / ANKLE Right 11/6/2017    RIGHT ANKLE HARDWARE REMOVAL performed by Elizabeth Borja MD at [facility] Bilateral     knees    ID COLON CA SCRN NOT  [address] [place] [address] N/A 7/11/2017    COLONOSCOPY performed by Cayetano Tobias DO at [facility] Left     TONSILLECTOMY AND ADENOIDECTOMY         Chart Reviewed: Yes  Family / Caregiver Present: No    Restrictions:        SUBJECTIVE:      Pain  Pre Treatment Pain Screening  Pain at present: 10  Comments / Details: pain in right rib cage with talking, all attempted movements of LE's and UE's - at rest intermittent shooting pain without provocation    Post Treatment Pain Screening:   Pain Assessment  Pain Level: 10    Prior Level of Function:  Social/Functional History  Lives With: Alone  Type of Home: House  Home Layout: Multi-level, Bed/Bath upstairs (split)  Home Access: Stairs to enter without rails  Entrance Stairs - Number of Steps: 1 to enter and 6 to bed and bath level  Bathroom Shower/Tub: Walk-in shower  Bathroom Equipment: Shower chair  Home Equipment: Cane, Rolling walker  ADL Assistance: Independent  Homemaking Assistance: Independent  Ambulation Assistance: Independent (with cane)  Transfer Assistance: Independent  Active : Yes  Occupation: bed    Goals:  Short term goals  Short term goal 1: mod assist +1 rolling  Short term goal 2: max assist +1 supine to sit  Short term goal 3: pt able to sit EOB 2 min with min assist for balance maintenance  Short term goal 4: pt able to participate in 15 min of active therapy program    [facility] (6 CLICK) Ihsan Dhillon 28 Inpatient Mobility Raw Score : 7     Therapy Time:   Individual   Time In 1340   Time Out 1410   Minutes Martha Genao [address] [place] [address], 12/10/21 at 2:22 PM         Definitions for assistance levels  Independent = pt does not require any physical supervision or assistance from another person for activity completion. Device may be needed.   Stand by assistance = pt requires verbal cues or instructions from another person, close to but not touching, to perform the activity  Minimal assistance= pt performs 75% or more of the activity; assistance is required to complete the activity  Moderate assistance= pt performs 50% of the activity; assistance is required to complete the activity  Maximal assistance = pt performs 25% of the activity; assistance is required to complete the activity  Dependent = pt requires total physical assistance to accomplish the task

## 2021-12-10 NOTE — PROGRESS NOTES
NEG 06/07/2012       Radiology:  CT Head WO Contrast   Final Result      No acute intracranial process. Chronic microvascular ischemic changes. CT CERVICAL SPINE WO CONTRAST   Final Result      No acute fracture or traumatic malalignment. Multilevel degenerative changes of the cervical spine. CT CHEST WO CONTRAST   Final Result      No acute traumatic intrathoracic findings. Bilateral pleural effusions, small right and trace left, with associated compressive atelectasis.                  Assessment/Plan:    69 y/o man with history of CAD,AFIB,HTN, DM2, RA, BPH with LUTS, prostate cancer, COVID infection in 1/2021 who presented with:    Generalized weakness and fall  - initial trauma w/u including CT head/neck/chest negative  - PT/OT    AFIB / bradycardia / elevated troponin  - monitor on telemetry  - cardiology consulted    Hypertensive urgency  - with SBP in thw 200s on arrival  - improved  - resume home regimen    DM2 with hyperglycemia  - ISS              Electronically signed by Benjy Gonzalez MD on 12/10/2021 at 10:03 AM

## 2021-12-10 NOTE — CONSULTS
Cardiology Consult Note      Date:   12/10/2021  Patient name:  Luis Barraza  Date of admission:  12/9/2021  6:09 PM  MRN:   [id number]  YOB: 1945  Time of Consult:  5:12 PM    Consulting Cardiologist: Jojo Terrell MD MD  Primary Cardiologist:  Dr. Silverio Bonner and Dr. Ysabel Vega  Referring Provider: GIUSEPPE Irving CNP    HPI:   Luis Barraza is a 68 y.o.  male patient who is being at the request of GIUSEPPE Mensah CNP for inpatient consultation of edema and atrial fibrillation. He was admitted on 12/9/2021. Previous [facility] and [id number] [facility] records have been reviewed in detail. He has a history of paroxysmal atrial fibrillation for which she has been maintained on sotalol 120 mg one half tab twice daily. He has opted against long term anticoagulation therapy. He has a history of essential hypertension, hyperlipidemia and moderate atherosclerotic heart disease. He had previous angioplasty and stenting of the proximal and mid right coronary artery. He was noted on cardiac catheterization in 2013 to have moderate disease and patent stents. LV ejection fraction was 80%. The patient notes that he has had some worsening fatigue over the past several weeks to months. He lives alone at home. He states he generally is able to do his normal daily living activities but has gradually been losing some of his strength. He notes that he was walking up the stairs and accidentally tripped. He fell back down some stairs. He notes quite severe pain on the right side of his chest.  He states that he stopped taking his daily Lasix several days ago because he does not like to get up to go to the bathroom. He has noted some worsening exertional shortness of breath as well as increasing peripheral edema. Because of progressive symptoms he decided to come to the emergency department. He was noted to have borderline increased troponin level 0.021.   He was also noted to have poorly controlled blood pressure as well as atrial fibrillation with a slow ventricular response. He currently is resting comfortably although he intermittently cries out in pain. He states it hurts to even talk. Pain is localized to a small portion of the right lower chest.  X-rays were negative for rib fracture. He denies any chest pressure or tightening sensations. .  He denies any orthopnea, PND, or increasing peripheral edema. He denies any palpitations, lightheadedness, near-syncope, or syncope. He denies any fever, chills, or cough. He denies any nausea, vomiting, or diaphoresis. He denies any hemoptysis, hematemesis, melena, or hematochezia. Allergies:   Allergies   Allergen Reactions    Hylan G-F 20 Other (See Comments) and Swelling     Swelling in leg    Ace Inhibitors      cough    Statins Depletion Therapy Other (See Comments)     OKAY WITH CRESTOR, weakness       Past Medical History:  Past Medical History:   Diagnosis Date    Bradycardia     CAD (coronary artery disease)     Cancer (HCC)     prostate- radiation     CHF (congestive heart failure) (HCC)     Depression     HSV-2 (herpes simplex virus 2) infection     Hyperlipidemia     Hypertension     Left knee DJD     Osteoarthritis     Rheumatoid arthritis(714.0)     Type 2 diabetes mellitus without complication, without long-term current use of insulin ([facility] [address] 1/10/2019       Past Surgical History:  Past Surgical History:   Procedure Laterality Date    ANKLE SURGERY Right     pin    APPENDECTOMY      ARTHRODESIS Right 10/31/2016    RIGHT ANKLE ARTHRODESIS OF RIGHT ANKLE AND SUBTALAR JOINT, C-ARM, ABHIJEET EQUIPMENT SYNTHETIC BONE GRAFT, ALLOGRAFT CANCELLOUS, SHARON ANKLE FUSION NAIL, SHANDA IMPLANT BONE STIMULATOR, CHOICE ANESTHESIA, BLOCK  performed by Sreekanth Blair MD at [address] [place] [address]      stent x 5    COLONOSCOPY  7-19-11    FRACTURE SURGERY      right ankle    HARDWARE REMOVAL FOOT / ANKLE Right 2017    RIGHT ANKLE HARDWARE REMOVAL performed by Ravin Gold MD at [address] Bilateral     knees    [address] COLON CA SCRN NOT  [address] [facility] N/A 2017    COLONOSCOPY performed by Annmarie Sams DO at [facility] Left     TONSILLECTOMY AND ADENOIDECTOMY         Family History:       Problem Relation Age of Onset    Heart Attack Father     Cancer Father         colon    High Cholesterol Mother     Heart Disease Mother     Macular Degen Mother     Arthritis Sister         hip replacement    Other Brother         vertigo    No Known Problems Son     No Known Problems Son        Social  History:     Social History     Tobacco Use    Smoking status: Former Smoker     Packs/day: 0.25     Years: 7.00     Pack years: 1.75     Types: Cigarettes     Start date: 5     Quit date: 6/3/1992     Years since quittin.5    Smokeless tobacco: Never Used   Substance Use Topics    Alcohol use: Yes     Alcohol/week: 0.0 standard drinks     Comment: weekly- 2 beers or wine       Home Medications:    Prior to Admission medications    Medication Sig Start Date End Date Taking?  Authorizing Provider   rosuvastatin (CRESTOR) 40 MG tablet  20  Yes Historical Provider, MD   KLOR-CON M20 20 MEQ extended release tablet  20  Yes Historical Provider, MD   sotalol (BETAPACE) 120 MG tablet  19  Yes Historical Provider, MD   sildenafil (VIAGRA) 100 MG tablet Take 1 tablet by mouth as needed for Erectile Dysfunction 3/6/20  Yes Yani Urrutia MD   prednisoLONE acetate (PRED FORTE) 1 % ophthalmic suspension  5/15/19  Yes Historical Provider, MD   rosuvastatin (CRESTOR) 20 MG tablet Take 20 mg by mouth   Yes Historical Provider, MD   hydrALAZINE (APRESOLINE) 50 MG tablet Take 50 mg by mouth daily   Yes Historical Provider, MD   tamsulosin (FLOMAX) 0.4 MG capsule Take 1 capsule by mouth daily 3/7/19  Yes Yani Urrutia MD   sildenafil (VIAGRA) 100 MG tablet Take 1 tablet by mouth as needed for Erectile Dysfunction 3/7/19  Yes Avril Lira MD   folic acid (FOLVITE) 1 MG tablet Take 1 mg by mouth daily   Yes Historical Provider, MD   amLODIPine (NORVASC) 5 MG tablet Take 5 mg by mouth daily   Yes Historical Provider, MD   meloxicam (MOBIC) 15 MG tablet Take 15 mg by mouth daily   Yes Historical Provider, MD   etanercept (ENBREL) 25 MG/0.5ML SOSY Inject 40 mg into the skin once a week   Yes Historical Provider, MD   allopurinol (ZYLOPRIM) 100 MG tablet Take 100 mg by mouth daily   Yes Historical Provider, MD   pantoprazole (PROTONIX) 40 MG tablet Take 40 mg by mouth daily  1/25/17  Yes Historical Provider, MD   nitroGLYCERIN (NITROSTAT) 0.4 MG SL tablet Place 1 tablet under the tongue every 5 minutes as needed for Chest pain. 6/18/14  Yes Petey Crawford MD   tamsulosin (FLOMAX) 0.4 MG capsule TAKE 1 CAPSULE BY MOUTH EVERY DAY 6/10/21   Avril Lira MD   ketorolac 0.4 % SOLN ophthalmic solution Use 1 Drop in both eyes twice daily.  5/19/19   Historical Provider, MD   acetaminophen (TYLENOL) 500 MG tablet Take 1,000 mg by mouth every 6 hours as needed for Pain    Historical Provider, MD   aspirin 81 MG tablet Take 81 mg by mouth daily    Historical Provider, MD   furosemide (LASIX) 20 MG tablet Take 20 mg by mouth daily  9/28/15   Historical Provider, MD       Current Medications:   sodium chloride      dextrose         IV Medications:  Current Facility-Administered Medications   Medication Dose Route Frequency Provider Last Rate Last Admin    sodium chloride flush 0.9 % injection 5-40 mL  5-40 mL IntraVENous 2 times per day Nicoletto Bolzan-Roche, APRN - CNP   10 mL at 12/10/21 0844    sodium chloride flush 0.9 % injection 5-40 mL  5-40 mL IntraVENous PRN Nicoletto Bolzan-Roche, APRN - CNP        0.9 % sodium chloride infusion  25 mL IntraVENous PRN Nicoletto Bolzan-Roche, APRN - CNP        enoxaparin (LOVENOX) injection 40 mg  40 mg SubCUTAneous Daily Nicoletto Bolzan-Roche, APRN - CNP   40 mg at 12/10/21 0840    ondansetron (ZOFRAN-ODT) disintegrating tablet 4 mg  4 mg Oral Q8H PRN Nicoletto Bolzan-Roche, APRN - CNP        Or    ondansetron (ZOFRAN) injection 4 mg  4 mg IntraVENous Q6H PRN Nicoletto Bolzan-Roche, APRN - CNP        polyethylene glycol (GLYCOLAX) packet 17 g  17 g Oral Daily PRN Nicoletto Bolzan-Roche, APRN - CNP        acetaminophen (TYLENOL) tablet 650 mg  650 mg Oral Q6H PRN Nicoletto Bolzan-Roche, APRN - CNP        Or    acetaminophen (TYLENOL) suppository 650 mg  650 mg Rectal Q6H PRN Nicozulemao Bolzan-Roche, APRN - CNP        lidocaine 4 % external patch 1 patch  1 patch TransDERmal Daily Brandon Shepard MD   1 patch at 12/10/21 0840    hydrALAZINE (APRESOLINE) injection 10 mg  10 mg IntraVENous Q4H PRN Brandon Shepard MD        insulin lispro (HUMALOG) injection vial 0-12 Units  0-12 Units SubCUTAneous TID WC Brandon Shepard MD        insulin lispro (HUMALOG) injection vial 0-6 Units  0-6 Units SubCUTAneous Nightly Brandon Shepard MD        glucose (GLUTOSE) 40 % oral gel 15 g  15 g Oral PRN Brandon Shepard MD        dextrose 50 % IV solution  12.5 g IntraVENous PRN Brandon Shepard MD        glucagon (rDNA) injection 1 mg  1 mg IntraMUSCular PRN Brandon Shepard MD        dextrose 5 % solution  100 mL/hr IntraVENous PRN Brandon Shepard MD        traMADol Hannah Xie) tablet 50 mg  50 mg Oral Q6H PRN Kristel Boswell MD   50 mg at 12/10/21 1227    furosemide (LASIX) injection 40 mg  40 mg IntraVENous Once Kristel Boswell MD        allopurinol (ZYLOPRIM) tablet 100 mg  100 mg Oral Daily Kristel Boswell MD        amLODIPine (NORVASC) tablet 5 mg  5 mg Oral Daily Kristel Boswell MD        aspirin chewable tablet 81 mg  81 mg Oral Daily Kristel Boswell MD        folic acid (FOLVITE) tablet 1 mg  1 mg Oral Daily Kristel Boswell MD        furosemide (LASIX) tablet 20 mg  20 mg Oral Daily Kristel Boswell MD        hydrALAZINE (APRESOLINE) tablet 50 mg  50 mg Oral Daily Elena Durant MD        ketorolac (ACULAR) 0.5 % ophthalmic solution 1 drop  1 drop Both Eyes BID Elena Durant MD        meloxicam (MOBIC) tablet 15 mg  15 mg Oral Daily Elena Durant MD        pantoprazole (PROTONIX) tablet 40 mg  40 mg Oral Daily Elena Durant MD        rosuvastatin (CRESTOR) tablet 20 mg  20 mg Oral Nightly Elena Durant MD        sotalol (BETAPACE) tablet 60 mg  60 mg Oral 2 times per day Elena Durant MD        tamsulosin (FLOMAX) capsule 0.4 mg  0.4 mg Oral Daily Elena Durant MD        pill splitter   Does not apply Once Elena Durant MD         Current Outpatient Medications   Medication Sig Dispense Refill    rosuvastatin (CRESTOR) 40 MG tablet       KLOR-CON M20 20 MEQ extended release tablet       sotalol (BETAPACE) 120 MG tablet       sildenafil (VIAGRA) 100 MG tablet Take 1 tablet by mouth as needed for Erectile Dysfunction 6 tablet 3    prednisoLONE acetate (PRED FORTE) 1 % ophthalmic suspension       rosuvastatin (CRESTOR) 20 MG tablet Take 20 mg by mouth      hydrALAZINE (APRESOLINE) 50 MG tablet Take 50 mg by mouth daily      tamsulosin (FLOMAX) 0.4 MG capsule Take 1 capsule by mouth daily 90 capsule 3    sildenafil (VIAGRA) 100 MG tablet Take 1 tablet by mouth as needed for Erectile Dysfunction 6 tablet 3    folic acid (FOLVITE) 1 MG tablet Take 1 mg by mouth daily      amLODIPine (NORVASC) 5 MG tablet Take 5 mg by mouth daily      meloxicam (MOBIC) 15 MG tablet Take 15 mg by mouth daily      etanercept (ENBREL) 25 MG/0.5ML SOSY Inject 40 mg into the skin once a week      allopurinol (ZYLOPRIM) 100 MG tablet Take 100 mg by mouth daily      pantoprazole (PROTONIX) 40 MG tablet Take 40 mg by mouth daily       nitroGLYCERIN (NITROSTAT) 0.4 MG SL tablet Place 1 tablet under the tongue every 5 minutes as needed for Chest pain.  25 tablet 1    tamsulosin (FLOMAX) 0.4 MG capsule TAKE 1 CAPSULE BY MOUTH EVERY DAY 90 capsule 3    ketorolac 0.4 % SOLN ophthalmic solution Use 1 Drop in both eyes twice daily. 1    acetaminophen (TYLENOL) 500 MG tablet Take 1,000 mg by mouth every 6 hours as needed for Pain      aspirin 81 MG tablet Take 81 mg by mouth daily      furosemide (LASIX) 20 MG tablet Take 20 mg by mouth daily          ROS:   · 12 point review of systems was obtained in detail and is negative other than that noted above or below. Vital Signs:  Vitals:    12/10/21 1530 12/10/21 1600 12/10/21 1611 12/10/21 1615   BP:  (!) 156/69 (!) 156/69 (!) 156/69   Pulse: 50 52 (!) 49 51   Resp: 18 21 18 25   Temp:       TempSrc:       SpO2: 92% 94% 91% (!) 89%   Weight:       Height:         No intake or output data in the 24 hours ending 12/10/21 1712    Patient Vitals for the past 96 hrs (Last 3 readings):   Weight   12/09/21 1745 300 lb (136.1 kg)   12/09/21 1740 300 lb (136.1 kg)       Physical Examination:  GENERAL APPEARANCE: Well developed, well nourished, in no acute distress. CHEST: Symmetric. Tenderness with light palpation of right chest.   INTEGUMENT: Skin warm and dry, without gross excoriationis or lesions. HEENT: No gross abnormalities of conjunctiva, teeth, gums, oral mucosa  NECK: Supple, no JVD, no bruit. Thyroid not palpable. Carotid upstrokes normal.  NEURO/PSHCY: Alert and oriented x3; appropriate behavior and responses and responses, grossly normal cerebellar function with normal balance and coordination  LUNGS: Decreased breath sounds bases bilaterally; normal respiratory effort. HEART: Rate and rhythm irregularly irregular with no evident murmur; no gallop appreciated. There are no rubs, clicks or heaves. PMI nondisplaced. ABDOMEN: Soft, nontender, no palpable hepatosplenomegaly, no mases, no bruits. Abdominal aorta not noted to be enlarged. MUSCULOSKELETAL: Ambulatory with normal tandem gait. EXTREMITIES: Warm with good color, no clubbing or cyanois.  There is 2+ bilateral edema noted.  PERIPHERAL VASCULAR: Pulses present and equally palpable; 2+ throughout. No femoral bruits. Diagnostics:    EKG:    Telemetry: atrial fibrillation - controlled. Lab Data:  BMP:  Recent Labs     12/09/21  2156 12/09/21  2156 12/10/21  0536     --  139   K 3.8  --  4.5   CL 96  --  100   CO2 26  --  20   BUN 17  --  17   CREATININE 0.74  --  0.52*   LABGLOM >60.0   < > >60.0    < > = values in this interval not displayed. CBC:  Recent Labs     12/09/21  1900   WBC 11.8*   RBC 5.35   HGB 17.4   HCT 52.6*   MCV 98.4   MCH 32.5*   MCHC 33.0   RDW 15.9*        Cardiac Enzymes:   Recent Labs     12/09/21  2156 12/10/21  0900   TROPONINI 0.028* 0.036*     Hepatic Function Panel:  Recent Labs     12/09/21  2156 12/10/21  0536   ALKPHOS 99 86   ALT 32 29   AST 37 40   PROT 7.2 6.5   BILITOT 2.3* 2.3*   LABALBU 4.5 3.6     Magnesium:  Recent Labs     12/10/21  0536   MG 1.9     Pro-BNP:  Lab Results   Component Value Date    PROBNP 4,130 12/09/2021    PROBNP 872 12/24/2015    PROBNP 386 06/10/2014     TSH:  Lab Results   Component Value Date    TSH 1.610 10/30/2018     Lipid Profile:  Lab Results   Component Value Date    TRIG 162 03/30/2021    HDL 58 03/30/2021    LDLCALC 128 03/30/2021     HgbA1C:  Lab Results   Component Value Date    LABA1C 6.5 03/16/2020     CMP:  Recent Labs     12/09/21  2156 12/10/21  0536    139   K 3.8 4.5   CL 96 100   CO2 26 20   BUN 17 17   CREATININE 0.74 0.52*   GLUCOSE 123* 106*   CALCIUM 9.7 9.2   PROT 7.2 6.5   LABALBU 4.5 3.6   BILITOT 2.3* 2.3*   ALKPHOS 99 86   AST 37 40   ALT 32 29       Radiology:   CT Head WO Contrast   Final Result      No acute intracranial process. Chronic microvascular ischemic changes. CT CERVICAL SPINE WO CONTRAST   Final Result      No acute fracture or traumatic malalignment. Multilevel degenerative changes of the cervical spine.                CT CHEST WO CONTRAST   Final Result      No acute traumatic intrathoracic findings. Bilateral pleural effusions, small right and trace left, with associated compressive atelectasis. Impression:     1. Acute diastolic and right-sided congestive heart failure secondary to dietary and medical noncompliance. Patient stopped taking oral diuretics and has noticed worsening peripheral edema and exertional shortness of breath. Elevated proBNP level noted. Previously noted to have normal LV systolic function. Likely exacerbated by recurrence of atrial fibrillation at some point in the recent past.    2.  History of persistent atrial fibrillation. On sotalol therapy. Status post cardioversion several years ago. Patient has opted against long-term anticoagulation therapy. 3.  Chronic sinus bradycardia with underlying sinus node dysfunction. 4.  Generalized fatigue and weakness. 5.  Accidental fall. Traumatic right-sided chest discomfort. 6.  Atherosclerotic heart disease with remote angioplasty and stenting of the right coronary artery. Moderate diffuse disease by cardiac cath 2013. Normal LV systolic function. 7.  Primary hypertension. Recommendations:    Discontinue sotalol as patient is in atrial fibrillation and currently is not anticoagulated. Patient continues to refuse long term anticoagulation with a NOAC or warfarin. Diurese with IV Lasix. Resume oral diuretics. Repeat echocardiogram.    Follow-up with Dr. Kelsea Fuchs to discuss plans for eventual repeat cardioversion, assuming he is willing to start on anticoagulation. Otherwise will plan on a rate control strategy. Discussed with him the need for compliance with his medications. Thank you for the opportunity to participate in the care of your patient. Do not hesitate to call if you have any questions.     Electronically signed by Krunal Barboza MD, [facility] on 12/10/2021 at 5:12 PM

## 2021-12-10 NOTE — ED NOTES
Pt placed on hospital bed due to being uncomfortable in Er bed   Pt tolerated well   Warm blankets and ice water provided     Levi Seymour RN  12/09/21 8494

## 2021-12-10 NOTE — H&P
Date    ANKLE SURGERY Right     pin    APPENDECTOMY      ARTHRODESIS Right 10/31/2016    RIGHT ANKLE ARTHRODESIS OF RIGHT ANKLE AND SUBTALAR JOINT, C-ARM, ABHIJEET EQUIPMENT SYNTHETIC BONE GRAFT, ALLOGRAFT CANCELLOUS, SHARON ANKLE FUSION NAIL, SHANDA IMPLANT BONE STIMULATOR, CHOICE ANESTHESIA, BLOCK  performed by Mirian Machado MD at [address]      stent x 5    COLONOSCOPY  11    FRACTURE SURGERY      right ankle    HARDWARE REMOVAL FOOT / ANKLE Right 2017    RIGHT ANKLE HARDWARE REMOVAL performed by Dasia Valadez MD at [address] Bilateral     knees    [place] COLON CA SCRN NOT  [address] [place] [address] IND N/A 2017    COLONOSCOPY performed by Awais [place] DO at [facility] Left     TONSILLECTOMY AND ADENOIDECTOMY       FAMILY HISTORY:    Family History   Problem Relation Age of Onset    Heart Attack Father     Cancer Father         colon    High Cholesterol Mother     Heart Disease Mother     Macular Degen Mother     Arthritis Sister         hip replacement    Other Brother         vertigo    No Known Problems Son     No Known Problems Son      SOCIAL HISTORY:    Social History     Socioeconomic History    Marital status:      Spouse name: Not on file    Number of children: Not on file    Years of education: Not on file    Highest education level: Not on file   Occupational History    Not on file   Tobacco Use    Smoking status: Former Smoker     Packs/day: 0.25     Years: 7.00     Pack years: 1.75     Types: Cigarettes     Start date: 5     Quit date: 6/3/1992     Years since quittin.5    Smokeless tobacco: Never Used   Vaping Use    Vaping Use: Never used   Substance and Sexual Activity    Alcohol use:  Yes     Alcohol/week: 0.0 standard drinks     Comment: weekly- 2 beers or wine    Drug use: No    Sexual activity: Never   Other Topics Concern    Not on file   Social History Narrative    Not on file     Social Determinants of Health     Financial Resource Strain:     Difficulty of Paying Living Expenses: Not on file   Food Insecurity: No Food Insecurity    Worried About Running Out of Food in the Last Year: Never true    Ran Out of Food in the Last Year: Never true   Transportation Needs: No Transportation Needs    Lack of Transportation (Medical): No    Lack of Transportation (Non-Medical): No   Physical Activity:     Days of Exercise per Week: Not on file    Minutes of Exercise per Session: Not on file   Stress:     Feeling of Stress : Not on file   Social Connections:     Frequency of Communication with Friends and Family: Not on file    Frequency of Social Gatherings with Friends and Family: Not on file    Attends [employer] Services: Not on file    Active Member of [address] [place] [address] [id number] or Organizations: Not on file    Attends Club or Organization Meetings: Not on file    Marital Status: Not on file   Intimate Partner Violence:     Fear of Current or Ex-Partner: Not on file    Emotionally Abused: Not on file    Physically Abused: Not on file    Sexually Abused: Not on file   Housing Stability:     Unable to Pay for Housing in the Last Year: Not on file    Number of [place] in the Last Year: Not on file    Unstable Housing in the Last Year: Not on file     MEDICATIONS:   Prior to Admission medications    Medication Sig Start Date End Date Taking? Authorizing Provider   tamsulosin (FLOMAX) 0.4 MG capsule TAKE 1 CAPSULE BY MOUTH EVERY DAY 6/10/21   Carry MD Ranjit   rosuvastatin (CRESTOR) 40 MG tablet  4/14/20   Historical Provider, MD   KLOR-CON M20 20 MEQ extended release tablet  1/7/20   Historical Provider, MD   sotalol (BETAPACE) 120 MG tablet  12/2/19   Historical Provider, MD   sildenafil (VIAGRA) 100 MG tablet Take 1 tablet by mouth as needed for Erectile Dysfunction 3/6/20   Radha Church MD   ketorolac 0.4 % SOLN ophthalmic solution Use 1 Drop in both eyes twice daily.  5/19/19 Historical Provider, MD   prednisoLONE acetate (PRED FORTE) 1 % ophthalmic suspension  5/15/19   Historical Provider, MD   rosuvastatin (CRESTOR) 20 MG tablet Take 20 mg by mouth    Historical Provider, MD   hydrALAZINE (APRESOLINE) 50 MG tablet Take 50 mg by mouth daily    Historical Provider, MD   tamsulosin (FLOMAX) 0.4 MG capsule Take 1 capsule by mouth daily 3/7/19   Laverne Manzo, MD   sildenafil (VIAGRA) 100 MG tablet Take 1 tablet by mouth as needed for Erectile Dysfunction 3/7/19   Laverne Manzo, MD   folic acid (FOLVITE) 1 MG tablet Take 1 mg by mouth daily    Historical Provider, MD   amLODIPine (NORVASC) 5 MG tablet Take 5 mg by mouth daily    Historical Provider, MD   meloxicam (MOBIC) 15 MG tablet Take 15 mg by mouth daily    Historical Provider, MD   etanercept (ENBREL) 25 MG/0.5ML SOSY Inject 40 mg into the skin once a week    Historical Provider, MD   allopurinol (ZYLOPRIM) 100 MG tablet Take 100 mg by mouth daily    Historical Provider, MD   acetaminophen (TYLENOL) 500 MG tablet Take 1,000 mg by mouth every 6 hours as needed for Pain    Historical Provider, MD   pantoprazole (PROTONIX) 40 MG tablet Take 40 mg by mouth daily  1/25/17   Historical Provider, MD   aspirin 81 MG tablet Take 81 mg by mouth daily    Historical Provider, MD   furosemide (LASIX) 20 MG tablet Take 20 mg by mouth daily  9/28/15   Historical Provider, MD   nitroGLYCERIN (NITROSTAT) 0.4 MG SL tablet Place 1 tablet under the tongue every 5 minutes as needed for Chest pain. 6/18/14   Mert Escoto MD       ALLERGIES: Hylan g-f 20, Ace inhibitors, and Statins depletion therapy    REVIEW OF SYSTEM:   Review of Systems   Constitutional: Positive for fatigue. Negative for activity change, chills and fever. HENT: Negative for congestion, ear pain, rhinorrhea and sore throat. Eyes: Negative for photophobia and visual disturbance. Respiratory: Positive for shortness of breath. Negative for cough and wheezing. Cardiovascular: Negative for chest pain. Gastrointestinal: Negative for abdominal pain, diarrhea, nausea and vomiting. Endocrine: Negative for polydipsia, polyphagia and polyuria. Genitourinary: Negative for dysuria, flank pain, hematuria and urgency. Musculoskeletal: Negative for back pain, myalgias and neck stiffness. Skin: Negative for rash and wound. Allergic/Immunologic: Negative for immunocompromised state. Neurological: Positive for weakness. Negative for dizziness and headaches. Psychiatric/Behavioral: Negative for behavioral problems and confusion. OBJECTIVE  PHYSICAL EXAM: BP (!) 178/74   Pulse 52   Temp 97.1 °F (36.2 °C)   Resp 20   Ht 5' 11\" (1.803 m)   Wt 300 lb (136.1 kg)   SpO2 95%   BMI 41.84 kg/m²     Physical Exam  Vitals and nursing note reviewed. Constitutional:       Appearance: He is well-developed. HENT:      Head: Normocephalic and atraumatic. Nose: Nose normal.      Mouth/Throat:      Mouth: Mucous membranes are moist.   Eyes:      Pupils: Pupils are equal, round, and reactive to light. Cardiovascular:      Rate and Rhythm: Regular rhythm. Bradycardia present. Pulses: Normal pulses. Heart sounds: Normal heart sounds. Pulmonary:      Effort: Pulmonary effort is normal. No respiratory distress. Breath sounds: Normal breath sounds. No wheezing. Abdominal:      General: Bowel sounds are normal.      Palpations: Abdomen is soft. There is no mass. Tenderness: There is no abdominal tenderness. Musculoskeletal:         General: Normal range of motion. Cervical back: Normal range of motion. Right lower leg: 3+ Edema ( Nonpitting) present. Left lower leg: Left lower leg edema:   Nonpitting. Lymphadenopathy:      Cervical: No cervical adenopathy. Skin:     General: Skin is warm and dry. Capillary Refill: Capillary refill takes less than 2 seconds.    Neurological:      Mental Status: He is alert and oriented to person, place, and time. Deep Tendon Reflexes: Reflexes normal.   Psychiatric:         Thought Content: Thought content normal.           DATA:     Diagnostic tests reviewed for today's visit:    Most recent labs and imaging results reviewed.      LABS:    Recent Results (from the past 24 hour(s))   CBC Auto Differential    Collection Time: 12/09/21  7:00 PM   Result Value Ref Range    WBC 11.8 (H) 4.8 - 10.8 K/uL    RBC 5.35 4.70 - 6.10 M/uL    Hemoglobin 17.4 14.0 - 18.0 g/dL    Hematocrit 52.6 (H) 42.0 - 52.0 %    MCV 98.4 80.0 - 100.0 fL    MCH 32.5 (H) 27.0 - 31.3 pg    MCHC 33.0 33.0 - 37.0 %    RDW 15.9 (H) 11.5 - 14.5 %    Platelets 298 251 - 447 K/uL    Neutrophils % 81.0 %    Lymphocytes % 7.0 %    Monocytes % 10.5 %    Eosinophils % 1.1 %    Basophils % 0.4 %    Neutrophils Absolute 9.6 (H) 1.4 - 6.5 K/uL    Lymphocytes Absolute 0.8 (L) 1.0 - 4.8 K/uL    Monocytes Absolute 1.2 (H) 0.2 - 0.8 K/uL    Eosinophils Absolute 0.1 0.0 - 0.7 K/uL    Basophils Absolute 0.1 0.0 - 0.2 K/uL   Urine Reflex to Culture    Collection Time: 12/09/21  7:00 PM    Specimen: Urine, clean catch   Result Value Ref Range    Color, UA Yellow Straw/Yellow    Clarity, UA Clear Clear    Glucose, Ur Negative Negative mg/dL    Bilirubin Urine Negative Negative    Ketones, Urine TRACE (A) Negative mg/dL    Specific Gravity, UA 1.015 1.005 - 1.030    Blood, Urine SMALL (A) Negative    pH, UA 6.5 5.0 - 9.0    Protein, UA >=300 (A) Negative mg/dL    Urobilinogen, Urine 1.0 <2.0 E.U./dL    Nitrite, Urine Negative Negative    Leukocyte Esterase, Urine Negative Negative    Urine Reflex to Culture Not Indicated    Microscopic Urinalysis    Collection Time: 12/09/21  7:00 PM   Result Value Ref Range    Bacteria, UA Negative Negative /HPF    Hyaline Casts, UA 0-1 0 - 5 /HPF    WBC, UA 3-5 0 - 5 /HPF    RBC, UA 6-10 (A) 0 - 5 /HPF    Epithelial Cells, UA 0-2 0 - 5 /HPF   SPECIMEN REJECTION    Collection Time: 12/09/21  8:39 PM   Result Value Ref Range    Rejected Test CMP, TROP     Reason for Rejection see below    Comprehensive Metabolic Panel    Collection Time: 12/09/21  9:56 PM   Result Value Ref Range    Sodium 137 135 - 144 mEq/L    Potassium 3.8 3.4 - 4.9 mEq/L    Chloride 96 95 - 107 mEq/L    CO2 26 20 - 31 mEq/L    Anion Gap 15 9 - 15 mEq/L    Glucose 123 (H) 70 - 99 mg/dL    BUN 17 8 - 23 mg/dL    CREATININE 0.74 0.70 - 1.20 mg/dL    GFR Non-African American >60.0 >60    GFR  >60.0 >60    Calcium 9.7 8.5 - 9.9 mg/dL    Total Protein 7.2 6.3 - 8.0 g/dL    Albumin 4.5 3.5 - 4.6 g/dL    Total Bilirubin 2.3 (H) 0.2 - 0.7 mg/dL    Alkaline Phosphatase 99 35 - 104 U/L    ALT 32 0 - 41 U/L    AST 37 0 - 40 U/L    Globulin 2.7 2.3 - 3.5 g/dL   Troponin    Collection Time: 12/09/21  9:56 PM   Result Value Ref Range    Troponin 0.028 (HH) 0.000 - 0.010 ng/mL   Brain Natriuretic Peptide    Collection Time: 12/09/21  9:56 PM   Result Value Ref Range    Pro-BNP 4,130 pg/mL   COVID-19, Rapid    Collection Time: 12/10/21 12:59 AM    Specimen: Nasopharyngeal Swab   Result Value Ref Range    SARS-CoV-2, NAAT Not Detected Not Detected       IMAGING:  No results found. VTE Prophylaxis: low molecular weight heparin -  start    ASSESSMENT AND PLAN    Principal Problem:  Unable to ambulate: SINGH. History of A. fib on BB. Bradycardia; EKG shows A. fib with a rate of 57. Patient heart rate between 51 and 59 on monitor. Patient recently stop diuretics at home. SINGH likely related to heart failure. We will get echo. We will resume home Lasix. We will get PT/OT. Hold rate control medication. We will consult cardiology. Elevated trop: Troponin  0.028. EKG A. fib. History of A. fib. We will cycle troponin. We will repeat EKG. Active Problems:  CAD: History of cardiac stent x5. Patient on aspirin and statin. We will resume home meds. HTN: patient on home meds to control.   We will resume home meds and hold rate control medications pending cardiology recommendation. HLD: Patient on statin. We will resume home meds. Plan of care discussed with: patient    SIGNATURE: GIUSEPPE Kurtz - CNP  DATE: December 10, 2021  TIME: 1:15 AM     BETH Choi, MD - supervising physician

## 2021-12-11 PROBLEM — I50.43 CHF (CONGESTIVE HEART FAILURE), NYHA CLASS I, ACUTE ON CHRONIC, COMBINED (HCC): Status: ACTIVE | Noted: 2021-12-11

## 2021-12-11 LAB
ALBUMIN SERPL-MCNC: 3.5 G/DL (ref 3.5–4.6)
ALP BLD-CCNC: 83 U/L (ref 35–104)
ALT SERPL-CCNC: 21 U/L (ref 0–41)
ANION GAP SERPL CALCULATED.3IONS-SCNC: 13 MEQ/L (ref 9–15)
AST SERPL-CCNC: 22 U/L (ref 0–40)
BILIRUB SERPL-MCNC: 2 MG/DL (ref 0.2–0.7)
BUN BLDV-MCNC: 22 MG/DL (ref 8–23)
CALCIUM SERPL-MCNC: 8.7 MG/DL (ref 8.5–9.9)
CHLORIDE BLD-SCNC: 96 MEQ/L (ref 95–107)
CO2: 26 MEQ/L (ref 20–31)
CREAT SERPL-MCNC: 0.85 MG/DL (ref 0.7–1.2)
GFR AFRICAN AMERICAN: >60
GFR NON-AFRICAN AMERICAN: >60
GLOBULIN: 2.4 G/DL (ref 2.3–3.5)
GLUCOSE BLD-MCNC: 119 MG/DL (ref 60–115)
GLUCOSE BLD-MCNC: 122 MG/DL (ref 70–99)
GLUCOSE BLD-MCNC: 127 MG/DL (ref 60–115)
GLUCOSE BLD-MCNC: 166 MG/DL (ref 60–115)
GLUCOSE BLD-MCNC: 175 MG/DL (ref 60–115)
HCT VFR BLD CALC: 47.4 % (ref 42–52)
HEMOGLOBIN: 16.2 G/DL (ref 13.7–17.5)
LV EF: 65 %
LVEF MODALITY: NORMAL
MAGNESIUM: 1.7 MG/DL (ref 1.7–2.4)
MCH RBC QN AUTO: 32.7 PG (ref 25.7–32.2)
MCHC RBC AUTO-ENTMCNC: 34.2 % (ref 32.3–36.5)
MCV RBC AUTO: 95.6 FL (ref 79–92.2)
PDW BLD-RTO: 14.3 % (ref 11.6–14.4)
PERFORMED ON: ABNORMAL
PLATELET # BLD: 211 K/UL (ref 163–337)
POTASSIUM REFLEX MAGNESIUM: 3.2 MEQ/L (ref 3.4–4.9)
RBC # BLD: 4.96 M/UL (ref 4.63–6.08)
SODIUM BLD-SCNC: 135 MEQ/L (ref 135–144)
TOTAL PROTEIN: 5.9 G/DL (ref 6.3–8)
TROPONIN: 0.03 NG/ML (ref 0–0.01)
TROPONIN: 0.04 NG/ML (ref 0–0.01)
TROPONIN: 0.04 NG/ML (ref 0–0.01)
WBC # BLD: 9.6 K/UL (ref 4.2–9)

## 2021-12-11 PROCEDURE — 6360000002 HC RX W HCPCS: Performed by: INTERNAL MEDICINE

## 2021-12-11 PROCEDURE — 1210000000 HC MED SURG R&B

## 2021-12-11 PROCEDURE — 85027 COMPLETE CBC AUTOMATED: CPT

## 2021-12-11 PROCEDURE — 84484 ASSAY OF TROPONIN QUANT: CPT

## 2021-12-11 PROCEDURE — 97167 OT EVAL HIGH COMPLEX 60 MIN: CPT

## 2021-12-11 PROCEDURE — 6370000000 HC RX 637 (ALT 250 FOR IP): Performed by: INTERNAL MEDICINE

## 2021-12-11 PROCEDURE — 36415 COLL VENOUS BLD VENIPUNCTURE: CPT

## 2021-12-11 PROCEDURE — 6360000004 HC RX CONTRAST MEDICATION: Performed by: INTERNAL MEDICINE

## 2021-12-11 PROCEDURE — 83735 ASSAY OF MAGNESIUM: CPT

## 2021-12-11 PROCEDURE — C8929 TTE W OR WO FOL WCON,DOPPLER: HCPCS

## 2021-12-11 PROCEDURE — 97530 THERAPEUTIC ACTIVITIES: CPT

## 2021-12-11 PROCEDURE — 80053 COMPREHEN METABOLIC PANEL: CPT

## 2021-12-11 RX ORDER — ACETAMINOPHEN 325 MG/1
650 TABLET ORAL EVERY 6 HOURS PRN
Status: DISCONTINUED | OUTPATIENT
Start: 2021-12-11 | End: 2021-12-17 | Stop reason: HOSPADM

## 2021-12-11 RX ORDER — AMLODIPINE BESYLATE 5 MG/1
5 TABLET ORAL DAILY
Status: DISCONTINUED | OUTPATIENT
Start: 2021-12-11 | End: 2021-12-12

## 2021-12-11 RX ORDER — ASPIRIN 81 MG/1
81 TABLET, CHEWABLE ORAL DAILY
Status: DISCONTINUED | OUTPATIENT
Start: 2021-12-11 | End: 2021-12-17 | Stop reason: HOSPADM

## 2021-12-11 RX ORDER — LIDOCAINE 4 G/G
1 PATCH TOPICAL DAILY
Status: DISCONTINUED | OUTPATIENT
Start: 2021-12-11 | End: 2021-12-17 | Stop reason: HOSPADM

## 2021-12-11 RX ORDER — POLYETHYLENE GLYCOL 3350 17 G/17G
17 POWDER, FOR SOLUTION ORAL DAILY PRN
Status: DISCONTINUED | OUTPATIENT
Start: 2021-12-11 | End: 2021-12-17 | Stop reason: HOSPADM

## 2021-12-11 RX ORDER — ACETAMINOPHEN 650 MG/1
650 SUPPOSITORY RECTAL EVERY 6 HOURS PRN
Status: DISCONTINUED | OUTPATIENT
Start: 2021-12-11 | End: 2021-12-17 | Stop reason: HOSPADM

## 2021-12-11 RX ORDER — SODIUM CHLORIDE 9 MG/ML
25 INJECTION, SOLUTION INTRAVENOUS PRN
Status: DISCONTINUED | OUTPATIENT
Start: 2021-12-11 | End: 2021-12-17 | Stop reason: HOSPADM

## 2021-12-11 RX ORDER — ONDANSETRON 2 MG/ML
4 INJECTION INTRAMUSCULAR; INTRAVENOUS EVERY 6 HOURS PRN
Status: DISCONTINUED | OUTPATIENT
Start: 2021-12-11 | End: 2021-12-17 | Stop reason: HOSPADM

## 2021-12-11 RX ORDER — ROSUVASTATIN CALCIUM 20 MG/1
20 TABLET, COATED ORAL NIGHTLY
Status: DISCONTINUED | OUTPATIENT
Start: 2021-12-11 | End: 2021-12-17 | Stop reason: HOSPADM

## 2021-12-11 RX ORDER — FUROSEMIDE 10 MG/ML
40 INJECTION INTRAMUSCULAR; INTRAVENOUS DAILY
Status: DISCONTINUED | OUTPATIENT
Start: 2021-12-11 | End: 2021-12-11

## 2021-12-11 RX ORDER — NICOTINE POLACRILEX 4 MG
15 LOZENGE BUCCAL PRN
Status: DISCONTINUED | OUTPATIENT
Start: 2021-12-11 | End: 2021-12-11

## 2021-12-11 RX ORDER — DEXTROSE MONOHYDRATE 25 G/50ML
12.5 INJECTION, SOLUTION INTRAVENOUS PRN
Status: DISCONTINUED | OUTPATIENT
Start: 2021-12-11 | End: 2021-12-17 | Stop reason: HOSPADM

## 2021-12-11 RX ORDER — METOPROLOL SUCCINATE 25 MG/1
25 TABLET, EXTENDED RELEASE ORAL DAILY
Status: DISCONTINUED | OUTPATIENT
Start: 2021-12-11 | End: 2021-12-17

## 2021-12-11 RX ORDER — DEXTROSE MONOHYDRATE 50 MG/ML
100 INJECTION, SOLUTION INTRAVENOUS PRN
Status: DISCONTINUED | OUTPATIENT
Start: 2021-12-11 | End: 2021-12-17 | Stop reason: HOSPADM

## 2021-12-11 RX ORDER — FOLIC ACID 1 MG/1
1 TABLET ORAL DAILY
Status: DISCONTINUED | OUTPATIENT
Start: 2021-12-11 | End: 2021-12-17 | Stop reason: HOSPADM

## 2021-12-11 RX ORDER — HYDRALAZINE HYDROCHLORIDE 20 MG/ML
10 INJECTION INTRAMUSCULAR; INTRAVENOUS EVERY 4 HOURS PRN
Status: DISCONTINUED | OUTPATIENT
Start: 2021-12-11 | End: 2021-12-17 | Stop reason: HOSPADM

## 2021-12-11 RX ORDER — FUROSEMIDE 10 MG/ML
40 INJECTION INTRAMUSCULAR; INTRAVENOUS 2 TIMES DAILY
Status: DISCONTINUED | OUTPATIENT
Start: 2021-12-11 | End: 2021-12-12

## 2021-12-11 RX ORDER — POTASSIUM CHLORIDE 750 MG/1
40 TABLET, EXTENDED RELEASE ORAL
Status: DISCONTINUED | OUTPATIENT
Start: 2021-12-11 | End: 2021-12-12

## 2021-12-11 RX ORDER — ALLOPURINOL 100 MG/1
100 TABLET ORAL DAILY
Status: DISCONTINUED | OUTPATIENT
Start: 2021-12-11 | End: 2021-12-17 | Stop reason: HOSPADM

## 2021-12-11 RX ORDER — MELOXICAM 7.5 MG/1
15 TABLET ORAL DAILY
Status: DISCONTINUED | OUTPATIENT
Start: 2021-12-11 | End: 2021-12-17 | Stop reason: HOSPADM

## 2021-12-11 RX ORDER — KETOROLAC TROMETHAMINE 30 MG/ML
15 INJECTION, SOLUTION INTRAMUSCULAR; INTRAVENOUS EVERY 6 HOURS PRN
Status: DISPENSED | OUTPATIENT
Start: 2021-12-11 | End: 2021-12-16

## 2021-12-11 RX ORDER — TAMSULOSIN HYDROCHLORIDE 0.4 MG/1
0.4 CAPSULE ORAL DAILY
Status: DISCONTINUED | OUTPATIENT
Start: 2021-12-11 | End: 2021-12-17 | Stop reason: HOSPADM

## 2021-12-11 RX ORDER — SODIUM CHLORIDE 0.9 % (FLUSH) 0.9 %
5-40 SYRINGE (ML) INJECTION EVERY 12 HOURS SCHEDULED
Status: DISCONTINUED | OUTPATIENT
Start: 2021-12-11 | End: 2021-12-11

## 2021-12-11 RX ORDER — HYDRALAZINE HYDROCHLORIDE 25 MG/1
50 TABLET, FILM COATED ORAL DAILY
Status: DISCONTINUED | OUTPATIENT
Start: 2021-12-11 | End: 2021-12-12

## 2021-12-11 RX ORDER — KETOROLAC TROMETHAMINE 5 MG/ML
1 SOLUTION OPHTHALMIC 2 TIMES DAILY
Status: DISCONTINUED | OUTPATIENT
Start: 2021-12-11 | End: 2021-12-17 | Stop reason: HOSPADM

## 2021-12-11 RX ORDER — PANTOPRAZOLE SODIUM 40 MG/1
40 TABLET, DELAYED RELEASE ORAL DAILY
Status: DISCONTINUED | OUTPATIENT
Start: 2021-12-11 | End: 2021-12-17 | Stop reason: HOSPADM

## 2021-12-11 RX ORDER — TRAMADOL HYDROCHLORIDE 50 MG/1
50 TABLET ORAL EVERY 6 HOURS PRN
Status: DISCONTINUED | OUTPATIENT
Start: 2021-12-11 | End: 2021-12-17 | Stop reason: HOSPADM

## 2021-12-11 RX ORDER — SODIUM CHLORIDE 0.9 % (FLUSH) 0.9 %
5-40 SYRINGE (ML) INJECTION PRN
Status: DISCONTINUED | OUTPATIENT
Start: 2021-12-11 | End: 2021-12-17 | Stop reason: HOSPADM

## 2021-12-11 RX ORDER — FUROSEMIDE 20 MG/1
20 TABLET ORAL DAILY
Status: DISCONTINUED | OUTPATIENT
Start: 2021-12-11 | End: 2021-12-12

## 2021-12-11 RX ORDER — ONDANSETRON 4 MG/1
4 TABLET, ORALLY DISINTEGRATING ORAL EVERY 8 HOURS PRN
Status: DISCONTINUED | OUTPATIENT
Start: 2021-12-11 | End: 2021-12-17 | Stop reason: HOSPADM

## 2021-12-11 RX ADMIN — ENOXAPARIN SODIUM 40 MG: 40 INJECTION SUBCUTANEOUS at 09:15

## 2021-12-11 RX ADMIN — ACETAMINOPHEN 650 MG: 325 TABLET ORAL at 01:54

## 2021-12-11 RX ADMIN — PERFLUTREN 1.6 ML: 6.52 INJECTION, SUSPENSION INTRAVENOUS at 17:03

## 2021-12-11 RX ADMIN — ASPIRIN 81 MG CHEWABLE TABLET 81 MG: 81 TABLET CHEWABLE at 09:14

## 2021-12-11 RX ADMIN — KETOROLAC TROMETHAMINE 15 MG: 30 INJECTION, SOLUTION INTRAMUSCULAR; INTRAVENOUS at 22:25

## 2021-12-11 RX ADMIN — KETOROLAC TROMETHAMINE 15 MG: 30 INJECTION, SOLUTION INTRAMUSCULAR; INTRAVENOUS at 13:55

## 2021-12-11 RX ADMIN — KETOROLAC TROMETHAMINE 1 DROP: 5 SOLUTION/ DROPS OPHTHALMIC at 12:07

## 2021-12-11 RX ADMIN — TAMSULOSIN HYDROCHLORIDE 0.4 MG: 0.4 CAPSULE ORAL at 09:14

## 2021-12-11 RX ADMIN — POTASSIUM CHLORIDE 40 MEQ: 10 TABLET, EXTENDED RELEASE ORAL at 12:06

## 2021-12-11 RX ADMIN — HYDRALAZINE HYDROCHLORIDE 50 MG: 25 TABLET, FILM COATED ORAL at 09:13

## 2021-12-11 RX ADMIN — ALLOPURINOL 100 MG: 100 TABLET ORAL at 09:14

## 2021-12-11 RX ADMIN — FOLIC ACID 1 MG: 1 TABLET ORAL at 09:15

## 2021-12-11 RX ADMIN — AMLODIPINE BESYLATE 5 MG: 5 TABLET ORAL at 09:14

## 2021-12-11 RX ADMIN — MELOXICAM 15 MG: 7.5 TABLET ORAL at 09:15

## 2021-12-11 RX ADMIN — FUROSEMIDE 40 MG: 10 INJECTION, SOLUTION INTRAMUSCULAR; INTRAVENOUS at 09:15

## 2021-12-11 RX ADMIN — PANTOPRAZOLE SODIUM 40 MG: 40 TABLET, DELAYED RELEASE ORAL at 09:14

## 2021-12-11 RX ADMIN — OXYCODONE AND ACETAMINOPHEN 1 TABLET: 5; 325 TABLET ORAL at 09:21

## 2021-12-11 RX ADMIN — FUROSEMIDE 40 MG: 10 INJECTION, SOLUTION INTRAMUSCULAR; INTRAVENOUS at 17:18

## 2021-12-11 RX ADMIN — KETOROLAC TROMETHAMINE 1 DROP: 5 SOLUTION/ DROPS OPHTHALMIC at 19:49

## 2021-12-11 RX ADMIN — OXYCODONE AND ACETAMINOPHEN 1 TABLET: 5; 325 TABLET ORAL at 17:18

## 2021-12-11 RX ADMIN — OXYCODONE AND ACETAMINOPHEN 1 TABLET: 5; 325 TABLET ORAL at 22:25

## 2021-12-11 RX ADMIN — TRAMADOL HYDROCHLORIDE 50 MG: 50 TABLET, COATED ORAL at 18:39

## 2021-12-11 RX ADMIN — ROSUVASTATIN CALCIUM 20 MG: 20 TABLET, COATED ORAL at 19:49

## 2021-12-11 RX ADMIN — TRAMADOL HYDROCHLORIDE 50 MG: 50 TABLET, COATED ORAL at 12:06

## 2021-12-11 RX ADMIN — OXYCODONE AND ACETAMINOPHEN 1 TABLET: 5; 325 TABLET ORAL at 13:17

## 2021-12-11 ASSESSMENT — PAIN DESCRIPTION - PAIN TYPE
TYPE: ACUTE PAIN
TYPE: ACUTE PAIN

## 2021-12-11 ASSESSMENT — PAIN SCALES - GENERAL
PAINLEVEL_OUTOF10: 8
PAINLEVEL_OUTOF10: 8
PAINLEVEL_OUTOF10: 10
PAINLEVEL_OUTOF10: 3
PAINLEVEL_OUTOF10: 9
PAINLEVEL_OUTOF10: 10

## 2021-12-11 ASSESSMENT — PAIN DESCRIPTION - DESCRIPTORS: DESCRIPTORS: SHARP

## 2021-12-11 ASSESSMENT — PAIN DESCRIPTION - LOCATION
LOCATION: RIB CAGE
LOCATION: RIB CAGE

## 2021-12-11 ASSESSMENT — PAIN DESCRIPTION - ORIENTATION
ORIENTATION: RIGHT
ORIENTATION: RIGHT

## 2021-12-11 ASSESSMENT — PAIN - FUNCTIONAL ASSESSMENT: PAIN_FUNCTIONAL_ASSESSMENT: 0-10

## 2021-12-11 ASSESSMENT — PAIN DESCRIPTION - FREQUENCY: FREQUENCY: CONTINUOUS

## 2021-12-11 NOTE — PROGRESS NOTES
Pt admitted to room 208. Pt alert and oriented. Pt complains of pain on right side where ribs are located. Pt stated he fell walking up the stairs at home. Pt medicated for pain per mar. Pt has an abrasion on his forehead. Pt has a morales cath in place for intake and output. Pt on room air. Pt call light in reach. Pt given ice for pain. Pt on telemetry and runs yary. Will continue to monitor pt.   Electronically signed by Atul Kang RN on 12/11/2021 at 1:55 AM

## 2021-12-11 NOTE — H&P
Hospital Medicine History & Physical      PCP: Carmen Joyce MD    Date of Admission: 12/10/2021    Date of Service: 12/11/21      Chief Complaint:  Weakness, fall, dyspnea      History Of Present Illness:  68 y.o. male who presented to [facility]  with above complains. He has a history of CAD, hypertension, hyperlipidemia, diabetes presents with fall, fatigue. Patient has been having progressively worsening fatigue for the past couple of months. He was going upstairs and lost his strength and tripped falling down the stairs. He injured his chest and had. He mentions been having shortness of breath that is worse with ambulation. He also has been having lower extremity edema for which he usually takes diuretics but he stopped because he does not like to go to the bathroom regularly due to his fatigue. Patient has A. fib and he is on sotalol. He usually has bradycardia and follows up with Dr. Rick Dunn for it.  After evaluation at ER by cardiology he was transferred to [facility] since no beds were available at [facility]      Past Medical History:          Diagnosis Date    Bradycardia     CAD (coronary artery disease)     Cancer ([facility] [address]     prostate- radiation     CHF (congestive heart failure) (HCC)     Depression     HSV-2 (herpes simplex virus 2) infection     Hyperlipidemia     Hypertension     Left knee DJD     Osteoarthritis     Rheumatoid arthritis(714.0)     Type 2 diabetes mellitus without complication, without long-term current use of insulin ([facility] [address] 1/10/2019       Past Surgical History:          Procedure Laterality Date    ANKLE SURGERY Right     pin    APPENDECTOMY      ARTHRODESIS Right 10/31/2016    RIGHT ANKLE ARTHRODESIS OF RIGHT ANKLE AND SUBTALAR JOINT, C-ARM, ABHIJEET EQUIPMENT SYNTHETIC BONE GRAFT, ALLOGRAFT CANCELLOUS, SHARON ANKLE FUSION NAIL, SHANDA IMPLANT BONE STIMULATOR, CHOICE ANESTHESIA, BLOCK  per[facility]      stent x 5    COLONOSCOPY  7-19-11    FRACTURE SURGERY      right ankle    HARDWARE REMOVAL FOOT / ANKLE Right 11/6/2017    RIGHT ANKLE HARDWARE REMOVAL performed by Astrid Pal MD at [address] Bilateral     knees    [place] COLON CA SCRN NOT  [address] [place] [address] N/A 7/11/2017    COLONOSCOPY performed by Madie Latham DO at [place] [address] Left     TONSILLECTOMY AND ADENOIDECTOMY         Medications Prior to Admission:      Prior to Admission medications    Medication Sig Start Date End Date Taking? Authorizing Provider   sotalol (BETAPACE) 120 MG tablet  12/2/19  Yes Historical Provider, MD   ketorolac 0.4 % SOLN ophthalmic solution Use 1 Drop in both eyes twice daily. 5/19/19  Yes Historical Provider, MD   hydrALAZINE (APRESOLINE) 50 MG tablet Take 50 mg by mouth daily   Yes Historical Provider, MD   tamsulosin (FLOMAX) 0.4 MG capsule Take 1 capsule by mouth daily 3/7/19  Yes Mindi Jacobsen MD   folic acid (FOLVITE) 1 MG tablet Take 1 mg by mouth daily   Yes Historical Provider, MD   amLODIPine (NORVASC) 5 MG tablet Take 5 mg by mouth daily   Yes Historical Provider, MD   etanercept (ENBREL) 25 MG/0.5ML SOSY Inject 40 mg into the skin once a week   Yes Historical Provider, MD   allopurinol (ZYLOPRIM) 100 MG tablet Take 100 mg by mouth daily   Yes Historical Provider, MD   acetaminophen (TYLENOL) 500 MG tablet Take 1,000 mg by mouth every 6 hours as needed for Pain   Yes Historical Provider, MD   pantoprazole (PROTONIX) 40 MG tablet Take 40 mg by mouth daily  1/25/17  Yes Historical Provider, MD   aspirin 81 MG tablet Take 81 mg by mouth daily   Yes Historical Provider, MD   furosemide (LASIX) 20 MG tablet Take 20 mg by mouth daily  9/28/15  Yes Historical Provider, MD   nitroGLYCERIN (NITROSTAT) 0.4 MG SL tablet Place 1 tablet under the tongue every 5 minutes as needed for Chest pain.  6/18/14  Yes Sandra Livingston MD   tamsulosin (FLOMAX) 0.4 MG capsule TAKE 1 CAPSULE BY MOUTH EVERY DAY 6/10/21 Caroline Garcia MD   rosuvastatin (CRESTOR) 40 MG tablet  4/14/20   Historical Provider, MD   KLOR-CON M20 20 MEQ extended release tablet  1/7/20   Historical Provider, MD   sildenafil (VIAGRA) 100 MG tablet Take 1 tablet by mouth as needed for Erectile Dysfunction 3/6/20   Caroline Garcia MD   prednisoLONE acetate (PRED FORTE) 1 % ophthalmic suspension  5/15/19   Historical Provider, MD   rosuvastatin (CRESTOR) 20 MG tablet Take 20 mg by mouth    Historical Provider, MD   sildenafil (VIAGRA) 100 MG tablet Take 1 tablet by mouth as needed for Erectile Dysfunction 3/7/19   Caroline Garcia MD   meloxicam (MOBIC) 15 MG tablet Take 15 mg by mouth daily    Historical Provider, MD       Allergies:  Hylan g-f 20, Ace inhibitors, and Statins depletion therapy    Social History:      The patient currently lives home    TOBACCO:   reports that he quit smoking about 29 years ago. His smoking use included cigarettes. He started smoking about 36 years ago. He has a 1.75 pack-year smoking history. He has never used smokeless tobacco.  ETOH:   reports current alcohol use. Family History:       Reviewed in detail and negative for DM, CAD, Cancer, CVA. Positive as follows:        Problem Relation Age of Onset    Heart Attack Father     Cancer Father         colon    High Cholesterol Mother     Heart Disease Mother     Macular Degen Mother     Arthritis Sister         hip replacement    Other Brother         vertigo    No Known Problems Son     No Known Problems Son        REVIEW OF SYSTEMS:   Pertinent positives as noted in the HPI. All other systems reviewed and negative. PHYSICAL EXAM:    BP (!) 147/66   Pulse (!) 49   Temp 97.5 °F (36.4 °C) (Oral)   Resp 18   Ht 5' 11\" (1.803 m)   Wt 290 lb 9.6 oz (131.8 kg)   SpO2 93%   BMI 40.53 kg/m²     General appearance: in apparent distress due to ribcage pain, appears stated age and cooperative.   HEENT:  Normal cephalic, atraumatic without obvious deformity. Pupils equal, round, and reactive to light. Extra ocular muscles intact. Conjunctivae/corneas clear. Neck: Supple, with full range of motion. No jugular venous distention. Trachea midline. Respiratory:  Normal respiratory effort. Clear to auscultation, bilaterally without Rales/Wheezes/Rhonchi. Cardiovascular:  Regular rate and rhythm with normal S1/S2 without murmurs, rubs or gallops. Abdomen: Soft, non-tender, non-distended with normal bowel sounds. Musculoskeletal:   edema bilaterally. Full range of motion without deformity. Skin: Skin color, texture, turgor normal.  No rashes or lesions. Neurologic:  Neurovascularly intact without any focal sensory/motor deficits. Psychiatric:  Alert and oriented, thought content appropriate, normal insight      Labs:     Recent Labs     12/09/21  1900 12/11/21  0701   WBC 11.8* 9.6*   HGB 17.4 16.2   HCT 52.6* 47.4    211     Recent Labs     12/09/21  2156 12/10/21  0536 12/11/21  0701    139 135   K 3.8 4.5 3.2*   CL 96 100 96   CO2 26 20 26   BUN 17 17 22   CREATININE 0.74 0.52* 0.85   CALCIUM 9.7 9.2 8.7     Recent Labs     12/09/21 2156 12/10/21  0536 12/11/21  0701   AST 37 40 22   ALT 32 29 21   BILITOT 2.3* 2.3* 2.0*   ALKPHOS 99 86 83     No results for input(s): INR in the last 72 hours.   Recent Labs     12/09/21  2156 12/10/21  0900 12/11/21  0700   TROPONINI 0.028* 0.036* 0.035*       Urinalysis:      Lab Results   Component Value Date    NITRU Negative 12/09/2021    WBCUA 3-5 12/09/2021    BACTERIA Negative 12/09/2021    RBCUA 6-10 12/09/2021    BLOODU SMALL 12/09/2021    SPECGRAV 1.015 12/09/2021    GLUCOSEU Negative 12/09/2021    GLUCOSEU NEG 06/07/2012       Radiology:     CXR: I have reviewed the CXR with the following interpretation:   EKG:  I have reviewed the EKG with the following interpretation:     No orders to display       ASSESSMENT:    Active Hospital Problems    Diagnosis Date Noted    CHF (congestive heart failure), NYHA class I, acute on chronic, combined ([facility] [address] [I50.43] 12/11/2021       PLAN:        DVT Prophylaxis: lovenox  Diet: ADULT DIET; Regular  Code Status: Full Code    PT/OT Eval Status: pending    Dispo - Mechanical fall, weakness- PT on case  R ribcage pain, aggravated by deep inspiration- discussed with radiology regarding reconstruction CT report to include ribs visualization, mean while continue with pain meds. Adding local lidoderm patch, IV toradol  Elevated troponins, CAD- appreciate cardiology recommendations,. meds restarted, [facility] on case for further recommendations. On ASA/statin, post STENTS placement    CHF/edema, elevated BNP, dyspnea- IV lasix initiated, 2 d echo pending  A fib- rate controlled, pt bradycardic, off systemic anticoagulation per patient decision   Morbid obesity with BMI 40%- supportive care   Medically stable for acute admission at [facility], will follow along with consultants        Walt Steiner MD    Thank you Dario Holman MD for the opportunity to be involved in this patient's care. If you have any questions or concerns please feel free to contact me.

## 2021-12-11 NOTE — ED NOTES
Report to Pam Yarbrough at [facility], [address] [place] [address]  12/10/21 5849

## 2021-12-11 NOTE — PROGRESS NOTES
Occupational Therapy   Occupational Therapy Initial Assessment- Med  Date: 2021   Patient Name: Seng Espino  MRN: [id number]     : 1945    Date of Service: 2021    Discharge Recommendations:  Continue to assess pending progress, Subacute/Skilled Nursing Facility       Assessment   Performance deficits / Impairments: Decreased functional mobility ; Decreased ADL status; Decreased strength; Decreased safe awareness; Decreased cognition; Decreased endurance; Decreased balance; Decreased high-level IADLs; Decreased coordination  Assessment: Pt is a 77y/o male PLOF lives home alone, wife recently passed away 2mo ago. Pt has reported inc'd weakness since but was I/MI with his ADL. Pt presents to [facility]* Generalized weakness, acute on chronic CHF, unspecified heart failure type, s/p fall with the above resulting perf def/impair and would benefit from OT skilled services to maximize strength/end and safety/I with ADL for safe d/c transition. Pt with high pain and anxiety- requires inc'd time and cues for ability to follow instruction. Recommending 2 person assist at this time for pt comfort. Recommend SLP cog eval.  Treatment Diagnosis: Generalized weakness, acute on chronic CHF, unspecified heart failure type, s/p fall  Prognosis: Fair  History: multi comorb  Exam: 9 perf def/impair  OT Education: OT Role; Plan of Care; Transfer Training  REQUIRES OT FOLLOW UP: Yes  Safety Devices  Safety Devices in place: Yes  Type of devices: All fall risk precautions in place           Patient Diagnosis(es): There were no encounter diagnoses. has a past medical history of Bradycardia, CAD (coronary artery disease), Cancer ([address] CHF (congestive heart failure) ([address] Depression, HSV-2 (herpes simplex virus 2) infection, Hyperlipidemia, Hypertension, Left knee DJD, Osteoarthritis, Rheumatoid arthritis(714.0), and Type 2 diabetes mellitus without complication, without long-term current use of insulin ([address]    has a past surgical history that includes Appendectomy; Tonsillectomy and adenoidectomy; Colonoscopy (7-19-11); arthrodesis (Right, 10/31/2016); Ankle surgery (Right); pr colon ca scrn not hi rsk ind (N/A, 7/11/2017); Cardiac catheterization; fracture surgery; joint replacement (Bilateral); shoulder surgery (Left); and HARDWARE REMOVAL FOOT / ANKLE (Right, 11/6/2017). Treatment Diagnosis: Generalized weakness, acute on chronic CHF, unspecified heart failure type, s/p fall      Restrictions  Restrictions/Precautions  Restrictions/Precautions: Fall Risk    Subjective   General  Chart Reviewed: Yes  Patient assessed for rehabilitation services?: Yes  Additional Pertinent Hx: Lost balance and fell down the steps at home ~2 days ago- pt reports he fell down about 6-8 steps.  Injured R rib cage- contusions per pt and R hand- both negative for fx's  Family / Caregiver Present: No  Referring Practitioner: Dr. Rick Arguello  Diagnosis: Generalized weakness, acute on chronic CHF, unspecified heart failure type, s/p fall  Subjective  Subjective: Pt agreeable to OT eval/tx  Patient Currently in Pain: Yes  Pain Assessment  Pain Assessment: 0-10  Pain Level: 8  Pain Type: Acute pain  Pain Location: Rib cage  Pain Orientation: Right  Pain Descriptors: Sharp  Pain Frequency: Continuous (increases with movement)  Vital Signs  Patient Currently in Pain: Yes  Social/Functional History  Social/Functional History  Lives With: Alone  Type of Home: House  Home Layout: Multi-level, Bed/Bath upstairs (split)  Home Access: Stairs to enter without rails  Entrance Stairs - Number of Steps: 1 JACI from outdoors- no HR, 6-8 steps up to to bed and bath level- bilat HRs  Bathroom Shower/Tub: Walk-in shower, Shower chair without back  Bathroom Toilet: Handicap height  Bathroom Equipment: Shower chair, Hand-held shower  Bathroom Accessibility: Walker accessible  Home Equipment: Cane, Rolling walker, Crutches, Lift chair  Receives Help From: Friend(s), Neighbor  ADL Assistance: Independent  Homemaking Assistance: Independent  Ambulation Assistance: Independent (with cane)  Transfer Assistance: Independent  Active : Yes  Mode of Transportation: SUV  Occupation: Retired  Type of occupation: , teacher- sub  Leisure & Hobbies: Dog- [employer]  IADL Comments: Eating  Additional Comments: Was living with wife whom past away ~2 mo ago. Pt reports inc'd weakness starting about ~2 mo ago as well. Pt had a fall 2 days ago       Objective        Orientation  Overall Orientation Status:  (Pt was A&O but very slow processing, requires inc'd time and repetition for instructions. Pt informed he has been having confusion and \"when I woke up this morning it took me a half hour to realize I wasn't at home. \" Pt yells out with any mvmt.)  Orientation Level: Oriented to place; Oriented to time; Oriented to situation; Oriented to person  Observation/Palpation  Posture: Good  Observation: Telemetry monitor, IV RUE, abrasion on forehead, morales cath. Pt yells out with any mvmts     ADL  Feeding: Independent  Grooming: Setup  UE Bathing: Moderate assistance  LE Bathing: Dependent/Total  UE Dressing: Moderate assistance  LE Dressing: Dependent/Total  Toileting: Dependent/Total  Tone RUE  RUE Tone: Normotonic  Tone LUE  LUE Tone: Normotonic  Coordination  Movements Are Fluid And Coordinated: No  Coordination and Movement description: Decreased speed; Right UE; Left UE (dec'd speed 2* rib cage pain)     Bed mobility  Rolling to Right: 2 Person assistance; Maximum assistance  Supine to Sit: 2 Person assistance; Maximum assistance (HOB elevated, use of BR)  Scooting: Maximal assistance  Comment: pt yelling out with any/all mvmts, not assisting much at all. Required max encouragement to agree to get up \"I'm comfortable right here. \"  Educated on risk of pneumonia and bed sores with inc'd weakness if not getting OOB. Pt then agreed.   Transfers  Sit to stand: 2 Person assistance; Contact guard assistance; Minimal assistance (in SS)  Stand to sit: 2 Person assistance; Contact guard assistance; Minimal assistance (from SS)  Transfer Comments: SS xfer performed for pt comfort and reassurance- pt yelling out with any/all mvmt's appears anxious and in pain. Pt required max cues for safety/sequencing with SS xfer bed -> recliner. Sensation  Overall Sensation Status: WFL        LUE AROM (degrees)  LUE AROM : WFL  Left Hand AROM (degrees)  Left Hand AROM: WFL  RUE AROM (degrees)  RUE AROM : WFL  Right Hand AROM (degrees)  Right Hand AROM: WFL  LUE Strength  Gross LUE Strength: Exceptions to [facility]  L Shoulder Flex: 4-/5  RUE Strength  Gross RUE Strength: Exceptions to [facility] [place]  R Shoulder Flex: 4-/5                   Plan   Plan  Times per week: 4-7x/wk  Times per day: Daily  Plan weeks: <1- med pt  Current Treatment Recommendations: Strengthening, ROM, Balance Training, Functional Mobility Training, Endurance Training, Stair training, Pain Management, Safety Education & Training, Patient/Caregiver Education & Training, Self-Care / ADL, Home Management Training      Goals  Short term goals  Time Frame for Short term goals: 3-5 days- med pt  Short term goal 1: Tolerate 25-30min BUE ther ex/act to inc strength/end for ADL  Short term goal 2: Inc to [place] person using least restrictive AD for fxl ADL xfers  Short term goal 3: Inc to [employer] for UB bathing and dressing  Short term goal 4: Inc to [employer] and dressing  Long term goals  Time Frame for Long term goals : Same as STGs  Long term goal 1: Same as STGs  Long term goal 2: Same as STGs  Patient Goals   Patient goals :  To get better       Therapy Time   Individual Concurrent Group Co-treatment   Time In  2:25         Time Out  3:35         Minutes  [address], [place]

## 2021-12-11 NOTE — FLOWSHEET NOTE
Spoke to Guillermina Later in Lab she will add trop from AM labs. Next trop due 1p.m. Electronically signed by Kei Katz RN on 12/11/2021 at 7:43 AM    0537: cardiology consult called in.  Electronically signed by Kei Katz RN on 12/11/2021 at 8:19 AM

## 2021-12-11 NOTE — FLOWSHEET NOTE
1251: Troponin drawn and taken to lab.  Electronically signed by Yun Angel RN on 12/11/2021 at 12:59 PM

## 2021-12-11 NOTE — ED NOTES
Report to [facility]. Pt off unit via stretcher in stable condition.      Gwendolyn Azar RN  12/10/21 0732

## 2021-12-11 NOTE — PROGRESS NOTES
[facility] OF THE [facility] Heart Progress Note               Rounding Cardiologist:  Tim Odom MD, MD   Primary Cardiologist: Dr. Fiordaliza Marcial    Date:  12/11/2021  Patient:  Daja Salinas  YOB: 1945  MRN:  [id number]   Admit Date:  12/10/2021      SUBJECTIVE    Daja Salinas was seen and examined today at bedside. Sitting up in chair. States he feels better. Still has right sided sharp chest pain. He denies chest pressure or shortness of breath. Edema is improved. 1.16 liter negative fluid balance. Remains in atrial fibrillation. Patient has refused anticoagulation in the past but is now agreeable to start.       -------------------------------------------------------------------------------------    Consult HPI:   Daja Salinas is a 68 y.o.  male patient who is being at the request of GIUSEPPE Mensah CNP for inpatient consultation of edema and atrial fibrillation. He was admitted on 12/9/2021. Previous [facility] and [id number] [facility] records have been reviewed in detail. He has a history of paroxysmal atrial fibrillation for which she has been maintained on sotalol 120 mg one half tab twice daily. He has opted against long term anticoagulation therapy. He has a history of essential hypertension, hyperlipidemia and moderate atherosclerotic heart disease. He had previous angioplasty and stenting of the proximal and mid right coronary artery. He was noted on cardiac catheterization in 2013 to have moderate disease and patent stents. LV ejection fraction was 80%. The patient notes that he has had some worsening fatigue over the past several weeks to months. He lives alone at home. He states he generally is able to do his normal daily living activities but has gradually been losing some of his strength. He notes that he was walking up the stairs and accidentally tripped. He fell back down some stairs.   He notes quite severe pain on the right side of his chest.  He states that he stopped taking his daily Lasix several days ago because he does not like to get up to go to the bathroom. He has noted some worsening exertional shortness of breath as well as increasing peripheral edema. Because of progressive symptoms he decided to come to the emergency department. He was noted to have borderline increased troponin level 0.021. He was also noted to have poorly controlled blood pressure as well as atrial fibrillation with a slow ventricular response. He currently is resting comfortably although he intermittently cries out in pain. He states it hurts to even talk. Pain is localized to a small portion of the right lower chest.  X-rays were negative for rib fracture. He denies any chest pressure or tightening sensations. .  He denies any orthopnea, PND, or increasing peripheral edema. He denies any palpitations, lightheadedness, near-syncope, or syncope. He denies any fever, chills, or cough. He denies any nausea, vomiting, or diaphoresis. He denies any hemoptysis, hematemesis, melena, or hematochezia. VITALS     Vitals:    12/10/21 2245 12/11/21 0620   BP: (!) 168/88 (!) 147/66   Pulse: 65 (!) 49   Resp: 20 18   Temp: 98.1 °F (36.7 °C) 97.5 °F (36.4 °C)   TempSrc: Oral Oral   SpO2: 94% 93%   Weight: 290 lb 9.6 oz (131.8 kg)    Height: 5' 11\" (1.803 m)        Intake/Output Summary (Last 24 hours) at 12/11/2021 9163  Last data filed at 12/11/2021 2342  Gross per 24 hour   Intake 490 ml   Output 1150 ml   Net -660 ml       Patient Vitals for the past 96 hrs (Last 3 readings):   Weight   12/10/21 2245 290 lb 9.6 oz (131.8 kg)       PHYSICAL EXAM   PHYSICAL EXAMINATION:  GENERAL:  Well developed, well nourished, in no acute distress. CHEST:  Symmetric and nontender. NEURO/PSYCH:  Alert and oriented times three with approppriate behavior and responses. NECK:  Supple, no JVD, no bruit.   LUNGS:  Clear to auscultation bilaterally, normal respiratory effort. HEART:  Rate and rhythm irregularly irregular with no evident murmur, no gallop appreciated. There are no rubs, clicks or heaves. EXTREMITIES:  Warm with good color, no clubbing or cyanosis. There is 2+ bilateral edema noted. PERIPHERAL VASCULAR:  Pulses present and equally palpable; 2+ throughout. DIAGNOSTIC RESULTS   EKG:   Atrial fibrillation  Voltage criteria for left ventricular hypertrophy  T wave abnormality, consider lateral ischemia  Abnormal ECG  When compared with ECG of 09-DEC-2021 18:54,  ST no longer depressed in Inferior leads  Nonspecific T wave abnormality has replaced inverted T waves in Inferior leads  QT has lengthened    Telemetry: atrial fibrillation - controlled. LAB DATA   BMP:  Recent Labs     12/09/21  2156 12/09/21  2156 12/10/21  0536 12/10/21  0536 12/11/21  0701     --  139  --  135   K 3.8  --  4.5  --  3.2*   CL 96  --  100  --  96   CO2 26  --  20  --  26   BUN 17  --  17  --  22   CREATININE 0.74  --  0.52*  --  0.85   LABGLOM >60.0   < > >60.0   < > >60.0    < > = values in this interval not displayed.      Cardiac Enzymes:  Recent Labs     12/09/21  2156 12/10/21  0900   TROPONINI 0.028* 0.036*     CBC:   Lab Results   Component Value Date    WBC 9.6 12/11/2021    RBC 4.96 12/11/2021    RBC 4.29 06/07/2012    HGB 16.2 12/11/2021    HCT 47.4 12/11/2021     12/11/2021     CMP:    Lab Results   Component Value Date     12/11/2021    K 3.2 12/11/2021    CL 96 12/11/2021    CO2 26 12/11/2021    BUN 22 12/11/2021    CREATININE 0.85 12/11/2021    GFRAA >60.0 12/11/2021    LABGLOM >60.0 12/11/2021    GLUCOSE 122 12/11/2021    GLUCOSE 91 06/07/2012    CALCIUM 8.7 12/11/2021     Hepatic Function Panel:    Lab Results   Component Value Date    ALKPHOS 83 12/11/2021    ALT 21 12/11/2021    AST 22 12/11/2021    PROT 5.9 12/11/2021    BILITOT 2.0 12/11/2021    BILIDIR <0.2 03/30/2021    IBILI see below 03/30/2021    LABALBU 3.5 12/11/2021 LABALBU 4.7 09/08/2011     Magnesium:    Lab Results   Component Value Date    MG 1.7 12/11/2021     PT/INR:    Lab Results   Component Value Date    PROTIME 10.1 02/26/2016    INR 0.9 02/26/2016     HgBA1c:    Lab Results   Component Value Date    LABA1C 6.5 03/16/2020     Lipid Profile:    Lab Results   Component Value Date    TRIG 162 03/30/2021    HDL 58 03/30/2021    LDLCALC 128 03/30/2021     TSH:    Lab Results   Component Value Date    TSH 1.610 10/30/2018     PRO-BNP:   Lab Results   Component Value Date    PROBNP 4,130 12/09/2021    PROBNP 872 12/24/2015        RADIOLOGY     No orders to display     Result Date: 12/10/2021  EXAMINATION:  CT HEAD WO CONTRAST HISTORY:  .      No acute intracranial process. Chronic microvascular ischemic changes. Result Date: 12/10/2021  Exam: CT CHEST WO CONTRAST dated 12/9/2021 8:04 PM .     No acute traumatic intrathoracic findings. Bilateral pleural effusions, small right and trace left, with associated compressive atelectasis. Result Date: 12/10/2021  EXAMINATION:  CT CERVICAL SPINE WITHOUT CONTRAST HISTORY:   fall . No acute fracture or traumatic malalignment. Multilevel degenerative changes of the cervical spine.        CURRENT MEDICATIONS       enoxaparin  40 mg SubCUTAneous Daily    lidocaine  1 patch TransDERmal Daily    insulin lispro  0-12 Units SubCUTAneous TID WC    insulin lispro  0-6 Units SubCUTAneous Nightly    allopurinol  100 mg Oral Daily    amLODIPine  5 mg Oral Daily    aspirin  81 mg Oral Daily    folic acid  1 mg Oral Daily    [Held by provider] furosemide  20 mg Oral Daily    hydrALAZINE  50 mg Oral Daily    ketorolac  1 drop Both Eyes BID    meloxicam  15 mg Oral Daily    pantoprazole  40 mg Oral Daily    rosuvastatin  20 mg Oral Nightly    tamsulosin  0.4 mg Oral Daily    metoprolol succinate  25 mg Oral Daily    furosemide  40 mg IntraVENous Daily    insulin lispro  0.03 Units/kg SubCUTAneous TID WC    insulin lispro  0-6 Units SubCUTAneous TID     insulin lispro  0-3 Units SubCUTAneous Nightly      sodium chloride      dextrose           ASSESSMENT     1. Acute diastolic and right-sided congestive heart failure secondary to dietary and medical noncompliance. Patient stopped taking oral diuretics and noticed worsening peripheral edema and exertional shortness of breath. Elevated proBNP level noted. Previously noted to have normal LV systolic function. Likely exacerbated by recurrence of atrial fibrillation at some point in the recent past.     2.  History of persistent atrial fibrillation. Patient was on sotalol therapy prior to admission. Discontinued as he is in atrial fibrillation and he is not anticoagulated. Status post cardioversion several years ago. Patient had opted against long-term anticoagulation therapy. 3.  Chronic sinus bradycardia with underlying sinus node dysfunction. 4.  Generalized fatigue and weakness. 5.  Accidental fall. Traumatic right-sided chest discomfort. 6.  Atherosclerotic heart disease with remote angioplasty and stenting of the right coronary artery. Moderate diffuse disease by cardiac cath 2013. Normal LV systolic function. 7.  Primary hypertension. PLAN     Sotalol discontinued as patient is in atrial fibrillation and currently is not anticoagulated. He is agreeable to start on Eliquis 5 mg po BID. Change IV Lasix to Torsemide 20 mg daily. Review echocardiogram when available. High dose amlodipine not ideal secondary to side effect of edema. Will decrease to 5 mg daily. Increase hydralazine. Follow-up with Dr. Melly Goodwin to discuss plans for eventual repeat cardioversion. Discussed with him the need for compliance with his medications. Please do not hesitate to call with questions.   Electronically signed by Gemma Real MD, [facility] on 12/11/2021 at 8:19 AM

## 2021-12-12 LAB
ALBUMIN SERPL-MCNC: 3.2 G/DL (ref 3.5–4.6)
ALP BLD-CCNC: 83 U/L (ref 35–104)
ALT SERPL-CCNC: 18 U/L (ref 0–41)
ANION GAP SERPL CALCULATED.3IONS-SCNC: 18 MEQ/L (ref 9–15)
AST SERPL-CCNC: 22 U/L (ref 0–40)
BASOPHILS ABSOLUTE: 0 K/UL (ref 0–0.1)
BASOPHILS RELATIVE PERCENT: 0.4 % (ref 0.2–1.2)
BILIRUB SERPL-MCNC: 1.5 MG/DL (ref 0.2–0.7)
BUN BLDV-MCNC: 31 MG/DL (ref 8–23)
CALCIUM SERPL-MCNC: 8.4 MG/DL (ref 8.5–9.9)
CHLORIDE BLD-SCNC: 93 MEQ/L (ref 95–107)
CO2: 22 MEQ/L (ref 20–31)
CREAT SERPL-MCNC: 1.25 MG/DL (ref 0.7–1.2)
EOSINOPHILS ABSOLUTE: 0.3 K/UL (ref 0–0.5)
EOSINOPHILS RELATIVE PERCENT: 3 % (ref 0.8–7)
GFR AFRICAN AMERICAN: >60
GFR NON-AFRICAN AMERICAN: 56.1
GLOBULIN: 2.8 G/DL (ref 2.3–3.5)
GLUCOSE BLD-MCNC: 105 MG/DL (ref 70–99)
GLUCOSE BLD-MCNC: 121 MG/DL (ref 60–115)
GLUCOSE BLD-MCNC: 143 MG/DL (ref 60–115)
GLUCOSE BLD-MCNC: 151 MG/DL (ref 60–115)
GLUCOSE BLD-MCNC: 173 MG/DL (ref 60–115)
HCT VFR BLD CALC: 47.1 % (ref 42–52)
HEMOGLOBIN: 16 G/DL (ref 13.7–17.5)
IMMATURE GRANULOCYTES #: 0 K/UL
IMMATURE GRANULOCYTES %: 0.4 %
LYMPHOCYTES ABSOLUTE: 1 K/UL (ref 1.3–3.6)
LYMPHOCYTES RELATIVE PERCENT: 10.7 %
MAGNESIUM: 1.8 MG/DL (ref 1.7–2.4)
MCH RBC QN AUTO: 32.6 PG (ref 25.7–32.2)
MCHC RBC AUTO-ENTMCNC: 34 % (ref 32.3–36.5)
MCV RBC AUTO: 95.9 FL (ref 79–92.2)
MONOCYTES ABSOLUTE: 1.1 K/UL (ref 0.3–0.8)
MONOCYTES RELATIVE PERCENT: 11.6 % (ref 5.3–12.2)
NEUTROPHILS ABSOLUTE: 7.2 K/UL (ref 1.8–5.4)
NEUTROPHILS RELATIVE PERCENT: 73.9 % (ref 34–67.9)
PDW BLD-RTO: 14.2 % (ref 11.6–14.4)
PERFORMED ON: ABNORMAL
PLATELET # BLD: 195 K/UL (ref 163–337)
POTASSIUM SERPL-SCNC: 3.9 MEQ/L (ref 3.4–4.9)
PRO-BNP: 1823 PG/ML
RBC # BLD: 4.91 M/UL (ref 4.63–6.08)
SODIUM BLD-SCNC: 133 MEQ/L (ref 135–144)
TOTAL PROTEIN: 6 G/DL (ref 6.3–8)
TROPONIN: 0.05 NG/ML (ref 0–0.01)
WBC # BLD: 9.7 K/UL (ref 4.2–9)

## 2021-12-12 PROCEDURE — 97530 THERAPEUTIC ACTIVITIES: CPT

## 2021-12-12 PROCEDURE — 1210000000 HC MED SURG R&B

## 2021-12-12 PROCEDURE — 80053 COMPREHEN METABOLIC PANEL: CPT

## 2021-12-12 PROCEDURE — 6370000000 HC RX 637 (ALT 250 FOR IP): Performed by: INTERNAL MEDICINE

## 2021-12-12 PROCEDURE — 83735 ASSAY OF MAGNESIUM: CPT

## 2021-12-12 PROCEDURE — 6360000002 HC RX W HCPCS: Performed by: INTERNAL MEDICINE

## 2021-12-12 PROCEDURE — 85025 COMPLETE CBC W/AUTO DIFF WBC: CPT

## 2021-12-12 PROCEDURE — 36415 COLL VENOUS BLD VENIPUNCTURE: CPT

## 2021-12-12 PROCEDURE — 83880 ASSAY OF NATRIURETIC PEPTIDE: CPT

## 2021-12-12 PROCEDURE — 97162 PT EVAL MOD COMPLEX 30 MIN: CPT

## 2021-12-12 PROCEDURE — 84484 ASSAY OF TROPONIN QUANT: CPT

## 2021-12-12 RX ORDER — HYDRALAZINE HYDROCHLORIDE 25 MG/1
50 TABLET, FILM COATED ORAL 3 TIMES DAILY
Status: DISCONTINUED | OUTPATIENT
Start: 2021-12-12 | End: 2021-12-17 | Stop reason: HOSPADM

## 2021-12-12 RX ORDER — AMLODIPINE BESYLATE 5 MG/1
5 TABLET ORAL ONCE
Status: COMPLETED | OUTPATIENT
Start: 2021-12-12 | End: 2021-12-12

## 2021-12-12 RX ORDER — TORSEMIDE 20 MG/1
20 TABLET ORAL DAILY
Status: DISCONTINUED | OUTPATIENT
Start: 2021-12-12 | End: 2021-12-17 | Stop reason: HOSPADM

## 2021-12-12 RX ORDER — AMLODIPINE BESYLATE 10 MG/1
10 TABLET ORAL DAILY
Status: DISCONTINUED | OUTPATIENT
Start: 2021-12-13 | End: 2021-12-12

## 2021-12-12 RX ORDER — POTASSIUM CHLORIDE 750 MG/1
20 TABLET, EXTENDED RELEASE ORAL
Status: DISCONTINUED | OUTPATIENT
Start: 2021-12-13 | End: 2021-12-14

## 2021-12-12 RX ORDER — AMLODIPINE BESYLATE 5 MG/1
5 TABLET ORAL DAILY
Status: DISCONTINUED | OUTPATIENT
Start: 2021-12-13 | End: 2021-12-14

## 2021-12-12 RX ADMIN — ENOXAPARIN SODIUM 40 MG: 40 INJECTION SUBCUTANEOUS at 08:09

## 2021-12-12 RX ADMIN — FOLIC ACID 1 MG: 1 TABLET ORAL at 08:10

## 2021-12-12 RX ADMIN — TORSEMIDE 20 MG: 20 TABLET ORAL at 15:44

## 2021-12-12 RX ADMIN — MELOXICAM 15 MG: 7.5 TABLET ORAL at 08:10

## 2021-12-12 RX ADMIN — FUROSEMIDE 40 MG: 10 INJECTION, SOLUTION INTRAMUSCULAR; INTRAVENOUS at 08:09

## 2021-12-12 RX ADMIN — KETOROLAC TROMETHAMINE 1 DROP: 5 SOLUTION/ DROPS OPHTHALMIC at 19:57

## 2021-12-12 RX ADMIN — APIXABAN 5 MG: 2.5 TABLET, FILM COATED ORAL at 19:55

## 2021-12-12 RX ADMIN — TRAMADOL HYDROCHLORIDE 50 MG: 50 TABLET, COATED ORAL at 19:55

## 2021-12-12 RX ADMIN — OXYCODONE AND ACETAMINOPHEN 1 TABLET: 5; 325 TABLET ORAL at 15:43

## 2021-12-12 RX ADMIN — OXYCODONE AND ACETAMINOPHEN 1 TABLET: 5; 325 TABLET ORAL at 10:02

## 2021-12-12 RX ADMIN — HYDRALAZINE HYDROCHLORIDE 50 MG: 25 TABLET, FILM COATED ORAL at 08:10

## 2021-12-12 RX ADMIN — HYDRALAZINE HYDROCHLORIDE 50 MG: 25 TABLET, FILM COATED ORAL at 19:55

## 2021-12-12 RX ADMIN — ASPIRIN 81 MG CHEWABLE TABLET 81 MG: 81 TABLET CHEWABLE at 08:10

## 2021-12-12 RX ADMIN — POTASSIUM CHLORIDE 40 MEQ: 10 TABLET, EXTENDED RELEASE ORAL at 08:10

## 2021-12-12 RX ADMIN — PANTOPRAZOLE SODIUM 40 MG: 40 TABLET, DELAYED RELEASE ORAL at 08:10

## 2021-12-12 RX ADMIN — KETOROLAC TROMETHAMINE 1 DROP: 5 SOLUTION/ DROPS OPHTHALMIC at 08:13

## 2021-12-12 RX ADMIN — ALLOPURINOL 100 MG: 100 TABLET ORAL at 08:10

## 2021-12-12 RX ADMIN — TRAMADOL HYDROCHLORIDE 50 MG: 50 TABLET, COATED ORAL at 08:09

## 2021-12-12 RX ADMIN — AMLODIPINE BESYLATE 5 MG: 5 TABLET ORAL at 10:02

## 2021-12-12 RX ADMIN — TRAMADOL HYDROCHLORIDE 50 MG: 50 TABLET, COATED ORAL at 00:55

## 2021-12-12 RX ADMIN — TAMSULOSIN HYDROCHLORIDE 0.4 MG: 0.4 CAPSULE ORAL at 08:09

## 2021-12-12 RX ADMIN — AMLODIPINE BESYLATE 5 MG: 5 TABLET ORAL at 08:11

## 2021-12-12 RX ADMIN — ROSUVASTATIN CALCIUM 20 MG: 20 TABLET, COATED ORAL at 19:55

## 2021-12-12 RX ADMIN — OXYCODONE AND ACETAMINOPHEN 1 TABLET: 5; 325 TABLET ORAL at 04:41

## 2021-12-12 RX ADMIN — METOPROLOL SUCCINATE 25 MG: 25 TABLET, FILM COATED, EXTENDED RELEASE ORAL at 08:10

## 2021-12-12 ASSESSMENT — PAIN DESCRIPTION - DESCRIPTORS: DESCRIPTORS: SHARP

## 2021-12-12 ASSESSMENT — PAIN SCALES - GENERAL
PAINLEVEL_OUTOF10: 10
PAINLEVEL_OUTOF10: 10
PAINLEVEL_OUTOF10: 8
PAINLEVEL_OUTOF10: 10
PAINLEVEL_OUTOF10: 9
PAINLEVEL_OUTOF10: 9
PAINLEVEL_OUTOF10: 0
PAINLEVEL_OUTOF10: 6
PAINLEVEL_OUTOF10: 8

## 2021-12-12 ASSESSMENT — PAIN DESCRIPTION - ORIENTATION: ORIENTATION: RIGHT

## 2021-12-12 ASSESSMENT — PAIN DESCRIPTION - LOCATION: LOCATION: RIB CAGE

## 2021-12-12 ASSESSMENT — PAIN DESCRIPTION - PAIN TYPE: TYPE: ACUTE PAIN

## 2021-12-12 NOTE — PROGRESS NOTES
Hospitalist Progress Note      PCP: Cindy Pal MD    Date of Admission: 12/10/2021    Chief Complaint: weakness    Subjective: pt eating breakfast, looks comfortable     Medications:  Reviewed    Infusion Medications    sodium chloride      dextrose       Scheduled Medications    [START ON 12/13/2021] amLODIPine  10 mg Oral Daily    amLODIPine  5 mg Oral Once    enoxaparin  40 mg SubCUTAneous Daily    lidocaine  1 patch TransDERmal Daily    insulin lispro  0-12 Units SubCUTAneous TID WC    insulin lispro  0-6 Units SubCUTAneous Nightly    allopurinol  100 mg Oral Daily    aspirin  81 mg Oral Daily    folic acid  1 mg Oral Daily    [Held by provider] furosemide  20 mg Oral Daily    hydrALAZINE  50 mg Oral Daily    ketorolac  1 drop Both Eyes BID    meloxicam  15 mg Oral Daily    pantoprazole  40 mg Oral Daily    rosuvastatin  20 mg Oral Nightly    tamsulosin  0.4 mg Oral Daily    metoprolol succinate  25 mg Oral Daily    lidocaine  1 patch TransDERmal Nightly    furosemide  40 mg IntraVENous BID    potassium chloride  40 mEq Oral Daily with breakfast    insulin lispro  0.03 Units/kg SubCUTAneous TID WC    insulin lispro  0-3 Units SubCUTAneous Nightly     PRN Meds: sodium chloride flush, sodium chloride, ondansetron **OR** ondansetron, polyethylene glycol, acetaminophen **OR** acetaminophen, hydrALAZINE, dextrose, glucagon (rDNA), dextrose, traMADol, ketorolac, glucose, sodium chloride flush, promethazine **OR** ondansetron, oxyCODONE-acetaminophen      Intake/Output Summary (Last 24 hours) at 12/12/2021 0836  Last data filed at 12/11/2021 1900  Gross per 24 hour   Intake --   Output 500 ml   Net -500 ml       Exam:    BP (!) 181/94   Pulse 71   Temp 97.9 °F (36.6 °C) (Oral)   Resp 18   Ht 5' 11\" (1.803 m)   Wt 290 lb 9.6 oz (131.8 kg)   SpO2 91%   BMI 40.53 kg/m²     General appearance: No apparent distress, appears stated age and cooperative.   HEENT: Pupils equal, round, and reactive to light. Conjunctivae/corneas clear. Neck: Supple, with full range of motion. No jugular venous distention. Trachea midline. Respiratory:  Normal respiratory effort. Clear to auscultation, bilaterally without Rales/Wheezes/Rhonchi. Cardiovascular: Regular rate and rhythm with normal S1/S2 without murmurs, rubs or gallops. Abdomen: Soft, non-tender, non-distended with normal bowel sounds. Musculoskeletal: edema bilaterally. Full range of motion without deformity. Skin: Skin color, texture, turgor normal.  No rashes or lesions. Neurologic:  Neurovascularly intact without any focal sensory/motor deficits. Cranial nerves: II-XII intact, grossly non-focal.  Psychiatric: Alert and oriented, thought content appropriate, normal insight        Labs:   Recent Labs     12/09/21  1900 12/11/21  0701 12/12/21  0632   WBC 11.8* 9.6* 9.7*   HGB 17.4 16.2 16.0   HCT 52.6* 47.4 47.1    211 195     Recent Labs     12/10/21  0536 12/11/21  0701 12/12/21  0632    135 133*   K 4.5 3.2* 3.9    96 93*   CO2 20 26 22   BUN 17 22 31*   CREATININE 0.52* 0.85 1.25*   CALCIUM 9.2 8.7 8.4*     Recent Labs     12/10/21  0536 12/11/21  0701 12/12/21  0632   AST 40 22 22   ALT 29 21 18   BILITOT 2.3* 2.0* 1.5*   ALKPHOS 86 83 83     No results for input(s): INR in the last 72 hours.   Recent Labs     12/11/21  1254 12/11/21  1853 12/12/21  0148   TROPONINI 0.033* 0.041* 0.046*       Urinalysis:      Lab Results   Component Value Date    NITRU Negative 12/09/2021    WBCUA 3-5 12/09/2021    BACTERIA Negative 12/09/2021    RBCUA 6-10 12/09/2021    BLOODU SMALL 12/09/2021    SPECGRAV 1.015 12/09/2021    GLUCOSEU Negative 12/09/2021    GLUCOSEU NEG 06/07/2012       Radiology:  No orders to display           Assessment/Plan:    Active Hospital Problems    Diagnosis Date Noted    CHF (congestive heart failure), NYHA class I, acute on chronic, combined ([facility] [I50.43] 12/11/2021         DVT Prophylaxis: lovenox  Diet: ADULT DIET; Regular  Code Status: Full Code    PT/OT Eval Status: done    Dispo -  Mechanical fall, weakness- PT on case  R ribcage pain, aggravated by deep inspiration- discussed with radiology yesterday- no ribs fracture were identified, continue with  local lidoderm patch, IV toradol  Elevated troponins, CAD- appreciate cardiology recommendations,. meds restarted, [facility] on case for further recommendations.  On ASA/statin, post STENTS placement    CHF/edema, elevated BNP, dyspnea- IV lasix initiated, 2 d echo pending  A fib- rate controlled, pt bradycardic, off systemic anticoagulation per patient decision   Morbid obesity with BMI 40%- supportive care   HTN- elevated BP- increase norvasc to 10 mg, follow up clinically   Medically stable for acute admission at [facility], will follow along with consultants       Electronically signed by Francisca Burden MD on 12/12/2021 at 8:36 AM

## 2021-12-12 NOTE — PROGRESS NOTES
Physical Therapy    Facility/Department: [facility] MED SURG UNIT  Initial Assessment    NAME: Akua Cid  :   MRN: [id number]    Date of Service: 2021    Discharge Recommendations:  Continue to assess pending progress, Subacute/Skilled Nursing Facility   PT Equipment Recommendations  Equipment Needed:  (Continue to assess)    Assessment   Body structures, Functions, Activity limitations: Decreased functional mobility ; Increased pain; Decreased cognition; Decreased ADL status  Assessment: Pt demonstrates adequate strength but fear is hold him back from progressing. Needs extended time, lives alone in multilevel home, will need to possibly move bed. Treatment Diagnosis: Bruised right ribs with pain and fear of moving and causing pain  Prognosis: Good  Decision Making: Medium Complexity  PT Education: Goals; Plan of Care; Functional Mobility Training; Home Exercise Program; Transfer Training; Gait Training  Patient Education: Talked with pt possible need for short-term stay at SNF based on how fast he progresses  Barriers to Learning: cognition  REQUIRES PT FOLLOW UP: Yes  Activity Tolerance  Activity Tolerance: Patient limited by pain  Activity Tolerance: Extended time to pt's fear of pain, and slow processing of requests       Patient Diagnosis(es): There were no encounter diagnoses. has a past medical history of Bradycardia, CAD (coronary artery disease), Cancer ([address] CHF (congestive heart failure) ([address] Depression, HSV-2 (herpes simplex virus 2) infection, Hyperlipidemia, Hypertension, Left knee DJD, Osteoarthritis, Rheumatoid arthritis(714.0), and Type 2 diabetes mellitus without complication, without long-term current use of insulin ([address] has a past surgical history that includes Appendectomy; Tonsillectomy and adenoidectomy; Colonoscopy (11); arthrodesis (Right, 10/31/2016); Ankle surgery (Right); pr colon ca scrn not hi rsk ind (N/A, 2017);  Cardiac catheterization; fracture surgery; joint replacement (Bilateral); shoulder surgery (Left); and HARDWARE REMOVAL FOOT / ANKLE (Right, 2017). Restrictions  Restrictions/Precautions  Restrictions/Precautions: Fall Risk  Required Braces or Orthoses?: No  Implants present? : Metal implants  Vision/Hearing  Vision: Impaired  Vision Exceptions: Wears glasses at all times  Hearing: Within functional limits     Subjective  General  Chart Reviewed: Yes  Patient assessed for rehabilitation services?: Yes  Additional Pertinent Hx: Used SPC prior to admission  Response To Previous Treatment: Not applicable  Referring Practitioner: Radha Carmona  Referral Date : 21  Diagnosis: Fall with rib cage pain on right, xrays negative for fx(s)  Follows Commands:  (slow responses-needs extended time and repeat of instru tions/questions)  General Comment  Comments: Pt states wif  recenrtly  Pain Screening  Patient Currently in Pain: Yes  Pain Assessment  Pain Level: 8  Pain Type: Acute pain  Pain Location: Rib cage  Pain Orientation: Right  Pain Descriptors: Sharp  Vital Signs  Patient Currently in Pain: Yes  Pre Treatment Pain Screening  Comments / Details: Pt responses are slow andhe appears to be having short term memory problems today- needing questions to be repeated.     Orientation  Orientation  Overall Orientation Status: Impaired  Orientation Level: Oriented to place; Oriented to person; Oriented to time (unable to name present president, claims been having \"alittle bit\" of problems with memory)  Social/Functional History  Social/Functional History  Lives With: Alone  Type of Home: House  Home Layout: Multi-level, Bed/Bath upstairs  Home Access: Stairs to enter without rails  Entrance Stairs - Number of Steps: 1 JACI from outdoors- no HR, 6-8 steps up to to bed and bath level- bilat HRs  Bathroom Shower/Tub: Walk-in shower, Shower chair without back  Bathroom Toilet: Handicap height  Bathroom Equipment: Shower chair, Hand-held shower  Bathroom Accessibility: Walker accessible  Home Equipment: Cane, Rolling walker, Crutches, Lift chair  Receives Help From: [employer]  ADL Assistance: Independent  Homemaking Assistance: Needs assistance (neighbor helps)  Ambulation Assistance: Independent (with cane)  Transfer Assistance: Independent  Active : Yes  Mode of Transportation: SUV  Occupation: Retired  Type of occupation: , teacher- sub  Leisure & Hobbies: Dog- [employer]  IADL Comments: Eating  Additional Comments: Was living with wife whom past away ~2 mo ago. Pt reports inc'd weakness starting about ~2 mo ago as well.  Pt had a fall 2 days ago  Cognition        Objective     Observation/Palpation  Observation: R forearm IV, abrasion right forheaad, morales cath, small sore right little toe from fall                Bed mobility  Bridging: Unable to assess  Rolling to Left: Dependent/Total  Rolling to Right: 2 Person assistance; Maximum assistance  Supine to Sit: 2 Person assistance; Maximum assistance  Sit to Supine: Unable to assess  Scooting: Maximal assistance  Comment: Pt needs much assurance and extended time to assist fro supine to sit on EOB  Transfers  Sit to Stand: 2 Person Assistance (with Dion Doom)  Stand to sit: 2 Person Assistance (to recliner with CGS, pt very hesistant, extended time)  Bed to Chair: 2 Person Assistance (Dion Doom)  Comment: Stood in Dion Doom x10 mins, sat on EOC before moving back with assist of 2 x15 min (despite repeated requests to attempt)  Ambulation  Ambulation?: No  Stairs/Curb  Stairs?: No     Balance  Posture: Good  Sitting - Static: Good  Sitting - Dynamic: Good  Standing - Static: Good  Standing - Dynamic: Fair; + (Was able to eight shift when standing in Dion Doom, picking up feet)        Plan   Plan  Times per week: 5-7  Times per day:  (1-2)  Plan weeks: 2  Current Treatment Recommendations: Transfer Training, Cognitive Reorientation, Pain Management, Gait Training, Home Exercise Program, Safety Education & Training  Safety Devices  Type of devices:  All fall risk precautions in place, Bed alarm in place, Call light within reach, Chair alarm in place, Gait belt, Left in chair, Patient at risk for falls (gait belt upper chest under arms)  Restraints  Initially in place: No    G-Code       OutComes Score                                                  AM-PAC Score             Goals  Short term goals  Time Frame for Short term goals: 3-5 days  Short term goal 1: mod assist +1 rolling  Short term goal 2: max assist +1 supine to sit  Short term goal 3: Stand at bedside with with assist of 2 for 10 min  Short term goal 4: Particpate in LE ex to support ambulation  Long term goals  Time Frame for Long term goals : 5-15 days  Long term goal 1: Amb with ww x50', indep  Long term goal 2: Able to get out flat  bed on right with ww  Long term goal 3: Be able to use commode with ww, indep  Long term goal 4: Perform at least one step  Long term goal 5: Written HEP  Patient Goals   Patient goals : Return home to his dog       Therapy Time   Individual Concurrent Group Co-treatment   Time In  835         Time Out  [address], [id number]

## 2021-12-12 NOTE — FLOWSHEET NOTE
Spoke with Patient's son Jaxson Back at length regarding patient. He is concerned for patient mentality and weakness. Pt. Son stated that patients wife  2 month ago due to malpractice. Pt is suppose to be handling the affairs or law suit and the children voiced concerned he might not be in his right mind to make de scions regarding that. Patients oldest son Vaishnavi Huddleston lives in [place] and is a  who states if we need to contact him it is best to contact his wife Fabiola due to issues with his cell service.  Electronically signed by Jessie Cadet RN on 2021 at 4:29 PM

## 2021-12-12 NOTE — PROGRESS NOTES
Troponin critical at 0.046, as per earlier conversation with Dr Lauri Carvajal, this is not new for patient and he does not consider this an acute issue. Dr Moraes Fillers on call at this time, she agrees and asks that the every 6 hour troponin rechecks be discontinued.

## 2021-12-12 NOTE — PROGRESS NOTES
Pt alert and resting in bed at this time. Assessment and vitals are as charted. Pt given ice water per request. Pt given ice bag and blanket for bracing when coughing for pain in ribs. Pt states no further needs at this time. Call light and table are within reach. Bed alarm on for safety.

## 2021-12-13 LAB
ALBUMIN SERPL-MCNC: 3.3 G/DL (ref 3.5–4.6)
ALP BLD-CCNC: 84 U/L (ref 35–104)
ALT SERPL-CCNC: 23 U/L (ref 0–41)
ANION GAP SERPL CALCULATED.3IONS-SCNC: 15 MEQ/L (ref 9–15)
AST SERPL-CCNC: 36 U/L (ref 0–40)
BASOPHILS ABSOLUTE: 0 K/UL (ref 0–0.1)
BASOPHILS RELATIVE PERCENT: 0.3 % (ref 0.2–1.2)
BILIRUB SERPL-MCNC: 1.5 MG/DL (ref 0.2–0.7)
BUN BLDV-MCNC: 32 MG/DL (ref 8–23)
CALCIUM SERPL-MCNC: 8.5 MG/DL (ref 8.5–9.9)
CHLORIDE BLD-SCNC: 95 MEQ/L (ref 95–107)
CO2: 26 MEQ/L (ref 20–31)
CREAT SERPL-MCNC: 1.08 MG/DL (ref 0.7–1.2)
EOSINOPHILS ABSOLUTE: 0.3 K/UL (ref 0–0.5)
EOSINOPHILS RELATIVE PERCENT: 2.6 % (ref 0.8–7)
GFR AFRICAN AMERICAN: >60
GFR NON-AFRICAN AMERICAN: >60
GLOBULIN: 2.8 G/DL (ref 2.3–3.5)
GLUCOSE BLD-MCNC: 117 MG/DL (ref 70–99)
GLUCOSE BLD-MCNC: 124 MG/DL (ref 60–115)
GLUCOSE BLD-MCNC: 127 MG/DL (ref 60–115)
GLUCOSE BLD-MCNC: 132 MG/DL (ref 60–115)
GLUCOSE BLD-MCNC: 174 MG/DL (ref 60–115)
GLUCOSE BLD-MCNC: 208 MG/DL (ref 60–115)
HCT VFR BLD CALC: 45.6 % (ref 42–52)
HEMOGLOBIN: 15.7 G/DL (ref 13.7–17.5)
IMMATURE GRANULOCYTES #: 0.1 K/UL
IMMATURE GRANULOCYTES %: 0.5 %
LYMPHOCYTES ABSOLUTE: 1.1 K/UL (ref 1.3–3.6)
LYMPHOCYTES RELATIVE PERCENT: 9.7 %
MAGNESIUM: 1.8 MG/DL (ref 1.7–2.4)
MCH RBC QN AUTO: 32.8 PG (ref 25.7–32.2)
MCHC RBC AUTO-ENTMCNC: 34.4 % (ref 32.3–36.5)
MCV RBC AUTO: 95.4 FL (ref 79–92.2)
MONOCYTES ABSOLUTE: 1.2 K/UL (ref 0.3–0.8)
MONOCYTES RELATIVE PERCENT: 11.2 % (ref 5.3–12.2)
NEUTROPHILS ABSOLUTE: 8.4 K/UL (ref 1.8–5.4)
NEUTROPHILS RELATIVE PERCENT: 75.7 % (ref 34–67.9)
PDW BLD-RTO: 14 % (ref 11.6–14.4)
PERFORMED ON: ABNORMAL
PLATELET # BLD: 217 K/UL (ref 163–337)
POTASSIUM SERPL-SCNC: 3.9 MEQ/L (ref 3.4–4.9)
RBC # BLD: 4.78 M/UL (ref 4.63–6.08)
SODIUM BLD-SCNC: 136 MEQ/L (ref 135–144)
TOTAL PROTEIN: 6.1 G/DL (ref 6.3–8)
TSH SERPL DL<=0.05 MIU/L-ACNC: 1.7 UIU/ML (ref 0.44–3.86)
WBC # BLD: 11 K/UL (ref 4.2–9)

## 2021-12-13 PROCEDURE — 36415 COLL VENOUS BLD VENIPUNCTURE: CPT

## 2021-12-13 PROCEDURE — 85025 COMPLETE CBC W/AUTO DIFF WBC: CPT

## 2021-12-13 PROCEDURE — 97530 THERAPEUTIC ACTIVITIES: CPT

## 2021-12-13 PROCEDURE — 6360000002 HC RX W HCPCS: Performed by: INTERNAL MEDICINE

## 2021-12-13 PROCEDURE — 6370000000 HC RX 637 (ALT 250 FOR IP): Performed by: INTERNAL MEDICINE

## 2021-12-13 PROCEDURE — 92523 SPEECH SOUND LANG COMPREHEN: CPT

## 2021-12-13 PROCEDURE — 1210000000 HC MED SURG R&B

## 2021-12-13 PROCEDURE — 97116 GAIT TRAINING THERAPY: CPT

## 2021-12-13 PROCEDURE — 83735 ASSAY OF MAGNESIUM: CPT

## 2021-12-13 PROCEDURE — 84443 ASSAY THYROID STIM HORMONE: CPT

## 2021-12-13 PROCEDURE — 80053 COMPREHEN METABOLIC PANEL: CPT

## 2021-12-13 RX ORDER — SENNA PLUS 8.6 MG/1
2 TABLET ORAL 2 TIMES DAILY
Status: DISCONTINUED | OUTPATIENT
Start: 2021-12-13 | End: 2021-12-17 | Stop reason: HOSPADM

## 2021-12-13 RX ORDER — POLYETHYLENE GLYCOL 3350 17 G/17G
17 POWDER, FOR SOLUTION ORAL 2 TIMES DAILY
Status: DISCONTINUED | OUTPATIENT
Start: 2021-12-13 | End: 2021-12-17 | Stop reason: HOSPADM

## 2021-12-13 RX ADMIN — ASPIRIN 81 MG CHEWABLE TABLET 81 MG: 81 TABLET CHEWABLE at 08:02

## 2021-12-13 RX ADMIN — TRAMADOL HYDROCHLORIDE 50 MG: 50 TABLET, COATED ORAL at 23:54

## 2021-12-13 RX ADMIN — SENNOSIDES 17.2 MG: 8.6 TABLET, FILM COATED ORAL at 12:05

## 2021-12-13 RX ADMIN — OXYCODONE AND ACETAMINOPHEN 1 TABLET: 5; 325 TABLET ORAL at 11:08

## 2021-12-13 RX ADMIN — OXYCODONE AND ACETAMINOPHEN 1 TABLET: 5; 325 TABLET ORAL at 01:03

## 2021-12-13 RX ADMIN — METOPROLOL SUCCINATE 25 MG: 25 TABLET, FILM COATED, EXTENDED RELEASE ORAL at 08:01

## 2021-12-13 RX ADMIN — TORSEMIDE 20 MG: 20 TABLET ORAL at 08:02

## 2021-12-13 RX ADMIN — POLYETHYLENE GLYCOL 3350 17 G: 17 POWDER, FOR SOLUTION ORAL at 12:05

## 2021-12-13 RX ADMIN — OXYCODONE AND ACETAMINOPHEN 1 TABLET: 5; 325 TABLET ORAL at 20:14

## 2021-12-13 RX ADMIN — APIXABAN 5 MG: 2.5 TABLET, FILM COATED ORAL at 20:07

## 2021-12-13 RX ADMIN — ROSUVASTATIN CALCIUM 20 MG: 20 TABLET, COATED ORAL at 20:07

## 2021-12-13 RX ADMIN — HYDRALAZINE HYDROCHLORIDE 50 MG: 25 TABLET, FILM COATED ORAL at 08:02

## 2021-12-13 RX ADMIN — KETOROLAC TROMETHAMINE 15 MG: 30 INJECTION, SOLUTION INTRAMUSCULAR; INTRAVENOUS at 01:03

## 2021-12-13 RX ADMIN — POLYETHYLENE GLYCOL 3350 17 G: 17 POWDER, FOR SOLUTION ORAL at 20:09

## 2021-12-13 RX ADMIN — AMLODIPINE BESYLATE 5 MG: 5 TABLET ORAL at 08:01

## 2021-12-13 RX ADMIN — APIXABAN 5 MG: 2.5 TABLET, FILM COATED ORAL at 08:02

## 2021-12-13 RX ADMIN — POTASSIUM CHLORIDE 20 MEQ: 10 TABLET, EXTENDED RELEASE ORAL at 08:02

## 2021-12-13 RX ADMIN — MELOXICAM 15 MG: 7.5 TABLET ORAL at 08:01

## 2021-12-13 RX ADMIN — SENNOSIDES 17.2 MG: 8.6 TABLET, FILM COATED ORAL at 20:06

## 2021-12-13 RX ADMIN — FOLIC ACID 1 MG: 1 TABLET ORAL at 08:02

## 2021-12-13 RX ADMIN — KETOROLAC TROMETHAMINE 1 DROP: 5 SOLUTION/ DROPS OPHTHALMIC at 10:26

## 2021-12-13 RX ADMIN — PANTOPRAZOLE SODIUM 40 MG: 40 TABLET, DELAYED RELEASE ORAL at 08:02

## 2021-12-13 RX ADMIN — HYDRALAZINE HYDROCHLORIDE 50 MG: 25 TABLET, FILM COATED ORAL at 13:35

## 2021-12-13 RX ADMIN — TAMSULOSIN HYDROCHLORIDE 0.4 MG: 0.4 CAPSULE ORAL at 08:02

## 2021-12-13 RX ADMIN — KETOROLAC TROMETHAMINE 1 DROP: 5 SOLUTION/ DROPS OPHTHALMIC at 20:07

## 2021-12-13 RX ADMIN — ALLOPURINOL 100 MG: 100 TABLET ORAL at 08:01

## 2021-12-13 RX ADMIN — HYDRALAZINE HYDROCHLORIDE 50 MG: 25 TABLET, FILM COATED ORAL at 20:06

## 2021-12-13 ASSESSMENT — PAIN SCALES - GENERAL
PAINLEVEL_OUTOF10: 8
PAINLEVEL_OUTOF10: 7
PAINLEVEL_OUTOF10: 8
PAINLEVEL_OUTOF10: 10
PAINLEVEL_OUTOF10: 8
PAINLEVEL_OUTOF10: 10
PAINLEVEL_OUTOF10: 10
PAINLEVEL_OUTOF10: 0

## 2021-12-13 ASSESSMENT — PAIN DESCRIPTION - ORIENTATION
ORIENTATION: RIGHT
ORIENTATION: RIGHT

## 2021-12-13 ASSESSMENT — PAIN DESCRIPTION - PAIN TYPE
TYPE: ACUTE PAIN
TYPE: ACUTE PAIN

## 2021-12-13 ASSESSMENT — PAIN DESCRIPTION - LOCATION
LOCATION: BUTTOCKS
LOCATION: RIB CAGE
LOCATION: RIB CAGE
LOCATION: FLANK

## 2021-12-13 ASSESSMENT — PAIN DESCRIPTION - ONSET: ONSET: ON-GOING

## 2021-12-13 ASSESSMENT — PAIN DESCRIPTION - FREQUENCY
FREQUENCY: INTERMITTENT
FREQUENCY: CONTINUOUS

## 2021-12-13 ASSESSMENT — PAIN DESCRIPTION - DESCRIPTORS: DESCRIPTORS: SHARP

## 2021-12-13 NOTE — PROGRESS NOTES
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, left eye; Presbyopia; Regular astigmatism; Hyperuricemia; Chronic gastritis without bleeding; Type 2 diabetes mellitus with microalbuminuria, without long-term current use of insulin ([address] Retinal hemorrhage, bilateral; Intermediate stage nonexudative age-related macular degeneration of both eyes; Primary osteoarthritis, right ankle and foot; Pain in right ankle; Encounter for other orthopedic aftercare; Arthrodesis status; Unable to ambulate; PAF (paroxysmal atrial fibrillation) ; and CHF (congestive heart failure), NYHA class I, acute on chronic, combined ([address] on their problem list.  DATE ONSET:     Date of Eval: 2021   Evaluating Therapist: LORNE Aiken        Primary Complaint: confusion, poor attention    Pain:  Pain Assessment  Pain Assessment: Faces  Pain Level: 10  Pain Type: Acute pain  Pain Location: Buttocks  Pain Orientation: Right  Pain Descriptors: Sharp  Pain Frequency: Continuous  Pain Onset: Progressive  Response to Pain Intervention: Asleep with RR greater than 10    Assessment:  Cognitive Diagnosis: mod/severe cognitive impairment, MMSE 16/30    PT WAS IN PAIN DURING THIS ASSESSMENT, DEMONSTRATING FLUCTUATIONS IN ALERTNESS AND ATTENTION    Recommendations:  Requires SLP Intervention: Yes  Duration/Frequency of Treatment: 1-3 x/wk  D/C Recommendations:  To be determined       Plan: Goals:  Short-term Goals  Timeframe for Short-term Goals: 1-2 wks  Goal 1: Will be oriented to place and time using visual supports and min/no verbal cues  80%  Goal 2: Will follow 2-3 step verbal directions in 8/10 opportunities and min/no verbal cues  Goal 3: Will complete attention/concentration tasks in 80% of opportunities and occ. verbal cues  Long-term Goals  Timeframe for Long-term Goals: 1-2 wks  Goal 1: Will demonstrate cognitive skills for safety in hospital room and during therapy tasks/ADLs in 80% of opportunities   Patient/family involved in developing goals and treatment plan:YES    Subjective:   Previous level of function and limitations: HOME ALONE, STATES SPOUSE DID MEDICATIONS - SHE IS NOW   General  Chart Reviewed: Yes  Patient assessed for rehabilitation services?: Yes  Additional Pertinent Hx: fall downstairs in home  Family / Caregiver Present: No  General Comment  Comments: in pain for evaluation  Subjective  Subjective: sitting in recliner  Social/Functional History  Lives With: Alone  Type of Home: House  Home Layout: Multi-level; Bed/Bath upstairs  Home Access: Stairs to enter without rails  Entrance Stairs - Number of Steps: 1 JACI from outdoors- no HR, 6-8 steps up to to bed and bath level- bilat HRs  Bathroom Shower/Tub: Walk-in shower; Shower chair without back  [employer]: Handicap height  Bathroom Equipment: Shower chair; Hand-held shower  Bathroom Accessibility: Walker accessible  Home Equipment: Cane; Rolling walker; Crutches; Lift chair  Receives Help From: [employer]  ADL Assistance: Independent  Homemaking Assistance: Needs assistance (neighbor helps)  Vision  Vision Exceptions: Wears glasses for reading  Hearing  Hearing: Within functional limits           Objective:     Oral/Motor  Oral Motor:  Within functional limits    Auditory Comprehension  Comprehension: Within Functional Limits         Expression  Primary Mode of Expression: Verbal    Verbal Expression  Verbal Expression: Within functional limits    Written Expression  Dominant Hand: Right  Written Expression: Within Functional Limits         Pragmatics/Social Functioning  Pragmatics: Within functional limits    Cognition:      Orientation  Overall Orientation Status: Impaired  Orientation Level: Oriented to person  Attention  Attention: Exceptions to [facility]  Selective Attention: Moderate  Sustained Attention: Severe  Memory  Memory: Exceptions to [facility]  Short-term Memory: Severe  Working Memory: Severe  Problem Solving  Problem Solving: Exceptions to [facility]  Complex Functional Tasks: Severe  Managing Medications: Severe  Numeric Reasoning  Numeric Reasoning: Exceptions to [facility]   Calculations: Severe  Money Management: To be assessed in therapy  Time: To be assessed in therapy  Abstract Reasoning  Abstract Reasoning: Unable to assess  Convergent Thinking: To be assessed in therapy  Divergent Thinking: To be assessed in therapy  Safety/Judgement  Safety/Judgement: Within Functional Limits    Additional Assessments:   UNABLE TO COMPLETE FURTHER ASSESSMENTS AT THIS TIME, R/T LETHARGY AND LACK OF ALERTNESS          Prognosis:  Speech Therapy Prognosis  Prognosis: Guarded  Additional Comments: Very lethargic and in a lot of pain from his fall, fell asleep several times during the evaluation  Individuals consulted  Consulted and agree with results and recommendations: Patient; RN    Education:  Patient Education: use of whiteboard for orientation to place and time  Patient Education Response: Demonstrated understanding  Safety Devices in place: Yes  Type of devices: Call light within reach;  Left in chair; Chair alarm in place    Therapy Time:   Individual Concurrent Group Co-treatment   Time In 1250         Time Out 1340         Minutes Hannah Owen, [place]  12/13/2021 1:38 PM

## 2021-12-13 NOTE — PROGRESS NOTES
Patient's niece [employer] emailed this  patient's health care POA. Advanced directives will be placed for medical records to scan into patient's EMR. Patient's named HPOA is spouse Mariluz Meneses however spouse is reported to have recently . Patient's son Dorotha Castleman  (# [phone]) is listed as first alternative POA. This  contacted Anika and Gretel II via phone. Discussed DC planning and reviewed PT/OT recommendations for slower paced SNF. Tawny Cano reports that he is agreeable with patient going to a SNF with memory care unit. [facility] SN[facility] in [facility] identified as facility of choice. SS to continue to follow and contact [facility] SN[facility] with new referral.  Patient's insurance requires a precert before patient can be eligable for SNF. It is worth noting that patient's son Tawny Cano reports that patient has a history of excessive alcohol use. Tawny Cano reports that he noticed at patient's wife's  that patient had been \"hitting the bottle hard. \"  Tawny Zaidgiana II reports concern that patient can go into withdraws. This  notified patient's RN Katty Escobar of son's concern for patient having alcohol withdraw. SS to continue to follow and assist with coordinating admission to SNF.

## 2021-12-13 NOTE — PROGRESS NOTES
Chart reviewed. Patient was admitted to [facility] with a diagnosis of CHF. Patient was noted to have presented to the ED via [address] after reported fall at home. Patient's care discussed in daily quality rounds. Patient is reported to present with confusion. Patient was evaluated by SLP and scored 16/30 on mini mental assessment, demonstrating cognitive impairment. Patient was also evaluated by PT/OT and therapies recommending slower paced SNF upon DC. Patient's niece Roxane Moore ([phone]) contacted this  reporting that she has patient's Living Will and Health POA and plans to email this  documents. SS to contact patient's Health Care POA to discuss DC planning and review PT/OT recommendations once document obtained.

## 2021-12-13 NOTE — PROGRESS NOTES
Occupational Therapy  Facility/Department: [facility] MED SURG UNIT  Daily Treatment Note  NAME: Gwenlyn Kawasaki  :   MRN: [id number]    Date of Service: 2021    Discharge Recommendations:  Continue to assess pending progress, Subacute/Skilled Nursing Facility       Assessment   Performance deficits / Impairments: Decreased functional mobility ; Decreased ADL status; Decreased strength; Decreased safe awareness; Decreased cognition; Decreased endurance; Decreased balance; Decreased high-level IADLs; Decreased coordination  Assessment: Co-tx with PTA for 2 person assist. Pt modA to maxA x2 person for all bed mobs. Initially trialed using SS, pt did very well with cues for safety/sequencing. Pt was Katherine x2 and CGA once in stance in SS. Therefore, trialed RW, Katherine sit -> stand x2 person with max tactile and vcs for safety/sequencing. Pt requires inc'd time with max tactile and vcs for all tasks. Inc'd time for processing needed and inc'd time for all tasks. It took pt at least 5min just to walk  10 feet from bed -> toilet. Pt with difficulty following certain commands, appears confused. Pt Katherine x1 with CGA x1 for fxl mob to BR- assist guiding RW. Max tactile and vcs all tasks with inc'd processing time. Treatment Diagnosis: Generalized weakness, acute on chronic CHF, unspecified heart failure type, s/p fall  Prognosis: Fair  REQUIRES OT FOLLOW UP: Yes         Patient Diagnosis(es): There were no encounter diagnoses. has a past medical history of Bradycardia, CAD (coronary artery disease), Cancer ([address] CHF (congestive heart failure) ([address] Depression, HSV-2 (herpes simplex virus 2) infection, Hyperlipidemia, Hypertension, Left knee DJD, Osteoarthritis, Rheumatoid arthritis(714.0), and Type 2 diabetes mellitus without complication, without long-term current use of insulin ([address] has a past surgical history that includes Appendectomy;  Tonsillectomy and adenoidectomy; Colonoscopy (11); arthrodesis (Right, 10/31/2016); Ankle surgery (Right); pr colon ca scrn not hi rsk ind (N/A, 7/11/2017); Cardiac catheterization; fracture surgery; joint replacement (Bilateral); shoulder surgery (Left); and HARDWARE REMOVAL FOOT / ANKLE (Right, 11/6/2017). Restrictions  Restrictions/Precautions  Restrictions/Precautions: Fall Risk  Required Braces or Orthoses?: No  Implants present? : Metal implants     Subjective   General  Chart Reviewed: Yes  Patient assessed for rehabilitation services?: Yes  Additional Pertinent Hx: Lost balance and fell down the steps at home ~2 days ago- pt reports he fell down about 6-8 steps. Injured R rib cage- contusions per pt and R hand- both negative for fx's  Family / Caregiver Present: No  Referring Practitioner: Dr. Alva Last  Diagnosis: Generalized weakness, acute on chronic CHF, unspecified heart failure type, s/p fall  Subjective  Subjective: Pt agreeable to therapy  Pain Assessment  Pain Assessment: Faces  Pain Level: 8  Pain Location: Rib cage  Pain Orientation: Right  Vital Signs  BP Location: Left lower arm  Level of Consciousness: Alert (0)  Patient Currently in Pain: Yes  Oxygen Therapy  O2 Device: None (Room air)      Objective       Toilet Transfers  Toilet - Technique: Ambulating  Equipment Used: Raised toilet seat with rails  Toilet Transfer: 2 Person assistance; Contact guard assistance; Minimal assistance (Katherine x1 with CGA x1)  Toilet Transfers Comments: max tactile and vcs for task  Bed mobility  Rolling to Left: 2 Person assistance; Moderate assistance  Supine to Sit: 2 Person assistance; Moderate assistance  Scooting: Maximal assistance     Functional mobility: 2 person Katherine/CGA bed -> toilet with RW. Required max cues for safety/sequencing.             Plan   Plan  Times per week: 4-7x/wk  Times per day: Daily  Plan weeks: <1- med pt  Current Treatment Recommendations: Strengthening, ROM, Balance Training, Functional Mobility Training, Endurance Training, Stair training, Pain Management, Safety Education & Training, Patient/Caregiver Education & Training, Self-Care / ADL, Home Management Training       Goals  Short term goals  Time Frame for Short term goals: 3-5 days- med pt  Short term goal 1: Tolerate 25-30min BUE ther ex/act to inc strength/end for ADL  Short term goal 2: Inc to [place] person using least restrictive AD for fxl ADL xfers  Short term goal 3: Inc to [employer] for UB bathing and dressing  Short term goal 4: Inc to [employer] and dressing  Long term goals  Time Frame for Long term goals : Same as STGs  Long term goal 1: Same as STGs  Long term goal 2: Same as STGs  Patient Goals   Patient goals :  To get better       Therapy Time   Individual Concurrent Group Co-treatment   Time In  8:30         Time Out  9:15         Minutes  [address], [place]

## 2021-12-13 NOTE — PROGRESS NOTES
Hospitalist Progress Note      PCP: Kylee Martins MD    Date of Admission: 12/10/2021    Chief Complaint: weakness, dyspnea    Subjective: pt awake, eating breakfast     Medications:  Reviewed    Infusion Medications    sodium chloride      dextrose       Scheduled Medications    amLODIPine  5 mg Oral Daily    apixaban  5 mg Oral BID    torsemide  20 mg Oral Daily    hydrALAZINE  50 mg Oral TID    potassium chloride  20 mEq Oral Daily with breakfast    lidocaine  1 patch TransDERmal Daily    insulin lispro  0-12 Units SubCUTAneous TID WC    insulin lispro  0-6 Units SubCUTAneous Nightly    allopurinol  100 mg Oral Daily    aspirin  81 mg Oral Daily    folic acid  1 mg Oral Daily    ketorolac  1 drop Both Eyes BID    meloxicam  15 mg Oral Daily    pantoprazole  40 mg Oral Daily    rosuvastatin  20 mg Oral Nightly    tamsulosin  0.4 mg Oral Daily    metoprolol succinate  25 mg Oral Daily    lidocaine  1 patch TransDERmal Nightly    insulin lispro  0.03 Units/kg SubCUTAneous TID WC    insulin lispro  0-3 Units SubCUTAneous Nightly     PRN Meds: sodium chloride flush, sodium chloride, ondansetron **OR** ondansetron, polyethylene glycol, acetaminophen **OR** acetaminophen, hydrALAZINE, dextrose, glucagon (rDNA), dextrose, traMADol, ketorolac, glucose, sodium chloride flush, promethazine **OR** ondansetron, oxyCODONE-acetaminophen      Intake/Output Summary (Last 24 hours) at 12/13/2021 0817  Last data filed at 12/13/2021 0756  Gross per 24 hour   Intake --   Output 1800 ml   Net -1800 ml       Exam:    BP (!) 150/73 Comment: nurse aware  Pulse 61   Temp 97.7 °F (36.5 °C) (Oral)   Resp 16   Ht 5' 11\" (1.803 m)   Wt 290 lb 9.6 oz (131.8 kg)   SpO2 96%   BMI 40.53 kg/m²     General appearance: No apparent distress, appears stated age and cooperative. HEENT: Pupils equal, round, and reactive to light. Conjunctivae/corneas clear. Neck: Supple, with full range of motion.  No jugular venous acute rehab before Dc home? Social service on case   R ribcage pain, aggravated by deep inspiration- better, continue with  local lidoderm patch, IV toradol  Elevated troponins, CAD- appreciate cardiology recommendations,. meds restarted, [facility] on case for further recommendations.  On ASA/statin, post STENTS placement    CHF/edema, elevated BNP, dyspnea- IV lasix completed, on torsemide now, 2 d echo pending  A fib- rate controlled,  systemic anticoagulation restarted yesterday per  patient/cardiology decision   Morbid obesity with BMI 40%- supportive care   HTN- controlled,  follow up clinically   Some confusion, disorientation- neurology to see pt for possible dementia work up, mini mental evaluation ordered   Constipation- adding stool softeners   Medically stable for acute admission at [facility], will follow along with consultants       Electronically signed by Luc Valenzuela MD on 12/13/2021 at 8:17 AM

## 2021-12-13 NOTE — PROGRESS NOTES
Physical Therapy  Facility/Department: [facility] MED SURG UNIT  Daily Treatment Note  NAME: Daja Salinas  : [id number]  MRN: [id number]    Date of Service: 2021    Discharge Recommendations:  24 hour supervision or assist, Subacute/Skilled Nursing Facility (slow paced SNF)        Assessment   Body structures, Functions, Activity limitations: Decreased functional mobility ; Increased pain; Decreased cognition; Decreased ADL status  Assessment: Pt very slow to respond to VC's this date requiring inc time to process. Inc time to complete transfers with maxVC's and tactile cues for sequencing to inc safety and technique to dec pain. Pt requiring inc time to ambulate 10' from bed to toilet taking a standing break and slow to answer questions. Jenelle needed to guide AD to safely turn and step back to toilet today. Inc time on toilet. Inc time to use bathroom this date. Pt initially reluctant to get off toilet but was then agreeable to ambulate back to chair. Inc time, tactile and VC's to get pt to sit as he became distracted wanting a water and kept placing hand back on walker. Pt needed min/modA to safely sit from standing position in recliner. Pt left to sit up in recliner with call light and chair alarm in place. Continue with POC. Discussed pt's status with nursing. Treatment Diagnosis: Bruised right ribs with pain and fear of moving and causing pain  REQUIRES PT FOLLOW UP: Yes  Activity Tolerance  Activity Tolerance: Patient limited by pain; Patient limited by cognitive status     Patient Diagnosis(es): There were no encounter diagnoses. has a past medical history of Bradycardia, CAD (coronary artery disease), Cancer ([address] CHF (congestive heart failure) ([address] Depression, HSV-2 (herpes simplex virus 2) infection, Hyperlipidemia, Hypertension, Left knee DJD, Osteoarthritis, Rheumatoid arthritis(714.0), and Type 2 diabetes mellitus without complication, without long-term current use of insulin ([address]    has a past surgical history that includes Appendectomy; Tonsillectomy and adenoidectomy; Colonoscopy (7-19-11); arthrodesis (Right, 10/31/2016); Ankle surgery (Right); pr colon ca scrn not hi rsk ind (N/A, 7/11/2017); Cardiac catheterization; fracture surgery; joint replacement (Bilateral); shoulder surgery (Left); and HARDWARE REMOVAL FOOT / ANKLE (Right, 11/6/2017). Restrictions  Restrictions/Precautions  Restrictions/Precautions: Fall Risk  Required Braces or Orthoses?: No  Implants present? : Metal implants  Subjective   General  Chart Reviewed: Yes  Additional Pertinent Hx: Used SPC prior to admission  Family / Caregiver Present: No  Referring Practitioner: Rick Arguello  Subjective  Subjective: Pt lying in bed and agreeable to therapy. Pt states he is finished with breakfast but barely touched food. Pain Screening  Patient Currently in Pain: Yes  Pain Assessment  Pain Assessment: Faces  Pain Level: 8  Pain Location: Rib cage  Vital Signs  Patient Currently in Pain: Yes       Orientation     Cognition      Objective    supine>sit maxAx2 (inc time to complete and process, VC's and tactile cues)  Transfers  Sit to Stand: 2 Person Assistance; Minimal Assistance  Stand to sit: 2 Person Assistance; Minimal Assistance  Comment: pt initially stood in [address] steady and then able to  [employer]. VC's for hand placement to inc safety. Initially needed to rock to stand. Pt needing   Ambulation  Ambulation?: Yes  Ambulation 1  Surface: level tile  Device: Rolling Walker  Assistance: 2 Person assistance; Minimal assistance; Contact guard assistance (minAx1 +CGAx1)  Quality of Gait: inc time to process, small step length, VC's for direction and maintaining closer distance to AD.    Distance: 10' from bed to toilet (>5 min to ambulate) and 15' from toilet to recliner, (>10 min to complete )  Comments: needed assist to guide AD      Balance  Standing - Dynamic: Fair  Comments: pt stood in [address] steady with BUE assist and was able to weight shift and march in place x 3 min in preparation for [employer]. Pt then able to stand at [employer] with CGAx2 (for safety). G-Code     OutComes Score                                                     AM-PAC Score             Goals  Short term goals  Time Frame for Short term goals: 3-5 days  Short term goal 1: mod assist +1 rolling  Short term goal 2: max assist +1 supine to sit  Short term goal 3: Stand at bedside with with assist of 2 for 10 min  Short term goal 4: Particpate in LE ex to support ambulation  Long term goals  Time Frame for Long term goals : 5-15 days  Long term goal 1: Amb with ww x50', indep  Long term goal 2: Able to get out flat  bed on right with ww  Long term goal 3: Be able to use commode with ww, indep  Long term goal 4: Perform at least one step  Long term goal 5: Written HEP  Patient Goals   Patient goals : Return home to his dog    Plan    Plan  Times per week: 5-7  Times per day:  (1-2)  Plan weeks: 2  Current Treatment Recommendations: Transfer Training, Cognitive Reorientation, Pain Management, Gait Training, Home Exercise Program, Safety Education & Training  Safety Devices  Type of devices:  All fall risk precautions in place, Bed alarm in place, Call light within reach, Chair alarm in place, Gait belt, Left in chair, Patient at risk for falls (gait belt upper chest under arms)  Restraints  Initially in place: No     Therapy Time   Individual Concurrent Group Co-treatment   Time In  0830         Time Out  0940         Minutes  [address], [place]  License and [address] Number: [id number]

## 2021-12-14 ENCOUNTER — HOSPITAL ENCOUNTER (OUTPATIENT)
Dept: MRI IMAGING | Age: 76
Discharge: HOME | End: 2021-12-16
Payer: MEDICARE

## 2021-12-14 ENCOUNTER — APPOINTMENT (OUTPATIENT)
Dept: ULTRASOUND IMAGING | Age: 76
DRG: 291 | End: 2021-12-14
Attending: INTERNAL MEDICINE
Payer: MEDICARE

## 2021-12-14 LAB
ALBUMIN SERPL-MCNC: 3.1 G/DL (ref 3.5–4.6)
ALP BLD-CCNC: 84 U/L (ref 35–104)
ALT SERPL-CCNC: 37 U/L (ref 0–41)
AMMONIA: 27 UMOL/L (ref 16–60)
ANION GAP SERPL CALCULATED.3IONS-SCNC: 15 MEQ/L (ref 9–15)
AST SERPL-CCNC: 46 U/L (ref 0–40)
BASOPHILS ABSOLUTE: 0 K/UL (ref 0–0.1)
BASOPHILS RELATIVE PERCENT: 0.5 % (ref 0.2–1.2)
BILIRUB SERPL-MCNC: 1.2 MG/DL (ref 0.2–0.7)
BUN BLDV-MCNC: 30 MG/DL (ref 8–23)
CALCIUM SERPL-MCNC: 8.6 MG/DL (ref 8.5–9.9)
CHLORIDE BLD-SCNC: 95 MEQ/L (ref 95–107)
CO2: 25 MEQ/L (ref 20–31)
CREAT SERPL-MCNC: 0.92 MG/DL (ref 0.7–1.2)
EOSINOPHILS ABSOLUTE: 0.3 K/UL (ref 0–0.5)
EOSINOPHILS RELATIVE PERCENT: 3.6 % (ref 0.8–7)
GFR AFRICAN AMERICAN: >60
GFR NON-AFRICAN AMERICAN: >60
GLOBULIN: 3 G/DL (ref 2.3–3.5)
GLUCOSE BLD-MCNC: 122 MG/DL (ref 70–99)
GLUCOSE BLD-MCNC: 123 MG/DL (ref 60–115)
GLUCOSE BLD-MCNC: 131 MG/DL (ref 60–115)
GLUCOSE BLD-MCNC: 175 MG/DL (ref 60–115)
GLUCOSE BLD-MCNC: 179 MG/DL (ref 60–115)
HCT VFR BLD CALC: 45.3 % (ref 42–52)
HEMOGLOBIN: 15.6 G/DL (ref 13.7–17.5)
IMMATURE GRANULOCYTES #: 0.1 K/UL
IMMATURE GRANULOCYTES %: 0.6 %
LYMPHOCYTES ABSOLUTE: 0.8 K/UL (ref 1.3–3.6)
LYMPHOCYTES RELATIVE PERCENT: 9.9 %
MAGNESIUM: 1.9 MG/DL (ref 1.7–2.4)
MCH RBC QN AUTO: 32.9 PG (ref 25.7–32.2)
MCHC RBC AUTO-ENTMCNC: 34.4 % (ref 32.3–36.5)
MCV RBC AUTO: 95.6 FL (ref 79–92.2)
MONOCYTES ABSOLUTE: 0.9 K/UL (ref 0.3–0.8)
MONOCYTES RELATIVE PERCENT: 11 % (ref 5.3–12.2)
NEUTROPHILS ABSOLUTE: 6.3 K/UL (ref 1.8–5.4)
NEUTROPHILS RELATIVE PERCENT: 74.4 % (ref 34–67.9)
PDW BLD-RTO: 14.1 % (ref 11.6–14.4)
PERFORMED ON: ABNORMAL
PLATELET # BLD: 226 K/UL (ref 163–337)
POTASSIUM SERPL-SCNC: 3.7 MEQ/L (ref 3.4–4.9)
PRO-BNP: 1662 PG/ML
RBC # BLD: 4.74 M/UL (ref 4.63–6.08)
SEDIMENTATION RATE, ERYTHROCYTE: 26 MM (ref 0–20)
SODIUM BLD-SCNC: 135 MEQ/L (ref 135–144)
T4 FREE: 1.79 NG/DL (ref 0.84–1.68)
TOTAL PROTEIN: 6.1 G/DL (ref 6.3–8)
WBC # BLD: 8.4 K/UL (ref 4.2–9)

## 2021-12-14 PROCEDURE — 6370000000 HC RX 637 (ALT 250 FOR IP): Performed by: INTERNAL MEDICINE

## 2021-12-14 PROCEDURE — 83880 ASSAY OF NATRIURETIC PEPTIDE: CPT

## 2021-12-14 PROCEDURE — 84439 ASSAY OF FREE THYROXINE: CPT

## 2021-12-14 PROCEDURE — 84207 ASSAY OF VITAMIN B-6: CPT

## 2021-12-14 PROCEDURE — 83921 ORGANIC ACID SINGLE QUANT: CPT

## 2021-12-14 PROCEDURE — 86618 LYME DISEASE ANTIBODY: CPT

## 2021-12-14 PROCEDURE — 97530 THERAPEUTIC ACTIVITIES: CPT

## 2021-12-14 PROCEDURE — 86592 SYPHILIS TEST NON-TREP QUAL: CPT

## 2021-12-14 PROCEDURE — 1210000000 HC MED SURG R&B

## 2021-12-14 PROCEDURE — 80053 COMPREHEN METABOLIC PANEL: CPT

## 2021-12-14 PROCEDURE — 86788 WEST NILE VIRUS AB IGM: CPT

## 2021-12-14 PROCEDURE — 93880 EXTRACRANIAL BILAT STUDY: CPT

## 2021-12-14 PROCEDURE — 97110 THERAPEUTIC EXERCISES: CPT

## 2021-12-14 PROCEDURE — 84165 PROTEIN E-PHORESIS SERUM: CPT

## 2021-12-14 PROCEDURE — 86789 WEST NILE VIRUS ANTIBODY: CPT

## 2021-12-14 PROCEDURE — 84155 ASSAY OF PROTEIN SERUM: CPT

## 2021-12-14 PROCEDURE — 70551 MRI BRAIN STEM W/O DYE: CPT

## 2021-12-14 PROCEDURE — 36415 COLL VENOUS BLD VENIPUNCTURE: CPT

## 2021-12-14 PROCEDURE — 82784 ASSAY IGA/IGD/IGG/IGM EACH: CPT

## 2021-12-14 PROCEDURE — 85652 RBC SED RATE AUTOMATED: CPT

## 2021-12-14 PROCEDURE — 95813 EEG EXTND MNTR 61-119 MIN: CPT

## 2021-12-14 PROCEDURE — 97116 GAIT TRAINING THERAPY: CPT

## 2021-12-14 PROCEDURE — 82140 ASSAY OF AMMONIA: CPT

## 2021-12-14 PROCEDURE — 86431 RHEUMATOID FACTOR QUANT: CPT

## 2021-12-14 PROCEDURE — 82607 VITAMIN B-12: CPT

## 2021-12-14 PROCEDURE — 86334 IMMUNOFIX E-PHORESIS SERUM: CPT

## 2021-12-14 PROCEDURE — 83090 ASSAY OF HOMOCYSTEINE: CPT

## 2021-12-14 PROCEDURE — 83735 ASSAY OF MAGNESIUM: CPT

## 2021-12-14 PROCEDURE — 86038 ANTINUCLEAR ANTIBODIES: CPT

## 2021-12-14 PROCEDURE — 82746 ASSAY OF FOLIC ACID SERUM: CPT

## 2021-12-14 PROCEDURE — 85025 COMPLETE CBC W/AUTO DIFF WBC: CPT

## 2021-12-14 RX ORDER — POTASSIUM CHLORIDE 750 MG/1
40 TABLET, EXTENDED RELEASE ORAL
Status: DISCONTINUED | OUTPATIENT
Start: 2021-12-15 | End: 2021-12-17 | Stop reason: HOSPADM

## 2021-12-14 RX ORDER — AMLODIPINE BESYLATE 10 MG/1
10 TABLET ORAL DAILY
Status: DISCONTINUED | OUTPATIENT
Start: 2021-12-15 | End: 2021-12-17 | Stop reason: HOSPADM

## 2021-12-14 RX ORDER — LOSARTAN POTASSIUM 25 MG/1
25 TABLET ORAL DAILY
Status: DISCONTINUED | OUTPATIENT
Start: 2021-12-14 | End: 2021-12-17 | Stop reason: HOSPADM

## 2021-12-14 RX ORDER — LORAZEPAM 1 MG/1
1 TABLET ORAL EVERY 4 HOURS PRN
Status: DISCONTINUED | OUTPATIENT
Start: 2021-12-14 | End: 2021-12-17 | Stop reason: HOSPADM

## 2021-12-14 RX ADMIN — KETOROLAC TROMETHAMINE 1 DROP: 5 SOLUTION/ DROPS OPHTHALMIC at 09:45

## 2021-12-14 RX ADMIN — ALLOPURINOL 100 MG: 100 TABLET ORAL at 09:45

## 2021-12-14 RX ADMIN — ASPIRIN 81 MG CHEWABLE TABLET 81 MG: 81 TABLET CHEWABLE at 09:44

## 2021-12-14 RX ADMIN — SENNOSIDES 17.2 MG: 8.6 TABLET, FILM COATED ORAL at 20:20

## 2021-12-14 RX ADMIN — HYDRALAZINE HYDROCHLORIDE 50 MG: 25 TABLET, FILM COATED ORAL at 20:19

## 2021-12-14 RX ADMIN — POLYETHYLENE GLYCOL 3350 17 G: 17 POWDER, FOR SOLUTION ORAL at 20:21

## 2021-12-14 RX ADMIN — PANTOPRAZOLE SODIUM 40 MG: 40 TABLET, DELAYED RELEASE ORAL at 09:44

## 2021-12-14 RX ADMIN — APIXABAN 5 MG: 2.5 TABLET, FILM COATED ORAL at 09:45

## 2021-12-14 RX ADMIN — METOPROLOL SUCCINATE 25 MG: 25 TABLET, FILM COATED, EXTENDED RELEASE ORAL at 09:44

## 2021-12-14 RX ADMIN — TAMSULOSIN HYDROCHLORIDE 0.4 MG: 0.4 CAPSULE ORAL at 09:44

## 2021-12-14 RX ADMIN — OXYCODONE AND ACETAMINOPHEN 1 TABLET: 5; 325 TABLET ORAL at 10:51

## 2021-12-14 RX ADMIN — TORSEMIDE 20 MG: 20 TABLET ORAL at 09:44

## 2021-12-14 RX ADMIN — APIXABAN 5 MG: 2.5 TABLET, FILM COATED ORAL at 20:20

## 2021-12-14 RX ADMIN — KETOROLAC TROMETHAMINE 1 DROP: 5 SOLUTION/ DROPS OPHTHALMIC at 20:22

## 2021-12-14 RX ADMIN — HYDRALAZINE HYDROCHLORIDE 50 MG: 25 TABLET, FILM COATED ORAL at 09:45

## 2021-12-14 RX ADMIN — LORAZEPAM 1 MG: 1 TABLET ORAL at 10:51

## 2021-12-14 RX ADMIN — ROSUVASTATIN CALCIUM 20 MG: 20 TABLET, COATED ORAL at 20:19

## 2021-12-14 RX ADMIN — SENNOSIDES 17.2 MG: 8.6 TABLET, FILM COATED ORAL at 09:45

## 2021-12-14 RX ADMIN — MELOXICAM 15 MG: 7.5 TABLET ORAL at 09:44

## 2021-12-14 RX ADMIN — FOLIC ACID 1 MG: 1 TABLET ORAL at 09:45

## 2021-12-14 ASSESSMENT — PAIN SCALES - GENERAL
PAINLEVEL_OUTOF10: 7
PAINLEVEL_OUTOF10: 6
PAINLEVEL_OUTOF10: 4

## 2021-12-14 NOTE — PROGRESS NOTES
Alexey Dumont in MRI notified pt was medicated with ativan and percocet at this time. Will continue to monitor.

## 2021-12-14 NOTE — FLOWSHEET NOTE
Patient had small bm at bedside commode. Patient transferred back to bed. Bed alarm in place.  Electronically signed by Alex Gee RN on 12/14/2021 at 6:37 PM

## 2021-12-14 NOTE — PROGRESS NOTES
Hospitalist Progress Note      PCP: Eric Capellan MD    Date of Admission: 12/10/2021    Chief Complaint: weakness    Subjective: patient in chair, looks comfortable     Medications:  Reviewed    Infusion Medications    sodium chloride      dextrose       Scheduled Medications    [START ON 12/15/2021] amLODIPine  10 mg Oral Daily    [START ON 12/15/2021] potassium chloride  40 mEq Oral Daily with breakfast    polyethylene glycol  17 g Oral BID    senna  2 tablet Oral BID    apixaban  5 mg Oral BID    torsemide  20 mg Oral Daily    hydrALAZINE  50 mg Oral TID    lidocaine  1 patch TransDERmal Daily    insulin lispro  0-12 Units SubCUTAneous TID WC    insulin lispro  0-6 Units SubCUTAneous Nightly    allopurinol  100 mg Oral Daily    aspirin  81 mg Oral Daily    folic acid  1 mg Oral Daily    ketorolac  1 drop Both Eyes BID    meloxicam  15 mg Oral Daily    pantoprazole  40 mg Oral Daily    rosuvastatin  20 mg Oral Nightly    tamsulosin  0.4 mg Oral Daily    metoprolol succinate  25 mg Oral Daily    lidocaine  1 patch TransDERmal Nightly    insulin lispro  0.03 Units/kg SubCUTAneous TID WC     PRN Meds: sodium chloride flush, sodium chloride, ondansetron **OR** ondansetron, polyethylene glycol, acetaminophen **OR** acetaminophen, hydrALAZINE, dextrose, glucagon (rDNA), dextrose, traMADol, ketorolac, glucose, sodium chloride flush, promethazine **OR** ondansetron, oxyCODONE-acetaminophen      Intake/Output Summary (Last 24 hours) at 12/14/2021 0948  Last data filed at 12/14/2021 0419  Gross per 24 hour   Intake --   Output 2500 ml   Net -2500 ml       Exam:    BP (!) 163/81   Pulse 69   Temp 98.4 °F (36.9 °C) (Oral)   Resp 20   Ht 5' 11\" (1.803 m)   Wt 290 lb 9.6 oz (131.8 kg)   SpO2 96%   BMI 40.53 kg/m²     General appearance: No apparent distress, appears stated age and cooperative. HEENT: Pupils equal, round, and reactive to light. Conjunctivae/corneas clear.   Neck: Supple, with full range of motion. No jugular venous distention. Trachea midline. Respiratory:  Normal respiratory effort. Clear to auscultation, bilaterally without Rales/Wheezes/Rhonchi. Cardiovascular: Regular rate and rhythm with normal S1/S2 without murmurs, rubs or gallops. Abdomen: Soft, non-tender, non-distended with normal bowel sounds. Musculoskeletal: edema bilaterally. Skin: Skin color, texture, turgor normal.  No rashes or lesions. Neurologic:  Neurovascularly intact without any focal sensory/motor deficits. Cranial nerves: II-XII intact, grossly non-focal.  Psychiatric: Alert and oriented, thought content appropriate, normal insight        Labs:   Recent Labs     12/12/21  0632 12/13/21  0552 12/14/21  0612   WBC 9.7* 11.0* 8.4   HGB 16.0 15.7 15.6   HCT 47.1 45.6 45.3    217 226     Recent Labs     12/12/21  0632 12/13/21  0551 12/14/21  0613   * 136 135   K 3.9 3.9 3.7   CL 93* 95 95   CO2 22 26 25   BUN 31* 32* 30*   CREATININE 1.25* 1.08 0.92   CALCIUM 8.4* 8.5 8.6     Recent Labs     12/12/21  0632 12/13/21  0551 12/14/21  0613   AST 22 36 46*   ALT 18 23 37   BILITOT 1.5* 1.5* 1.2*   ALKPHOS 83 84 84     No results for input(s): INR in the last 72 hours. Recent Labs     12/11/21  1254 12/11/21  1853 12/12/21  0148   TROPONINI 0.033* 0.041* 0.046*       Urinalysis:      Lab Results   Component Value Date    NITRU Negative 12/09/2021    WBCUA 3-5 12/09/2021    BACTERIA Negative 12/09/2021    RBCUA 6-10 12/09/2021    BLOODU SMALL 12/09/2021    SPECGRAV 1.015 12/09/2021    GLUCOSEU Negative 12/09/2021    GLUCOSEU NEG 06/07/2012       Radiology:  MRI BRAIN WO CONTRAST    (Results Pending)   US CAROTID ARTERY BILATERAL    (Results Pending)           Assessment/Plan:    Active Hospital Problems    Diagnosis Date Noted    CHF (congestive heart failure), NYHA class I, acute on chronic, combined ([facility] [I50.43] 12/11/2021         DVT Prophylaxis: apixaban  Diet: ADULT DIET;  Regular  Code Status: Full Code    PT/OT Eval Status: done    Dispo - Mechanical fall, weakness- PT on case. May needs post acute rehab before Dc home. Social service on case   R ribcage pain, aggravated by deep inspiration- better, continue with  local lidoderm patch, IV toradol  Elevated troponins, CAD- appreciate cardiology recommendations,. meds restarted, [facility] on case for further recommendations. On ASA/statin, post STENTS placement    CHF/edema, elevated BNP, dyspnea- IV lasix completed, on torsemide now, 2 d echo report seeing   A fib- rate controlled,  systemic anticoagulation restarted yesterday per  patient/cardiology decision   Morbid obesity with BMI 40%- supportive care   HTN- increase norvasc for better control.  ,  follow up clinically   Some confusion, disorientation- neurology on case for further work up, mini mental evaluation done  Constipation- adding stool softeners   Medically stable for acute admission at [place], will follow along with consultants       Electronically signed by Luc Valenzuela MD on 12/14/2021 at 9:48 AM

## 2021-12-14 NOTE — PROGRESS NOTES
Upon reassessment prior to transport for MRI pt is drowsy and comfortable. Declines premedication at this time, states he's had MRIs in the past without issue and will be fine. Will continue to monitor.

## 2021-12-14 NOTE — PROGRESS NOTES
This  received voice mail from Marychuy in admissions at [facility]. Marychuy reports that referral was received and that nursing is reviewing patient's information to determine if facility is able to meet patient's needs. SS to continue to follow.

## 2021-12-14 NOTE — CONSULTS
ID - The patient is a 68y year old, right-handed male. Consultation requested by Dr. Ofelia Lao. The history was obtained from  the patient and available records. Progress notes, x-rays, lab results, and other inpatient notes were reviewed. CC -- Confusion -- HPI -- Pt denies sx such as forgetting names, getting lost, making math mistakes. However, during  interview he exhibited impersistence, instructions has to be repeated. Pt states he used to work as an . IMAGING: pending. TESTING: paneing. SIGNIFICANT LABS: pending. Other labs in chart reviewed. PRAVEEN -- Tx - CPAP @ ?. Hx - Pt not clear who diagnosed him or when, or who is in change of his machine. Elana Bloodgood PN -- Hx - Noted on exam. Recent fall down steps, now at [facility] for rehab. Pt came to ER for worsened CHF, had  stopped own furosemide. Cognition -- Hx - See exam.  PMH -- hypertension, coronary artery disease, hyperlipidaemia, congestive heart failure, atrial fibrillation, anticoagulated status, diabetes  mellitus, rheumatoid arth, osteoarthritis, obstructive sleep apnoea, seizures, depression, prostate carcinoma  PSH -- ankle and shoulder surgery, total knee replacements, coronary stent, appendectomy, tonsillectomy & adenoidectomy  Aller -- q.v.  Meds -- see reconciliation sheet. FHx --  SHx -- No tob No EtOH Quit smoking 29 y ago. ROS -- Negatives: malaise rash headache dizziness diplopia nausea ataxia angina palpitations cough vomiting incontinence dysuria  arthralgias weight gain anorexia alopecia nystagmus drowsiness tremor memory difficulty Positives: none. Also see HPI for elements of this section, particularly PMH, allergies, FH, ROS, documented therein. Physical Examination --  The patient is well groomed, markedly obese, and in no acute distress. Vital signs: reviewed as per the graphic sheet. Skin: examination demonstrated no evident lesions. HEENT: bruits absent . Tongue very large. [address] IV.   Heart: cardiac rhythm permanent. Likely multi-infarct component given atrial fibrillation. Obstructive sleep apnoea. Peripheral neuropathy. Recommendations and Patient Instructions --  1. Neuropathy and cognition panel. 2. MR brain. 3. Carotid u/s. .  4. Get sleep studies. 5. (Follow-up after discharge. ).

## 2021-12-14 NOTE — PROGRESS NOTES
Physical Therapy  Facility/Department: [facility] MED SURG UNIT  Daily Treatment Note  NAME: Villa Faria  :   MRN: [id number]    Date of Service: 2021    Discharge Recommendations:  (P) 24 hour supervision or assist, Subacute/Skilled Nursing Facility        Assessment Pt cooperative with encouragement to participate. Pt seems confused at times, demo difficulty following directions during gait- demo delayed response time, slow to initiate. Marked time spent on transfers and addressing toileting needs (2 BMs during tx session) morales intact. Pt reports pain/discomfort in R lateral rib area, agreeable to remain up in recliner at end of session; LEs elevated, ice packs issued to assist with decreasing pain. REQUIRES PT FOLLOW UP: (P) Yes  Activity Tolerance: (P) Patient limited by endurance; Patient limited by cognitive status; Patient limited by pain     Patient Diagnosis(es): There were no encounter diagnoses. has a past medical history of Bradycardia, CAD (coronary artery disease), [address] ([facility] CHF (congestive heart failure) ([facility] Depression, HSV-2 (herpes simplex virus 2) infection, Hyperlipidemia, Hypertension, Left knee DJD, Osteoarthritis, Rheumatoid arthritis(714.0), and Type 2 diabetes mellitus without complication, without long-term current use of insulin ([facility] has a past surgical history that includes Appendectomy; Tonsillectomy and adenoidectomy; Colonoscopy (11); arthrodesis (Right, 10/31/2016); Ankle surgery (Right); pr colon ca scrn not hi rsk ind (N/A, 2017); Cardiac catheterization; fracture surgery; joint replacement (Bilateral); shoulder surgery (Left); and HARDWARE REMOVAL FOOT / ANKLE (Right, 2017).     Restrictions  Restrictions/Precautions  Restrictions/Precautions: (P) Fall Risk  Required Braces or Orthoses?: (P) No  Implants present? : (P) Metal implants  Subjective   General  Chart Reviewed: (P) Yes  Additional Pertinent Hx: (P) Used SPC prior to admission  Response To Previous Treatment: (P) Not applicable  Family / Caregiver Present: (P) No  Referring Practitioner: Isela Thurston  Subjective: (P) Nurse states it took pt 25 min to agree and assist with getting OOB to sit on BSC. Comments: (P) STNA present to assist with initial transfer and gait trial.   Pain Screening  Patient Currently in Pain: (P) Yes (R lateral rib pain)    Pre Treatment Pain Screening  Pain at present: (P) 5  Comments / Details: (P) nurse and STNA in with pt this AM- pt seated on [facility]    Orientation     Cognition      Objective      Transfers: Mod/Min A, AD for support, cues for safe technique/hand placement. Sit to Stand: (P) Minimal Assistance; Moderate Assistance  Stand to sit: (P) Minimal Assistance; Contact guard assistance  Stand Pivot Transfers: (P) Minimal Assistance; Contact guard assistance  Comment: (P) consistent cues for hand/foot placement, moderate time/effort required to complete transfers.    Ambulation  Ambulation?: (P) Yes  Ambulation 1  Surface: (P) level tile  Device: (P) Rolling Walker  Assistance: (P) Contact guard assistance; Minimal assistance  Quality of Gait: (P) upright posture, slow pace, short steps  Gait Deviations: (P) Slow Gretchen; Decreased step length; Decreased step height; Decreased head and trunk rotation  Distance: (P) 15' x 1, 20' x 2  Comments: (P) seated rest in between, cues for direction- pt demo increased difficulty following cues/directions when turning/backing up- assist to manage walker  Stairs/Curb  Stairs?: (P) No     Balance  Posture: (P) Good  Sitting - Static: (P) Good  Sitting - Dynamic: (P) Fair; +  Standing - Static: (P) Fair; +  Standing - Dynamic: (P) Fair  Comments: (P) trunk ROM/mobility limited by reports of R lateral rib pain  Exercises  Hip Abduction: (P) x 10  Knee Long Arc Quad: (P) x 10  Ankle Pumps: (P) x 15- limited on R d/t ankle fusion  Comments: (P) visual demo provided with cues for correct form/technique Other Activities: (P) Other (see comment)  Comment: (P) Assisted pt with toileting 2x during tx session, assist required for hygiene posteriorly  Cryotherapy (Minutes\Location): (P) ice pack R lateral rib area post tx session           G-Code     OutComes Score                                                     AM-PAC Score             Goals  Short term goals  Time Frame for Short term goals: (P) 3-5 days  Short term goal 1: (P) mod assist +1 rolling  Short term goal 2: (P) max assist +1 supine to sit  Short term goal 3: (P) Stand at bedside with with assist of 2 for 10 min  Short term goal 4: (P) Particpate in LE ex to support ambulation  Long term goals  Time Frame for Long term goals : (P) 5-15 days  Long term goal 1: (P) Amb with ww x50', indep  Long term goal 2: (P) Able to get out flat  bed on right with ww  Long term goal 3: (P) Be able to use commode with ww, indep  Long term goal 4: (P) Perform at least one step  Long term goal 5: (P) Written HEP  Patient Goals   Patient goals : (P) Return home to his dog    Plan    Plan  Times per week: (P) 5-7  Times per day:  (1-2)  Plan weeks: (P) 2  Current Treatment Recommendations: (P) Transfer Training, Cognitive Reorientation, Pain Management, Gait Training, Home Exercise Program, Safety Education & Training  Safety Devices  Type of devices: (P) All fall risk precautions in place, Chair alarm in place, Call light within reach  Restraints  Initially in place: No     Therapy Time   Individual Concurrent Group Co-treatment   Time In  08:50 am         Time Out  10:00 am         Minutes  [id number] [facility], [facility]  License and Documentation Cosign  Therapy License Number: [id number]

## 2021-12-15 LAB
ALBUMIN SERPL-MCNC: 3.1 G/DL (ref 3.5–4.6)
ALP BLD-CCNC: 83 U/L (ref 35–104)
ALT SERPL-CCNC: 60 U/L (ref 0–41)
ANION GAP SERPL CALCULATED.3IONS-SCNC: 15 MEQ/L (ref 9–15)
AST SERPL-CCNC: 66 U/L (ref 0–40)
BASOPHILS ABSOLUTE: 0.1 K/UL (ref 0–0.1)
BASOPHILS RELATIVE PERCENT: 0.6 % (ref 0.2–1.2)
BILIRUB SERPL-MCNC: 1 MG/DL (ref 0.2–0.7)
BUN BLDV-MCNC: 29 MG/DL (ref 8–23)
CALCIUM SERPL-MCNC: 8.7 MG/DL (ref 8.5–9.9)
CHLORIDE BLD-SCNC: 95 MEQ/L (ref 95–107)
CO2: 25 MEQ/L (ref 20–31)
CREAT SERPL-MCNC: 0.92 MG/DL (ref 0.7–1.2)
EOSINOPHILS ABSOLUTE: 0.3 K/UL (ref 0–0.5)
EOSINOPHILS RELATIVE PERCENT: 3.5 % (ref 0.8–7)
GFR AFRICAN AMERICAN: >60
GFR NON-AFRICAN AMERICAN: >60
GLOBULIN: 3.4 G/DL (ref 2.3–3.5)
GLUCOSE BLD-MCNC: 111 MG/DL (ref 60–115)
GLUCOSE BLD-MCNC: 112 MG/DL (ref 60–115)
GLUCOSE BLD-MCNC: 113 MG/DL (ref 70–99)
GLUCOSE BLD-MCNC: 119 MG/DL (ref 60–115)
GLUCOSE BLD-MCNC: 159 MG/DL (ref 60–115)
HCT VFR BLD CALC: 46.2 % (ref 42–52)
HEMOGLOBIN: 15.9 G/DL (ref 13.7–17.5)
HOMOCYSTEINE: 23.6 UMOL/L
IMMATURE GRANULOCYTES #: 0 K/UL
IMMATURE GRANULOCYTES %: 0.3 %
LYMPHOCYTES ABSOLUTE: 1 K/UL (ref 1.3–3.6)
LYMPHOCYTES RELATIVE PERCENT: 10.6 %
MAGNESIUM: 2.1 MG/DL (ref 1.7–2.4)
MCH RBC QN AUTO: 33.1 PG (ref 25.7–32.2)
MCHC RBC AUTO-ENTMCNC: 34.4 % (ref 32.3–36.5)
MCV RBC AUTO: 96.3 FL (ref 79–92.2)
MONOCYTES ABSOLUTE: 1 K/UL (ref 0.3–0.8)
MONOCYTES RELATIVE PERCENT: 10.9 % (ref 5.3–12.2)
NEUTROPHILS ABSOLUTE: 6.7 K/UL (ref 1.8–5.4)
NEUTROPHILS RELATIVE PERCENT: 74.1 % (ref 34–67.9)
PDW BLD-RTO: 14.1 % (ref 11.6–14.4)
PERFORMED ON: ABNORMAL
PERFORMED ON: ABNORMAL
PERFORMED ON: NORMAL
PERFORMED ON: NORMAL
PLATELET # BLD: 233 K/UL (ref 163–337)
POTASSIUM SERPL-SCNC: 4.1 MEQ/L (ref 3.4–4.9)
RBC # BLD: 4.8 M/UL (ref 4.63–6.08)
RHEUMATOID FACTOR: 11 IU/ML
RPR: NORMAL
SARS-COV-2, NAAT: NOT DETECTED
SODIUM BLD-SCNC: 135 MEQ/L (ref 135–144)
TOTAL PROTEIN: 6.5 G/DL (ref 6.3–8)
WBC # BLD: 9.1 K/UL (ref 4.2–9)

## 2021-12-15 PROCEDURE — 1210000000 HC MED SURG R&B

## 2021-12-15 PROCEDURE — 80053 COMPREHEN METABOLIC PANEL: CPT

## 2021-12-15 PROCEDURE — 97110 THERAPEUTIC EXERCISES: CPT

## 2021-12-15 PROCEDURE — 6370000000 HC RX 637 (ALT 250 FOR IP): Performed by: INTERNAL MEDICINE

## 2021-12-15 PROCEDURE — 99212 OFFICE O/P EST SF 10 MIN: CPT

## 2021-12-15 PROCEDURE — 85025 COMPLETE CBC W/AUTO DIFF WBC: CPT

## 2021-12-15 PROCEDURE — 97530 THERAPEUTIC ACTIVITIES: CPT

## 2021-12-15 PROCEDURE — 87635 SARS-COV-2 COVID-19 AMP PRB: CPT

## 2021-12-15 PROCEDURE — 2500000003 HC RX 250 WO HCPCS: Performed by: INTERNAL MEDICINE

## 2021-12-15 PROCEDURE — 97116 GAIT TRAINING THERAPY: CPT

## 2021-12-15 PROCEDURE — 83735 ASSAY OF MAGNESIUM: CPT

## 2021-12-15 PROCEDURE — 36415 COLL VENOUS BLD VENIPUNCTURE: CPT

## 2021-12-15 RX ORDER — TORSEMIDE 20 MG/1
20 TABLET ORAL DAILY
Qty: 30 TABLET | Refills: 3 | Status: SHIPPED | OUTPATIENT
Start: 2021-12-16 | End: 2022-08-24

## 2021-12-15 RX ORDER — METOPROLOL SUCCINATE 25 MG/1
25 TABLET, EXTENDED RELEASE ORAL DAILY
Qty: 30 TABLET | Refills: 3 | Status: SHIPPED | OUTPATIENT
Start: 2021-12-16 | End: 2022-08-15 | Stop reason: ALTCHOICE

## 2021-12-15 RX ORDER — LOSARTAN POTASSIUM 25 MG/1
25 TABLET ORAL DAILY
Qty: 30 TABLET | Refills: 3 | Status: SHIPPED | OUTPATIENT
Start: 2021-12-16

## 2021-12-15 RX ORDER — AMLODIPINE BESYLATE 10 MG/1
10 TABLET ORAL DAILY
Qty: 30 TABLET | Refills: 3 | Status: SHIPPED | OUTPATIENT
Start: 2021-12-15 | End: 2022-08-15 | Stop reason: ALTCHOICE

## 2021-12-15 RX ORDER — POTASSIUM CHLORIDE 20 MEQ/1
40 TABLET, EXTENDED RELEASE ORAL
Qty: 60 TABLET | Refills: 3 | Status: SHIPPED | OUTPATIENT
Start: 2021-12-15 | End: 2022-08-15 | Stop reason: ALTCHOICE

## 2021-12-15 RX ADMIN — KETOROLAC TROMETHAMINE 1 DROP: 5 SOLUTION/ DROPS OPHTHALMIC at 08:37

## 2021-12-15 RX ADMIN — APIXABAN 5 MG: 2.5 TABLET, FILM COATED ORAL at 20:07

## 2021-12-15 RX ADMIN — Medication: at 20:06

## 2021-12-15 RX ADMIN — TAMSULOSIN HYDROCHLORIDE 0.4 MG: 0.4 CAPSULE ORAL at 08:33

## 2021-12-15 RX ADMIN — METOPROLOL SUCCINATE 25 MG: 25 TABLET, FILM COATED, EXTENDED RELEASE ORAL at 08:33

## 2021-12-15 RX ADMIN — Medication: at 17:21

## 2021-12-15 RX ADMIN — HYDRALAZINE HYDROCHLORIDE 50 MG: 25 TABLET, FILM COATED ORAL at 08:34

## 2021-12-15 RX ADMIN — POLYETHYLENE GLYCOL 3350 17 G: 17 POWDER, FOR SOLUTION ORAL at 08:33

## 2021-12-15 RX ADMIN — APIXABAN 5 MG: 2.5 TABLET, FILM COATED ORAL at 08:34

## 2021-12-15 RX ADMIN — LOSARTAN POTASSIUM 25 MG: 25 TABLET, FILM COATED ORAL at 08:34

## 2021-12-15 RX ADMIN — PANTOPRAZOLE SODIUM 40 MG: 40 TABLET, DELAYED RELEASE ORAL at 08:34

## 2021-12-15 RX ADMIN — KETOROLAC TROMETHAMINE 1 DROP: 5 SOLUTION/ DROPS OPHTHALMIC at 20:09

## 2021-12-15 RX ADMIN — MELOXICAM 15 MG: 7.5 TABLET ORAL at 08:33

## 2021-12-15 RX ADMIN — SENNOSIDES 17.2 MG: 8.6 TABLET, FILM COATED ORAL at 20:06

## 2021-12-15 RX ADMIN — TORSEMIDE 20 MG: 20 TABLET ORAL at 08:33

## 2021-12-15 RX ADMIN — FOLIC ACID 1 MG: 1 TABLET ORAL at 08:34

## 2021-12-15 RX ADMIN — AMLODIPINE BESYLATE 10 MG: 10 TABLET ORAL at 08:33

## 2021-12-15 RX ADMIN — ASPIRIN 81 MG CHEWABLE TABLET 81 MG: 81 TABLET CHEWABLE at 08:33

## 2021-12-15 RX ADMIN — HYDRALAZINE HYDROCHLORIDE 50 MG: 25 TABLET, FILM COATED ORAL at 20:07

## 2021-12-15 RX ADMIN — ROSUVASTATIN CALCIUM 20 MG: 20 TABLET, COATED ORAL at 20:07

## 2021-12-15 RX ADMIN — HYDRALAZINE HYDROCHLORIDE 50 MG: 25 TABLET, FILM COATED ORAL at 14:08

## 2021-12-15 RX ADMIN — POLYETHYLENE GLYCOL 3350 17 G: 17 POWDER, FOR SOLUTION ORAL at 20:10

## 2021-12-15 RX ADMIN — POTASSIUM CHLORIDE 40 MEQ: 10 TABLET, EXTENDED RELEASE ORAL at 08:11

## 2021-12-15 RX ADMIN — ALLOPURINOL 100 MG: 100 TABLET ORAL at 08:34

## 2021-12-15 RX ADMIN — SENNOSIDES 17.2 MG: 8.6 TABLET, FILM COATED ORAL at 08:33

## 2021-12-15 ASSESSMENT — PAIN SCALES - GENERAL
PAINLEVEL_OUTOF10: 6
PAINLEVEL_OUTOF10: 3

## 2021-12-15 ASSESSMENT — PAIN DESCRIPTION - ORIENTATION: ORIENTATION: RIGHT

## 2021-12-15 ASSESSMENT — PAIN DESCRIPTION - LOCATION: LOCATION: FLANK

## 2021-12-15 NOTE — PROGRESS NOTES
Physical Therapy  Facility/Department: [facility] MED SURG UNIT  Daily Treatment Note  NAME: Olga Steve  :   MRN: [id number]    Date of Service: 12/15/2021    Discharge Recommendations:  (P) 24 hour supervision or assist, Subacute/Skilled Nursing Facility        Assessment Pt is cooperative with interventions this AM, able and willing to follow cues/instructions. Pt demo ability to ambulate further distances into benson- short, stiff legged step pattern, unable to correct with cues, denies pain in LEs, does report \"weakness\". Pt returned in recliner at end of session, LEs elevated, ice pack issued to assist with decreasing pain in R lateral rib area: 7/10 pain. REQUIRES PT FOLLOW UP: (P) Yes  Activity Tolerance: (P) Patient limited by endurance; Patient limited by cognitive status     Patient Diagnosis(es): There were no encounter diagnoses. has a past medical history of Bradycardia, CAD (coronary artery disease), Cancer ([facility] CHF (congestive heart failure) ([facility] Depression, HSV-2 (herpes simplex virus 2) infection, Hyperlipidemia, Hypertension, Left knee DJD, Osteoarthritis, Rheumatoid arthritis(714.0), and Type 2 diabetes mellitus without complication, without long-term current use of insulin ([facility] has a past surgical history that includes Appendectomy; Tonsillectomy and adenoidectomy; Colonoscopy (11); arthrodesis (Right, 10/31/2016); Ankle surgery (Right); pr colon ca scrn not hi rsk ind (N/A, 2017); Cardiac catheterization; fracture surgery; joint replacement (Bilateral); shoulder surgery (Left); and HARDWARE REMOVAL FOOT / ANKLE (Right, 2017).     Restrictions  Restrictions/Precautions  Restrictions/Precautions: (P) Fall Risk  Required Braces or Orthoses?: (P) No  Implants present? : (P) Metal implants  Subjective   General  Chart Reviewed: (P) Yes  Additional Pertinent Hx: (P) Used SPC prior to admission  Response To Previous Treatment: (P) Patient with no complaints from previous session. Family / Caregiver Present: (P) No  Referring Practitioner: Tammy Thurston  Subjective: (P) Pt seated in recliner this morning, awake and alert. Comments: (P) Pt is agreeable to interventions. Pain Screening  Patient Currently in Pain: (P) Denies  Pre Treatment Pain Screening  Pain at present: (P) 7    Orientation     Cognition      Objective      Transfers  Sit to Stand: (P) Minimal Assistance; Moderate Assistance  Stand to sit: (P) Contact guard assistance  Stand Pivot Transfers: (P) Contact guard assistance  Comment: (P) cues for correct form/technique- Mod/Min A x 2 to stand from w/c, Min A to stand from recliner   Ambulation  Ambulation?: (P) Yes  Ambulation 1  Surface: (P) level tile  Device: (P) Rolling Walker  Assistance: (P) Contact guard assistance  Quality of Gait: (P) upright posture, slow pace, short steps  Gait Deviations: (P) Slow Gretchen; Decreased step length; Decreased step height; Decreased head and trunk rotation  Distance: (P) 65' x 2  Comments: (P) seated rest in between. Stairs/Curb  Stairs?: (P) No     Balance  Posture: (P) Good  Sitting - Static: (P) Good  Sitting - Dynamic: (P) Fair; +  Standing - Static: (P) Fair; +  Standing - Dynamic: (P) Fair  Exercises  Hip Flexion: (P) x 10  Hip Abduction: (P) x 10  Knee Long Arc Quad: (P) x 10  Ankle Pumps: (P) x 20- limited on R d/t fused ankle  Comments: (P) ther ex performed in recliner, visual demo and v/c's for correct direction of movement.              Cryotherapy (Minutes\Location): (P) ice pack R lateral rib area post tx session           G-Code     OutComes Score                                                     AM-PAC Score             Goals  Short term goals  Time Frame for Short term goals: (P) 3-5 days  Short term goal 1: (P) mod assist +1 rolling  Short term goal 2: (P) max assist +1 supine to sit  Short term goal 3: (P) Stand at bedside with with assist of 2 for 10 min  Short term goal 4: (P) Particpate in LE ex to support ambulation  Long term goals  Time Frame for Long term goals : (P) 5-15 days  Long term goal 1: (P) Amb with ww x50', indep  Long term goal 2: (P) Able to get out flat  bed on right with ww  Long term goal 3: (P) Be able to use commode with ww, indep  Long term goal 4: (P) Perform at least one step  Long term goal 5: (P) Written HEP  Patient Goals   Patient goals : (P) Return home to his dog    Plan    Plan  Times per week: (P) 5-7  Times per day:  (1-2)  Plan weeks: (P) 2  Current Treatment Recommendations: (P) Transfer Training, Cognitive Reorientation, Pain Management, Gait Training, Home Exercise Program, Safety Education & Training  Safety Devices  Type of devices: (P) All fall risk precautions in place, Chair alarm in place, Call light within reach  Restraints  Initially in place: (P) No     Therapy Time   Individual Concurrent Group Co-treatment   Time In  11:10 am         Time Out  12:00 pm         Minutes  [address], PTA  License and Documentation Cosign  Therapy License Number: [id number]

## 2021-12-15 NOTE — PROGRESS NOTES
Wound Ostomy Continence Nurse  Consult Note       NAME:  Charles Miller RECORD NUMBER:  [id number]  AGE: 68 y.o. GENDER: male  : 1945  TODAY'S DATE:  12/15/2021    Subjective   Reason for [id number] Nurse Evaluation and Assessment: Fungal rash buttocks & gluteal cleft      Ahmet Fernandez is a 68 y.o. male referred by:   [x] Physician  [] Nursing  [] Other:     Wound Identification:  Wound Type: Moisture associated  Contributing Factors: diabetes, decreased mobility and obesity    Wound History: Patient admitted to [facility] with fungal rash to the buttocks and ankit cleft areas.   Current Wound Care Treatment:  Recommending ET mix with nystatin, topical TID    Patient Goal of Care:  [x] Wound Healing  [] Odor Control  [] Palliative Care  [] Pain Control   [] Other:         PAST MEDICAL HISTORY        Diagnosis Date    Bradycardia     CAD (coronary artery disease)     Cancer (HCC)     prostate- radiation     CHF (congestive heart failure) (HCC)     Depression     HSV-2 (herpes simplex virus 2) infection     Hyperlipidemia     Hypertension     Left knee DJD     Osteoarthritis     Rheumatoid arthritis(714.0)     Type 2 diabetes mellitus without complication, without long-term current use of insulin ([facility] 1/10/2019       PAST SURGICAL HISTORY    Past Surgical History:   Procedure Laterality Date    ANKLE SURGERY Right     pin    APPENDECTOMY      ARTHRODESIS Right 10/31/2016    RIGHT ANKLE ARTHRODESIS OF RIGHT ANKLE AND SUBTALAR JOINT, C-ARM, ABHIJEET EQUIPMENT SYNTHETIC BONE GRAFT, ALLOGRAFT CANCELLOUS, SHARON ANKLE FUSION NAIL, SHANDA IMPLANT BONE STIMULATOR, CHOICE ANESTHESIA, BLOCK  performed by Dangelo Klein MD at [place] [address]      stent x [address] [place] [address]  7-19-11    FRACTURE SURGERY      right ankle    HARDWARE REMOVAL FOOT / ANKLE Right 2017    RIGHT ANKLE HARDWARE REMOVAL performed by Ki Haynes MD at [address] [place] [address] needed for Pain      pantoprazole (PROTONIX) 40 MG tablet Take 40 mg by mouth daily       aspirin 81 MG tablet Take 81 mg by mouth daily      nitroGLYCERIN (NITROSTAT) 0.4 MG SL tablet Place 1 tablet under the tongue every 5 minutes as needed for Chest pain.  25 tablet 1    rosuvastatin (CRESTOR) 40 MG tablet       prednisoLONE acetate (PRED FORTE) 1 % ophthalmic suspension       meloxicam (MOBIC) 15 MG tablet Take 15 mg by mouth daily         Objective    BP (!) 160/78   Pulse 65   Temp 97.8 °F (36.6 °C) (Oral)   Resp 20   Ht 5' 11\" (1.803 m)   Wt 290 lb 9.6 oz (131.8 kg)   SpO2 97%   BMI 40.53 kg/m²     LABS:  WBC:    Lab Results   Component Value Date    WBC 9.1 12/15/2021     H/H:    Lab Results   Component Value Date    HGB 15.9 12/15/2021    HCT 46.2 12/15/2021     PTT:    Lab Results   Component Value Date    APTT 19.8 02/26/2016    PTT 26.1 09/24/2012   [APTT}  PT/INR:    Lab Results   Component Value Date    PROTIME 10.1 02/26/2016    INR 0.9 02/26/2016     HgBA1c:    Lab Results   Component Value Date    LABA1C 6.5 03/16/2020       Assessment   Hima Risk Score: Hima Scale Score: 18    Patient Active Problem List   Diagnosis    Bradycardia    Mixed hyperlipidemia    Primary osteoarthritis involving multiple joints    Dysthymia    CAD (coronary artery disease)    RA [facility]oid arthritis) [facility]    Essential hypertension    Numbness in feet    Stented coronary artery    History of prostate cancer    History of colon polyps    Nuclear sclerosis of both eyes    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, left eye    Presbyopia    Regular astigmatism    Hyperuricemia    Chronic gastritis without bleeding    Type 2 diabetes mellitus with microalbuminuria, without long-term current use of insulin ([facility])    Retinal hemorrhage, bilateral    Intermediate stage nonexudative age-related macular degeneration of both eyes    Primary osteoarthritis, right ankle and foot  Pain in right ankle    Encounter for other orthopedic aftercare    Arthrodesis status    Unable to ambulate    PAF (paroxysmal atrial fibrillation)     CHF (congestive heart failure), NYHA class I, acute on chronic, combined (HCC)       Assessment:    Bilateral buttocks and  cleft areas are bright red, blanchable with diffuse satellite lesion noted at the border of the rash. Patient also noted to have strong fungal-like odor. Plan   Plan of Care:   See above    Specialty Bed Required : N/A   [] Low Air Loss   [] Pressure Redistribution  [] Fluid Immersion  [] Bariatric  [] Other:     Current Diet: ADULT DIET;  Regular  Dietician consult:  Yes    Discharge Plan:  Placement for patient upon discharge: TBD    Patient appropriate for Outpatient [address]: N/A    Referrals:  []   []  [facility]  [] Supplies  [] Other    Patient/Caregiver Teaching:  Level of patient/caregiver understanding able to:   [] Indicates understanding       [] Needs reinforcement  [] Unsuccessful      [] Verbal Understanding  [] Demonstrated understanding       [] No evidence of learning  [] Refused teaching         [x] N/A       Electronically signed by BRII Vazquez, RN, Anca Shepard on 12/15/2021 at 1:59 PM

## 2021-12-15 NOTE — PROGRESS NOTES
Occupational Therapy  Facility/Department: [facility] MED SURG UNIT  Daily Treatment Note  NAME: Luis Barraza  :   MRN: [id number]    Date of Service: 12/15/2021    Discharge Recommendations:  24 hour supervision or assist (slow SNF for therapy)       Assessment   Performance deficits / Impairments: Decreased functional mobility ; Decreased ADL status; Decreased strength; Decreased safe awareness; Decreased cognition; Decreased endurance; Decreased balance; Decreased high-level IADLs; Decreased coordination  Assessment: Pt tolerated BUE ther ex with 1.5# dowel with RBs. Pt stated he was only having \"mild\" pain today. Pt requires cues for instructions d/t dec'd cog. Treatment Diagnosis: Generalized weakness, acute on chronic CHF, unspecified heart failure type, s/p fall  Prognosis: Fair  REQUIRES OT FOLLOW UP: Yes         Patient Diagnosis(es): There were no encounter diagnoses. has a past medical history of Bradycardia, CAD (coronary artery disease), Cancer ([facility] CHF (congestive heart failure) ([facility] [address] [facility] Depression, HSV-2 (herpes simplex virus 2) infection, Hyperlipidemia, Hypertension, Left knee DJD, Osteoarthritis, Rheumatoid arthritis(714.0), and Type 2 diabetes mellitus without complication, without long-term current use of insulin ([facility] has a past surgical history that includes Appendectomy; Tonsillectomy and adenoidectomy; Colonoscopy (11); arthrodesis (Right, 10/31/2016); Ankle surgery (Right); pr colon ca scrn not hi rsk ind (N/A, 2017); Cardiac catheterization; fracture surgery; joint replacement (Bilateral); shoulder surgery (Left); and HARDWARE REMOVAL FOOT / ANKLE (Right, 2017).     Restrictions  Restrictions/Precautions  Restrictions/Precautions: Fall Risk  Required Braces or Orthoses?: No  Implants present? : Metal implants  Subjective   General  Chart Reviewed: Yes  Patient assessed for rehabilitation services?: Yes  Additional Pertinent Hx: Lost balance and fell down the steps at home ~2 days ago- pt reports he fell down about 6-8 steps. Injured R rib cage- contusions per pt and R hand- both negative for fx's  Family / Caregiver Present: No  Referring Practitioner: Dr. Arian Spencer  Diagnosis: Generalized weakness, acute on chronic CHF, unspecified heart failure type, s/p fall  Subjective  Subjective: Pt agreeable to OT  Pain Assessment  Pain Assessment: 0-10  Pain Level: 3  Pain Location: Flank  Pain Orientation: Right  Vital Signs  Patient Currently in Pain: Yes      Objective       Exercises  Shoulder Flexion: 1x15 reps BUE with 1.5# dowel for raises, forward rows, and backward rows  Horizontal ABduction: 1x15 reps BUE with 1.5# dowel  Horizontal ADduction: 1x15 reps BUE with 1.5# dowel  Elbow Flexion: 1x15 reps BUE with 1.5# dowel  Elbow Extension: 1x15 reps BUE with 1.5# dowel for chest presses  Wrist Flexion: 1x15 reps BUE with 1.5# dowel  Wrist Extension: 1x15 reps BUE with 1.5# dowel  Other: to inc strength/end for ADL (RB between each set)                    Plan   Plan  Times per week: 4-7x/wk  Times per day: Daily  Plan weeks: <1- med pt  Current Treatment Recommendations: Strengthening, ROM, Balance Training, Functional Mobility Training, Endurance Training, Stair training, Pain Management, Safety Education & Training, Patient/Caregiver Education & Training, Self-Care / ADL, Home Management Training          Goals  Short term goals  Time Frame for Short term goals: 3-5 days- med pt  Short term goal 1: Tolerate 25-30min BUE ther ex/act to inc strength/end for ADL  Short term goal 2: Inc to [employer] person using least restrictive AD for fxl ADL xfers  Short term goal 3: Inc to [employer] for UB bathing and dressing  Short term goal 4: Inc to [employer] and dressing  Long term goals  Time Frame for Long term goals : Same as STGs  Long term goal 1: Same as STGs  Long term goal 2: Same as STGs  Patient Goals   Patient goals :  To get better       Therapy Time   Individual Concurrent Group Co-treatment   Time In  8:30         Time Out  9:00         Minutes  [address], [place]

## 2021-12-15 NOTE — PROGRESS NOTES
This  was informed by Jeremi Edward (R) in admissions at [facility] that facility is able to meet patient's needs and precert will be started today. This  updated patient's son/DL Capone Janice Soulier thanked this  for update and reports that he is still agreeable with patient going to [facility] for therapies upon DC from [facility]. This  completed [id number] in [address] along with Emma Pete in [address] This  also faxed Yolanda copies of patient's [address] Ashwin Berry reports plan to contact [facility] once precert is obtained. SS to continue to follow and assist with smooth transition to SNF.

## 2021-12-15 NOTE — PROGRESS NOTES
Discharge Summary    Patient:  Celestino Gracia  YOB: 1945    MRN: [id number]   Acct: [de-identified]    Primary Care Physician: Delisa Lloyd MD    Admit date:  12/10/2021    Discharge date:   12/15/21      Discharge Diagnoses:   <principal problem not specified>  Active Problems:    CHF (congestive heart failure), NYHA class I, acute on chronic, combined ([address]  Resolved Problems:    * No resolved hospital problems. *      Admitted for: [facility] Course: patient was admitted after mechanical fall, CHF exacerbation, non sustain A fib onset. Was diuresed, evaluated by cardiology and full anticoag were initiated. Due to cognitive deficit he was evaluated by neurology service. carrotid US/brain MRI and EEG were performed. After completing his acute stay he will be DC to SNF for further management.  Per his son report patient takes alcohol daily     Consultants:  Cardio/neurology    Discharge Medications:       Medication List      START taking these medications    apixaban 5 MG Tabs tablet  Commonly known as: ELIQUIS  Take 1 tablet by mouth 2 times daily     losartan 25 MG tablet  Commonly known as: COZAAR  Take 1 tablet by mouth daily  Start taking on: December 16, 2021     metoprolol succinate 25 MG extended release tablet  Commonly known as: TOPROL XL  Take 1 tablet by mouth daily  Start taking on: December 16, 2021     torsemide 20 MG tablet  Commonly known as: DEMADEX  Take 1 tablet by mouth daily  Start taking on: December 16, 2021        CHANGE how you take these medications    amLODIPine 10 MG tablet  Commonly known as: NORVASC  Take 1 tablet by mouth daily  What changed:   · medication strength  · how much to take     potassium chloride 20 MEQ extended release tablet  Commonly known as: KLOR-CON M  Take 2 tablets by mouth daily (with breakfast)  What changed:   · how much to take  · how to take this  · when to take this     rosuvastatin 40 MG tablet  Commonly known as: AUGUSTIN  What changed: Another medication with the same name was removed. Continue taking this medication, and follow the directions you see here. tamsulosin 0.4 MG capsule  Commonly known as: Flomax  Take 1 capsule by mouth daily  What changed: Another medication with the same name was removed. Continue taking this medication, and follow the directions you see here. CONTINUE taking these medications    acetaminophen 500 MG tablet  Commonly known as: TYLENOL     allopurinol 100 MG tablet  Commonly known as: ZYLOPRIM     aspirin 81 MG tablet     Enbrel 25 MG/0.5ML Sosy  Generic drug: etanercept     folic acid 1 MG tablet  Commonly known as: FOLVITE     hydrALAZINE 50 MG tablet  Commonly known as: APRESOLINE     ketorolac 0.4 % Soln ophthalmic solution     meloxicam 15 MG tablet  Commonly known as: MOBIC     nitroGLYCERIN 0.4 MG SL tablet  Commonly known as: NITROSTAT  Place 1 tablet under the tongue every 5 minutes as needed for Chest pain.      pantoprazole 40 MG tablet  Commonly known as: PROTONIX     prednisoLONE acetate 1 % ophthalmic suspension  Commonly known as: PRED FORTE        STOP taking these medications    furosemide 20 MG tablet  Commonly known as: LASIX     sildenafil 100 MG tablet  Commonly known as: Viagra     sotalol 120 MG tablet  Commonly known as: BETAPACE           Where to Get Your Medications      These medications were sent to [facility]/pharmacy #[id number] [place] - [address] [address]    Phone: [phone]   · amLODIPine 10 MG tablet  · apixaban 5 MG Tabs tablet  · losartan 25 MG tablet  · metoprolol succinate 25 MG extended release tablet  · potassium chloride 20 MEQ extended release tablet  · torsemide 20 MG tablet         Physical Exam:    Vitals:  Vitals:    12/14/21 0412 12/14/21 1939 12/14/21 1940 12/15/21 0624   BP: (!) 163/81 114/60  (!) 160/78   Pulse: 69 66 52 65   Resp: 20 18  20   Temp: 98.4 °F (36.9 °C) 97.3 °F (36.3 °C) 97.8 °F (36.6 °C)   TempSrc: Oral   Oral   SpO2: 96% 95% 95% 97%   Weight:       Height:         Weight: Weight: 290 lb 9.6 oz (131.8 kg)     24 hour intake/output:    Intake/Output Summary (Last 24 hours) at 12/15/2021 1498  Last data filed at 12/15/2021 0558  Gross per 24 hour   Intake --   Output 1700 ml   Net -1700 ml       General appearance - alert, well appearing, and in no distress  Chest - clear to auscultation, no wheezes, rales or rhonchi, symmetric air entry  Heart - normal rate, regular rhythm, normal S1, S2, no murmurs, rubs, clicks or gallops  Abdomen - soft, nontender, nondistended, no masses or organomegaly  Obese: Yes; Protuberant: Yes   Neurological - partially alert, oriented, normal speech, no focal findings or movement disorder noted  Extremities - edema  Skin - normal coloration and turgor, no rashes, no suspicious skin lesions noted    Procedures:      Diagnostic Test:      Radiology reports as per the Radiologist  Radiology: MRI BRAIN WO CONTRAST    Result Date: 12/14/2021  EXAMINATION: MRI BRAIN WO CONTRAST CLINICAL HISTORY:  cognitive decline COMPARISONS: NONE AVAILABLE TECHNIQUE: Multiplanar multisequence images of the brain were obtained without contrast. Diffusion perfusion imaging was obtained. BRAIN MRI FINDINGS:  There are no extra-axial collections. There is no evidence of hemorrhage. There are no areas of perfusion diffusion signal abnormality to suggest ischemia. The susceptibility images do not demonstrate evidence of hemosiderin deposition within the brain parenchyma or the leptomeninges. There is preservation of the gray-white matter differentiation. There are a few scattered foci of T2/FLAIR increased signal in the subcortical and periventricular white matter without associated edema or mass effect. There is prominence of the sulci and ventricles consistent with moderate global cerebral atrophy and chronic involutional changes.  The midline structures are intact, the corpus callosum is within normal limits. The region of the pineal gland and the sella turcica are unremarkable. There are no space-occupying lesions in the posterior fossa. The basilar cisterns are patent. The craniocervical junction is unremarkable. The visualized portions of the orbits are within normal limits, the globes are intact. The visualized portions of the paranasal sinuses are within normal limits. The calvarium and soft tissues are unremarkable. There are no acute intracranial changes, there is no evidence of hemorrhage or acute ischemia.        Results for orders placed or performed during the hospital encounter of 12/10/21   Comprehensive Metabolic Panel w/ Reflex to MG   Result Value Ref Range    Sodium 135 135 - 144 mEq/L    Potassium reflex Magnesium 3.2 (L) 3.4 - 4.9 mEq/L    Chloride 96 95 - 107 mEq/L    CO2 26 20 - 31 mEq/L    Anion Gap 13 9 - 15 mEq/L    Glucose 122 (H) 70 - 99 mg/dL    BUN 22 8 - 23 mg/dL    CREATININE 0.85 0.70 - 1.20 mg/dL    GFR Non-African American >60.0 >60    GFR  >60.0 >60    Calcium 8.7 8.5 - 9.9 mg/dL    Total Protein 5.9 (L) 6.3 - 8.0 g/dL    Albumin 3.5 3.5 - 4.6 g/dL    Total Bilirubin 2.0 (H) 0.2 - 0.7 mg/dL    Alkaline Phosphatase 83 35 - 104 U/L    ALT 21 0 - 41 U/L    AST 22 0 - 40 U/L    Globulin 2.4 2.3 - 3.5 g/dL   CBC   Result Value Ref Range    WBC 9.6 (H) 4.2 - 9.0 K/uL    RBC 4.96 4.63 - 6.08 M/uL    Hemoglobin 16.2 13.7 - 17.5 g/dL    Hematocrit 47.4 42.0 - 52.0 %    MCV 95.6 (H) 79.0 - 92.2 fL    MCH 32.7 (H) 25.7 - 32.2 pg    MCHC 34.2 32.3 - 36.5 %    RDW 14.3 11.6 - 14.4 %    Platelets 420 020 - 154 K/uL   Troponin   Result Value Ref Range    Troponin 0.035 (HH) 0.000 - 0.010 ng/mL   Troponin   Result Value Ref Range    Troponin 0.033 (HH) 0.000 - 0.010 ng/mL   Troponin   Result Value Ref Range    Troponin 0.041 () 0.000 - 0.010 ng/mL   Magnesium   Result Value Ref Range    Magnesium 1.7 1.7 - 2.4 mg/dL   Troponin Result Value Ref Range    Troponin 0.046 (HH) 0.000 - 0.010 ng/mL   CBC Auto Differential   Result Value Ref Range    WBC 9.7 (H) 4.2 - 9.0 K/uL    RBC 4.91 4.63 - 6.08 M/uL    Hemoglobin 16.0 13.7 - 17.5 g/dL    Hematocrit 47.1 42.0 - 52.0 %    MCV 95.9 (H) 79.0 - 92.2 fL    MCH 32.6 (H) 25.7 - 32.2 pg    MCHC 34.0 32.3 - 36.5 %    RDW 14.2 11.6 - 14.4 %    Platelets 818 683 - 306 K/uL    Neutrophils % 73.9 (H) 34.0 - 67.9 %    Immature Granulocytes % 0.4 %    Lymphocytes % 10.7 %    Monocytes % 11.6 5.3 - 12.2 %    Eosinophils % 3.0 0.8 - 7.0 %    Basophils % 0.4 0.2 - 1.2 %    Neutrophils Absolute 7.2 (H) 1.8 - 5.4 K/uL    Immature Granulocytes # 0.0 K/uL    Lymphocytes Absolute 1.0 (L) 1.3 - 3.6 K/uL    Monocytes Absolute 1.1 (H) 0.3 - 0.8 K/uL    Eosinophils Absolute 0.3 0.0 - 0.5 K/uL    Basophils Absolute 0.0 0.0 - 0.1 K/uL   Brain Natriuretic Peptide   Result Value Ref Range    Pro-BNP 1,823 pg/mL   Comprehensive Metabolic Panel   Result Value Ref Range    Sodium 133 (L) 135 - 144 mEq/L    Potassium 3.9 3.4 - 4.9 mEq/L    Chloride 93 (L) 95 - 107 mEq/L    CO2 22 20 - 31 mEq/L    Anion Gap 18 (H) 9 - 15 mEq/L    Glucose 105 (H) 70 - 99 mg/dL    BUN 31 (H) 8 - 23 mg/dL    CREATININE 1.25 (H) 0.70 - 1.20 mg/dL    GFR Non-African American 56.1 (L) >60    GFR  >60.0 >60    Calcium 8.4 (L) 8.5 - 9.9 mg/dL    Total Protein 6.0 (L) 6.3 - 8.0 g/dL    Albumin 3.2 (L) 3.5 - 4.6 g/dL    Total Bilirubin 1.5 (H) 0.2 - 0.7 mg/dL    Alkaline Phosphatase 83 35 - 104 U/L    ALT 18 0 - 41 U/L    AST 22 0 - 40 U/L    Globulin 2.8 2.3 - 3.5 g/dL   Magnesium   Result Value Ref Range    Magnesium 1.8 1.7 - 2.4 mg/dL   Comprehensive Metabolic Panel   Result Value Ref Range    Sodium 136 135 - 144 mEq/L    Potassium 3.9 3.4 - 4.9 mEq/L    Chloride 95 95 - 107 mEq/L    CO2 26 20 - 31 mEq/L    Anion Gap 15 9 - 15 mEq/L    Glucose 117 (H) 70 - 99 mg/dL    BUN 32 (H) 8 - 23 mg/dL    CREATININE 1.08 0.70 - 1.20 mg/dL GFR Non- >60.0 >60    GFR  >60.0 >60    Calcium 8.5 8.5 - 9.9 mg/dL    Total Protein 6.1 (L) 6.3 - 8.0 g/dL    Albumin 3.3 (L) 3.5 - 4.6 g/dL    Total Bilirubin 1.5 (H) 0.2 - 0.7 mg/dL    Alkaline Phosphatase 84 35 - 104 U/L    ALT 23 0 - 41 U/L    AST 36 0 - 40 U/L    Globulin 2.8 2.3 - 3.5 g/dL   Magnesium   Result Value Ref Range    Magnesium 1.8 1.7 - 2.4 mg/dL   CBC Auto Differential   Result Value Ref Range    WBC 11.0 (H) 4.2 - 9.0 K/uL    RBC 4.78 4.63 - 6.08 M/uL    Hemoglobin 15.7 13.7 - 17.5 g/dL    Hematocrit 45.6 42.0 - 52.0 %    MCV 95.4 (H) 79.0 - 92.2 fL    MCH 32.8 (H) 25.7 - 32.2 pg    MCHC 34.4 32.3 - 36.5 %    RDW 14.0 11.6 - 14.4 %    Platelets 566 117 - 516 K/uL    Neutrophils % 75.7 (H) 34.0 - 67.9 %    Immature Granulocytes % 0.5 %    Lymphocytes % 9.7 %    Monocytes % 11.2 5.3 - 12.2 %    Eosinophils % 2.6 0.8 - 7.0 %    Basophils % 0.3 0.2 - 1.2 %    Neutrophils Absolute 8.4 (H) 1.8 - 5.4 K/uL    Immature Granulocytes # 0.1 K/uL    Lymphocytes Absolute 1.1 (L) 1.3 - 3.6 K/uL    Monocytes Absolute 1.2 (H) 0.3 - 0.8 K/uL    Eosinophils Absolute 0.3 0.0 - 0.5 K/uL    Basophils Absolute 0.0 0.0 - 0.1 K/uL   TSH without Reflex   Result Value Ref Range    TSH 1.700 0.440 - 3.860 uIU/mL   Comprehensive Metabolic Panel   Result Value Ref Range    Sodium 135 135 - 144 mEq/L    Potassium 3.7 3.4 - 4.9 mEq/L    Chloride 95 95 - 107 mEq/L    CO2 25 20 - 31 mEq/L    Anion Gap 15 9 - 15 mEq/L    Glucose 122 (H) 70 - 99 mg/dL    BUN 30 (H) 8 - 23 mg/dL    CREATININE 0.92 0.70 - 1.20 mg/dL    GFR Non-African American >60.0 >60    GFR  >60.0 >60    Calcium 8.6 8.5 - 9.9 mg/dL    Total Protein 6.1 (L) 6.3 - 8.0 g/dL    Albumin 3.1 (L) 3.5 - 4.6 g/dL    Total Bilirubin 1.2 (H) 0.2 - 0.7 mg/dL    Alkaline Phosphatase 84 35 - 104 U/L    ALT 37 0 - 41 U/L    AST 46 (H) 0 - 40 U/L    Globulin 3.0 2.3 - 3.5 g/dL   Magnesium   Result Value Ref Range    Magnesium 1.9 1.7 - 2.4 mg/dL   CBC Auto Differential   Result Value Ref Range    WBC 8.4 4.2 - 9.0 K/uL    RBC 4.74 4.63 - 6.08 M/uL    Hemoglobin 15.6 13.7 - 17.5 g/dL    Hematocrit 45.3 42.0 - 52.0 %    MCV 95.6 (H) 79.0 - 92.2 fL    MCH 32.9 (H) 25.7 - 32.2 pg    MCHC 34.4 32.3 - 36.5 %    RDW 14.1 11.6 - 14.4 %    Platelets 645 015 - 123 K/uL    Neutrophils % 74.4 (H) 34.0 - 67.9 %    Immature Granulocytes % 0.6 %    Lymphocytes % 9.9 %    Monocytes % 11.0 5.3 - 12.2 %    Eosinophils % 3.6 0.8 - 7.0 %    Basophils % 0.5 0.2 - 1.2 %    Neutrophils Absolute 6.3 (H) 1.8 - 5.4 K/uL    Immature Granulocytes # 0.1 K/uL    Lymphocytes Absolute 0.8 (L) 1.3 - 3.6 K/uL    Monocytes Absolute 0.9 (H) 0.3 - 0.8 K/uL    Eosinophils Absolute 0.3 0.0 - 0.5 K/uL    Basophils Absolute 0.0 0.0 - 0.1 K/uL   Brain Natriuretic Peptide   Result Value Ref Range    Pro-BNP 1,662 pg/mL   Sedimentation Rate   Result Value Ref Range    Sed Rate 26 (H) 0 - 20 mm   RPR Reflex to Titer and TPPA   Result Value Ref Range    RPR Non-reactive Non-reactive   Ammonia   Result Value Ref Range    Ammonia 27 16 - 60 umol/L   T4, Free   Result Value Ref Range    T4 Free 1.79 (H) 0.84 - 1.68 ng/dL   Comprehensive Metabolic Panel   Result Value Ref Range    Sodium 135 135 - 144 mEq/L    Potassium 4.1 3.4 - 4.9 mEq/L    Chloride 95 95 - 107 mEq/L    CO2 25 20 - 31 mEq/L    Anion Gap 15 9 - 15 mEq/L    Glucose 113 (H) 70 - 99 mg/dL    BUN 29 (H) 8 - 23 mg/dL    CREATININE 0.92 0.70 - 1.20 mg/dL    GFR Non-African American >60.0 >60    GFR  >60.0 >60    Calcium 8.7 8.5 - 9.9 mg/dL    Total Protein 6.5 6.3 - 8.0 g/dL    Albumin 3.1 (L) 3.5 - 4.6 g/dL    Total Bilirubin 1.0 (H) 0.2 - 0.7 mg/dL    Alkaline Phosphatase 83 35 - 104 U/L    ALT 60 (H) 0 - 41 U/L    AST 66 (H) 0 - 40 U/L    Globulin 3.4 2.3 - 3.5 g/dL   Magnesium   Result Value Ref Range    Magnesium 2.1 1.7 - 2.4 mg/dL   CBC Auto Differential   Result Value Ref Range    WBC 9.1 (H) 4.2 - 9.0 K/uL    RBC 4.80 4.63 - 6.08 M/uL    Hemoglobin 15.9 13.7 - 17.5 g/dL    Hematocrit 46.2 42.0 - 52.0 %    MCV 96.3 (H) 79.0 - 92.2 fL    MCH 33.1 (H) 25.7 - 32.2 pg    MCHC 34.4 32.3 - 36.5 %    RDW 14.1 11.6 - 14.4 %    Platelets 024 082 - 828 K/uL    Neutrophils % 74.1 (H) 34.0 - 67.9 %    Immature Granulocytes % 0.3 %    Lymphocytes % 10.6 %    Monocytes % 10.9 5.3 - 12.2 %    Eosinophils % 3.5 0.8 - 7.0 %    Basophils % 0.6 0.2 - 1.2 %    Neutrophils Absolute 6.7 (H) 1.8 - 5.4 K/uL    Immature Granulocytes # 0.0 K/uL    Lymphocytes Absolute 1.0 (L) 1.3 - 3.6 K/uL    Monocytes Absolute 1.0 (H) 0.3 - 0.8 K/uL    Eosinophils Absolute 0.3 0.0 - 0.5 K/uL    Basophils Absolute 0.1 0.0 - 0.1 K/uL   POCT Glucose   Result Value Ref Range    POC Glucose 119 (H) 60 - 115 mg/dl    Performed on ACCU-CHEK    POCT Glucose   Result Value Ref Range    POC Glucose 166 (H) 60 - 115 mg/dl    Performed on ACCU-CHEK    POCT Glucose   Result Value Ref Range    POC Glucose 127 (H) 60 - 115 mg/dl    Performed on ACCU-CHEK    POCT Glucose   Result Value Ref Range    POC Glucose 175 (H) 60 - 115 mg/dl    Performed on ACCU-CHEK    POCT Glucose   Result Value Ref Range    POC Glucose 121 (H) 60 - 115 mg/dl    Performed on ACCU-CHEK    POCT Glucose   Result Value Ref Range    POC Glucose 143 (H) 60 - 115 mg/dl    Performed on ACCU-CHEK    POCT Glucose   Result Value Ref Range    POC Glucose 151 (H) 60 - 115 mg/dl    Performed on ACCU-CHEK    POCT Glucose   Result Value Ref Range    POC Glucose 173 (H) 60 - 115 mg/dl    Performed on ACCU-CHEK    POCT Glucose   Result Value Ref Range    POC Glucose 124 (H) 60 - 115 mg/dl    Performed on ACCU-CHEK    POCT Glucose   Result Value Ref Range    POC Glucose 127 (H) 60 - 115 mg/dl    Performed on ACCU-CHEK    POCT Glucose   Result Value Ref Range    POC Glucose 132 (H) 60 - 115 mg/dl    Performed on ACCU-CHEK    POCT Glucose   Result Value Ref Range    POC Glucose 174 (H) 60 - 115 mg/dl    Performed on ACCU-CHEK    POCT Glucose   Result Value Ref Range    POC Glucose 208 (H) 60 - 115 mg/dl    Performed on ACCU-CHEK    POCT Glucose   Result Value Ref Range    POC Glucose 123 (H) 60 - 115 mg/dl    Performed on ACCU-CHEK    POCT Glucose   Result Value Ref Range    POC Glucose 131 (H) 60 - 115 mg/dl    Performed on ACCU-CHEK    POCT Glucose   Result Value Ref Range    POC Glucose 179 (H) 60 - 115 mg/dl    Performed on ACCU-CHEK    POCT Glucose   Result Value Ref Range    POC Glucose 175 (H) 60 - 115 mg/dl    Performed on ACCU-CHEK    POCT Glucose   Result Value Ref Range    POC Glucose 112 60 - 115 mg/dl    Performed on ACCU-CHEK        Diet:  ADULT DIET;  Regular    Activity:  Activity as tolerated (Patient may move about with assist as indicated or with supervision.)    Follow-up:  in 1 weeks with Rick Liao MD,  *    Disposition: SNF    Condition: Stable    Time Spent: 50 minutes    Electronically signed by Suzette Pearce MD on 12/15/2021 at 8:38 AM    Discharging Hospitalist

## 2021-12-15 NOTE — DISCHARGE INSTR - COC
Continuity of Care Form    Patient Name: Sara Nolan   :    MRN:  [id number]    Admit date:  12/10/2021  Discharge date:  ***    Code Status Order: Full Code   Advance Directives:      Admitting Physician:  Laura Teague MD  PCP: Harvy Duane, MD    Discharging Nurse: [facility] Unit/Room#: 0208/0208-01  Discharging Unit Phone Number: ***    Emergency Contact:   Extended Emergency Contact Information  Primary Emergency Contact: Madeleine Gant  Mobile Phone: [phone]  Relation: Child  Secondary Emergency Contact: Wayman Cheadle  Mobile Phone: [phone]  Relation: None    Past Surgical History:  Past Surgical History:   Procedure Laterality Date    ANKLE SURGERY Right     pin    APPENDECTOMY      ARTHRODESIS Right 10/31/2016    RIGHT ANKLE ARTHRODESIS OF RIGHT ANKLE AND SUBTALAR JOINT, C-ARM, ABHIJEET EQUIPMENT SYNTHETIC BONE GRAFT, ALLOGRAFT CANCELLOUS, SHARON ANKLE FUSION NAIL, SHANDA IMPLANT BONE STIMULATOR, CHOICE ANESTHESIA, BLOCK  performed by Claudia Jennings MD at [place] [address]      stent x 5    COLONOSCOPY  11    FRACTURE SURGERY      right ankle    HARDWARE REMOVAL FOOT / ANKLE Right 2017    RIGHT ANKLE HARDWARE REMOVAL performed by Leonor Gorman MD at [address] [place] [address]. Bilateral     knees    IA COLON CA SCRN NOT  [address] [place] [address] N/A 2017    COLONOSCOPY performed by Vickey Santiago DO at [facility] Left     TONSILLECTOMY AND ADENOIDECTOMY         Immunization History:   Immunization History   Administered Date(s) Administered    Influenza Vaccine, unspecified formulation 2016    Influenza, High Dose (Fluzone 65 yrs and older) 2015, 10/20/2018    Influenza, Triv, 3 Years and older, IM, PF (Afluria 5yrs and older) 2016    Pneumococcal Conjugate 13-valent ([place]ake) 01/10/2017    Zoster Live (Zostavax) 2012, 2018, 2018    Zoster Recombinant (Shingrix) 2018, 2018 Active Problems:  Patient Active Problem List   Diagnosis Code    Bradycardia R00.1    Mixed hyperlipidemia E78.2    Primary osteoarthritis involving multiple joints M89.49    Dysthymia F34.1    CAD (coronary artery disease) I25.10    RA [facility]) [facility] M06.9    Essential hypertension I10    Numbness in feet R20.0    Stented coronary artery Z95.5    History of prostate cancer Z85.46    History of colon polyps Z86.010    Nuclear sclerosis of both eyes H25.13    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, 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    Presbyopia H52.4    Regular astigmatism H52.229    Hyperuricemia E79.0    Chronic gastritis without bleeding K29.50    Type 2 diabetes mellitus with microalbuminuria, without long-term current use of insulin (HCC) E11.29, R80.9    Retinal hemorrhage, bilateral H35.63    Intermediate stage nonexudative age-related macular degeneration of both eyes H35.3132    Primary osteoarthritis, right ankle and foot M19.071    Pain in right ankle M25.571    Encounter for other orthopedic aftercare Z47.89    Arthrodesis status Z98.1    Unable to ambulate R26.2    PAF (paroxysmal atrial fibrillation)  I48.0    CHF (congestive heart failure), NYHA class I, acute on chronic, combined ([facility] I50.43       Isolation/Infection:   Isolation            No Isolation          Patient Infection Status       Infection Onset Added Last Indicated Last Indicated By Review Planned Expiration Resolved Resolved By    None active    Resolved    COVID-19 21 COVID-19 Ambulatory   21     COVID-19 (Rule Out) 21 COVID-19 Ambulatory (Ordered)   21 Rule-Out Test Resulted            Nurse Assessment:  Last Vital Signs: BP (!) 160/78   Pulse 65   Temp 97.8 °F (36.6 °C) (Oral)   Resp 20   Ht 5' 11\" (1.803 m)   Wt 290 lb 9.6 oz (131.8 kg)   SpO2 97%   BMI 40.53 kg/m²     Last documented pain score (0-10 scale): Pain Level: 4  Last Weight:   Wt Readings from Last 1 Encounters:   12/10/21 290 lb 9.6 oz (131.8 kg)     Mental Status:  {IP PT MENTAL STATUS:}    IV Access:  [address] IV ACCESS:745084969}    Nursing Mobility/ADLs:  Walking   {CHP DME MJOA:568276002}  Transfer  {CHP DME HRCQ:422452772}  Bathing  {CHP DME KFAR:885409914}  Dressing  {CHP DME PYXE:925456285}  Toileting  {CHP DME TCAD:465757961}  Feeding  {CHP DME CDJA:536857473}  Med Admin  {CHP DME QFQK:563221957}  Med Delivery   {[facility] MED Delivery:060252218}    Wound Care Documentation and Therapy:        Elimination:  Continence:    Bowel: {YES / SQ:96473}  Bladder: {YES / BM:49491}  Urinary Catheter: {Urinary Catheter:345024578}   Colostomy/Ileostomy/Ileal Conduit: {YES / VM:38159}       Date of Last BM: ***    Intake/Output Summary (Last 24 hours) at 12/15/2021 0834  Last data filed at 12/15/2021 0558  Gross per 24 hour   Intake --   Output 1700 ml   Net -1700 ml     I/O last 3 completed shifts:  In: -   Out: 1700 [Urine:1700]    Safety Concerns:     [address] Safety Concerns:077263620}    Impairments/Disabilities:      [address] Impairments/Disabilities:939688140}    Nutrition Therapy:  Current Nutrition Therapy:   [address] Diet List:378582619}    Routes of Feeding: {CHP DME Other Feedings:050534828}  Liquids: {Slp liquid thickness:04038}  Daily Fluid Restriction: {CHP DME Yes amt example:058495232}  Last Modified Barium Swallow with Video (Video Swallowing Test): {Done Not Done XRMR:005162891}    Treatments at the Time of Hospital Discharge:   Respiratory Treatments: ***  Oxygen Therapy:  {Therapy; copd oxygen:43964}  Ventilator:    {[facility] Vent XKIB:130032189}    Rehab Therapies: {THERAPEUTIC INTERVENTION:7037431691}  Weight Bearing Status/Restrictions: [address]  Weight Bearin}  Other Medical Equipment (for information only, NOT a DME order):  {EQUIPMENT:146102283}  Other Treatments: ***    Patient's personal belongings (please select all that are sent with patient):  {P DME Belongings:017617777}    RN SIGNATURE:  {Esignature:834930873}    CASE MANAGEMENT/SOCIAL WORK SECTION    Inpatient Status Date: 208    Readmission Risk Assessment Score:  Readmission Risk              Risk of Unplanned Readmission:  14           Discharging to Facility/ Agency   Name: [facility]  Address: [address]  Phone: [phone]  Fax: [phone]      / signature: Electronically signed by BRADLEY Wong on 12/15/2021 at 9:55 AM      PHYSICIAN SECTION    Prognosis: Good    Condition at Discharge: Stable    Rehab Potential (if transferring to Rehab): Good    Recommended Labs or Other Treatments After Discharge:     Physician Certification: I certify the above information and transfer of Lorelee Paget  is necessary for the continuing treatment of the diagnosis listed and that he requires [place] for greater 30 days.      Update Admission H&P: No change in H&P    PHYSICIAN SIGNATURE:  Electronically signed by Luc Valenzuela MD on 12/15/21 at 8:34 AM EST

## 2021-12-15 NOTE — PROGRESS NOTES
This  followed up with Jeremi Edward (R) in admissions at [address] [place] [address]. Jeremi Edward (R) reports that precert has not yet been updated. Jeremi Edward (R) anticipates that precert should be obtained by tomorrow am and plans to follow up with [facility] RN House supervisor once precert obtained. This  contacted patient's son Eligio Bonner II via phone with update. Eligio Bonner reports that patient continues to present with confusion by evidence of patient informing another family member that he is no longer in the hospital but in a hospice unit- per Eligio Bonner II. Pedro UMAÑA thanks this  for update and confirming that patient is still at [facility] and waiting for insurance to approve admission to [address] [place] [address].   SS to continue to follow and assist with smooth transition to SNF

## 2021-12-16 LAB
ALBUMIN SERPL-MCNC: 3.4 G/DL (ref 3.5–4.6)
ALP BLD-CCNC: 86 U/L (ref 35–104)
ALT SERPL-CCNC: 65 U/L (ref 0–41)
ANION GAP SERPL CALCULATED.3IONS-SCNC: 17 MEQ/L (ref 9–15)
AST SERPL-CCNC: 57 U/L (ref 0–40)
BASOPHILS ABSOLUTE: 0.1 K/UL (ref 0–0.1)
BASOPHILS RELATIVE PERCENT: 0.6 % (ref 0.2–1.2)
BILIRUB SERPL-MCNC: 0.9 MG/DL (ref 0.2–0.7)
BUN BLDV-MCNC: 27 MG/DL (ref 8–23)
CALCIUM SERPL-MCNC: 8.9 MG/DL (ref 8.5–9.9)
CHLORIDE BLD-SCNC: 97 MEQ/L (ref 95–107)
CO2: 24 MEQ/L (ref 20–31)
CREAT SERPL-MCNC: 0.93 MG/DL (ref 0.7–1.2)
EOSINOPHILS ABSOLUTE: 0.3 K/UL (ref 0–0.5)
EOSINOPHILS RELATIVE PERCENT: 3.8 % (ref 0.8–7)
FOLATE: 8.6 NG/ML
GFR AFRICAN AMERICAN: >60
GFR NON-AFRICAN AMERICAN: >60
GLOBULIN: 3.3 G/DL (ref 2.3–3.5)
GLUCOSE BLD-MCNC: 103 MG/DL (ref 70–99)
GLUCOSE BLD-MCNC: 110 MG/DL (ref 60–115)
GLUCOSE BLD-MCNC: 124 MG/DL (ref 60–115)
GLUCOSE BLD-MCNC: 155 MG/DL (ref 60–115)
GLUCOSE BLD-MCNC: 203 MG/DL (ref 60–115)
HCT VFR BLD CALC: 46.6 % (ref 42–52)
HEMOGLOBIN: 15.9 G/DL (ref 13.7–17.5)
IMMATURE GRANULOCYTES #: 0 K/UL
IMMATURE GRANULOCYTES %: 0.5 %
LYME, EIA: 0.08 LIV (ref 0–1.2)
LYMPHOCYTES ABSOLUTE: 1 K/UL (ref 1.3–3.6)
LYMPHOCYTES RELATIVE PERCENT: 12.1 %
MAGNESIUM: 2 MG/DL (ref 1.7–2.4)
MCH RBC QN AUTO: 32.8 PG (ref 25.7–32.2)
MCHC RBC AUTO-ENTMCNC: 34.1 % (ref 32.3–36.5)
MCV RBC AUTO: 96.1 FL (ref 79–92.2)
MONOCYTES ABSOLUTE: 1 K/UL (ref 0.3–0.8)
MONOCYTES RELATIVE PERCENT: 12.1 % (ref 5.3–12.2)
NEUTROPHILS ABSOLUTE: 6 K/UL (ref 1.8–5.4)
NEUTROPHILS RELATIVE PERCENT: 70.9 % (ref 34–67.9)
PDW BLD-RTO: 14.1 % (ref 11.6–14.4)
PERFORMED ON: ABNORMAL
PERFORMED ON: NORMAL
PLATELET # BLD: 266 K/UL (ref 163–337)
POTASSIUM SERPL-SCNC: 3.4 MEQ/L (ref 3.4–4.9)
RBC # BLD: 4.85 M/UL (ref 4.63–6.08)
SODIUM BLD-SCNC: 138 MEQ/L (ref 135–144)
TOTAL PROTEIN: 6.7 G/DL (ref 6.3–8)
VITAMIN B-12: 339 PG/ML (ref 232–1245)
WBC # BLD: 8.5 K/UL (ref 4.2–9)

## 2021-12-16 PROCEDURE — 97530 THERAPEUTIC ACTIVITIES: CPT

## 2021-12-16 PROCEDURE — 6370000000 HC RX 637 (ALT 250 FOR IP): Performed by: INTERNAL MEDICINE

## 2021-12-16 PROCEDURE — 97535 SELF CARE MNGMENT TRAINING: CPT

## 2021-12-16 PROCEDURE — 80053 COMPREHEN METABOLIC PANEL: CPT

## 2021-12-16 PROCEDURE — 83735 ASSAY OF MAGNESIUM: CPT

## 2021-12-16 PROCEDURE — 92507 TX SP LANG VOICE COMM INDIV: CPT

## 2021-12-16 PROCEDURE — 97116 GAIT TRAINING THERAPY: CPT

## 2021-12-16 PROCEDURE — 97110 THERAPEUTIC EXERCISES: CPT

## 2021-12-16 PROCEDURE — 85025 COMPLETE CBC W/AUTO DIFF WBC: CPT

## 2021-12-16 PROCEDURE — 1210000000 HC MED SURG R&B

## 2021-12-16 PROCEDURE — 36415 COLL VENOUS BLD VENIPUNCTURE: CPT

## 2021-12-16 RX ORDER — POTASSIUM CHLORIDE 750 MG/1
40 TABLET, EXTENDED RELEASE ORAL ONCE
Status: COMPLETED | OUTPATIENT
Start: 2021-12-16 | End: 2021-12-16

## 2021-12-16 RX ADMIN — KETOROLAC TROMETHAMINE 1 DROP: 5 SOLUTION/ DROPS OPHTHALMIC at 08:42

## 2021-12-16 RX ADMIN — MELOXICAM 15 MG: 7.5 TABLET ORAL at 08:38

## 2021-12-16 RX ADMIN — OXYCODONE AND ACETAMINOPHEN 1 TABLET: 5; 325 TABLET ORAL at 20:26

## 2021-12-16 RX ADMIN — KETOROLAC TROMETHAMINE 1 DROP: 5 SOLUTION/ DROPS OPHTHALMIC at 20:25

## 2021-12-16 RX ADMIN — TORSEMIDE 20 MG: 20 TABLET ORAL at 08:38

## 2021-12-16 RX ADMIN — APIXABAN 5 MG: 2.5 TABLET, FILM COATED ORAL at 08:37

## 2021-12-16 RX ADMIN — APIXABAN 5 MG: 2.5 TABLET, FILM COATED ORAL at 20:25

## 2021-12-16 RX ADMIN — ALLOPURINOL 100 MG: 100 TABLET ORAL at 08:39

## 2021-12-16 RX ADMIN — ASPIRIN 81 MG CHEWABLE TABLET 81 MG: 81 TABLET CHEWABLE at 08:38

## 2021-12-16 RX ADMIN — SENNOSIDES 17.2 MG: 8.6 TABLET, FILM COATED ORAL at 08:39

## 2021-12-16 RX ADMIN — HYDRALAZINE HYDROCHLORIDE 50 MG: 25 TABLET, FILM COATED ORAL at 20:25

## 2021-12-16 RX ADMIN — OXYCODONE AND ACETAMINOPHEN 1 TABLET: 5; 325 TABLET ORAL at 16:20

## 2021-12-16 RX ADMIN — PANTOPRAZOLE SODIUM 40 MG: 40 TABLET, DELAYED RELEASE ORAL at 08:43

## 2021-12-16 RX ADMIN — METOPROLOL SUCCINATE 25 MG: 25 TABLET, FILM COATED, EXTENDED RELEASE ORAL at 08:38

## 2021-12-16 RX ADMIN — LOSARTAN POTASSIUM 25 MG: 25 TABLET, FILM COATED ORAL at 08:38

## 2021-12-16 RX ADMIN — FOLIC ACID 1 MG: 1 TABLET ORAL at 08:38

## 2021-12-16 RX ADMIN — POTASSIUM CHLORIDE 40 MEQ: 10 TABLET, EXTENDED RELEASE ORAL at 08:39

## 2021-12-16 RX ADMIN — POLYETHYLENE GLYCOL 3350 17 G: 17 POWDER, FOR SOLUTION ORAL at 08:43

## 2021-12-16 RX ADMIN — Medication: at 16:21

## 2021-12-16 RX ADMIN — Medication: at 20:25

## 2021-12-16 RX ADMIN — POTASSIUM CHLORIDE 40 MEQ: 10 TABLET, EXTENDED RELEASE ORAL at 12:50

## 2021-12-16 RX ADMIN — HYDRALAZINE HYDROCHLORIDE 50 MG: 25 TABLET, FILM COATED ORAL at 12:50

## 2021-12-16 RX ADMIN — Medication: at 08:37

## 2021-12-16 RX ADMIN — AMLODIPINE BESYLATE 10 MG: 10 TABLET ORAL at 08:38

## 2021-12-16 RX ADMIN — HYDRALAZINE HYDROCHLORIDE 50 MG: 25 TABLET, FILM COATED ORAL at 08:38

## 2021-12-16 RX ADMIN — ROSUVASTATIN CALCIUM 20 MG: 20 TABLET, COATED ORAL at 20:25

## 2021-12-16 RX ADMIN — TAMSULOSIN HYDROCHLORIDE 0.4 MG: 0.4 CAPSULE ORAL at 08:39

## 2021-12-16 RX ADMIN — TRAMADOL HYDROCHLORIDE 50 MG: 50 TABLET, COATED ORAL at 08:39

## 2021-12-16 ASSESSMENT — PAIN DESCRIPTION - LOCATION: LOCATION: RIB CAGE

## 2021-12-16 ASSESSMENT — PAIN DESCRIPTION - ORIENTATION: ORIENTATION: RIGHT

## 2021-12-16 ASSESSMENT — PAIN DESCRIPTION - PAIN TYPE: TYPE: ACUTE PAIN

## 2021-12-16 ASSESSMENT — PAIN SCALES - GENERAL
PAINLEVEL_OUTOF10: 10
PAINLEVEL_OUTOF10: 8
PAINLEVEL_OUTOF10: 7
PAINLEVEL_OUTOF10: 8

## 2021-12-16 NOTE — PROGRESS NOTES
Occupational Therapy  Facility/Department: [facility] MED SURG UNIT  Daily Treatment Note  NAME: Laura Donahue  :   MRN: [id number]    Date of Service: 2021    Discharge Recommendations:  24 hour supervision or assist (slow SNF for therapy)       Assessment   Performance deficits / Impairments: Decreased functional mobility ; Decreased ADL status; Decreased strength; Decreased safe awareness; Decreased cognition; Decreased endurance; Decreased balance; Decreased high-level IADLs; Decreased coordination  Assessment: Pt improving with overall fxl xfers, fxl mob, and ADL. Pt Katherine sit -> stand from recliner with cues for safety/sequencing. Pt CGA fxl mob with RW with pace improving. Pt CGA for shower xfers with vcs and holding onto GB. Pt performed bathing while seated on shower chair using HH shower head and GBs. Pt performed UB bathing setup/Spv and LB bathing with [place]. Pt needs maxA for LB dressing. Pt CGA sit -> supine. Treatment Diagnosis: Generalized weakness, acute on chronic CHF, unspecified heart failure type, s/p fall  Prognosis: Good  REQUIRES OT FOLLOW UP: Yes         Patient Diagnosis(es): There were no encounter diagnoses. has a past medical history of Bradycardia, CAD (coronary artery disease), Cancer ([address] CHF (congestive heart failure) ([address] Depression, HSV-2 (herpes simplex virus 2) infection, Hyperlipidemia, Hypertension, Left knee DJD, Osteoarthritis, Rheumatoid arthritis(714.0), and Type 2 diabetes mellitus without complication, without long-term current use of insulin ([address] has a past surgical history that includes Appendectomy; Tonsillectomy and adenoidectomy; Colonoscopy (11); arthrodesis (Right, 10/31/2016); Ankle surgery (Right); pr colon ca scrn not hi rsk ind (N/A, 2017);  Cardiac catheterization; fracture surgery; joint replacement (Bilateral); shoulder surgery (Left); and HARDWARE REMOVAL FOOT / ANKLE (Right, 11/6/2017). Restrictions  Restrictions/Precautions  Restrictions/Precautions: Fall Risk  Required Braces or Orthoses?: No  Implants present? : Metal implants     Subjective   General  Chart Reviewed: Yes  Patient assessed for rehabilitation services?: Yes  Additional Pertinent Hx: Lost balance and fell down the steps at home ~2 days ago- pt reports he fell down about 6-8 steps. Injured R rib cage- contusions per pt and R hand- both negative for fx's  Family / Caregiver Present: No  Referring Practitioner: Dr. Radha Carmona  Diagnosis: Generalized weakness, acute on chronic CHF, unspecified heart failure type, s/p fall  Subjective  Subjective: Pt agreeable to OT  Pain Assessment  Pain Assessment: 0-10  Pain Level: 8  Pain Type: Acute pain  Pain Location: Rib cage  Pain Orientation: Right  Vital Signs  Patient Currently in Pain: Yes      Objective    ADL  Grooming: Setup; Modified independent   UE Bathing: Setup; Supervision  LE Bathing: Minimal assistance  UE Dressing: Setup; Supervision  LE Dressing: Maximum assistance  Toileting: Minimal assistance; Maximum assistance (able to wipe self Katherine but max assist to don new brief)        Functional Mobility  Functional - Mobility Device: Rolling Walker  Activity: To/from bathroom  Assist Level: Contact guard assistance  Shower Transfers  Shower - Transfer From: Walker  Shower - Transfer Type: To and From  Shower - Transfer To:  Shower seat with back  Shower - Technique: Ambulating (small step in/out of shower stall holding GB)  Shower Transfers: Contact Guard  Shower Transfers Comments: vcs for safety/sequencing  Bed mobility  Sit to Supine: Contact guard assistance  Transfers  Sit to stand: Minimal assistance  Stand to sit: Contact guard assistance                 Plan   Plan  Times per week: 4-7x/wk  Times per day: Daily  Plan weeks: <1- med pt  Current Treatment Recommendations: Strengthening, ROM, Balance Training, Functional Mobility Training, Endurance Training, Stair training, Pain Management, Safety Education & Training, Patient/Caregiver Education & Training, Self-Care / ADL, Home Management Training       Goals  Short term goals  Time Frame for Short term goals: 3-5 days- med pt  Short term goal 1: Tolerate 25-30min BUE ther ex/act to inc strength/end for ADL  Short term goal 2: Inc to Winter person using least restrictive AD for fxl ADL xfers  Short term goal 3: Inc to [employer] for UB bathing and dressing  Short term goal 4: Inc to [employer] and dressing  Long term goals  Time Frame for Long term goals : Same as STGs  Long term goal 1: Same as STGs  Long term goal 2: Same as STGs  Patient Goals   Patient goals :  To get better       Therapy Time   Individual Concurrent Group Co-treatment   Time In  8:30         Time Out  9:30         Minutes  [place], [place] 04-Apr-2018 19:08

## 2021-12-16 NOTE — PROGRESS NOTES
SPEECH - LANGUAGE PATHOLOGY  Therapy Progress Report    This patient was seen for continued therapy to address cognitive-linguistic concerns. He was alert, responsive, and fully engaged in therapy. The patient spoke in well formulated sentences with appropriate volume, articulation, and prosody. The following performances were noted:  Temporal orientation - 65%  Spatial orientation - 100%  General knowledge - 80%  Problem solving - 90%  Immediate Episodic Memory - 90%  Complex ideational - 90%  Delayed recall - 3/3    This patient demonstrates continued difficulty with orientation. Continued therapy is indicated per the patient's POC. Results were shared with the patient and staff. .    Start time - 10:50  Stop time - 11:35  Minutes - [address]  Claris Bence  Ph.D.  [facility]- SLP/A

## 2021-12-16 NOTE — DISCHARGE SUMMARY
Discharge Summary    Patient:  Aleksandr Alcala  YOB: 1945    MRN: [id number]   Acct: [de-identified]    Primary Care Physician: Rick Liao MD    Admit date:  12/10/2021    Discharge date:   12/16/21      Discharge Diagnoses:   <principal problem not specified>  Active Problems:    CHF (congestive heart failure), NYHA class I, acute on chronic, combined ([address]  Resolved Problems:    * No resolved hospital problems. *      Admitted for: [facility] Course: patient was admitted after mechanical fall, CHF exacerbation, non sustain A fib onset. Was diuresed, evaluated by cardiology and full anticoag were initiated. Due to cognitive deficit he was evaluated by neurology service. carrotid US/brain MRI and EEG were performed. After completing his acute stay he will be DC to SNF for further management. Per his son report patient takes alcohol daily      Consultants:  cardio    Discharge Medications:       Medication List      START taking these medications    apixaban 5 MG Tabs tablet  Commonly known as: ELIQUIS  Take 1 tablet by mouth 2 times daily     losartan 25 MG tablet  Commonly known as: COZAAR  Take 1 tablet by mouth daily     metoprolol succinate 25 MG extended release tablet  Commonly known as: TOPROL XL  Take 1 tablet by mouth daily     torsemide 20 MG tablet  Commonly known as: DEMADEX  Take 1 tablet by mouth daily        CHANGE how you take these medications    amLODIPine 10 MG tablet  Commonly known as: NORVASC  Take 1 tablet by mouth daily  What changed:   · medication strength  · how much to take     potassium chloride 20 MEQ extended release tablet  Commonly known as: KLOR-CON M  Take 2 tablets by mouth daily (with breakfast)  What changed:   · how much to take  · how to take this  · when to take this     rosuvastatin 40 MG tablet  Commonly known as: CRESTOR  What changed: Another medication with the same name was removed.  Continue taking this medication, and follow the directions you see here. tamsulosin 0.4 MG capsule  Commonly known as: Flomax  Take 1 capsule by mouth daily  What changed: Another medication with the same name was removed. Continue taking this medication, and follow the directions you see here. CONTINUE taking these medications    acetaminophen 500 MG tablet  Commonly known as: TYLENOL     allopurinol 100 MG tablet  Commonly known as: ZYLOPRIM     aspirin 81 MG tablet     Enbrel 25 MG/0.5ML Sosy  Generic drug: etanercept     folic acid 1 MG tablet  Commonly known as: FOLVITE     hydrALAZINE 50 MG tablet  Commonly known as: APRESOLINE     ketorolac 0.4 % Soln ophthalmic solution     meloxicam 15 MG tablet  Commonly known as: MOBIC     nitroGLYCERIN 0.4 MG SL tablet  Commonly known as: NITROSTAT  Place 1 tablet under the tongue every 5 minutes as needed for Chest pain.      pantoprazole 40 MG tablet  Commonly known as: PROTONIX     prednisoLONE acetate 1 % ophthalmic suspension  Commonly known as: PRED FORTE        STOP taking these medications    furosemide 20 MG tablet  Commonly known as: LASIX     sildenafil 100 MG tablet  Commonly known as: Viagra     sotalol 120 MG tablet  Commonly known as: BETAPACE           Where to Get Your Medications      These medications were sent to [facility]/pharmacy #[id number] Mack, [address] [place] [address]    Phone: [phone]   · amLODIPine 10 MG tablet  · apixaban 5 MG Tabs tablet  · losartan 25 MG tablet  · metoprolol succinate 25 MG extended release tablet  · potassium chloride 20 MEQ extended release tablet  · torsemide 20 MG tablet         Physical Exam:    Vitals:  Vitals:    12/15/21 2007 12/15/21 2008 12/16/21 0530 12/16/21 0557   BP: (!) 127/55 (!) 127/55  135/66   Pulse:  51  59   Resp:    18   Temp:    98.6 °F (37 °C)   TempSrc:    Oral   SpO2:    96%   Weight:   287 lb 6.4 oz (130.4 kg)    Height:         Weight: Weight: 287 lb 6.4 oz (130.4 kg)     24 hour intake/output:    Intake/Output Summary (Last 24 hours) at 12/16/2021 0747  Last data filed at 12/16/2021 0533  Gross per 24 hour   Intake 600 ml   Output 2500 ml   Net -1900 ml       General appearance - alert, well appearing, and in no distress  Chest - clear to auscultation, no wheezes, rales or rhonchi, symmetric air entry  Heart - normal rate, regular rhythm, normal S1, S2, no murmurs, rubs, clicks or gallops  Abdomen - soft, nontender, nondistended, no masses or organomegaly  Obese: Yes; Protuberant: Yes   Neurological - alert, oriented, normal speech, no focal findings or movement disorder noted  Extremities - edema  Skin - normal coloration and turgor, no rashes, no suspicious skin lesions noted    Procedures:      Diagnostic Test:      Radiology reports as per the Radiologist  Radiology: MRI BRAIN WO CONTRAST    Result Date: 12/14/2021  EXAMINATION: MRI BRAIN WO CONTRAST CLINICAL HISTORY:  cognitive decline COMPARISONS: NONE AVAILABLE TECHNIQUE: Multiplanar multisequence images of the brain were obtained without contrast. Diffusion perfusion imaging was obtained. BRAIN MRI FINDINGS:  There are no extra-axial collections. There is no evidence of hemorrhage. There are no areas of perfusion diffusion signal abnormality to suggest ischemia. The susceptibility images do not demonstrate evidence of hemosiderin deposition within the brain parenchyma or the leptomeninges. There is preservation of the gray-white matter differentiation. There are a few scattered foci of T2/FLAIR increased signal in the subcortical and periventricular white matter without associated edema or mass effect. There is prominence of the sulci and ventricles consistent with moderate global cerebral atrophy and chronic involutional changes. The midline structures are intact, the corpus callosum is within normal limits. The region of the pineal gland and the sella turcica are unremarkable.  There are no space-occupying lesions in the posterior fossa. The basilar cisterns are patent. The craniocervical junction is unremarkable. The visualized portions of the orbits are within normal limits, the globes are intact. The visualized portions of the paranasal sinuses are within normal limits. The calvarium and soft tissues are unremarkable. There are no acute intracranial changes, there is no evidence of hemorrhage or acute ischemia. US CAROTID ARTERY BILATERAL    Result Date: 12/15/2021  History:  carotid stenosis Technique:  US CAROTID ARTERY BILATERAL Comparison: March 16, 2016 Findings: On the right calcified plaque is seen in the bulb and extends into the internal carotid artery. On the left mild mixed plaque is seen in the bulb. Calcified plaque is seen in the distal common carotid artery. There is a large amount of calcified plaque is seen in the internal carotid artery. Increased velocity is seen in the proximal left internal carotid artery. Normal antegrade flow is seen in the vertebral arteries. Doppler findings: ARTERIAL BLOOD FLOW VELOCITY RIGHT PS                                               Prox CCA 124cm/s             Mid CCA 109cm/s              Dist CCA 89cm/s              Prox ICA 89cm/s              Mid ICA 118cm/s              Dist ICA 74cm/s              Prox ECA 162cm/s              Dist VERT 54cm/s              Bulb ICA/CCA 1.1 LEFT PS Prox CCA 93cm/s              Mid CCA 83cm/s              Dist CCA 83cm/s              Prox ICA 173cm/s              Mid ICA 108cm/s              Dist ICA 105cm/s              Prox ECA 125cm/s              Prox VERT 37cm/s              Bulb ICA/CCA 2.1     Impression: 1. No hemodynamic significant stenosis on the right   2. 50-69% stenosis seen on the left Validated velocity measurements with angiographic measurements, velocity criteria are extrapolated from diameter data as defined by the Society of radiologist in ultrasound consensus conference radiology 2003; 229; 340-346. Granulocytes % 0.4 %    Lymphocytes % 10.7 %    Monocytes % 11.6 5.3 - 12.2 %    Eosinophils % 3.0 0.8 - 7.0 %    Basophils % 0.4 0.2 - 1.2 %    Neutrophils Absolute 7.2 (H) 1.8 - 5.4 K/uL    Immature Granulocytes # 0.0 K/uL    Lymphocytes Absolute 1.0 (L) 1.3 - 3.6 K/uL    Monocytes Absolute 1.1 (H) 0.3 - 0.8 K/uL    Eosinophils Absolute 0.3 0.0 - 0.5 K/uL    Basophils Absolute 0.0 0.0 - 0.1 K/uL   Brain Natriuretic Peptide   Result Value Ref Range    Pro-BNP 1,823 pg/mL   Comprehensive Metabolic Panel   Result Value Ref Range    Sodium 133 (L) 135 - 144 mEq/L    Potassium 3.9 3.4 - 4.9 mEq/L    Chloride 93 (L) 95 - 107 mEq/L    CO2 22 20 - 31 mEq/L    Anion Gap 18 (H) 9 - 15 mEq/L    Glucose 105 (H) 70 - 99 mg/dL    BUN 31 (H) 8 - 23 mg/dL    CREATININE 1.25 (H) 0.70 - 1.20 mg/dL    GFR Non-African American 56.1 (L) >60    GFR  >60.0 >60    Calcium 8.4 (L) 8.5 - 9.9 mg/dL    Total Protein 6.0 (L) 6.3 - 8.0 g/dL    Albumin 3.2 (L) 3.5 - 4.6 g/dL    Total Bilirubin 1.5 (H) 0.2 - 0.7 mg/dL    Alkaline Phosphatase 83 35 - 104 U/L    ALT 18 0 - 41 U/L    AST 22 0 - 40 U/L    Globulin 2.8 2.3 - 3.5 g/dL   Magnesium   Result Value Ref Range    Magnesium 1.8 1.7 - 2.4 mg/dL   Comprehensive Metabolic Panel   Result Value Ref Range    Sodium 136 135 - 144 mEq/L    Potassium 3.9 3.4 - 4.9 mEq/L    Chloride 95 95 - 107 mEq/L    CO2 26 20 - 31 mEq/L    Anion Gap 15 9 - 15 mEq/L    Glucose 117 (H) 70 - 99 mg/dL    BUN 32 (H) 8 - 23 mg/dL    CREATININE 1.08 0.70 - 1.20 mg/dL    GFR Non-African American >60.0 >60    GFR  >60.0 >60    Calcium 8.5 8.5 - 9.9 mg/dL    Total Protein 6.1 (L) 6.3 - 8.0 g/dL    Albumin 3.3 (L) 3.5 - 4.6 g/dL    Total Bilirubin 1.5 (H) 0.2 - 0.7 mg/dL    Alkaline Phosphatase 84 35 - 104 U/L    ALT 23 0 - 41 U/L    AST 36 0 - 40 U/L    Globulin 2.8 2.3 - 3.5 g/dL   Magnesium   Result Value Ref Range    Magnesium 1.8 1.7 - 2.4 mg/dL   CBC Auto Differential   Result Value Ref Range    WBC 11.0 (H) 4.2 - 9.0 K/uL    RBC 4.78 4.63 - 6.08 M/uL    Hemoglobin 15.7 13.7 - 17.5 g/dL    Hematocrit 45.6 42.0 - 52.0 %    MCV 95.4 (H) 79.0 - 92.2 fL    MCH 32.8 (H) 25.7 - 32.2 pg    MCHC 34.4 32.3 - 36.5 %    RDW 14.0 11.6 - 14.4 %    Platelets 788 972 - 372 K/uL    Neutrophils % 75.7 (H) 34.0 - 67.9 %    Immature Granulocytes % 0.5 %    Lymphocytes % 9.7 %    Monocytes % 11.2 5.3 - 12.2 %    Eosinophils % 2.6 0.8 - 7.0 %    Basophils % 0.3 0.2 - 1.2 %    Neutrophils Absolute 8.4 (H) 1.8 - 5.4 K/uL    Immature Granulocytes # 0.1 K/uL    Lymphocytes Absolute 1.1 (L) 1.3 - 3.6 K/uL    Monocytes Absolute 1.2 (H) 0.3 - 0.8 K/uL    Eosinophils Absolute 0.3 0.0 - 0.5 K/uL    Basophils Absolute 0.0 0.0 - 0.1 K/uL   TSH without Reflex   Result Value Ref Range    TSH 1.700 0.440 - 3.860 uIU/mL   Comprehensive Metabolic Panel   Result Value Ref Range    Sodium 135 135 - 144 mEq/L    Potassium 3.7 3.4 - 4.9 mEq/L    Chloride 95 95 - 107 mEq/L    CO2 25 20 - 31 mEq/L    Anion Gap 15 9 - 15 mEq/L    Glucose 122 (H) 70 - 99 mg/dL    BUN 30 (H) 8 - 23 mg/dL    CREATININE 0.92 0.70 - 1.20 mg/dL    GFR Non-African American >60.0 >60    GFR  >60.0 >60    Calcium 8.6 8.5 - 9.9 mg/dL    Total Protein 6.1 (L) 6.3 - 8.0 g/dL    Albumin 3.1 (L) 3.5 - 4.6 g/dL    Total Bilirubin 1.2 (H) 0.2 - 0.7 mg/dL    Alkaline Phosphatase 84 35 - 104 U/L    ALT 37 0 - 41 U/L    AST 46 (H) 0 - 40 U/L    Globulin 3.0 2.3 - 3.5 g/dL   Magnesium   Result Value Ref Range    Magnesium 1.9 1.7 - 2.4 mg/dL   CBC Auto Differential   Result Value Ref Range    WBC 8.4 4.2 - 9.0 K/uL    RBC 4.74 4.63 - 6.08 M/uL    Hemoglobin 15.6 13.7 - 17.5 g/dL    Hematocrit 45.3 42.0 - 52.0 %    MCV 95.6 (H) 79.0 - 92.2 fL    MCH 32.9 (H) 25.7 - 32.2 pg    MCHC 34.4 32.3 - 36.5 %    RDW 14.1 11.6 - 14.4 %    Platelets 758 018 - 700 K/uL    Neutrophils % 74.4 (H) 34.0 - 67.9 %    Immature Granulocytes % 0.6 %    Lymphocytes % 9.9 %    Monocytes % 11.0 5.3 - 12.2 %    Eosinophils % 3.6 0.8 - 7.0 %    Basophils % 0.5 0.2 - 1.2 %    Neutrophils Absolute 6.3 (H) 1.8 - 5.4 K/uL    Immature Granulocytes # 0.1 K/uL    Lymphocytes Absolute 0.8 (L) 1.3 - 3.6 K/uL    Monocytes Absolute 0.9 (H) 0.3 - 0.8 K/uL    Eosinophils Absolute 0.3 0.0 - 0.5 K/uL    Basophils Absolute 0.0 0.0 - 0.1 K/uL   Brain Natriuretic Peptide   Result Value Ref Range    Pro-BNP 1,662 pg/mL   Sedimentation Rate   Result Value Ref Range    Sed Rate 26 (H) 0 - 20 mm   Vitamin B12 & Folate   Result Value Ref Range    Vitamin B-12 339 232 - 1245 pg/mL   Homocysteine, Serum   Result Value Ref Range    Homocysteine 23.6 (H) <15.0 umol/L   RPR Reflex to Titer and TPPA   Result Value Ref Range    RPR Non-reactive Non-reactive   Ammonia   Result Value Ref Range    Ammonia 27 16 - 60 umol/L   T4, Free   Result Value Ref Range    T4 Free 1.79 (H) 0.84 - 1.68 ng/dL   Rheumatoid Factor   Result Value Ref Range    Rheumatoid Factor 11.0 <14 IU/mL   Comprehensive Metabolic Panel   Result Value Ref Range    Sodium 135 135 - 144 mEq/L    Potassium 4.1 3.4 - 4.9 mEq/L    Chloride 95 95 - 107 mEq/L    CO2 25 20 - 31 mEq/L    Anion Gap 15 9 - 15 mEq/L    Glucose 113 (H) 70 - 99 mg/dL    BUN 29 (H) 8 - 23 mg/dL    CREATININE 0.92 0.70 - 1.20 mg/dL    GFR Non-African American >60.0 >60    GFR  >60.0 >60    Calcium 8.7 8.5 - 9.9 mg/dL    Total Protein 6.5 6.3 - 8.0 g/dL    Albumin 3.1 (L) 3.5 - 4.6 g/dL    Total Bilirubin 1.0 (H) 0.2 - 0.7 mg/dL    Alkaline Phosphatase 83 35 - 104 U/L    ALT 60 (H) 0 - 41 U/L    AST 66 (H) 0 - 40 U/L    Globulin 3.4 2.3 - 3.5 g/dL   Magnesium   Result Value Ref Range    Magnesium 2.1 1.7 - 2.4 mg/dL   CBC Auto Differential   Result Value Ref Range    WBC 9.1 (H) 4.2 - 9.0 K/uL    RBC 4.80 4.63 - 6.08 M/uL    Hemoglobin 15.9 13.7 - 17.5 g/dL    Hematocrit 46.2 42.0 - 52.0 %    MCV 96.3 (H) 79.0 - 92.2 fL    MCH 33.1 (H) 25.7 - 32.2 pg    MCHC 34.4 32.3 - 36.5 % RDW 14.1 11.6 - 14.4 %    Platelets 378 422 - 490 K/uL    Neutrophils % 74.1 (H) 34.0 - 67.9 %    Immature Granulocytes % 0.3 %    Lymphocytes % 10.6 %    Monocytes % 10.9 5.3 - 12.2 %    Eosinophils % 3.5 0.8 - 7.0 %    Basophils % 0.6 0.2 - 1.2 %    Neutrophils Absolute 6.7 (H) 1.8 - 5.4 K/uL    Immature Granulocytes # 0.0 K/uL    Lymphocytes Absolute 1.0 (L) 1.3 - 3.6 K/uL    Monocytes Absolute 1.0 (H) 0.3 - 0.8 K/uL    Eosinophils Absolute 0.3 0.0 - 0.5 K/uL    Basophils Absolute 0.1 0.0 - 0.1 K/uL   CBC Auto Differential   Result Value Ref Range    WBC 8.5 4.2 - 9.0 K/uL    RBC 4.85 4.63 - 6.08 M/uL    Hemoglobin 15.9 13.7 - 17.5 g/dL    Hematocrit 46.6 42.0 - 52.0 %    MCV 96.1 (H) 79.0 - 92.2 fL    MCH 32.8 (H) 25.7 - 32.2 pg    MCHC 34.1 32.3 - 36.5 %    RDW 14.1 11.6 - 14.4 %    Platelets 192 164 - 296 K/uL    Neutrophils % 70.9 (H) 34.0 - 67.9 %    Immature Granulocytes % 0.5 %    Lymphocytes % 12.1 %    Monocytes % 12.1 5.3 - 12.2 %    Eosinophils % 3.8 0.8 - 7.0 %    Basophils % 0.6 0.2 - 1.2 %    Neutrophils Absolute 6.0 (H) 1.8 - 5.4 K/uL    Immature Granulocytes # 0.0 K/uL    Lymphocytes Absolute 1.0 (L) 1.3 - 3.6 K/uL    Monocytes Absolute 1.0 (H) 0.3 - 0.8 K/uL    Eosinophils Absolute 0.3 0.0 - 0.5 K/uL    Basophils Absolute 0.1 0.0 - 0.1 K/uL   POCT Glucose   Result Value Ref Range    POC Glucose 119 (H) 60 - 115 mg/dl    Performed on ACCU-CHEK    POCT Glucose   Result Value Ref Range    POC Glucose 166 (H) 60 - 115 mg/dl    Performed on ACCU-CHEK    POCT Glucose   Result Value Ref Range    POC Glucose 127 (H) 60 - 115 mg/dl    Performed on ACCU-CHEK    POCT Glucose   Result Value Ref Range    POC Glucose 175 (H) 60 - 115 mg/dl    Performed on ACCU-CHEK    POCT Glucose   Result Value Ref Range    POC Glucose 121 (H) 60 - 115 mg/dl    Performed on ACCU-CHEK    POCT Glucose   Result Value Ref Range    POC Glucose 143 (H) 60 - 115 mg/dl    Performed on ACCU-CHEK    POCT Glucose   Result Value Ref Range    POC Glucose 151 (H) 60 - 115 mg/dl    Performed on ACCU-CHEK    POCT Glucose   Result Value Ref Range    POC Glucose 173 (H) 60 - 115 mg/dl    Performed on ACCU-CHEK    POCT Glucose   Result Value Ref Range    POC Glucose 124 (H) 60 - 115 mg/dl    Performed on ACCU-CHEK    POCT Glucose   Result Value Ref Range    POC Glucose 127 (H) 60 - 115 mg/dl    Performed on ACCU-CHEK    POCT Glucose   Result Value Ref Range    POC Glucose 132 (H) 60 - 115 mg/dl    Performed on ACCU-CHEK    POCT Glucose   Result Value Ref Range    POC Glucose 174 (H) 60 - 115 mg/dl    Performed on ACCU-CHEK    POCT Glucose   Result Value Ref Range    POC Glucose 208 (H) 60 - 115 mg/dl    Performed on ACCU-CHEK    POCT Glucose   Result Value Ref Range    POC Glucose 123 (H) 60 - 115 mg/dl    Performed on ACCU-CHEK    POCT Glucose   Result Value Ref Range    POC Glucose 131 (H) 60 - 115 mg/dl    Performed on ACCU-CHEK    POCT Glucose   Result Value Ref Range    POC Glucose 179 (H) 60 - 115 mg/dl    Performed on ACCU-CHEK    POCT Glucose   Result Value Ref Range    POC Glucose 175 (H) 60 - 115 mg/dl    Performed on ACCU-CHEK    POCT Glucose   Result Value Ref Range    POC Glucose 112 60 - 115 mg/dl    Performed on ACCU-CHEK    POCT Glucose   Result Value Ref Range    POC Glucose 111 60 - 115 mg/dl    Performed on ACCU-CHEK    POCT Glucose   Result Value Ref Range    POC Glucose 119 (H) 60 - 115 mg/dl    Performed on ACCU-CHEK    POCT Glucose   Result Value Ref Range    POC Glucose 159 (H) 60 - 115 mg/dl    Performed on ACCU-CHEK    POCT Glucose   Result Value Ref Range    POC Glucose 110 60 - 115 mg/dl    Performed on ACCU-CHEK        Diet:  ADULT DIET;  Regular    Activity:  Activity as tolerated (Patient may move about with assist as indicated or with supervision.)    Follow-up:  in 1 weeks with Amna Noriega MD,     Disposition: SNF    Condition: Stable    Time Spent: 50 minutes    Electronically signed by Natalia Parker MD on 12/16/2021 at 7:47 AM    Discharging Hospitalist

## 2021-12-16 NOTE — PROGRESS NOTES
Pt alert and oriented to self. Pt denies any pain or needs at this time. Pt bed alarm is on and call light in place. Pt has a morales cath in place. Will continue to monitor pt.   Electronically signed by Shari Hall RN on 12/15/2021 at 10:08 PM

## 2021-12-17 VITALS
SYSTOLIC BLOOD PRESSURE: 119 MMHG | WEIGHT: 287.4 LBS | DIASTOLIC BLOOD PRESSURE: 60 MMHG | HEIGHT: 74 IN | TEMPERATURE: 97.2 F | HEART RATE: 54 BPM | OXYGEN SATURATION: 96 % | BODY MASS INDEX: 36.88 KG/M2 | RESPIRATION RATE: 18 BRPM

## 2021-12-17 LAB
ALBUMIN SERPL-MCNC: 3.1 G/DL (ref 3.5–4.6)
ALP BLD-CCNC: 82 U/L (ref 35–104)
ALT SERPL-CCNC: 56 U/L (ref 0–41)
ANION GAP SERPL CALCULATED.3IONS-SCNC: 14 MEQ/L (ref 9–15)
AST SERPL-CCNC: 45 U/L (ref 0–40)
BASOPHILS ABSOLUTE: 0.1 K/UL (ref 0–0.1)
BASOPHILS RELATIVE PERCENT: 0.8 % (ref 0.2–1.2)
BILIRUB SERPL-MCNC: 0.8 MG/DL (ref 0.2–0.7)
BUN BLDV-MCNC: 34 MG/DL (ref 8–23)
CALCIUM SERPL-MCNC: 8.8 MG/DL (ref 8.5–9.9)
CHLORIDE BLD-SCNC: 100 MEQ/L (ref 95–107)
CO2: 23 MEQ/L (ref 20–31)
CREAT SERPL-MCNC: 1.3 MG/DL (ref 0.7–1.2)
EOSINOPHILS ABSOLUTE: 0.4 K/UL (ref 0–0.5)
EOSINOPHILS RELATIVE PERCENT: 5.7 % (ref 0.8–7)
GFR AFRICAN AMERICAN: >60
GFR NON-AFRICAN AMERICAN: 53.6
GLOBULIN: 3.3 G/DL (ref 2.3–3.5)
GLUCOSE BLD-MCNC: 106 MG/DL (ref 60–115)
GLUCOSE BLD-MCNC: 109 MG/DL (ref 70–99)
GLUCOSE BLD-MCNC: 116 MG/DL (ref 60–115)
GLUCOSE BLD-MCNC: 163 MG/DL (ref 60–115)
HCT VFR BLD CALC: 45.2 % (ref 42–52)
HEMOGLOBIN: 15.4 G/DL (ref 13.7–17.5)
IMMATURE GRANULOCYTES #: 0 K/UL
IMMATURE GRANULOCYTES %: 0.5 %
LYMPHOCYTES ABSOLUTE: 1.1 K/UL (ref 1.3–3.6)
LYMPHOCYTES RELATIVE PERCENT: 15.4 %
MAGNESIUM: 2 MG/DL (ref 1.7–2.4)
MCH RBC QN AUTO: 32.8 PG (ref 25.7–32.2)
MCHC RBC AUTO-ENTMCNC: 34.1 % (ref 32.3–36.5)
MCV RBC AUTO: 96.2 FL (ref 79–92.2)
MONOCYTES ABSOLUTE: 1 K/UL (ref 0.3–0.8)
MONOCYTES RELATIVE PERCENT: 13.6 % (ref 5.3–12.2)
NEUTROPHILS ABSOLUTE: 4.7 K/UL (ref 1.8–5.4)
NEUTROPHILS RELATIVE PERCENT: 64 % (ref 34–67.9)
PDW BLD-RTO: 14.1 % (ref 11.6–14.4)
PERFORMED ON: ABNORMAL
PERFORMED ON: ABNORMAL
PERFORMED ON: NORMAL
PLATELET # BLD: 259 K/UL (ref 163–337)
POTASSIUM SERPL-SCNC: 4.3 MEQ/L (ref 3.4–4.9)
RBC # BLD: 4.7 M/UL (ref 4.63–6.08)
SARS-COV-2, NAAT: NOT DETECTED
SODIUM BLD-SCNC: 137 MEQ/L (ref 135–144)
TOTAL PROTEIN: 6.4 G/DL (ref 6.3–8)
WBC # BLD: 7.4 K/UL (ref 4.2–9)
WEST NILE VIRUS, IGG: 0.29 IV
WEST NILE VIRUS, IGM: 0.02 IV

## 2021-12-17 PROCEDURE — 83735 ASSAY OF MAGNESIUM: CPT

## 2021-12-17 PROCEDURE — 36415 COLL VENOUS BLD VENIPUNCTURE: CPT

## 2021-12-17 PROCEDURE — 6370000000 HC RX 637 (ALT 250 FOR IP): Performed by: INTERNAL MEDICINE

## 2021-12-17 PROCEDURE — 80053 COMPREHEN METABOLIC PANEL: CPT

## 2021-12-17 PROCEDURE — 87635 SARS-COV-2 COVID-19 AMP PRB: CPT

## 2021-12-17 PROCEDURE — 85025 COMPLETE CBC W/AUTO DIFF WBC: CPT

## 2021-12-17 RX ORDER — METOPROLOL SUCCINATE 25 MG/1
12.5 TABLET, EXTENDED RELEASE ORAL DAILY
Status: DISCONTINUED | OUTPATIENT
Start: 2021-12-18 | End: 2021-12-17 | Stop reason: HOSPADM

## 2021-12-17 RX ADMIN — TAMSULOSIN HYDROCHLORIDE 0.4 MG: 0.4 CAPSULE ORAL at 08:38

## 2021-12-17 RX ADMIN — KETOROLAC TROMETHAMINE 1 DROP: 5 SOLUTION/ DROPS OPHTHALMIC at 08:39

## 2021-12-17 RX ADMIN — TRAMADOL HYDROCHLORIDE 50 MG: 50 TABLET, COATED ORAL at 08:38

## 2021-12-17 RX ADMIN — MELOXICAM 15 MG: 7.5 TABLET ORAL at 08:37

## 2021-12-17 RX ADMIN — HYDRALAZINE HYDROCHLORIDE 50 MG: 25 TABLET, FILM COATED ORAL at 13:58

## 2021-12-17 RX ADMIN — ASPIRIN 81 MG CHEWABLE TABLET 81 MG: 81 TABLET CHEWABLE at 08:37

## 2021-12-17 RX ADMIN — POTASSIUM CHLORIDE 40 MEQ: 10 TABLET, EXTENDED RELEASE ORAL at 08:38

## 2021-12-17 RX ADMIN — LOSARTAN POTASSIUM 25 MG: 25 TABLET, FILM COATED ORAL at 08:38

## 2021-12-17 RX ADMIN — METOPROLOL SUCCINATE 25 MG: 25 TABLET, FILM COATED, EXTENDED RELEASE ORAL at 08:38

## 2021-12-17 RX ADMIN — Medication: at 12:15

## 2021-12-17 RX ADMIN — PANTOPRAZOLE SODIUM 40 MG: 40 TABLET, DELAYED RELEASE ORAL at 08:38

## 2021-12-17 RX ADMIN — HYDRALAZINE HYDROCHLORIDE 50 MG: 25 TABLET, FILM COATED ORAL at 08:37

## 2021-12-17 RX ADMIN — Medication: at 16:42

## 2021-12-17 RX ADMIN — APIXABAN 5 MG: 2.5 TABLET, FILM COATED ORAL at 08:38

## 2021-12-17 RX ADMIN — TORSEMIDE 20 MG: 20 TABLET ORAL at 08:38

## 2021-12-17 RX ADMIN — OXYCODONE AND ACETAMINOPHEN 1 TABLET: 5; 325 TABLET ORAL at 13:58

## 2021-12-17 RX ADMIN — LORAZEPAM 1 MG: 1 TABLET ORAL at 13:58

## 2021-12-17 RX ADMIN — ALLOPURINOL 100 MG: 100 TABLET ORAL at 08:38

## 2021-12-17 RX ADMIN — AMLODIPINE BESYLATE 10 MG: 10 TABLET ORAL at 08:38

## 2021-12-17 RX ADMIN — FOLIC ACID 1 MG: 1 TABLET ORAL at 08:37

## 2021-12-17 ASSESSMENT — PAIN SCALES - GENERAL
PAINLEVEL_OUTOF10: 0
PAINLEVEL_OUTOF10: 8
PAINLEVEL_OUTOF10: 7

## 2021-12-17 NOTE — PROGRESS NOTES
This  received phone call from Marychuy in admissions at [facility]. Marychuy reports that pre-cert has been obtained and that patient can admit to facility on this date. NOMAN completed along with [id number] in [address]. [facility] RN House supervisor reports plan to arrange transport for patient from [facility] to [facility].   SS to continue to follow and contact patient's son to provide update once transport time has been arranged

## 2021-12-17 NOTE — PLAN OF CARE
Problem: Falls - Risk of:  Goal: Will remain free from falls  Description: Will remain free from falls  Outcome: Met This Shift  Goal: Absence of physical injury  Description: Absence of physical injury  Outcome: Met This Shift     Problem: Infection:  Goal: Will remain free from infection  Description: Will remain free from infection  Outcome: Met This Shift     Problem: Safety:  Goal: Free from accidental physical injury  Description: Free from accidental physical injury  Outcome: Met This Shift  Goal: Free from intentional harm  Description: Free from intentional harm  Outcome: Met This Shift     Problem: Daily Care:  Goal: Daily care needs are met  Description: Daily care needs are met  Outcome: Met This Shift     Problem: Pain:  Description: Pain management should include both nonpharmacologic and pharmacologic interventions.   Goal: Patient's pain/discomfort is manageable  Description: Patient's pain/discomfort is manageable  Outcome: Met This Shift  Goal: Pain level will decrease  Description: Pain level will decrease  Outcome: Met This Shift  Goal: Control of acute pain  Description: Control of acute pain  Outcome: Met This Shift  Goal: Control of chronic pain  Description: Control of chronic pain  Outcome: Met This Shift     Problem: Skin Integrity:  Goal: Skin integrity will stabilize  Description: Skin integrity will stabilize  Outcome: Met This Shift     Problem: Discharge Planning:  Goal: Patients continuum of care needs are met  Description: Patients continuum of care needs are met  Outcome: Ongoing

## 2021-12-17 NOTE — PROGRESS NOTES
Hospitalist Progress Note      PCP: Daniela Melgoza MD    Date of Admission: 12/10/2021    Chief Complaint: weakness, R ribcage pain     Subjective: pt in chair, just had breakfast     Medications:  Reviewed    Infusion Medications    sodium chloride      dextrose       Scheduled Medications    nystatin, stomahesive in petrolatum   Topical TID    amLODIPine  10 mg Oral Daily    potassium chloride  40 mEq Oral Daily with breakfast    losartan  25 mg Oral Daily    polyethylene glycol  17 g Oral BID    senna  2 tablet Oral BID    apixaban  5 mg Oral BID    torsemide  20 mg Oral Daily    hydrALAZINE  50 mg Oral TID    lidocaine  1 patch TransDERmal Daily    insulin lispro  0-12 Units SubCUTAneous TID WC    insulin lispro  0-6 Units SubCUTAneous Nightly    allopurinol  100 mg Oral Daily    aspirin  81 mg Oral Daily    folic acid  1 mg Oral Daily    ketorolac  1 drop Both Eyes BID    meloxicam  15 mg Oral Daily    pantoprazole  40 mg Oral Daily    rosuvastatin  20 mg Oral Nightly    tamsulosin  0.4 mg Oral Daily    metoprolol succinate  25 mg Oral Daily    lidocaine  1 patch TransDERmal Nightly    insulin lispro  0.03 Units/kg SubCUTAneous TID WC     PRN Meds: LORazepam, sodium chloride flush, sodium chloride, ondansetron **OR** ondansetron, polyethylene glycol, acetaminophen **OR** acetaminophen, hydrALAZINE, dextrose, glucagon (rDNA), dextrose, traMADol, glucose, sodium chloride flush, promethazine **OR** ondansetron, oxyCODONE-acetaminophen      Intake/Output Summary (Last 24 hours) at 12/17/2021 0839  Last data filed at 12/17/2021 0813  Gross per 24 hour   Intake 360 ml   Output 200 ml   Net 160 ml       Exam:    BP (!) 121/58   Pulse 52   Temp 98.2 °F (36.8 °C) (Oral)   Resp 18   Ht 5' 11\" (1.803 m)   Wt 287 lb 6.4 oz (130.4 kg)   SpO2 98%   BMI 40.08 kg/m²     General appearance: No apparent distress, appears stated age and cooperative.   HEENT: Pupils equal, round, and reactive to light. Conjunctivae/corneas clear. Neck: Supple, with full range of motion. No jugular venous distention. Trachea midline. Respiratory:  Normal respiratory effort. Clear to auscultation, bilaterally without Rales/Wheezes/Rhonchi. Cardiovascular: Regular rate and rhythm with normal S1/S2 without murmurs, rubs or gallops. Abdomen: Soft, non-tender, non-distended with normal bowel sounds. Musculoskeletal:  edema bilaterally. Skin: Skin color, texture, turgor normal.  No rashes or lesions. Neurologic:  Neurovascularly intact without any focal sensory/motor deficits. Psychiatric: Alert and oriented,   Capillary Refill: Brisk,< 3 seconds   Peripheral Pulses: +2 palpable, equal bilaterally       Labs:   Recent Labs     12/15/21  0631 12/16/21  0705 12/17/21  0526   WBC 9.1* 8.5 7.4   HGB 15.9 15.9 15.4   HCT 46.2 46.6 45.2    266 259     Recent Labs     12/15/21  0631 12/16/21  0705 12/17/21  0526    138 137   K 4.1 3.4 4.3   CL 95 97 100   CO2 25 24 23   BUN 29* 27* 34*   CREATININE 0.92 0.93 1.30*   CALCIUM 8.7 8.9 8.8     Recent Labs     12/15/21  0631 12/16/21  0705 12/17/21  0526   AST 66* 57* 45*   ALT 60* 65* 56*   BILITOT 1.0* 0.9* 0.8*   ALKPHOS 83 86 82     No results for input(s): INR in the last 72 hours. No results for input(s): Julieta Dross in the last 72 hours. Urinalysis:      Lab Results   Component Value Date    NITRU Negative 12/09/2021    WBCUA 3-5 12/09/2021    BACTERIA Negative 12/09/2021    RBCUA 6-10 12/09/2021    BLOODU SMALL 12/09/2021    SPECGRAV 1.015 12/09/2021    GLUCOSEU Negative 12/09/2021    GLUCOSEU NEG 06/07/2012       Radiology:  US CAROTID ARTERY BILATERAL   Final Result   Impression: 1.   No hemodynamic significant stenosis on the right   2. 50-69% stenosis seen on the left      Validated velocity measurements with angiographic measurements, velocity criteria are extrapolated from diameter data as defined by the Society of radiologist in ultrasound consensus conference radiology 2003; [phone]. MRI BRAIN WO CONTRAST   Final Result       There are no acute intracranial changes, there is no evidence of hemorrhage or acute ischemia. Assessment/Plan:    Active Hospital Problems    Diagnosis Date Noted    CHF (congestive heart failure), NYHA class I, acute on chronic, combined ([facility] [address] [I50.43] 12/11/2021         DVT Prophylaxis: apixaban  Diet: ADULT DIET; Regular  Code Status: Full Code    PT/OT Eval Status: done    Dispo - Mechanical fall, weakness- PT on case. Awaiting SNF placement. Social service on case   R ribcage pain, aggravated by deep inspiration- better, continue with  local lidoderm patch,   Elevated troponins, CAD- appreciate cardiology recommendations,.    CHF/edema, elevated BNP, dyspnea- IV lasix completed, on torsemide now, 2 d echo report seeing   A fib- rate controlled,  systemic anticoagulation restarted  per  patient/cardiology decision   Morbid obesity with BMI 40%- supportive care   HTN- controlled,  follow up clinically   Some confusion, disorientation- neurology on case for further work up, mini mental evaluation done  Constipation- resolved   Medically stable for acute admission at [facility], will follow along with consultants       Electronically signed by Caleb Mcclain MD on 12/17/2021 at 8:39 AM

## 2021-12-17 NOTE — PROGRESS NOTES
Nutrition Assessment     Type and Reason for Visit: RD Nutrition Re-Screen/LOS    Nutrition Recommendations/Plan: None at this time. Nutrition Assessment:  Chart reviewed per LOS protocol. Patient has discharge note written with POC to tx to SNF. No risk for malnutrition identified at this time. Please consult if discharge held & nutrition assessment desired. MCurrent Nutrition Therapies:    ADULT DIET; Regular    Anthropometric Measures:  · Height: 6' 2\" (188 cm)  · Current Body Wt: 287 lb 7.7 oz (130.4 kg)   · BMI: 36.9    Nutrition Diagnosis:   No nutrition diagnosis at this time     Nutrition Interventions:   Food and/or Nutrient Delivery:  Continue Current Diet      Discharge Planning:   To SNF today        Electronically signed by Marlena Renae RD, LD on 12/17/21 at 10:10 AM EST

## 2021-12-17 NOTE — PROGRESS NOTES
This  was informed by Quita Barfield [address] supervisor that [employer] is able to transport patient from [facility] to [facility].  time is reported to be 7:30pm this evening. This  contacted patient's son Magalys Hernandez [facility]) via phone to advise of above. Pedro thanked this  for update.   SS to continue to follow as needed while patient is at [facility]

## 2021-12-18 LAB
ALBUMIN SERPL-MCNC: 3.1 G/DL (ref 3.75–5.01)
ALPHA-1-GLOBULIN: 0.59 G/DL (ref 0.19–0.46)
ALPHA-2-GLOBULIN: 0.85 G/DL (ref 0.48–1.05)
ANA IGG, ELISA: NORMAL
BETA GLOBULIN: 0.77 G/DL (ref 0.48–1.1)
GAMMA GLOBULIN: 0.5 G/DL (ref 0.62–1.51)
IGA: 186 MG/DL (ref 68–408)
IGG: 490 MG/DL (ref 768–1632)
IGM: 93 MG/DL (ref 35–263)
IMMUNOFIXATION REFLEX: ABNORMAL
SPE/IFE INTERPRETATION: ABNORMAL
TOTAL PROTEIN: 5.8 G/DL (ref 6.3–8.2)

## 2021-12-19 LAB — VITAMIN B6: 23.6 NMOL/L (ref 20–125)

## 2021-12-20 ENCOUNTER — OFFICE VISIT (OUTPATIENT)
Dept: GERIATRIC MEDICINE | Age: 76
End: 2021-12-20
Payer: MEDICARE

## 2021-12-20 DIAGNOSIS — I48.0 PAF (PAROXYSMAL ATRIAL FIBRILLATION) (HCC): ICD-10-CM

## 2021-12-20 DIAGNOSIS — I50.43 CHF (CONGESTIVE HEART FAILURE), NYHA CLASS I, ACUTE ON CHRONIC, COMBINED (HCC): Primary | ICD-10-CM

## 2021-12-20 DIAGNOSIS — F10.10 ETOH ABUSE: ICD-10-CM
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
## 2021-12-20 DIAGNOSIS — M19.071 PRIMARY OSTEOARTHRITIS, RIGHT ANKLE AND FOOT: ICD-10-CM

## 2021-12-20 PROCEDURE — 99316 NF DSCHRG MGMT 30 MIN+: CPT | Performed by: NURSE PRACTITIONER

## 2021-12-20 NOTE — PROGRESS NOTES
[address] [facility] [place], [place]    2021    Miguel Ángel Mason  is a 68 y.o. in the [place] being seen for a f/u of   Chief Complaint   Patient presents with    Follow-Up from [facility] 3 d/c from rehab after fall & CHF       HPI  The pt is here for rehab after their hospitalization 12/10/21 - 21 after mechanical fall, CHF exacerbation, non sustain A fib onset. Was diuresed, evaluated by cardiology and full anticoag were initiated. Due to cognitive deficit he was evaluated by neurology service. carrotid US/brain MRI and EEG were performed. Per his son report patient takes alcohol daily      They are up for ambulation with therapy. Dyspnea with ambulation but NOT at rest.   They are eating and drinking OK per nursing. They have had no recent falls with injuries. They are not complaining of any un addressed pain issues. Have had no recent choking or dysphagia issues. NO agitation or unusual mental behavioral issues. No ETOH withdrawal BUT pt insists on going home. He is able to walk to therapy > 100 feet can transfer in and out of bed and can get out of house in emergency-is able to go up and down steps per therapy. Will plan on discharge asap but would like to keep pt few a few more days due to dyspnea an de conditioning. PT reports they are progressing with ambulation. OT reports they are progressing with ADLs. Family supportive. Plans to go home after rehab. He lives alone, wife just  and  has been relying heavily on neighbor who can no longer go over to home . Pt needs to be more independent.      Past Medical History:   Diagnosis Date    Bradycardia     CAD (coronary artery disease)     Cancer (HCC)     prostate- radiation     CHF (congestive heart failure) (HCC)     Depression     HSV-2 (herpes simplex virus 2) infection     Hyperlipidemia     Hypertension     Left knee DJD     Osteoarthritis     Rheumatoid arthritis(714.0)     Sexual activity: Never   Other Topics Concern    Not on file   Social History Narrative    Not on file     Social Determinants of Health     Financial Resource Strain:     Difficulty of Paying Living Expenses: Not on file   Food Insecurity: No Food Insecurity    Worried About Running Out of Food in the Last Year: Never true    Robert of Food in the Last Year: Never true   Transportation Needs: No Transportation Needs    Lack of Transportation (Medical): No    Lack of Transportation (Non-Medical): No   Physical Activity:     Days of Exercise per Week: Not on file    Minutes of Exercise per Session: Not on file   Stress:     Feeling of Stress : Not on file   Social Connections:     Frequency of Communication with Friends and Family: Not on file    Frequency of Social Gatherings with Friends and Family: Not on file    Attends Yarsanism Services: Not on file    Active Member of [address] [place] [address] [employer] or Organizations: Not on file    Attends Club or Organization Meetings: Not on file    Marital Status: Not on file   Intimate Partner Violence:     Fear of Current or Ex-Partner: Not on file    Emotionally Abused: Not on file    Physically Abused: Not on file    Sexually Abused: Not on file   Housing Stability:     Unable to Pay for Housing in the Last Year: Not on file    Number of [place] in the Last Year: Not on file    Unstable Housing in the Last Year: Not on file       Allergies: Hylan g-f 20, Ace inhibitors, and Statins depletion therapy  NF MEDICATIONS REVIEWED  Folic Acid 1 mg daily   hydralazine 50 mg at bedtime   ketorolac tromethamine solution 0.4% 1 drop both eyes twice daily   losartan potassium 25 mg in the morning metoprolol succinate ER 25 at bedtime Mobic 15 mg daily   pantoprazole 40 mg daily   potassium chloride ER 20 mEq daily   rosuvastatin calcium tablet 40 mg daily tamsulosin 0.4 mg daily   torsemide 20 mg daily.     Allopurinol 200 mg daily amlodipine 10 mg daily   apixaban 5 mg twice daily for atrial fibrillation aspirin 81 mg daily   Enbrel solution 25 mg subcutaneously weekly for rheumatoid arthritis      ROS:   Constitutional: There are no reports of behavioral issues, change in appetite, fever, or increased weakness. No bleeding issues. No head aches, change in vision or hearing. Respiratory: denies SOB, ++dyspnea, dyspnea on exertion, change in exercise capacity  Cardiovascular: denies CP, lightheadedness, palpitations, PND, orthopnea, or claudication. GI: No N/V/D. : no reports of dysuria, continent of urine   Extremities: No reports of pain issues, edema  Psych: at baseline      Physical exam:   Weight 291 pounds respirations 18 blood pressure 128/70 temperature 97.6 pulse is 80 O2 saturation 96% on room air   constitutional: Awake and alert sitting up in chair; ambulated to therapy, general weakness  HEENT: Normocephalic, intact facial symmetry, EOMs intact, sclera non icteric, pupils are equal and round, buccal mucosa pink and moist  Speech clear    NECK: - carotid bruit, euthyroid, no mass visualized  Cardiovascular: Regular rate S1S2 normal, NO S3S4  Respiratory: LCTA, mild labored respirations, no accessory muscle use noted  GI: abdomen NT,  Obese. ND  : incontinent of urine  Extremities: trace  ankle edema, PPP  SKELETAL: no redness, or swelling over joints. Limited ROM  Psych: pleasant, poor judgement, normal thought content  SKIN: no gross skin lesions noted on visualized skin, warm and dry    ASSESSMENT:     Diagnosis Orders   1. CHF (congestive heart failure), NYHA class I, acute on chronic, combined ([facility] [address]     2. PAF (paroxysmal atrial fibrillation)      3. Primary osteoarthritis, right ankle and foot     4. ETOH abuse     5.  Fall, sequela         PLAN:  Pt/POA agrees with POC   He just arrived here for PT OT and he already wants to go home  Will continue PT OT will try to discharge tomorrow or the next few days we need to get set up for outpatient PT OT    He is 100 feet down to the gym and is doing quite well with that with his cane he walks over 100 feet he is also done stairs up and down which he did fairly good he was mildly short of breath that he did have some weakness but he would be safe to go home he can go to the bathroom go to a food source and get out of his house in an emergency   the 3 major goals for safety to return home    adhere to the [address] guidelines for HTN management and the NCEP ATP III guidelines for LDL-C management. Referral to Home Health:   Documentation of Face to Face Encounter   Certification statement    Name: Hafsa Appl: [facility]   Date: 2021                   : 1945    I completed a face-to-face encounter on 2021 with the above named patient. In this encounter a medical condition was addressed, which is the primary reason for home health care. Based on my findings, the following services are medically necessary (Check all the apply):    [x] Skilled Nursing [] Speech Language Pathology [x] Physical Therapy    [] MSW  [x] [address] [facility])  [x] Occupational Therapy    The following specific clinical findings (not the diagnosis) support the need for skilled services as selected above:    RN for medication management  Therapy for strengthening and increase in endurance to reduce general weakness and improve balance for ataxic gait. The follow findings support that this patient is homebound including the need for any assistance of others and he use of Assistive Devices - what makes leaving home a considerable and taxing effort:    He has dyspnea that causes leaving home taxing and needs assistance of one to leave home. Certification for [address]:  Based on the above findings, I certify that this patient meets the criteria for being homebound and has the skilled need for intermittent care as indicated above.   The patient is under my care and I have intitated the

## 2021-12-22 ENCOUNTER — OFFICE VISIT (OUTPATIENT)
Dept: GERIATRIC MEDICINE | Age: 76
End: 2021-12-22
Payer: MEDICARE
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
## 2021-12-22 DIAGNOSIS — I50.43 CHF (CONGESTIVE HEART FAILURE), NYHA CLASS I, ACUTE ON CHRONIC, COMBINED (HCC): ICD-10-CM

## 2021-12-22 DIAGNOSIS — J00 HEAD COLD: Primary | ICD-10-CM

## 2021-12-22 PROCEDURE — 99309 SBSQ NF CARE MODERATE MDM 30: CPT | Performed by: NURSE PRACTITIONER

## 2021-12-22 NOTE — ADT AUTH CERT
Heart Failure - Care Day 2 (12/12/2021) by Sharalyn Spurling, RN       Review Status Review Entered   Completed 12/13/2021 12:40      Criteria Review      Care Day: 2 Care Date: 12/12/2021 Level of Care: Telemetry    Guideline Day 2    Clinical Status    (X) * Hemodynamic stability    (X) * Mental status at baseline    12/13/2021 12:40 PM EST by Morgan Shea and oriented, thought content appropriate, normal insight    (X) * No evidence of myocardial ischemia    (X) * Cardiac rate and rhythm acceptable    12/13/2021 12:40 PM EST by Winifred Castro in atrial fibrillation    (X) * Oxygenation at baseline or improved    (X) * Pulmonary edema absent or improved    12/13/2021 12:40 PM EST by [facility]      Edema is improved.      1.16 liter negative fluid balance.     Interventions    (X) * Pulmonary catheter absent    Medications    (X) Diuretics    12/13/2021 12:40 PM EST by [facility]      furosemide 40 mgIntraVENousDaily    (X) Beta-blocker    12/13/2021 12:40 PM EST by [facility]      metoprolol succinate 25 mgOralDaily    (X) Possible nitrates, hydralazine    12/13/2021 12:40 PM EST by [facility]      hydrALAZINE 50 mgOralDaily    * Milestone   Additional Notes   CARE DAY 2    12/12/2021      Chief Complaint: weakness       Subjective: pt eating breakfast, looks comfortable        Medications:  Reviewed       Infusion Medications    Infusions Meds   · sodium chloride    · dextrose           Scheduled Medications    Scheduled Medications   · [START ON 12/13/2021] amLODIPine 10 mg Oral Daily   · amLODIPine 5 mg Oral Once   · enoxaparin 40 mg SubCUTAneous Daily   · lidocaine 1 patch TransDERmal Daily   · insulin lispro 0-12 Units SubCUTAneous TID WC   · insulin lispro 0-6 Units SubCUTAneous Nightly   · allopurinol 100 mg Oral Daily   · aspirin 81 mg Oral Daily   · folic acid 1 mg Oral Daily   · [Held by provider] furosemide 20 mg Oral Daily   · hydrALAZINE 50 mg Oral Daily   · ketorolac 1 drop Both Eyes BID · meloxicam 15 mg Oral Daily   · pantoprazole 40 mg Oral Daily   · rosuvastatin 20 mg Oral Nightly   · tamsulosin 0.4 mg Oral Daily   · metoprolol succinate 25 mg Oral Daily   · lidocaine 1 patch TransDERmal Nightly   · furosemide 40 mg IntraVENous BID   · potassium chloride 40 mEq Oral Daily with breakfast   · insulin lispro 0.03 Units/kg SubCUTAneous TID WC   · insulin lispro 0-3 Units SubCUTAneous Nightly         Exam:       BP (!) 181/94   Pulse 71   Temp 97.9 °F (36.6 °C) (Oral)   Resp 18   Ht 5' 11\" (1.803 m)   Wt 290 lb 9.6 oz (131.8 kg)   SpO2 91%   BMI 40.53 kg/m²        General appearance: No apparent distress, appears stated age and cooperative. HEENT: Pupils equal, round, and reactive to light. Conjunctivae/corneas clear. Neck: Supple, with full range of motion. No jugular venous distention. Trachea midline. Respiratory:  Normal respiratory effort. Clear to auscultation, bilaterally without Rales/Wheezes/Rhonchi. Cardiovascular: Regular rate and rhythm with normal S1/S2 without murmurs, rubs or gallops. Abdomen: Soft, non-tender, non-distended with normal bowel sounds. Musculoskeletal: edema bilaterally.  Full range of motion without deformity. Skin: Skin color, texture, turgor normal.  No rashes or lesions. Neurologic:  Neurovascularly intact without any focal sensory/motor deficits. Cranial nerves: II-XII intact, grossly non-focal.   Psychiatric: Alert and oriented, thought content appropriate, normal insight         BUN 31   CREAT 1.25      Assessment/Plan:       Active Hospital Problems     Diagnosis Date Noted   · CHF (congestive heart failure), NYHA class I, acute on chronic, [facility] [I50.43] 12/11/2021               DVT Prophylaxis: lovenox   Diet: ADULT DIET;  Regular   Code Status: Full Code       PT/OT Eval Status: done       Dispo -  Mechanical fall, weakness- PT on case   R ribcage pain, aggravated by deep inspiration- discussed with radiology yesterday- no ribs fracture were identified, continue with  local lidoderm patch, IV toradol   Elevated troponins, CAD- appreciate cardiology recommendations,. meds restarted, [facility] on case for further recommendations.  On ASA/statin, post STENTS placement     CHF/edema, elevated BNP, dyspnea- IV lasix initiated, 2 d echo pending   A fib- rate controlled, pt bradycardic, off systemic anticoagulation per patient decision    Morbid obesity with BMI 40%- supportive care    HTN- elevated BP- increase norvasc to 10 mg, follow up clinically    Medically stable for acute admission at [facility], will follow along with consultants       Cardiologist:    ASSESSMENT       1.  Acute diastolic and right-sided congestive heart failure secondary to dietary and medical noncompliance.  Patient stopped taking oral diuretics and noticed worsening peripheral edema and exertional shortness of breath.  Elevated proBNP level noted.  Previously noted to have normal LV systolic function.  Likely exacerbated by recurrence of atrial fibrillation at some point in the recent past.       2.  History of persistent atrial fibrillation.  Patient was on sotalol therapy prior to admission.  Discontinued as he is in atrial fibrillation and he is not anticoagulated.  Status post cardioversion several years ago. William Eason had opted against long-term anticoagulation therapy.       3.  Chronic sinus bradycardia with underlying sinus node dysfunction.       4.  Generalized fatigue and weakness.       5.  Accidental fall.  Traumatic right-sided chest discomfort.       6.  Atherosclerotic heart disease with remote angioplasty and stenting of the right coronary artery.  Moderate diffuse disease by cardiac cath 2013.  Normal LV systolic function.       7.  Primary hypertension.           PLAN       Sotalol discontinued as patient is in atrial fibrillation and currently is not anticoagulated.       He is agreeable to start on Eliquis 5 mg po BID.       Change IV Lasix to Torsemide 20 mg daily.       Review echocardiogram when available.       High dose amlodipine not ideal secondary to side effect of edema. Will decrease to 5 mg daily.       Increase hydralazine.        Follow-up with Dr. Gianfranco Baker to discuss plans for eventual repeat cardioversion.         Discussed with him the need for compliance with his medications.           Heart Failure - Care Day 1 (12/11/2021) by Tish Thrasher RN       Review Status Review Entered   Completed 12/13/2021 12:28      Criteria Review      Care Day: 1 Care Date: 12/11/2021 Level of Care: Telemetry    Guideline Day 1    Level Of Care    (X) ICU or intermediate care or floor    Clinical Status    (X) * Clinical Indications met    12/13/2021 12:28 PM EST by Ellyn Figueroa TO BE DYSPENIC   WEAKNESS WITH MECHANICAL FALL    (X) Tachypnea, edema, and dyspnea    Activity    (X) Initial bed rest    Interventions    (X) Cardiac biomarkers, BNP    12/13/2021 12:28 PM EST by Angélica Peters      TROPONIN 0.035    (X) Electrolytes    (X) Weigh    12/13/2021 12:28 PM EST by Angélica Peters      DAILY    Medications    (X) IV diuretics    12/13/2021 12:28 PM EST by Angélica Peters      furosemide (LASIX) injection 40 mg  Dose: 40 mg  Freq: 2 TIMES DAILY Route: IV    (X) Beta-blocker    12/13/2021 12:28 PM EST by Angélica Peters      metoprolol succinate (TOPROL XL) extended release tablet 25 mg  Dose: 25 mg  Freq: DAILY Route: PO    * Milestone   Additional Notes   CARE DAY 1    12/11/2021      Chief Complaint: Joylene Iftikhar, fall, dyspnea           History Of Present Illness:  72 y.o. male who presented to [facility] above complains.  He has a history of CAD, hypertension, hyperlipidemia, diabetes presents with fall, fatigue.  Patient has been having progressively worsening fatigue for the past couple of months.  He was going upstairs and lost his strength and tripped falling down the stairs.  He injured his chest and had. [facility] mentions been having shortness of breath that is worse with local lidoderm patch, IV toradol   Elevated troponins, CAD- appreciate cardiology recommendations,. meds restarted, [facility] on case for further recommendations. On ASA/statin, post STENTS placement     CHF/edema, elevated BNP, dyspnea- IV lasix initiated, 2 d echo pending   A fib- rate controlled, pt bradycardic, off systemic anticoagulation per patient decision    Morbid obesity with BMI 40%- supportive care    Medically stable for acute admission at [facility], will follow along with consultants             Heart Failure - Clinical Indications for Admission to Inpatient Care by Tressie Blizzard, RN       Review Status Review Entered   Completed 12/13/2021 12:25      Criteria Review      Clinical Indications for Admission to Inpatient Care    Most Recent : Vincent Lainez Most Recent Date: 12/13/2021 12:25 PM EST    (X) Admission is indicated for  1 or more  of the following  (1) (2) (3) (4) (5) (6):       (X) Dyspnea (above baseline) that persists despite observation care       12/13/2021 12:25 PM EST by Vincent Lainez         CHF/edema, elevated BNP, dyspnea- IV lasix initiated, 2 d echo pending     (X) Other Indication: Mechanical fall, weakness      Entered 12/13/2021 12:25 PM EST by Vincent Lainez     R ribcage pain, aggravated by deep inspiration- discussed with radiology regarding reconstruction CT report to include ribs visualization, mean while continue with pain meds.  Adding local lidoderm patch, IV toradol

## 2021-12-26 NOTE — PROGRESS NOTES
[address] [facility]. [place], Kristyn Wallis [place]    12/22/2021    Krish Aguila  is a 68 y.o. in the [place] being seen for a f/u of   Chief Complaint   Patient presents with    Head Congestion     rehab for fall & CHF       HPI  The pt is here for rehab after their hospitalization for her episode of CHF and falls at home is being seen today for 2-day history of head cold he says is nose is running no sore throat coughing congestion feels stuffy ears are popping and cracking but no decrease in hearing no fever cough is related to postnasal drip. He has decided now to stay he was was home today but he did agree to stay for a little longer he lost his wife last month and has been rather dependent on a neighbor and the neighbor is not available to continue to go there every day to make sure he is okay so he did agree to stay for least a week to get optimized CHF is under good control at this time he is not having any fluid overload or heart failure symptoms  He is progressing  well in therapy   they are up in chair but ambulation with therapy. They are eating and drinking OK per nursing. They are not complaining of any un addressed pain issues. Have had no recent choking or dysphagia issues. NO agitation or unusual mental behavioral issues. PT reports they are progressing with ambulation. OT reports they are progressing with ADLs. Family supportive. Plans to go home after rehab.      Past Medical History:   Diagnosis Date    Bradycardia     CAD (coronary artery disease)     Cancer (HCC)     prostate- radiation     CHF (congestive heart failure) (HCC)     Depression     HSV-2 (herpes simplex virus 2) infection     Hyperlipidemia     Hypertension     Left knee DJD     Osteoarthritis     Rheumatoid arthritis(714.0)     Type 2 diabetes mellitus without complication, without long-term current use of insulin ([facility] 1/10/2019     Past Surgical History:   Procedure Laterality Date  ANKLE SURGERY Right     pin    APPENDECTOMY      ARTHRODESIS Right 10/31/2016    RIGHT ANKLE ARTHRODESIS OF RIGHT ANKLE AND SUBTALAR JOINT, C-ARM, ABHIJEET EQUIPMENT SYNTHETIC BONE GRAFT, ALLOGRAFT CANCELLOUS, SHARON ANKLE FUSION NAIL, SHANDA IMPLANT BONE STIMULATOR, CHOICE ANESTHESIA, BLOCK  performed by Renuka Huggins MD at [address]      stent x 5    COLONOSCOPY  11    FRACTURE SURGERY      right ankle    HARDWARE REMOVAL FOOT / ANKLE Right 2017    RIGHT ANKLE HARDWARE REMOVAL performed by Regina Carvajal MD at [address] Bilateral     knees    ND COLON CA SCRN NOT  [address] IND N/A 2017    COLONOSCOPY performed by Kye Corcoran DO at [facility] Left     TONSILLECTOMY AND ADENOIDECTOMY       Family History   Problem Relation Age of Onset    Heart Attack Father     Cancer Father         colon    High Cholesterol Mother     Heart Disease Mother     Macular Degen Mother     Arthritis Sister         hip replacement    Other Brother         vertigo    No Known Problems Son     No Known Problems Son      Social History     Socioeconomic History    Marital status:      Spouse name: Not on file    Number of children: Not on file    Years of education: Not on file    Highest education level: Not on file   Occupational History    Not on file   Tobacco Use    Smoking status: Former Smoker     Packs/day: 0.25     Years: 7.00     Pack years: 1.75     Types: Cigarettes     Start date: 5     Quit date: 6/3/1992     Years since quittin.5    Smokeless tobacco: Never Used   Vaping Use    Vaping Use: Never used   Substance and Sexual Activity    Alcohol use:  Yes     Alcohol/week: 0.0 standard drinks     Comment: weekly- 2 beers or wine    Drug use: No    Sexual activity: Never   Other Topics Concern    Not on file   Social History Narrative    Not on file     Social Determinants of Health     Financial Resource Strain:     Difficulty of Paying Living Expenses: Not on file   Food Insecurity: No Food Insecurity    Worried About Running Out of Food in the Last Year: Never true    Ran Out of Food in the Last Year: Never true   Transportation Needs: No Transportation Needs    Lack of Transportation (Medical): No    Lack of Transportation (Non-Medical): No   Physical Activity:     Days of Exercise per Week: Not on file    Minutes of Exercise per Session: Not on file   Stress:     Feeling of Stress : Not on file   Social Connections:     Frequency of Communication with Friends and Family: Not on file    Frequency of Social Gatherings with Friends and Family: Not on file    Attends [employer] Services: Not on file    Active Member of [address] [place] [address] or Organizations: Not on file    Attends Club or Organization Meetings: Not on file    Marital Status: Not on file   Intimate Partner Violence:     Fear of Current or Ex-Partner: Not on file    Emotionally Abused: Not on file    Physically Abused: Not on file    Sexually Abused: Not on file   Housing Stability:     Unable to Pay for Housing in the Last Year: Not on file    Number of [place] in the Last Year: Not on file    Unstable Housing in the Last Year: Not on file       Allergies: Hylan g-f 20, Ace inhibitors, and Statins depletion therapy  NF MEDICATIONS REVIEWED    ROS:   Constitutional: There are no reports of behavioral issues, change in appetite, fever, or increased weakness. No bleeding issues. No head aches, change in vision or hearing. Head cold symptoms  Respiratory: denies SOB, dyspnea, dyspnea on exertion, change in exercise capacity and no coughing  Cardiovascular: denies CP, lightheadedness, palpitations, PND, orthopnea, or claudication. GI: No N/V/D.    : no reports of dysuria, continent of urine   Extremities: No reports of pain issues, edema  Psych: at baseline   lucid    Physical exam:   VS   blood pressure 131/86 temperature 97.6 pulse 67 respirations 18 weight 286 pounds  Constitutional: Awake and alert sitting up in chair, no further general weakness  HEENT: Normocephalic, intact facial symmetry, EOMs intact, sclera non icteric, pupils are equal and round, buccal mucosa pink and moist.  Bilateral nasal passages are not patent or stuffy he has clear nasal drainage oropharynx clear no purulent noted  TAVR are clear no runny eyes  Speech clear   NECK: - carotid bruit, euthyroid, no mass visualized  Cardiovascular: Regular rate S1S2 normal, NO S3S4  Respiratory: LCTA, even unlabored respirations, no accessory muscle use noted  GI: Obese , abdomen NT, +BS   ND  : incontinent of urine  Extremities: no ankle edema, PPP  SKELETAL: no redness, or swelling over joints. Limited ROM but spontaneous movement x 4   Psych: pleasant,  good judgement, normal thought content  SKIN: no gross skin lesions noted on visualized skin, warm and dry    ASSESSMENT:     Diagnosis Orders   1. Head cold     2. Fall, subsequent encounter     3. CHF (congestive heart failure), NYHA class I, acute on chronic, combined (HCC)         PLAN:  Pt/POA agrees with POC   T OT rehabilitation for his falls asymptomatic of any CHF at this time he seems to be well compensated  We will add Zyrtec Robitussin and Sudafed for his cold symptoms and continue to follow encouraged him to stay for about a week for a therapy    Return if symptoms worsen or fail to improve. Pertinent POC, medications, labs, have been reviewed, continue same. Encourage fluids and good nutrition. Stress fall prevention strategies. Shaylee Pinzon, GIUSEPPE-CNP      Shaylee Pinzon DNP, MSN, RN, GNP-BC, NP-C  Adult/[place] Nurse Practitioner  [address] [place] [address] [place], [facility]  Department of Geriatrics  8:30am-4:30pm   [phone]  After hours Answering service [phone]      Please note this report is partially produced by using speech recognition hardware.   It may contain errors related to the system, including grammar,

## 2021-12-28 ENCOUNTER — OFFICE VISIT (OUTPATIENT)
Dept: GERIATRIC MEDICINE | Age: 76
End: 2021-12-28
Payer: MEDICARE

## 2021-12-28 DIAGNOSIS — I50.43 CHF (CONGESTIVE HEART FAILURE), NYHA CLASS I, ACUTE ON CHRONIC, COMBINED (HCC): ICD-10-CM

## 2021-12-28 DIAGNOSIS — R80.9 TYPE 2 DIABETES MELLITUS WITH MICROALBUMINURIA, WITHOUT LONG-TERM CURRENT USE OF INSULIN (HCC): Primary | ICD-10-CM

## 2021-12-28 DIAGNOSIS — E11.29 TYPE 2 DIABETES MELLITUS WITH MICROALBUMINURIA, WITHOUT LONG-TERM CURRENT USE OF INSULIN (HCC): Primary | ICD-10-CM

## 2021-12-28 DIAGNOSIS — R53.1 WEAKNESS: ICD-10-CM

## 2021-12-28 DIAGNOSIS — I10 ESSENTIAL HYPERTENSION: ICD-10-CM

## 2021-12-28 DIAGNOSIS — R26.81 UNSTEADINESS: ICD-10-CM

## 2021-12-28 PROCEDURE — 99305 1ST NF CARE MODERATE MDM 35: CPT | Performed by: INTERNAL MEDICINE

## 2021-12-30 ENCOUNTER — OFFICE VISIT (OUTPATIENT)
Dept: GERIATRIC MEDICINE | Age: 76
End: 2021-12-30
Payer: MEDICARE

## 2021-12-30 DIAGNOSIS — J20.9 ACUTE BRONCHITIS, UNSPECIFIED ORGANISM: Primary | ICD-10-CM

## 2021-12-30 PROCEDURE — 99309 SBSQ NF CARE MODERATE MDM 30: CPT | Performed by: NURSE PRACTITIONER

## 2022-01-03 ENCOUNTER — OFFICE VISIT (OUTPATIENT)
Dept: GERIATRIC MEDICINE | Age: 77
End: 2022-01-03
Payer: MEDICARE

## 2022-01-03 DIAGNOSIS — I48.0 PAF (PAROXYSMAL ATRIAL FIBRILLATION) (HCC): ICD-10-CM

## 2022-01-03 DIAGNOSIS — J20.9 ACUTE BRONCHITIS, UNSPECIFIED ORGANISM: Primary | ICD-10-CM

## 2022-01-03 DIAGNOSIS — R53.1 GENERAL WEAKNESS: ICD-10-CM

## 2022-01-03 DIAGNOSIS — I50.43 CHF (CONGESTIVE HEART FAILURE), NYHA CLASS I, ACUTE ON CHRONIC, COMBINED (HCC): ICD-10-CM

## 2022-01-03 PROCEDURE — 99309 SBSQ NF CARE MODERATE MDM 30: CPT | Performed by: NURSE PRACTITIONER

## 2022-01-04 LAB — METHYLMALONIC ACID: 0.26 UMOL/L (ref 0–0.4)

## 2022-01-05 NOTE — PROGRESS NOTES
[address] [facility]. Formerly [facility], [address] [place]    1/3/2022    Nancy Fields  is a 68 y.o. in the NF being seen for a f/u of   Chief Complaint   Patient presents with    Other     bronchitis        HPI   The pt is here for rehab after their hospitalization for recent fall from CHF and A fib. They are up   using cane for ambulation with therapy. Had acute bronchitis while here. He feels better per his report. He is not coughing much and no longer has aches, pains, or SOB. No fever. Still has cough but not as much and has runny nose, would like antihistamine. Wants to go home soon. They are eating and drinking OK per nursing. They have had no recent falls with injuries. They are not complaining of any un addressed pain issues. Have had no recent choking or dysphagia issues. NO agitation or unusual mental behavioral issues. PT reports they are progressing with ambulation. OT reports they are progressing with ADLs. Family supportive. Plans to go home after rehab.      Past Medical History:   Diagnosis Date    Bradycardia     CAD (coronary artery disease)     Cancer (HCC)     prostate- radiation     CHF (congestive heart failure) (HCC)     Depression     HSV-2 (herpes simplex virus 2) infection     Hyperlipidemia     Hypertension     Left knee DJD     Osteoarthritis     Rheumatoid arthritis(714.0)     Type 2 diabetes mellitus without complication, without long-term current use of insulin ([facility] [address] 1/10/2019     Past Surgical History:   Procedure Laterality Date    ANKLE SURGERY Right     pin    APPENDECTOMY      ARTHRODESIS Right 10/31/2016    RIGHT ANKLE ARTHRODESIS OF RIGHT ANKLE AND SUBTALAR JOINT, C-ARM, ABHIJEET EQUIPMENT SYNTHETIC BONE GRAFT, ALLOGRAFT CANCELLOUS, SHARON ANKLE FUSION NAIL, SHANDA IMPLANT BONE STIMULATOR, CHOICE ANESTHESIA, BLOCK  performed by Nazanin Gutiérrez MD at [facility]      stent x 5    COLONOSCOPY  7-19-11    FRACTURE SURGERY      right ankle    HARDWARE REMOVAL FOOT / ANKLE Right 2017    RIGHT ANKLE HARDWARE REMOVAL performed by Shirley Amado MD at [address] Bilateral     knees    NC COLON CA SCRN NOT  [address] IND N/A 2017    COLONOSCOPY performed by Romana Mayo, DO at [facility] Left     TONSILLECTOMY AND ADENOIDECTOMY       Family History   Problem Relation Age of Onset    Heart Attack Father     Cancer Father         colon    High Cholesterol Mother     Heart Disease Mother     Macular Degen Mother     Arthritis Sister         hip replacement    Other Brother         vertigo    No Known Problems Son     No Known Problems Son      Social History     Socioeconomic History    Marital status:      Spouse name: Not on file    Number of children: Not on file    Years of education: Not on file    Highest education level: Not on file   Occupational History    Not on file   Tobacco Use    Smoking status: Former Smoker     Packs/day: 0.25     Years: 7.00     Pack years: 1.75     Types: Cigarettes     Start date: 5     Quit date: 6/3/1992     Years since quittin.6    Smokeless tobacco: Never Used   Vaping Use    Vaping Use: Never used   Substance and Sexual Activity    Alcohol use: Yes     Alcohol/week: 0.0 standard drinks     Comment: weekly- 2 beers or wine    Drug use: No    Sexual activity: Never   Other Topics Concern    Not on file   Social History Narrative    Not on file     Social Determinants of Health     Financial Resource Strain:     Difficulty of Paying Living Expenses: Not on file   Food Insecurity: No Food Insecurity    Worried About Running Out of Food in the Last Year: Never true    Robert of Food in the Last Year: Never true   Transportation Needs: No Transportation Needs    Lack of Transportation (Medical): No    Lack of Transportation (Non-Medical):  No   Physical Activity:     Days of Exercise per Week: Not on file    Minutes of Exercise per Session: Not on file   Stress:     Feeling of Stress : Not on file   Social Connections:     Frequency of Communication with Friends and Family: Not on file    Frequency of Social Gatherings with Friends and Family: Not on file    Attends [employer] Services: Not on file    Active Member of [address] [place] [address] or Organizations: Not on file    Attends Club or Organization Meetings: Not on file    Marital Status: Not on file   Intimate Partner Violence:     Fear of Current or Ex-Partner: Not on file    Emotionally Abused: Not on file    Physically Abused: Not on file    Sexually Abused: Not on file   Housing Stability:     Unable to Pay for Housing in the Last Year: Not on file    Number of [place] in the Last Year: Not on file    Unstable Housing in the Last Year: Not on file       Allergies: Hylan g-f 20, Ace inhibitors, and Statins depletion therapy  NF MEDICATIONS REVIEWED  Folic acid 1 mg daily   allopurinol 100 mg daily   hydralazine 50 mg daily   metoprolol succinate ER 20 mg daily tamsulosin 04 mg daily   torsemide 20 mg daily   Mobic 15 mg daily   aspirin 81 mg daily   rosuvastatin 40 mg daily   amlodipine 10 mg daily   pantoprazole 40 mg daily   apixaban 5 mg twice daily   ketorolac solution 0.4% 1 drop both eyes twice daily   losartan potassium 25 mg daily   Enbrel solution 40 mg subcutaneously at bedtime weekly patient brought in his own supply   Nitrostat 0.4 mg every 5 minutes as needed for chest pain as needed potassium chloride ER 20 mEq  2 tablets p.o. daily   Cipro 500 mg twice daily through 1/6 Zyrtec 5 mg p.o. daily  Tylenol as needed    ROS:   Constitutional: There are no reports of behavioral issues, change in appetite, fever, or increased weakness. No bleeding issues. No head aches, change in vision or hearing.    Respiratory: denies SOB, dyspnea, dyspnea on exertion, change in exercise capacity  + runny nose  Cardiovascular: denies CP, lightheadedness, palpitations, PND, orthopnea, or claudication. GI: No N/V/D. : no reports of dysuria, continent of urine   Extremities: No reports of pain issues, edema  Psych: at baseline   lucid    Physical exam:    VS   WT   291lb  T  96.5  P  56  R   18  BP   147/65    Constitutional: Awake and alert sitting up in chair, no general weakness  HEENT: no oral-pharynx discharge. Nasal drainage clear,   buccal mucosa pink and moist  Cardiovascular: Regular rate S1S2 normal, NO S3S4  Respiratory: LCTA, even unlabored respirations, no accessory muscle use noted    ASSESSMENT:     Diagnosis Orders   1. Acute bronchitis, unspecified organism     2. General weakness     3. CHF (congestive heart failure), NYHA class I, acute on chronic, combined ([facility] [address]     4. PAF (paroxysmal atrial fibrillation)          PLAN:  Pt/POA agrees with POC resolving, will re start antihistamine for another week  Weakness resolved, is able to meet rehab goals, return home with therapy  CHF resolved  AFib stable in NSR. Return if symptoms worsen or fail to improve. Pertinent POC, medications, labs, have been reviewed, continue same. Encourage fluids and good nutrition. Referral to Home Health:   Documentation of Face to Face Encounter   Certification statement    Name: Adrian Manzanares: [facility]  Date: 1/3/2022                   : 1945    I completed a face-to-face encounter on 1/3/2022 with the above named patient. In this encounter a medical condition was addressed, which is the primary reason for home health care.     Based on my findings, the following services are medically necessary (Check all the apply):    [x] Skilled Nursing [] Speech Language Pathology [x] Physical Therapy    [] MSW  [] [address] [facility])  [x] Occupational Therapy    The following specific clinical findings (not the diagnosis) support the need for skilled services as selected above:    RN for medication management  Therapy for strengthening and increase in endurance to reduce general weakness and improve balance for ataxic gait. The follow findings support that this patient is homebound including the need for any assistance of others and he use of Assistive Devices - what makes leaving home a considerable and taxing effort:    Using  cane that causes leaving home taxing and needs assistance of one to leave home. Certification for [address]:  Based on the above findings, I certify that this patient meets the criteria for being homebound and has the skilled need for intermittent care as indicated above. The patient is under my care and I have intitated the request for the Plan of Care. A physician and/or non-physician practitioner and collaborating physician will follow this patient and periodically review the POC. Signature:      Date: 1/3/2022    Dr. Oz Rodas collaborator      Stress fall prevention strategies. Cornelio Ventura APRN-CNP      Cornelio Ventura, ANDREAS, MSN, RN, GNP-BC, NP-C  Adult/[place] Nurse Practitioner  [address] [place] [address], [address] [place] [address]  Department of Geriatrics  8:30am-4:30pm   [phone]  After hours Answering service [phone]      Please note this report is partially produced by using speech recognition hardware. It may contain errors related to the system, including grammar, punctuation and spelling as well as words and phrases that may seem inaccurate.   For any questions or concerns feel free to contact me for clarification

## 2022-01-06 ENCOUNTER — TELEPHONE (OUTPATIENT)
Dept: FAMILY MEDICINE CLINIC | Age: 77
End: 2022-01-06

## 2022-01-06 NOTE — TELEPHONE ENCOUNTER
----- Message from Adiin Merlin sent at 1/6/2022  9:37 AM EST -----  Subject: Message to Provider    QUESTIONS  Information for Provider? Jade from [facility] is calling to get   a verbal order to start Home health with the patient after leaving the   hospital. Please give her a call back at [phone] ext. [phone]  ---------------------------------------------------------------------------  --------------  Rody SPAULDING  What is the best way for the office to contact you? OK to leave message on   voicemail  Preferred Call Back Phone Number?  [phone]  ---------------------------------------------------------------------------  --------------  SCRIPT ANSWERS  undefined

## 2022-01-07 ENCOUNTER — CARE COORDINATION (OUTPATIENT)
Dept: CASE MANAGEMENT | Age: 77
End: 2022-01-07

## 2022-01-07 NOTE — CARE COORDINATION
[facility] Transitions Initial Follow Up Call    Call within 2 business days of discharge: Yes    Patient: Pearl Adame Patient : [id number]   MRN: <[id number]>  Reason for Admission: CHF  Discharge Date: 21 RARS: Readmission Risk Score: 15.9 ( )      Last Discharge [facility]       Complaint Diagnosis Description Type Department Provider    12/10/21   Admission (Discharged) [address], MD        Acute Care Course: Patient admitted to [facility] for a CHF exacerbation from 12/10-. He discharged to [facility] from -22      Sig Hx: Fall, Afib    Medication: Started on Eliquis    DME:     Conversation: Attempted to contact patient for initial YUVAL call. Caller left a Hipaa compliant message introducing self, role, nature of the call and contact information for a call back. Follow up plan:  Will re-attempt    Non-face-to-face services provided:      Care Transitions 24 Hour Call    Care Transitions Interventions         Follow Up  Future Appointments   Date Time Provider Emiliano Bermudez   2022 10:45 AM Brock Coleman MD [facility]   3/8/2022  1:00 PM MD Jose Alejandro Brennan, RN

## 2022-01-10 ENCOUNTER — CARE COORDINATION (OUTPATIENT)
Dept: CASE MANAGEMENT | Age: 77
End: 2022-01-10

## 2022-01-10 NOTE — CARE COORDINATION
[facility] Transitions Initial Follow Up Call    Call within 2 business days of discharge: Yes    Patient: Qamar Woodruff Patient : [birth year]   MRN: <[id number]>  Reason for Admission: CHF  Discharge Date: 21 RARS: Readmission Risk Score: 15.9 ( )      Last Discharge [facility]       Complaint Diagnosis Description Type Department Provider    12/10/21   Admission (Discharged) [address], MD        Acute Care Course: Patient admitted to [facility] for a CHF exacerbation from 12/10-. He discharged to [facility] from -22        Sig Hx: Fall, Afib     Medication: Started on Eliquis     DME:      Conversation: 2nd and final attempt to contact patient for initial YUVAL call. Caller left a Hipaa compliant message introducing self, role, nature of the call and contact information for a call back.  Message includes final attempt.        Follow up plan: No further outreach     Non-face-to-face services provided:      Care Transitions 24 Hour Call    Care Transitions Interventions         Follow Up  Future Appointments   Date Time Provider Emiliano Bermudez   2022 10:45 AM Isaias Turk MD [facility]   3/8/2022  1:00 PM MD Anatoly Butler RN

## 2022-01-11 ENCOUNTER — CARE COORDINATION (OUTPATIENT)
Dept: CASE MANAGEMENT | Age: 77
End: 2022-01-11

## 2022-01-11 DIAGNOSIS — I50.43 CHF (CONGESTIVE HEART FAILURE), NYHA CLASS I, ACUTE ON CHRONIC, COMBINED (HCC): Primary | ICD-10-CM

## 2022-01-11 PROCEDURE — 1111F DSCHRG MED/CURRENT MED MERGE: CPT | Performed by: FAMILY MEDICINE

## 2022-01-11 NOTE — CARE COORDINATION
[facility] Transitions Initial Follow Up Call    Call within 2 business days of discharge: Yes    Patient: aKnchan Bass Patient :    MRN: [id number]  Reason for Admission: CHF  Discharge Date: 21 RARS: Readmission Risk Score: 15.9 ( )      Last Discharge [facility]       Complaint Diagnosis Description Type Department Provider    12/10/21   Admission (Discharged) [address], MD        Acute Care Course: Patient admitted to [facility] for a CHF exacerbation from 12/10-. He discharged to [facility] from -22        Sig Hx: Fall, Afib     Medication: Started on Eliquis     DME: Cane if needed     Conversation: Patient returned requested call. Patient states he is doing very well. Denies sob, cp, cough, congestion, swelling. Patient ambulates independently most of the time. He has a cane if needed. He performs ADL's without assistance. He is eating and drinking good. Patient states [facility] has not been out to his home. Called Michael per Daryn patient had a visit . Patient told them not today. Care Coordinator London Hurt states they have left several messages. States she will attempt after this conversation. PCP f/u appointment scheduled 21.  Medication review completed.     Follow up plan:  Will hand off to [facility]     Non-face-to-face services provided:      Care Transitions 24 Hour Call    Do you have any ongoing symptoms?: No  Do you have a copy of your discharge instructions?: Yes  Do you have all of your prescriptions and are they filled?: Yes  Have you been contacted by a [address]?: No  Have you scheduled your follow up appointment?: Yes (Comment: 22 PCP)  Were you discharged with any Home Care or Post Acute Services: Yes  Post Acute Services: [facility] [address] you feel like you have everything you need to keep you well at home?: Yes  Care Transitions Interventions         Follow Up  Future Appointments   Date Time Provider Department Center   2022 10:45 AM Ras Shell  [facility]   3/8/2022  1:00 PM Alesia Mosqueda MD [facility]     Transitions of Care Initial Call    Was this an external facility discharge? No     Challenges to be reviewed by the provider   Additional needs identified to be addressed with provider: No  none             Method of communication with provider : none      Advance Care Planning:   Does patient have an Advance Directive: reviewed and current, reviewed and needs to be updated, not on file; education provided, not on file, patient declined education, decision maker updated and referral to internal ACP facilitator. Was this a readmission? No  Patient stated reason for admission: CHF  Patients top risk factors for readmission: medical condition-CHF    Care Transition Nurse (CTN) contacted the patient by telephone to perform post hospital discharge assessment. Verified name and  with patient as identifiers. Provided introduction to self, and explanation of the CTN role. CTN reviewed discharge instructions, medical action plan and red flags with patient who verbalized understanding. Patient given an opportunity to ask questions and does not have any further questions or concerns at this time. Were discharge instructions available to patient? Yes. Reviewed appropriate site of care based on symptoms and resources available to patient including: PCP and When to call 911. The patient agrees to contact the PCP office for questions related to their healthcare. Medication reconciliation was performed with patient, who verbalizes understanding of administration of home medications. Advised obtaining a 90-day supply of all daily and as-needed medications. Covid Risk Education     Educated patient about risk for severe COVID-19 due to risk factors according to CDC guidelines.  CTN reviewed discharge instructions, medical action plan and red flag symptoms with the patient who verbalized understanding. Discussed COVID vaccination status: Yes. Education provided on COVID-19 vaccination as appropriate. Discussed exposure protocols and quarantine with CDC Guidelines. Patient was given an opportunity to verbalize any questions and concerns and agrees to contact CTN or health care provider for questions related to their healthcare. Reviewed and educated patient on any new and changed medications related to discharge diagnosis. Was patient discharged with a pulse oximeter? No Discussed and confirmed pulse oximeter discharge instructions and when to notify provider or seek emergency care. CTN provided contact information. Plan for follow-up call in 3-5 days based on severity of symptoms and risk factors.   Plan for next call: symptom management-Continued progress/ Reminder of appointment        Esvin Eastman RN

## 2022-01-13 ENCOUNTER — TELEPHONE (OUTPATIENT)
Dept: FAMILY MEDICINE CLINIC | Age: 77
End: 2022-01-13

## 2022-01-13 NOTE — TELEPHONE ENCOUNTER
Pee Cervantes the  from [facility] called on pt. Wanted to know if the [facility] have started the home health care for him because since being discharged from [facility] he claims he has been off his meds, and not able to get around to get them. She wanted them to do an urgent check on him.  Please advise

## 2022-01-18 NOTE — TELEPHONE ENCOUNTER
Lvm with kamille to call our office back pt has not been seen in a year will be due for appointment fo rmedications

## 2022-01-26 ENCOUNTER — OFFICE VISIT (OUTPATIENT)
Dept: FAMILY MEDICINE CLINIC | Age: 77
End: 2022-01-26
Payer: MEDICARE

## 2022-01-26 VITALS
RESPIRATION RATE: 14 BRPM | TEMPERATURE: 97.5 F | OXYGEN SATURATION: 96 % | HEART RATE: 56 BPM | HEIGHT: 74 IN | WEIGHT: 287 LBS | DIASTOLIC BLOOD PRESSURE: 80 MMHG | BODY MASS INDEX: 36.83 KG/M2 | SYSTOLIC BLOOD PRESSURE: 124 MMHG

## 2022-01-26 DIAGNOSIS — R80.9 TYPE 2 DIABETES MELLITUS WITH MICROALBUMINURIA, WITHOUT LONG-TERM CURRENT USE OF INSULIN (HCC): Primary | ICD-10-CM

## 2022-01-26 DIAGNOSIS — R60.0 BILATERAL LEG EDEMA: ICD-10-CM

## 2022-01-26 DIAGNOSIS — I10 ESSENTIAL HYPERTENSION: ICD-10-CM

## 2022-01-26 DIAGNOSIS — Z12.11 COLON CANCER SCREENING: ICD-10-CM

## 2022-01-26 DIAGNOSIS — Z80.0 FAMILY HISTORY OF COLON CANCER: ICD-10-CM

## 2022-01-26 DIAGNOSIS — E78.2 MIXED HYPERLIPIDEMIA: ICD-10-CM

## 2022-01-26 DIAGNOSIS — E11.29 TYPE 2 DIABETES MELLITUS WITH MICROALBUMINURIA, WITHOUT LONG-TERM CURRENT USE OF INSULIN (HCC): Primary | ICD-10-CM

## 2022-01-26 PROCEDURE — 1111F DSCHRG MED/CURRENT MED MERGE: CPT | Performed by: FAMILY MEDICINE

## 2022-01-26 PROCEDURE — 99214 OFFICE O/P EST MOD 30 MIN: CPT | Performed by: FAMILY MEDICINE

## 2022-01-26 RX ORDER — NITROGLYCERIN 0.4 MG/1
0.4 TABLET SUBLINGUAL EVERY 5 MIN PRN
COMMUNITY

## 2022-01-26 RX ORDER — SOTALOL HYDROCHLORIDE 120 MG/1
60 TABLET ORAL
COMMUNITY
End: 2022-08-15 | Stop reason: ALTCHOICE

## 2022-01-26 RX ORDER — TAMSULOSIN HYDROCHLORIDE 0.4 MG/1
0.4 CAPSULE ORAL DAILY
Qty: 90 CAPSULE | Refills: 3 | Status: CANCELLED | OUTPATIENT
Start: 2022-01-26

## 2022-01-26 ASSESSMENT — PATIENT HEALTH QUESTIONNAIRE - PHQ9
SUM OF ALL RESPONSES TO PHQ QUESTIONS 1-9: 0
1. LITTLE INTEREST OR PLEASURE IN DOING THINGS: 0
SUM OF ALL RESPONSES TO PHQ QUESTIONS 1-9: 0
2. FEELING DOWN, DEPRESSED OR HOPELESS: 0
SUM OF ALL RESPONSES TO PHQ QUESTIONS 1-9: 0
SUM OF ALL RESPONSES TO PHQ9 QUESTIONS 1 & 2: 0
SUM OF ALL RESPONSES TO PHQ QUESTIONS 1-9: 0

## 2022-01-26 ASSESSMENT — ENCOUNTER SYMPTOMS
DIARRHEA: 0
VOMITING: 0

## 2022-01-26 NOTE — PROGRESS NOTES
Subjective:      Patient ID: Nancy Fields is a 68 y.o. male    Diabetes  He presents for his follow-up diabetic visit. He has type 2 diabetes mellitus. Pertinent negatives for diabetes include no chest pain and no weakness. Hypertension  Pertinent negatives include no chest pain. Hyperlipidemia  Pertinent negatives include no chest pain. Here in follow up from stay at nursing home following fall and chf exacerbation which occurred in December and subsequent admission to hospital for that   Currently still seeing cardiology. Hx of htn and lipids and diabetes. [facility] with [facility] and [facility]rips thru specialist and [facility]. Review of Systems   Constitutional: Negative for chills and fever. Cardiovascular: Negative for chest pain. Gastrointestinal: Negative for diarrhea and vomiting. Musculoskeletal: Positive for arthralgias. Skin: Negative for rash. Neurological: Negative for weakness. Reviewed allergy, medical, social, surgical, family and med list changes and updated   Files     Social History     Socioeconomic History    Marital status:      Spouse name: None    Number of children: None    Years of education: None    Highest education level: None   Occupational History    None   Tobacco Use    Smoking status: Former Smoker     Packs/day: 0.25     Years: 7.00     Pack years: 1.75     Types: Cigarettes     Start date: 5     Quit date: 6/3/1992     Years since quittin.6    Smokeless tobacco: Never Used   Vaping Use    Vaping Use: Never used   Substance and Sexual Activity    Alcohol use:  Yes     Alcohol/week: 0.0 standard drinks     Comment: weekly- 2 beers or wine    Drug use: No    Sexual activity: Never   Other Topics Concern    None   Social History Narrative    None     Social Determinants of Health     Financial Resource Strain:     Difficulty of Paying Living Expenses: Not on file   Food Insecurity:     Worried About Running Out of [employer] in the Last Year: Not on file    Ran Out of Food in the Last Year: Not on file   Transportation Needs:     Lack of Transportation (Medical): Not on file    Lack of Transportation (Non-Medical): Not on file   Physical Activity:     Days of Exercise per Week: Not on file    Minutes of Exercise per Session: Not on file   Stress:     Feeling of Stress : Not on file   Social Connections:     Frequency of Communication with Friends and Family: Not on file    Frequency of Social Gatherings with Friends and Family: Not on file    Attends Sabianist Services: Not on file    Active Member of [address] [place] [address] [employer] or Organizations: Not on file    Attends Club or Organization Meetings: Not on file    Marital Status: Not on file   Intimate Partner Violence:     Fear of Current or Ex-Partner: Not on file    Emotionally Abused: Not on file    Physically Abused: Not on file    Sexually Abused: Not on file   Housing Stability:     Unable to Pay for Housing in the Last Year: Not on file    Number of [place] in the Last Year: Not on file    Unstable Housing in the Last Year: Not on file     Current Outpatient Medications   Medication Sig Dispense Refill    nitroGLYCERIN (NITROSTAT) 0.4 MG SL tablet Place 0.4 mg under the tongue every 5 minutes as needed for Chest pain up to max of 3 total doses. If no relief after 1 dose, call 911.       sotalol (BETAPACE) 120 MG tablet Take 120 mg by mouth 2 times daily Take 1/2 tab twice a day      amLODIPine (NORVASC) 10 MG tablet Take 1 tablet by mouth daily (Patient taking differently: Take 10 mg by mouth daily Indications: High Blood Pressure Disorder ) 30 tablet 3    losartan (COZAAR) 25 MG tablet Take 1 tablet by mouth daily (Patient taking differently: Take 25 mg by mouth daily Indications: High Blood Pressure Disorder ) 30 tablet 3    metoprolol succinate (TOPROL XL) 25 MG extended release tablet Take 1 tablet by mouth daily (Patient taking differently: Take 25 mg by mouth nightly ) 30 tablet 3    torsemide (DEMADEX) 20 MG tablet Take 1 tablet by mouth daily (Patient taking differently: Take 20 mg by mouth daily Indications: Congestive Heart Failure ) 30 tablet 3    apixaban (ELIQUIS) 5 MG TABS tablet Take 1 tablet by mouth 2 times daily (Patient taking differently: Take 5 mg by mouth 2 times daily Indications: Atrial Fibrillation ) 60 tablet 3    potassium chloride (KLOR-CON M) 20 MEQ extended release tablet Take 2 tablets by mouth daily (with breakfast) (Patient taking differently: Take 20 mEq by mouth daily (with breakfast) Indications: Nutritional Support ) 60 tablet 3    rosuvastatin (CRESTOR) 40 MG tablet Take 40 mg by mouth every evening Indications: High Amount of Fats in the Blood       ketorolac 0.4 % SOLN ophthalmic solution Place 1 drop into both eyes 2 times daily Indications: Eye Pain   1    hydrALAZINE (APRESOLINE) 50 MG tablet Take 50 mg by mouth nightly Indications: High Blood Pressure Disorder       tamsulosin (FLOMAX) 0.4 MG capsule Take 1 capsule by mouth daily (Patient taking differently: Take 0.4 mg by mouth daily Indications: Urinary Retention ) 90 capsule 3    folic acid (FOLVITE) 1 MG tablet Take 1 mg by mouth daily Indications: Nutritional Support       meloxicam (MOBIC) 15 MG tablet Take 15 mg by mouth every evening Indications: Pain       allopurinol (ZYLOPRIM) 100 MG tablet Take 100 mg by mouth every evening Indications: Arthritis       acetaminophen (TYLENOL) 325 MG tablet Take 650 mg by mouth every 4 hours as needed for Pain or Fever       pantoprazole (PROTONIX) 40 MG tablet Take 40 mg by mouth daily Indications: Gastroesophageal Reflux Disease       etanercept (ENBREL) 25 MG/0.5ML SOSY Inject 25 mg into the skin once a week Indications: Rheumatoid Arthritis  (Patient not taking: Reported on 1/26/2022)      aspirin 81 MG tablet Take 81 mg by mouth daily Indications: Atrial Fibrillation  (Patient not taking: Reported on 1/26/2022)       No current facility-administered medications for this visit. Family History   Problem Relation Age of Onset    Heart Attack Father     Cancer Father         colon    High Cholesterol Mother     Heart Disease Mother     Macular Degen Mother     Arthritis Sister         hip replacement    Other Brother         vertigo    No Known Problems Son     No Known Problems Son      Past Medical History:   Diagnosis Date    Bradycardia     CAD (coronary artery disease)     Cancer ([address]     prostate- radiation     CHF (congestive heart failure) (HCC)     Depression     HSV-2 (herpes simplex virus 2) infection     Hyperlipidemia     Hypertension     Left knee DJD     Osteoarthritis     Rheumatoid arthritis(714.0)     Type 2 diabetes mellitus without complication, without long-term current use of insulin ([address] 1/10/2019     Objective:   /80   Pulse 56   Temp 97.5 °F (36.4 °C)   Resp 14   Ht 6' 2\" (1.88 m)   Wt 287 lb (130.2 kg)   SpO2 96%   BMI 36.85 kg/m²     Physical Exam  Neck:no carotid bruits. No masses. No adenopathy. No thyroid asymmetry. Lungs:clear and equal breath sounds. No wheezes or rales. Heart:rate reg. No murmur. No gallops   Pulses:Radials 2+ equal               Poster tib non palpable and equal   Extremities: 1+ pitting edema of both legs   Gen: In no acute distress  Abdomen; B.S present. Soft  Non tender. No hepatosplenomegaly. No masses   Patient refused foot exam   Assessment:       Diagnosis Orders   1. Type 2 diabetes mellitus with microalbuminuria, without long-term current use of insulin ([address]     2. Essential hypertension     3. Mixed hyperlipidemia     4. Bilateral leg edema     5. Family history of colon cancer  Scott Ladd MD, [facility]   6.  Colon cancer screening  Scott Ladd MD, [facility]         Plan:   Continue current meds   [facility] as [facility] and follow up with multiple specialists as already planned   Orders Placed This Encounter   Procedures   Erica Casiano MD, Gastroenterology, [place]     Referral Priority:   Routine     Referral Type:   Eval and Treat     Referral Reason:   Specialty Services Required     Referred to Provider:   Caitlin Cr MD     Requested Specialty:   Gastroenterology     Number of Visits Requested:   1    f/u in 12 months

## 2022-01-28 NOTE — PROGRESS NOTES
Patient Name: Eran Graham  YOB: 1945  Medical Record Number: [id number]        History of Present Illness: This 66-year-old gentleman was seen for initial history physical patient with known history of heart failure with atrial relation patient had mechanical fall was hospitalized did undergo eval for functional status patient did undergo MRI of the brain echo carotid ultrasound patient was eval for possible acute CVA patient was felt to be functionally weak patient has had prior heavy alcohol use. Patient was stabilized did undergo vascular neurology's cardiology. The patient did have CHF exacerbation was stabilized at this time transferred here for ongoing course of care. Patient is globally weak at this time. Review of Systems   Unable to perform ROS: Mental status change   All other systems reviewed and are negative. Review of Systems: All 14 review of systems negative other than as stated above    Social History     Tobacco Use    Smoking status: Former Smoker     Packs/day: 0.25     Years: 7.00     Pack years: 1.75     Types: Cigarettes     Start date: 5     Quit date: 6/3/1992     Years since quittin.6    Smokeless tobacco: Never Used   Vaping Use    Vaping Use: Never used   Substance Use Topics    Alcohol use:  Yes     Alcohol/week: 0.0 standard drinks     Comment: weekly- 2 beers or wine    Drug use: No         Past Medical History:   Diagnosis Date    Bradycardia     CAD (coronary artery disease)     Cancer (HCC)     prostate- radiation     CHF (congestive heart failure) (HCC)     Depression     HSV-2 (herpes simplex virus 2) infection     Hyperlipidemia     Hypertension     Left knee DJD     Osteoarthritis     Rheumatoid arthritis(714.0)     Type 2 diabetes mellitus without complication, without long-term current use of insulin ([facility] 1/10/2019           Past Surgical History:   Procedure Laterality Date    ANKLE SURGERY Right     pin    APPENDECTOMY      ARTHRODESIS Right 10/31/2016    RIGHT ANKLE ARTHRODESIS OF RIGHT ANKLE AND SUBTALAR JOINT, C-ARM, ABHIJEET EQUIPMENT SYNTHETIC BONE GRAFT, ALLOGRAFT CANCELLOUS, SHARON ANKLE FUSION NAIL, SHANDA IMPLANT BONE STIMULATOR, CHOICE ANESTHESIA, BLOCK  performed by Janeen Awad MD at [address] [facility] [address]      stent x 5    COLONOSCOPY  7-19-11    FRACTURE SURGERY      right ankle    HARDWARE REMOVAL FOOT / ANKLE Right 11/6/2017    RIGHT ANKLE HARDWARE REMOVAL performed by Felicia Smith MD at [address] Bilateral     knees    [place] COLON CA SCRN NOT  [address]  IND N/A 7/11/2017    COLONOSCOPY performed by Destiney Almanzar DO at [facility] Left     TONSILLECTOMY AND ADENOIDECTOMY           Current Outpatient Medications on File Prior to Visit   Medication Sig Dispense Refill    amLODIPine (NORVASC) 10 MG tablet Take 1 tablet by mouth daily (Patient taking differently: Take 10 mg by mouth daily Indications: High Blood Pressure Disorder ) 30 tablet 3    losartan (COZAAR) 25 MG tablet Take 1 tablet by mouth daily (Patient taking differently: Take 25 mg by mouth daily Indications: High Blood Pressure Disorder ) 30 tablet 3    metoprolol succinate (TOPROL XL) 25 MG extended release tablet Take 1 tablet by mouth daily (Patient taking differently: Take 25 mg by mouth nightly ) 30 tablet 3    torsemide (DEMADEX) 20 MG tablet Take 1 tablet by mouth daily (Patient taking differently: Take 20 mg by mouth daily Indications: Congestive Heart Failure ) 30 tablet 3    apixaban (ELIQUIS) 5 MG TABS tablet Take 1 tablet by mouth 2 times daily (Patient taking differently: Take 5 mg by mouth 2 times daily Indications: Atrial Fibrillation ) 60 tablet 3    potassium chloride (KLOR-CON M) 20 MEQ extended release tablet Take 2 tablets by mouth daily (with breakfast) (Patient taking differently: Take 20 mEq by mouth daily (with breakfast) Indications: Nutritional Support ) 60 tablet 3    rosuvastatin (CRESTOR) 40 MG tablet Take 40 mg by mouth every evening Indications: High Amount of Fats in the Blood       ketorolac 0.4 % SOLN ophthalmic solution Place 1 drop into both eyes 2 times daily Indications: Eye Pain   1    hydrALAZINE (APRESOLINE) 50 MG tablet Take 50 mg by mouth nightly Indications: High Blood Pressure Disorder       tamsulosin (FLOMAX) 0.4 MG capsule Take 1 capsule by mouth daily (Patient taking differently: Take 0.4 mg by mouth daily Indications: Urinary Retention ) 90 capsule 3    folic acid (FOLVITE) 1 MG tablet Take 1 mg by mouth daily Indications: Nutritional Support       meloxicam (MOBIC) 15 MG tablet Take 15 mg by mouth every evening Indications: Pain       etanercept (ENBREL) 25 MG/0.5ML SOSY Inject 25 mg into the skin once a week Indications: Rheumatoid Arthritis  (Patient not taking: Reported on 1/26/2022)      allopurinol (ZYLOPRIM) 100 MG tablet Take 100 mg by mouth every evening Indications: Arthritis       acetaminophen (TYLENOL) 325 MG tablet Take 650 mg by mouth every 4 hours as needed for Pain or Fever       pantoprazole (PROTONIX) 40 MG tablet Take 40 mg by mouth daily Indications: Gastroesophageal Reflux Disease       aspirin 81 MG tablet Take 81 mg by mouth daily Indications: Atrial Fibrillation  (Patient not taking: Reported on 1/26/2022)       No current facility-administered medications on file prior to visit.        Allergies   Allergen Reactions    Hylan G-F 20 Other (See Comments) and Swelling     Swelling in leg    Ace Inhibitors      cough    Statins Depletion Therapy Other (See Comments)     OKAY WITH CRESTOR, weakness         Family History   Problem Relation Age of Onset    Heart Attack Father     Cancer Father         colon    High Cholesterol Mother     Heart Disease Mother     Macular Degen Mother     Arthritis Sister         hip replacement    Other Brother         vertigo    No Known Problems Son     No Known Problems Son          Physical Exam:      Physical Exam  Vitals and nursing note reviewed. Constitutional:       Appearance: Normal appearance. He is obese. HENT:      Head: Normocephalic and atraumatic. Nose: Nose normal.      Mouth/Throat:      Mouth: Mucous membranes are moist.   Eyes:      Extraocular Movements: Extraocular movements intact. Cardiovascular:      Rate and Rhythm: Normal rate. Rhythm irregular. Pulses: Normal pulses. Heart sounds: No murmur heard. Pulmonary:      Effort: Pulmonary effort is normal.      Breath sounds: Wheezing present. Abdominal:      General: Bowel sounds are normal.      Palpations: Abdomen is soft. Tenderness: There is no abdominal tenderness. Musculoskeletal:         General: Swelling present. Normal range of motion. Cervical back: Normal range of motion. Skin:     General: Skin is warm. Findings: Bruising present. Neurological:      Mental Status: He is disoriented. Motor: Weakness present. Psychiatric:         Mood and Affect: Mood normal.         There were no vitals taken for this visit.       .   Lab Results   Component Value Date    WBC 7.4 12/17/2021    HGB 15.4 12/17/2021    HCT 45.2 12/17/2021    MCV 96.2 (H) 12/17/2021     12/17/2021     Lab Results   Component Value Date     12/17/2021    K 4.3 12/17/2021    K 3.2 12/11/2021     12/17/2021    CO2 23 12/17/2021    BUN 34 12/17/2021    CREATININE 1.30 12/17/2021    GLUCOSE 109 12/17/2021    GLUCOSE 91 06/07/2012    CALCIUM 8.8 12/17/2021                ASSESSMENT:  Patient Active Problem List   Diagnosis    Bradycardia    Mixed hyperlipidemia    Primary osteoarthritis involving multiple joints    Dysthymia    CAD (coronary artery disease)    RA [facility]) [facility]    Essential hypertension    Numbness in feet    Stented coronary artery    History of prostate cancer    History of colon polyps    Nuclear sclerosis of both eyes  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, left eye    Presbyopia    Regular astigmatism    Hyperuricemia    Chronic gastritis without bleeding    Type 2 diabetes mellitus with microalbuminuria, without long-term current use of insulin ([facility]    Retinal hemorrhage, bilateral    Intermediate stage nonexudative age-related macular degeneration of both eyes    Primary osteoarthritis, right ankle and foot    Pain in right ankle    Encounter for other orthopedic aftercare    Arthrodesis status    Unable to ambulate    PAF (paroxysmal atrial fibrillation)     CHF (congestive heart failure), NYHA class I, acute on chronic, combined ([address]         PLAN:   Diagnosis Orders   1. Type 2 diabetes mellitus with microalbuminuria, without long-term current use of insulin ([address]     2. CHF (congestive heart failure), NYHA class I, acute on chronic, combined ([address]     3. Essential hypertension     4. Unsteadiness     5. Weakness           Monitor blood sugars closely follow-up A1c is pending. Monitoring weights diuresing intermittently follow-up with cardiologist needed. Reorientation as able cognition improving at this time. Continue extra support. Monitor blood pressure physical therapy outpatient for this time.

## 2022-02-02 ENCOUNTER — APPOINTMENT (OUTPATIENT)
Dept: GENERAL RADIOLOGY | Age: 77
End: 2022-02-02
Payer: MEDICARE

## 2022-02-02 ENCOUNTER — HOSPITAL ENCOUNTER (EMERGENCY)
Age: 77
Discharge: HOME | End: 2022-02-02
Attending: STUDENT IN AN ORGANIZED HEALTH CARE EDUCATION/TRAINING PROGRAM
Payer: MEDICARE

## 2022-02-02 ENCOUNTER — APPOINTMENT (OUTPATIENT)
Dept: CT IMAGING | Age: 77
End: 2022-02-02
Payer: MEDICARE

## 2022-02-02 VITALS
DIASTOLIC BLOOD PRESSURE: 72 MMHG | SYSTOLIC BLOOD PRESSURE: 138 MMHG | OXYGEN SATURATION: 95 % | WEIGHT: 280 LBS | TEMPERATURE: 97.7 F | HEIGHT: 71 IN | HEART RATE: 65 BPM | BODY MASS INDEX: 39.2 KG/M2 | RESPIRATION RATE: 18 BRPM
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
## 2022-02-02 LAB
ALBUMIN SERPL-MCNC: 4 G/DL (ref 3.5–4.6)
ALP BLD-CCNC: 110 U/L (ref 35–104)
ALT SERPL-CCNC: 18 U/L (ref 0–41)
ANION GAP SERPL CALCULATED.3IONS-SCNC: 13 MEQ/L (ref 9–15)
AST SERPL-CCNC: 28 U/L (ref 0–40)
BASOPHILS ABSOLUTE: 0 K/UL (ref 0–0.2)
BASOPHILS RELATIVE PERCENT: 0.3 %
BILIRUB SERPL-MCNC: 0.6 MG/DL (ref 0.2–0.7)
BUN BLDV-MCNC: 21 MG/DL (ref 8–23)
CALCIUM SERPL-MCNC: 8.6 MG/DL (ref 8.5–9.9)
CHLORIDE BLD-SCNC: 101 MEQ/L (ref 95–107)
CO2: 27 MEQ/L (ref 20–31)
CREAT SERPL-MCNC: 1.2 MG/DL (ref 0.7–1.2)
EKG ATRIAL RATE: 64 BPM
EKG Q-T INTERVAL: 548 MS
EKG QRS DURATION: 84 MS
EKG QTC CALCULATION (BAZETT): 463 MS
EKG R AXIS: -5 DEGREES
EKG T AXIS: -54 DEGREES
EKG VENTRICULAR RATE: 43 BPM
EOSINOPHILS ABSOLUTE: 0.6 K/UL (ref 0–0.7)
EOSINOPHILS RELATIVE PERCENT: 5.2 %
GFR AFRICAN AMERICAN: >60
GFR NON-AFRICAN AMERICAN: 58.8
GLOBULIN: 2.4 G/DL (ref 2.3–3.5)
GLUCOSE BLD-MCNC: 123 MG/DL (ref 70–99)
HCT VFR BLD CALC: 40.5 % (ref 42–52)
HEMOGLOBIN: 14 G/DL (ref 14–18)
LYMPHOCYTES ABSOLUTE: 1.7 K/UL (ref 1–4.8)
LYMPHOCYTES RELATIVE PERCENT: 15.3 %
MAGNESIUM: 1.9 MG/DL (ref 1.7–2.4)
MCH RBC QN AUTO: 33.7 PG (ref 27–31.3)
MCHC RBC AUTO-ENTMCNC: 34.6 % (ref 33–37)
MCV RBC AUTO: 97.6 FL (ref 80–100)
MONOCYTES ABSOLUTE: 1.2 K/UL (ref 0.2–0.8)
MONOCYTES RELATIVE PERCENT: 10.7 %
NEUTROPHILS ABSOLUTE: 7.6 K/UL (ref 1.4–6.5)
NEUTROPHILS RELATIVE PERCENT: 68.5 %
PDW BLD-RTO: 16.4 % (ref 11.5–14.5)
PLATELET # BLD: 269 K/UL (ref 130–400)
PLATELET SLIDE REVIEW: ADEQUATE
POTASSIUM SERPL-SCNC: 3.8 MEQ/L (ref 3.4–4.9)
PRO-BNP: 1830 PG/ML
RBC # BLD: 4.14 M/UL (ref 4.7–6.1)
RBC # BLD: NORMAL 10*6/UL
SARS-COV-2, NAAT: NOT DETECTED
SODIUM BLD-SCNC: 141 MEQ/L (ref 135–144)
TOTAL CK: 76 U/L (ref 0–190)
TOTAL PROTEIN: 6.4 G/DL (ref 6.3–8)
TROPONIN: 0.02 NG/ML (ref 0–0.01)
TSH REFLEX: 2.12 UIU/ML (ref 0.44–3.86)
WBC # BLD: 11.1 K/UL (ref 4.8–10.8)

## 2022-02-02 PROCEDURE — 84443 ASSAY THYROID STIM HORMONE: CPT

## 2022-02-02 PROCEDURE — 71045 X-RAY EXAM CHEST 1 VIEW: CPT

## 2022-02-02 PROCEDURE — 6360000004 HC RX CONTRAST MEDICATION: Performed by: STUDENT IN AN ORGANIZED HEALTH CARE EDUCATION/TRAINING PROGRAM

## 2022-02-02 PROCEDURE — 70498 CT ANGIOGRAPHY NECK: CPT

## 2022-02-02 PROCEDURE — 71260 CT THORAX DX C+: CPT

## 2022-02-02 PROCEDURE — 83735 ASSAY OF MAGNESIUM: CPT

## 2022-02-02 PROCEDURE — 36415 COLL VENOUS BLD VENIPUNCTURE: CPT

## 2022-02-02 PROCEDURE — 82550 ASSAY OF CK (CPK): CPT

## 2022-02-02 PROCEDURE — 87635 SARS-COV-2 COVID-19 AMP PRB: CPT

## 2022-02-02 PROCEDURE — 99285 EMERGENCY DEPT VISIT HI MDM: CPT

## 2022-02-02 PROCEDURE — 83880 ASSAY OF NATRIURETIC PEPTIDE: CPT

## 2022-02-02 PROCEDURE — 85025 COMPLETE CBC W/AUTO DIFF WBC: CPT

## 2022-02-02 PROCEDURE — 93010 ELECTROCARDIOGRAM REPORT: CPT | Performed by: INTERNAL MEDICINE

## 2022-02-02 PROCEDURE — 6370000000 HC RX 637 (ALT 250 FOR IP): Performed by: STUDENT IN AN ORGANIZED HEALTH CARE EDUCATION/TRAINING PROGRAM

## 2022-02-02 PROCEDURE — 80053 COMPREHEN METABOLIC PANEL: CPT

## 2022-02-02 PROCEDURE — 84484 ASSAY OF TROPONIN QUANT: CPT

## 2022-02-02 PROCEDURE — 6830039000 HC L3 TRAUMA ALERT

## 2022-02-02 PROCEDURE — 70450 CT HEAD/BRAIN W/O DYE: CPT

## 2022-02-02 PROCEDURE — 93005 ELECTROCARDIOGRAM TRACING: CPT | Performed by: STUDENT IN AN ORGANIZED HEALTH CARE EDUCATION/TRAINING PROGRAM

## 2022-02-02 RX ORDER — ACETAMINOPHEN 500 MG
1000 TABLET ORAL ONCE
Status: COMPLETED | OUTPATIENT
Start: 2022-02-02 | End: 2022-02-02

## 2022-02-02 RX ADMIN — IOPAMIDOL 150 ML: 612 INJECTION, SOLUTION INTRAVENOUS at 07:05

## 2022-02-02 RX ADMIN — ACETAMINOPHEN 1000 MG: 500 TABLET ORAL at 07:53

## 2022-02-02 ASSESSMENT — ENCOUNTER SYMPTOMS
ABDOMINAL PAIN: 0
VOMITING: 0
SHORTNESS OF BREATH: 0
NAUSEA: 0
FACIAL SWELLING: 0
BACK PAIN: 0
EYE PAIN: 0

## 2022-02-02 ASSESSMENT — PAIN SCALES - GENERAL
PAINLEVEL_OUTOF10: 3
PAINLEVEL_OUTOF10: 8

## 2022-02-02 NOTE — ED PROVIDER NOTES
[facility] ED  eMERGENCY dEPARTMENT eNCOUnter      Pt Name: Winifred Mejia  MRN: [id number]  [place] 1945  Date of evaluation: 2/2/2022  Provider: Nam Mireles MD      HISTORY OF PRESENT ILLNESS      Chief Complaint   Patient presents with   [facility] Fall       The history is provided by the Patient and EMS. Winifred Mejia is a 68 y.o. male with a PMH clinically significant for HTN, DMII, HLD, CAD, OA, HF, pAfib on eliquis and prostate CA s/p radiation presenting to the ED via EMS c/o fall from standing occurring just prior to arrival associated with preceding dizziness and lightheadedness while bending over. EMS reporting that they found the patient laying on the ground face first.  State patient initially hypotensive with BP in the 80s over 50s. Also noted patient altered, did not know his address or his age which he normally knows. States that the patient lives alone and that they have pick them up before. Is on Eliquis. Vital signs otherwise unremarkable. No interventions in route. Patient states he was bending over while he was in his kitchen trying to  some pills he dropped. While bending over, started to feel lightheaded and dizzy causing him to fall. Shi Bibles onto his face and chest.  Denies any injuries from the fall. No associated headache, numbness, weakness, tingling, CP, S OB, abdominal pain, N/V, back pain. States he has been taking all medications as indicated besides his diuretics. Does not take them because they cause him to urinate too much. Called EMS because he could not get up. States BLE edema is at his baseline. Not significantly more short of breath than his baseline. States history of similar previous episodes. States they have otherwise been feeling well. Per Chart Review: Most recent admission from 12/10-12/16/21 appreciated. Admitted for mechanical fall, CHF exacerbation. Also noted to have new onset nonsustained A. fib.   Anticoagulation started at that time. Noted nonspecific neurological deficits at that time, so obtained multiple imaging modalities of the head and neck. MRI of the head unremarkable. Carotid ultrasound showing 50 to 69% stenosis of the left ICA. Patient was diuresed due to heart failure exacerbation and able to be discharged home. REVIEW OF SYSTEMS       Review of Systems   Constitutional: Negative for activity change and fever. HENT: Negative for facial swelling and nosebleeds. Eyes: Negative for pain and visual disturbance. Respiratory: Negative for shortness of breath. Cardiovascular: Negative for chest pain. Gastrointestinal: Negative for abdominal pain, nausea and vomiting. Genitourinary: Negative for hematuria. Musculoskeletal: Negative for back pain and neck pain. Skin: Negative for wound. Neurological: Positive for dizziness and light-headedness. Negative for weakness, numbness and headaches.        PAST MEDICAL HISTORY     Past Medical History:   Diagnosis Date    Bradycardia     CAD (coronary artery disease)     Cancer ([facility] [address]     prostate- radiation     CHF (congestive heart failure) (HCC)     Depression     HSV-2 (herpes simplex virus 2) infection     Hyperlipidemia     Hypertension     Left knee DJD     Osteoarthritis     Rheumatoid arthritis(714.0)     Type 2 diabetes mellitus without complication, without long-term current use of insulin ([facility] [address] 1/10/2019       SURGICAL HISTORY       Past Surgical History:   Procedure Laterality Date    ANKLE SURGERY Right     pin    APPENDECTOMY      ARTHRODESIS Right 10/31/2016    RIGHT ANKLE ARTHRODESIS OF RIGHT ANKLE AND SUBTALAR JOINT, C-ARM, ABHIJEET EQUIPMENT SYNTHETIC BONE GRAFT, ALLOGRAFT CANCELLOUS, SHARON ANKLE FUSION NAIL, SHANDA IMPLANT BONE STIMULATOR, CHOICE ANESTHESIA, BLOCK  performed by Janeen Awad MD at [address] [facility] [address]      stent x 5    COLONOSCOPY  7-19-11    FRACTURE SURGERY      right ankle    HARDWARE REMOVAL FOOT / ANKLE Right 2017    RIGHT ANKLE HARDWARE REMOVAL performed by Amy Morales MD at [address] Bilateral     knees    MA COLON CA SCRN NOT  [address] [facility] N/A 2017    COLONOSCOPY performed by Lieutenant Timo DO at [facility] Left     TONSILLECTOMY AND ADENOIDECTOMY         FAMILY HISTORY       Family History   Problem Relation Age of Onset    Heart Attack Father     Cancer Father         colon    High Cholesterol Mother     Heart Disease Mother     Macular Degen Mother     Arthritis Sister         hip replacement    Other Brother         vertigo    No Known Problems Son     No Known Problems Son        SOCIAL HISTORY       Social History     Socioeconomic History    Marital status:      Spouse name: Not on file    Number of children: Not on file    Years of education: Not on file    Highest education level: Not on file   Occupational History    Not on file   Tobacco Use    Smoking status: Former Smoker     Packs/day: 0.25     Years: 7.00     Pack years: 1.75     Types: Cigarettes     Start date: 5     Quit date: 6/3/1992     Years since quittin.6    Smokeless tobacco: Never Used   Vaping Use    Vaping Use: Never used   Substance and Sexual Activity    Alcohol use: Yes     Alcohol/week: 0.0 standard drinks     Comment: weekly- 2 beers or wine    Drug use: No    Sexual activity: Never   Other Topics Concern    Not on file   Social History Narrative    Not on file     Social Determinants of Health     Financial Resource Strain:     Difficulty of Paying Living Expenses: Not on file   Food Insecurity:     Worried About Running Out of Food in the Last Year: Not on file    Robert of Food in the Last Year: Not on file   Transportation Needs:     Lack of Transportation (Medical): Not on file    Lack of Transportation (Non-Medical):  Not on file   Physical Activity:     Days of Exercise per Week: Not on file    Minutes of Exercise per Session: Not on file   Stress:     Feeling of Stress : Not on file   Social Connections:     Frequency of Communication with Friends and Family: Not on file    Frequency of Social Gatherings with Friends and Family: Not on file    Attends Cheondoism Services: Not on file    Active Member of [address] [place] [address] or Organizations: Not on file    Attends Club or Organization Meetings: Not on file    Marital Status: Not on file   Intimate Partner Violence:     Fear of Current or Ex-Partner: Not on file    Emotionally Abused: Not on file    Physically Abused: Not on file    Sexually Abused: Not on file   Housing Stability:     Unable to Pay for Housing in the Last Year: Not on file    Number of Eveliam[place]helle in the Last Year: Not on file    Unstable Housing in the Last Year: Not on file       CURRENT MEDICATIONS       Previous Medications    ACETAMINOPHEN (TYLENOL) 325 MG TABLET    Take 650 mg by mouth every 4 hours as needed for Pain or Fever     ALLOPURINOL (ZYLOPRIM) 100 MG TABLET    Take 100 mg by mouth every evening Indications: Arthritis     AMLODIPINE (NORVASC) 10 MG TABLET    Take 1 tablet by mouth daily    APIXABAN (ELIQUIS) 5 MG TABS TABLET    Take 1 tablet by mouth 2 times daily    ASPIRIN 81 MG TABLET    Take 81 mg by mouth daily Indications: Atrial Fibrillation     ETANERCEPT (ENBREL) 25 MG/0.5ML SOSY    Inject 25 mg into the skin once a week Indications: Rheumatoid Arthritis     FOLIC ACID (FOLVITE) 1 MG TABLET    Take 1 mg by mouth daily Indications: Nutritional Support     HYDRALAZINE (APRESOLINE) 50 MG TABLET    Take 50 mg by mouth nightly Indications: High Blood Pressure Disorder     KETOROLAC 0.4 % SOLN OPHTHALMIC SOLUTION    Place 1 drop into both eyes 2 times daily Indications: Eye Pain     LOSARTAN (COZAAR) 25 MG TABLET    Take 1 tablet by mouth daily    MELOXICAM (MOBIC) 15 MG TABLET    Take 15 mg by mouth every evening Indications: Pain     METOPROLOL SUCCINATE (TOPROL XL) 25 MG EXTENDED RELEASE TABLET    Take 1 tablet by mouth daily    NITROGLYCERIN (NITROSTAT) 0.4 MG SL TABLET    Place 0.4 mg under the tongue every 5 minutes as needed for Chest pain up to max of 3 total doses. If no relief after 1 dose, call 911. PANTOPRAZOLE (PROTONIX) 40 MG TABLET    Take 40 mg by mouth daily Indications: Gastroesophageal Reflux Disease     POTASSIUM CHLORIDE (KLOR-CON M) 20 MEQ EXTENDED RELEASE TABLET    Take 2 tablets by mouth daily (with breakfast)    ROSUVASTATIN (CRESTOR) 40 MG TABLET    Take 40 mg by mouth every evening Indications: High Amount of Fats in the Blood     SOTALOL (BETAPACE) 120 MG TABLET    Take 120 mg by mouth 2 times daily Take 1/2 tab twice a day    TAMSULOSIN (FLOMAX) 0.4 MG CAPSULE    Take 1 capsule by mouth daily    TORSEMIDE (DEMADEX) 20 MG TABLET    Take 1 tablet by mouth daily       ALLERGIES     Hylan g-f 20, Ace inhibitors, and Statins depletion therapy      PHYSICAL EXAM       ED Triage Vitals   BP Temp Temp src Pulse Resp SpO2 Height Weight   -- -- -- -- -- -- -- --       Physical Exam  Vitals and nursing note reviewed. Exam conducted with a chaperone present. Constitutional:       General: He is not in acute distress. Appearance: He is obese. He is not ill-appearing. HENT:      Head: Normocephalic and atraumatic. Jaw: There is normal jaw occlusion. Right Ear: Tympanic membrane normal.      Left Ear: Tympanic membrane normal.      Nose:      Right Nostril: No septal hematoma. Left Nostril: No septal hematoma. Mouth/Throat:      Mouth: Mucous membranes are moist. No injury. Pharynx: Oropharynx is clear. Eyes:      Extraocular Movements: Extraocular movements intact. Pupils: Pupils are equal, round, and reactive to light. Neck:      Trachea: Trachea normal.   Cardiovascular:      Rate and Rhythm: Regular rhythm. Bradycardia present. Pulses: Normal pulses. Pulmonary:      Effort: No respiratory distress.       Breath sounds: Normal breath sounds. Chest:      Chest wall: No deformity, tenderness or crepitus. Abdominal:      General: There is no distension. Palpations: Abdomen is soft. Tenderness: There is no abdominal tenderness. Musculoskeletal:         General: No swelling, tenderness, deformity or signs of injury. Cervical back: No tenderness. No spinous process tenderness. Right lower leg: Edema present. Left lower leg: Edema present. Skin:     General: Skin is warm and dry. Capillary Refill: Capillary refill takes less than 2 seconds. Neurological:      General: No focal deficit present. Mental Status: He is alert and oriented to person, place, and time. Sensory: No sensory deficit. Motor: No weakness.    Psychiatric:         Mood and Affect: Mood normal.         Behavior: Behavior normal.         MDM:   Chart Reviewed: PMH and additional information as noted in HPI obtained from chart review    Vitals:    Vitals:    02/02/22 0210 02/02/22 0214   BP: 130/66 130/66   Pulse: 50 50   Resp: 16 16   Temp: 97.7 °F (36.5 °C) 97.7 °F (36.5 °C)   TempSrc: Oral Oral   SpO2: 96% 97%   Weight: 280 lb (127 kg) 280 lb (127 kg)   Height: 5' 11\" (1.803 m) 5' 11\" (1.803 m)       PROCEDURES:  Unless otherwise noted below, none  Procedures    LABS:  Labs Reviewed   COMPREHENSIVE METABOLIC PANEL - Abnormal; Notable for the following components:       Result Value    Glucose 123 (*)     GFR Non- 58.8 (*)     Alkaline Phosphatase 110 (*)     All other components within normal limits   CBC WITH AUTO DIFFERENTIAL - Abnormal; Notable for the following components:    WBC 11.1 (*)     RBC 4.14 (*)     Hematocrit 40.5 (*)     MCH 33.7 (*)     RDW 16.4 (*)     Neutrophils Absolute 7.6 (*)     Monocytes Absolute 1.2 (*)     All other components within normal limits   TROPONIN - Abnormal; Notable for the following components:    Troponin 0.017 (*)     All other components within normal limits Narrative:     Natalie Call tel. [phone],  Chemistry results called to and read back by , 02/02/2022 03:36, by [facility]   COVID-19, RAPID   MAGNESIUM   CK   BRAIN NATRIURETIC PEPTIDE    Narrative:     Natalie Call tel. [phone],  Chemistry results called to and read back by , 02/02/2022 03:36, by [facility]   TSH WITH REFLEX    Narrative:     Natalie Call tel. [phone],  Chemistry results called to and read back by , 02/02/2022 03:36, by Mireya Leone    (Results Pending)   CT HEAD WO CONTRAST    (Results Pending)   CT CHEST W CONTRAST    (Results Pending)   CTA NECK W [address]    (Results Pending)       ED Course as of 02/02/22 0743   Wed Feb 02, 2022   0327 EKG 12 Lead  EKG showing junctional bradycardia versus atrial fibrillation with slow ventricular response. Rate of 43 bpm.  Normal axis, normal QTC. Nonspecific ST-T wave abnormalities with T wave inversions in the lateral and high lateral leads. Largely unchanged from previous. [NA]   0342 Pro-BNP: 1,830  Largely consistent with most recent baseline [NA]   0342 WBC(!): 11.1  Minimal leukocytosis. CBC otherwise largely unremarkable [NA]   0342 Magnesium: 1.9 [NA]   0342 SARS-CoV-2, NAAT: Not Detected [NA]   0342 Troponin(!!): 0.017  Decreased from most recent baseline [NA]   0342 Comprehensive Metabolic Panel(!):    Sodium 141   Potassium 3.8   Chloride 101   CO2 27   Anion Gap 13   Glucose 123(!)   BUN 21   Creatinine 1.20   GFR Non- 58.8(!)   GFR  >60.0   CALCIUM, SERUM, 884964 8.6   Total Protein 6.4   Albumin 4.0   Bilirubin 0.6   Alk Phos 110(!)   ALT 18   AST 28   Globulin 2.4  Largely unremarkable [NA]   0449 CT HEAD WO CONTRAST  Preliminary radiology read: No acute infarct or hemorrhage. Chronic small vessel ischemic disease. No acute calvarial abnormality.  [NA]   0506 XR CHEST PORTABLE  Chest x-ray showing significant right-sided pleural effusion with central pulmonary venous congestion and significantly enlarged cardiomediastinal silhouette. CT chest ordered. [NA]      ED Course User Index  [NA] Ayana Valenzuela MD       68 y.o. male with a PMH clinically significant for HTN, DMII, HLD, CAD, OA, HF, pAfib on eliquis and prostate CA s/p radiation presenting to the ED via EMS c/o fall from standing occurring just prior to arrival associated with preceding dizziness and lightheadedness while bending over. Upon initial evaluation, Pt bradycardic, but otherwise Afebrile, HDS and in NAD. PE as noted above. Labs, , EKG, and Imaging as noted above. Given findings, clinical presentation most likely consistent w/ fall while bending over without evidence of gross traumatic injuries on exam.  Patient however on anticoagulation so imaging obtained of the head and chest.  Initial CT head negative and chest x-ray showing right-sided pleural effusion new from previous in addition to likely mild pulmonary edema. BNP largely consistent with most recent baseline and troponin only minimally elevated. Lower suspicion for severe acute decompensated heart failure at this time. Given new onset pleural effusion in the setting of trauma and anticoagulation, will obtain CT of the chest to further rule out hemothorax. We will also obtain CT of the neck given patient's state lightheadedness and dizziness while bending over and known carotid stenosis. Pt was administered   Medications   acetaminophen (TYLENOL) tablet 1,000 mg (has no administration in time range)   iopamidol (ISOVUE-300) 61 % injection 100 mL (150 mLs IntraVENous Given 2/2/22 0705)       Plan: Follow-up imaging and disposition appropriately. Signed out to Dr. Anamaria Llamas with plan as noted above. CRITICAL CARE TIME   Total CriticalCare time was 0 minutes, excluding separately reportable procedures.   There was a high probability of clinically significant/life threatening deterioration in the patient's condition which required my urgent intervention. FINAL IMPRESSION      1.  Fall, initial encounter          DISPOSITION/PLAN   DISPOSITION        Current Discharge Medication List           MD Ines Moura,   02/02/22 0475

## 2022-02-02 NOTE — ED NOTES
Called pt's ride home: Beck Rapp- [phone]. Pt to be discharged and to wait in triage for ride.       [facility]  [birth year] 0581

## 2022-02-02 NOTE — ED TRIAGE NOTES
Pt arrived to ED via ems from home. Pt states he was trying to get pills that fell down and when he went to get them he fell and could not get himself back up. Pt states he was on the ground for about 15-20min. Pt states he did not lose consciousness. Pt denies pain. Pt states he normally get around with a cane. Pt states last fall was about a year ago. Per ems pt was hypotensive at scene but in route BP was normal pt is alert and oriented x 4.

## 2022-02-02 NOTE — ED NOTES
Bed: 03  Expected date: 2/2/22  Expected time: 1:59 AM  Means of arrival:   Comments:  69 y/o M  Fall from standing, hypotensive on scene  Current vitals 118/6, 76P, 18R, 94%  IV +blood thinners.       Nancy Madison RN  02/02/22 9943

## 2022-02-03 ENCOUNTER — CARE COORDINATION (OUTPATIENT)
Dept: CARE COORDINATION | Age: 77
End: 2022-02-03

## 2022-02-03 ENCOUNTER — TELEPHONE (OUTPATIENT)
Dept: FAMILY MEDICINE CLINIC | Age: 77
End: 2022-02-03

## 2022-02-03 NOTE — TELEPHONE ENCOUNTER
Jade from [facility] will be faxing over orders for skilled nursing, pt and ot for the patient. She can take a verbal as well. Please advise, thank you.

## 2022-02-03 NOTE — LETTER
2/3/2022    [address] [facility] [address]      Dear Bobby Lynch,    My name is Austin Massey RN and I am a registered nurse who partners with Jennie Denney MD to improve patients' health. Jennie Denney MD believes you would benefit from working with me. As a member of your health care team, I would work with other providers involved in your care, offer education for your specific health conditions, and connect you with additional resources as needed. I will collaborate with Jennie Denney MD to support you in following your treatment plan. The additional support I provide is no additional cost to you. My primary focus is to help you achieve specific goals and improve your health. We are committed to walk with you on this journey and look forward to working with you. Please call me to further discuss your healthcare needs. I am available by phone or for appointments at the office. You can reach me at [phone].     In good health,     Austin Massey RN
2/3/2022    [address] [place] [id number]      Dear René Back,    My name is Gianfranco Braswell RN and I am a registered nurse who partners with Josie Khoury MD to improve patients' health. Josie Khoury MD believes you would benefit from working with me. As a member of your health care team, I would work with other providers involved in your care, offer education for your specific health conditions, and connect you with additional resources as needed. I will collaborate with Josie Khoury MD to support you in following your treatment plan. The additional support I provide is no additional cost to you. My primary focus is to help you achieve specific goals and improve your health. We are committed to walk with you on this journey and look forward to working with you. Please call me to further discuss your healthcare needs. I am available by phone or for appointments at the office. You can reach me at [phone].     In good health,     Gianfranco Braswell RN
- - -

## 2022-02-03 NOTE — CARE COORDINATION
Called patient to discuss enrollment in [address] [place] [address]. I left a voice mail message introducing myself and a brief description of the Care Coordination Program.   Requested a call back to discuss the program.  Call back number provided. Letter of introduction mailed to home address and sent to [facility] [address]

## 2022-02-11 ENCOUNTER — CARE COORDINATION (OUTPATIENT)
Dept: CARE COORDINATION | Age: 77
End: 2022-02-11

## 2022-02-11 NOTE — CARE COORDINATION
Bettina Doherty would like to sign up for Care Coordination but prefers I call back on Monday to ask questions and review everything. Call scheduled for Monday.

## 2022-02-15 ENCOUNTER — CARE COORDINATION (OUTPATIENT)
Dept: CARE COORDINATION | Age: 77
End: 2022-02-15

## 2022-02-15 NOTE — CARE COORDINATION
Called to finish enrollment but Kimberly Stewart has decided he does not want to participate in the program.  Will end episode at this time. Morelia Lazcano he can call me with any questions or concerns.

## 2022-02-22 ENCOUNTER — TELEPHONE (OUTPATIENT)
Dept: PHARMACY | Facility: CLINIC | Age: 77
End: 2022-02-22

## 2022-02-22 NOTE — TELEPHONE ENCOUNTER
[facility] CLINICAL PHARMACY: ADHERENCE REVIEW  Identified care gap per [place]: fills at [facility]: ACE/ARB adherence    Last Visit: 1/26/22    ASSESSMENT  ACE/ARB ADHERENCE    Insurance Records claims through 2.14.22 :   LOSARTAN POTASSIUM 25 MG TAB first fill for 14 day supply. Next refill due: 1/20/22    Written #[id number]  Per [facility] Pharmacy:    not contacted at this time    BP Readings from Last 3 Encounters:   02/02/22 138/72   01/26/22 124/80   12/17/21 119/60     Estimated Creatinine Clearance: 71 mL/min (based on SCr of 1.2 mg/dL). PLAN  The following are interventions that have been identified:   - Patient eligible for 90 day supply of LOSARTAN POTASSIUM 25 MG TAB    Attempting to reach patient to review changing prescription from a 30-day supply to a 90-day supply.  Left message asking for return call      Future Appointments   Date Time Provider Emiliano Bermudez   3/8/2022  1:00 PM Aileen Kohli MD [id number] [facility].    [address] [place] [address] free: [phone]

## 2022-02-22 NOTE — LETTER
[place]  [address], [address]   [address]           02/25/22     Dear Kristyn Thorpe,      We tried to reach you recently regarding your LOSARTAN POTASSIUM 25 MG TAB, but were unable to reach you on the telephone. We have on file that you are currently taking LOSARTAN POTASSIUM 25 MG TAB daily. If you are no longer taking or taking differently, please call us at the number below so that we can discuss this and update your medication profile.      This medication can be filled for a 3-month supply to save you time and trips to the pharmacy  if you would like assistance in switching your prescriptions to a 3-month supply, please contact us        Sincerely,     [address] [facility] free: [phone]

## 2022-02-23 RX ORDER — MELOXICAM 15 MG/1
TABLET ORAL
Qty: 30 TABLET | Refills: 11 | OUTPATIENT
Start: 2022-02-23

## 2022-02-23 RX ORDER — FOLIC ACID 1 MG/1
TABLET ORAL
Qty: 30 TABLET | Refills: 11 | OUTPATIENT
Start: 2022-02-23

## 2022-02-23 RX ORDER — ALLOPURINOL 100 MG/1
TABLET ORAL
Qty: 30 TABLET | Refills: 11 | OUTPATIENT
Start: 2022-02-23

## 2022-02-25 NOTE — TELEPHONE ENCOUNTER
2nd Attempt Documentation:  2nd attempt to contact this patient regarding the previous message  CLINICAL PHARMACY: [facility]  Patient unavailable at the time of call. Left following message on home TAD: please call back at toll-free [phone] to retrieve previous message. Recent hospital visit for potential hypotension. Letter mailed to patient.     For Pharmacy [id number] [facility] in place:  No   Recommendation Provided To: Patient/Caregiver: 1 via Telephone   Intervention Detail: Adherence Monitorin   Gap Closed?: No    Intervention Accepted By: Patient/Caregiver: 0   Time Spent (min): 15

## 2022-04-01 ENCOUNTER — TELEPHONE (OUTPATIENT)
Dept: PHARMACY | Facility: CLINIC | Age: 77
End: 2022-04-01

## 2022-04-01 NOTE — TELEPHONE ENCOUNTER
POPULATION HEALTH CLINICAL PHARMACY: ADHERENCE REVIEW  Identified care gap per [place]: fills at [facility]: ACE/ARB and Statin adherence    Last Visit: 1/26/22    ASSESSMENT  ACE/ARB ADHERENCE    Insurance Records claims through 3.7.22  44%; Potential Fail Date: 5/2/22):   LOSARTAN POTASSIUM 25 MG  Next refill due: 3/25/22    Per Reconciled Dispense Report:   last filled on 2/23/22 for 30 day supply. 3RF     Per [facility]:   Is ready for  now for 90DS    BP Readings from Last 3 Encounters:   02/02/22 138/72   01/26/22 124/80   12/17/21 119/60     Estimated Creatinine Clearance: 71 mL/min (based on SCr of 1.2 mg/dL). [id number] Records claims through 3.7.22  Filled only once; Potential Fail Date: 6/7/22):   ROSUVASTATIN CALCIUM 40 MG Next refill due: 4/6/22    Per Reconciled Dispense Report:   last filled on 1/6/22 for 90 day supply. Due now to refill. 1RF    [facility]:  Had staff process refill today to go along with losartan    Lab Results   Component Value Date    CHOL 218 (H) 03/30/2021    TRIG 162 (H) 03/30/2021    HDL 58 03/30/2021    LDLCALC 128 03/30/2021     ALT   Date Value Ref Range Status   02/02/2022 18 0 - 41 U/L Final     Comment:     Specimen hemolysis has exceeded the interference as defined  by Roche. Result may be affected. Suggest recollection if  clinically indicated. AST   Date Value Ref Range Status   02/02/2022 28 0 - 40 U/L Final     Comment:     Specimen hemolysis has exceeded the interference as defined  by Roche. Value may be falsely increased. Suggest  recollection if clinically indicated.        The 10-year ASCVD risk score (Joy Kevin et al., 2013) is: 53.8%    Values used to calculate the score:      Age: 68 years      Sex: Male      Is Non- : No      Diabetic: Yes      Tobacco smoker: No      Systolic Blood Pressure: 413 mmHg      Is BP treated: Yes      HDL Cholesterol: 58 mg/dL      Total Cholesterol: 218 mg/dL PLAN  The following are interventions that have been identified:   Both due now. Left message to  2 refills at [facility]     No future appointments. Kaley Knott CP.    [address] [facility] free: [phone]

## 2022-04-05 RX ORDER — LOSARTAN POTASSIUM 25 MG/1
TABLET ORAL
Qty: 14 TABLET | OUTPATIENT
Start: 2022-04-05

## 2022-04-05 RX ORDER — HYDRALAZINE HYDROCHLORIDE 50 MG/1
TABLET, FILM COATED ORAL
Qty: 14 TABLET | OUTPATIENT
Start: 2022-04-05

## 2022-04-05 RX ORDER — NITROGLYCERIN 0.4 MG/1
TABLET SUBLINGUAL
Qty: 25 TABLET | OUTPATIENT
Start: 2022-04-05

## 2022-04-06 NOTE — TELEPHONE ENCOUNTER
2nd reminder to patient TAD if they haven't picked up 2 refills at [facility].    [place] in place:  No   Recommendation Provided To: Patient/Caregiver: 2 via Telephone and Pharmacy: 2   Intervention Detail: Adherence Monitorin   Gap Closed?: No    Intervention Accepted By: Patient/Caregiver: 0 and Pharmacy: 1   Time Spent (min): 15

## 2022-04-17 ENCOUNTER — APPOINTMENT (OUTPATIENT)
Dept: GENERAL RADIOLOGY | Age: 77
End: 2022-04-17
Payer: MEDICARE

## 2022-04-17 ENCOUNTER — APPOINTMENT (OUTPATIENT)
Dept: CT IMAGING | Age: 77
End: 2022-04-17
Payer: MEDICARE

## 2022-04-17 ENCOUNTER — HOSPITAL ENCOUNTER (EMERGENCY)
Age: 77
Discharge: ANOTHER HOSPITAL | End: 2022-04-17
Payer: MEDICARE

## 2022-04-17 VITALS
OXYGEN SATURATION: 96 % | SYSTOLIC BLOOD PRESSURE: 104 MMHG | TEMPERATURE: 98.5 F | RESPIRATION RATE: 21 BRPM | HEART RATE: 64 BPM | WEIGHT: 275 LBS | DIASTOLIC BLOOD PRESSURE: 78 MMHG | BODY MASS INDEX: 38.35 KG/M2

## 2022-04-17 DIAGNOSIS — S12.112A CLOSED NONDISPLACED ODONTOID FRACTURE WITH TYPE II MORPHOLOGY, INITIAL ENCOUNTER (HCC): Primary | ICD-10-CM

## 2022-04-17 DIAGNOSIS — F10.10 ALCOHOL ABUSE: ICD-10-CM

## 2022-04-17 DIAGNOSIS — Z91.199 H/O NONCOMPLIANCE WITH MEDICAL TREATMENT, PRESENTING HAZARDS TO HEALTH: ICD-10-CM

## 2022-04-17 DIAGNOSIS — S01.312A LACERATION OF ANTIHELIX OF LEFT EAR, INITIAL ENCOUNTER: ICD-10-CM

## 2022-04-17 DIAGNOSIS — R60.9 PERIPHERAL EDEMA: ICD-10-CM

## 2022-04-17 DIAGNOSIS — I50.9 CONGESTIVE HEART FAILURE, UNSPECIFIED HF CHRONICITY, UNSPECIFIED HEART FAILURE TYPE (HCC): ICD-10-CM

## 2022-04-17 DIAGNOSIS — J90 PLEURAL EFFUSION: ICD-10-CM

## 2022-04-17 LAB
ALBUMIN SERPL-MCNC: 3.7 G/DL (ref 3.5–4.6)
ALP BLD-CCNC: 108 U/L (ref 35–104)
ALT SERPL-CCNC: 20 U/L (ref 0–41)
AMPHETAMINE SCREEN, URINE: NORMAL
ANION GAP SERPL CALCULATED.3IONS-SCNC: 14 MEQ/L (ref 9–15)
APTT: 27 SEC (ref 24.4–36.8)
AST SERPL-CCNC: 22 U/L (ref 0–40)
BACTERIA: NEGATIVE /HPF
BARBITURATE SCREEN URINE: NORMAL
BASOPHILS ABSOLUTE: 0 K/UL (ref 0–0.2)
BASOPHILS RELATIVE PERCENT: 0.4 %
BENZODIAZEPINE SCREEN, URINE: NORMAL
BILIRUB SERPL-MCNC: 0.9 MG/DL (ref 0.2–0.7)
BILIRUBIN URINE: NEGATIVE
BLOOD, URINE: NEGATIVE
BUN BLDV-MCNC: 16 MG/DL (ref 8–23)
CALCIUM SERPL-MCNC: 8.8 MG/DL (ref 8.5–9.9)
CANNABINOID SCREEN URINE: NORMAL
CHLORIDE BLD-SCNC: 103 MEQ/L (ref 95–107)
CLARITY: CLEAR
CO2: 20 MEQ/L (ref 20–31)
COCAINE METABOLITE SCREEN URINE: NORMAL
COLOR: YELLOW
CREAT SERPL-MCNC: 0.68 MG/DL (ref 0.7–1.2)
EOSINOPHILS ABSOLUTE: 0.2 K/UL (ref 0–0.7)
EOSINOPHILS RELATIVE PERCENT: 1.7 %
EPITHELIAL CELLS, UA: NORMAL /HPF (ref 0–5)
ETHANOL PERCENT: 0.06 G/DL
ETHANOL: 63 MG/DL (ref 0–0.08)
GFR AFRICAN AMERICAN: >60
GFR AFRICAN AMERICAN: >60
GFR NON-AFRICAN AMERICAN: >60
GFR NON-AFRICAN AMERICAN: >60
GLOBULIN: 2.5 G/DL (ref 2.3–3.5)
GLUCOSE BLD-MCNC: 111 MG/DL (ref 70–99)
GLUCOSE URINE: NEGATIVE MG/DL
HCT VFR BLD CALC: 44.6 % (ref 42–52)
HEMOGLOBIN: 15 G/DL (ref 14–18)
HYALINE CASTS: NORMAL /HPF (ref 0–5)
INR BLD: 1.1
KETONES, URINE: NEGATIVE MG/DL
LEUKOCYTE ESTERASE, URINE: NEGATIVE
LYMPHOCYTES ABSOLUTE: 0.6 K/UL (ref 1–4.8)
LYMPHOCYTES RELATIVE PERCENT: 5.6 %
Lab: NORMAL
MAGNESIUM: 2 MG/DL (ref 1.7–2.4)
MCH RBC QN AUTO: 33.8 PG (ref 27–31.3)
MCHC RBC AUTO-ENTMCNC: 33.6 % (ref 33–37)
MCV RBC AUTO: 100.8 FL (ref 80–100)
METHADONE SCREEN, URINE: NORMAL
MONOCYTES ABSOLUTE: 0.9 K/UL (ref 0.2–0.8)
MONOCYTES RELATIVE PERCENT: 8.9 %
NEUTROPHILS ABSOLUTE: 8.7 K/UL (ref 1.4–6.5)
NEUTROPHILS RELATIVE PERCENT: 83.4 %
NITRITE, URINE: NEGATIVE
OPIATE SCREEN URINE: NORMAL
OXYCODONE URINE: NORMAL
PDW BLD-RTO: 15 % (ref 11.5–14.5)
PERFORMED ON: NORMAL
PH UA: 5.5 (ref 5–9)
PHENCYCLIDINE SCREEN URINE: NORMAL
PLATELET # BLD: 217 K/UL (ref 130–400)
POC CREATININE: 0.9 MG/DL (ref 0.8–1.3)
POC SAMPLE TYPE: NORMAL
POTASSIUM SERPL-SCNC: 4 MEQ/L (ref 3.4–4.9)
PRO-BNP: 1993 PG/ML
PROPOXYPHENE SCREEN: NORMAL
PROTEIN UA: 30 MG/DL
PROTHROMBIN TIME: 14.6 SEC (ref 12.3–14.9)
RBC # BLD: 4.42 M/UL (ref 4.7–6.1)
RBC UA: NORMAL /HPF (ref 0–5)
SODIUM BLD-SCNC: 137 MEQ/L (ref 135–144)
SPECIFIC GRAVITY UA: 1.01 (ref 1–1.03)
TOTAL CK: 109 U/L (ref 0–190)
TOTAL PROTEIN: 6.2 G/DL (ref 6.3–8)
TROPONIN: <0.01 NG/ML (ref 0–0.01)
URINE REFLEX TO CULTURE: ABNORMAL
UROBILINOGEN, URINE: 0.2 E.U./DL
WBC # BLD: 10.4 K/UL (ref 4.8–10.8)
WBC UA: NORMAL /HPF (ref 0–5)

## 2022-04-17 PROCEDURE — 81001 URINALYSIS AUTO W/SCOPE: CPT

## 2022-04-17 PROCEDURE — 96375 TX/PRO/DX INJ NEW DRUG ADDON: CPT

## 2022-04-17 PROCEDURE — 93005 ELECTROCARDIOGRAM TRACING: CPT | Performed by: PHYSICIAN ASSISTANT

## 2022-04-17 PROCEDURE — 70498 CT ANGIOGRAPHY NECK: CPT

## 2022-04-17 PROCEDURE — 72131 CT LUMBAR SPINE W/O DYE: CPT

## 2022-04-17 PROCEDURE — 6360000004 HC RX CONTRAST MEDICATION: Performed by: PHYSICIAN ASSISTANT

## 2022-04-17 PROCEDURE — 99284 EMERGENCY DEPT VISIT MOD MDM: CPT | Performed by: PHYSICIAN ASSISTANT

## 2022-04-17 PROCEDURE — 12011 RPR F/E/E/N/L/M 2.5 CM/<: CPT

## 2022-04-17 PROCEDURE — 82550 ASSAY OF CK (CPK): CPT

## 2022-04-17 PROCEDURE — 85730 THROMBOPLASTIN TIME PARTIAL: CPT

## 2022-04-17 PROCEDURE — 71045 X-RAY EXAM CHEST 1 VIEW: CPT

## 2022-04-17 PROCEDURE — 85610 PROTHROMBIN TIME: CPT

## 2022-04-17 PROCEDURE — 82077 ASSAY SPEC XCP UR&BREATH IA: CPT

## 2022-04-17 PROCEDURE — 2500000003 HC RX 250 WO HCPCS: Performed by: PHYSICIAN ASSISTANT

## 2022-04-17 PROCEDURE — 36415 COLL VENOUS BLD VENIPUNCTURE: CPT

## 2022-04-17 PROCEDURE — 70450 CT HEAD/BRAIN W/O DYE: CPT

## 2022-04-17 PROCEDURE — 83880 ASSAY OF NATRIURETIC PEPTIDE: CPT

## 2022-04-17 PROCEDURE — 80053 COMPREHEN METABOLIC PANEL: CPT

## 2022-04-17 PROCEDURE — 99285 EMERGENCY DEPT VISIT HI MDM: CPT

## 2022-04-17 PROCEDURE — 83735 ASSAY OF MAGNESIUM: CPT

## 2022-04-17 PROCEDURE — 85025 COMPLETE CBC W/AUTO DIFF WBC: CPT

## 2022-04-17 PROCEDURE — 84484 ASSAY OF TROPONIN QUANT: CPT

## 2022-04-17 PROCEDURE — 72128 CT CHEST SPINE W/O DYE: CPT

## 2022-04-17 PROCEDURE — 80307 DRUG TEST PRSMV CHEM ANLYZR: CPT

## 2022-04-17 PROCEDURE — 96374 THER/PROPH/DIAG INJ IV PUSH: CPT

## 2022-04-17 PROCEDURE — 6360000002 HC RX W HCPCS: Performed by: PHYSICIAN ASSISTANT

## 2022-04-17 PROCEDURE — 74177 CT ABD & PELVIS W/CONTRAST: CPT

## 2022-04-17 PROCEDURE — 72125 CT NECK SPINE W/O DYE: CPT

## 2022-04-17 PROCEDURE — 71250 CT THORAX DX C-: CPT

## 2022-04-17 RX ORDER — LIDOCAINE HYDROCHLORIDE 20 MG/ML
5 INJECTION, SOLUTION INFILTRATION; PERINEURAL ONCE
Status: COMPLETED | OUTPATIENT
Start: 2022-04-17 | End: 2022-04-17

## 2022-04-17 RX ORDER — FUROSEMIDE 10 MG/ML
40 INJECTION INTRAMUSCULAR; INTRAVENOUS ONCE
Status: COMPLETED | OUTPATIENT
Start: 2022-04-17 | End: 2022-04-17

## 2022-04-17 RX ORDER — MORPHINE SULFATE 4 MG/ML
4 INJECTION, SOLUTION INTRAMUSCULAR; INTRAVENOUS ONCE
Status: COMPLETED | OUTPATIENT
Start: 2022-04-17 | End: 2022-04-17

## 2022-04-17 RX ORDER — ONDANSETRON 2 MG/ML
4 INJECTION INTRAMUSCULAR; INTRAVENOUS ONCE
Status: COMPLETED | OUTPATIENT
Start: 2022-04-17 | End: 2022-04-17

## 2022-04-17 RX ADMIN — LIDOCAINE HYDROCHLORIDE 5 ML: 20 INJECTION, SOLUTION INFILTRATION; PERINEURAL at 12:50

## 2022-04-17 RX ADMIN — MORPHINE SULFATE 4 MG: 4 INJECTION, SOLUTION INTRAMUSCULAR; INTRAVENOUS at 15:41

## 2022-04-17 RX ADMIN — ONDANSETRON 4 MG: 2 INJECTION INTRAMUSCULAR; INTRAVENOUS at 15:43

## 2022-04-17 RX ADMIN — IOPAMIDOL 100 ML: 612 INJECTION, SOLUTION INTRAVENOUS at 12:12

## 2022-04-17 RX ADMIN — IOPAMIDOL 100 ML: 612 INJECTION, SOLUTION INTRAVENOUS at 14:28

## 2022-04-17 RX ADMIN — FUROSEMIDE 40 MG: 10 INJECTION, SOLUTION INTRAMUSCULAR; INTRAVENOUS at 12:45

## 2022-04-17 ASSESSMENT — PAIN SCALES - GENERAL
PAINLEVEL_OUTOF10: 8
PAINLEVEL_OUTOF10: 5
PAINLEVEL_OUTOF10: 8
PAINLEVEL_OUTOF10: 8

## 2022-04-17 ASSESSMENT — PAIN - FUNCTIONAL ASSESSMENT: PAIN_FUNCTIONAL_ASSESSMENT: 0-10

## 2022-04-17 ASSESSMENT — PAIN DESCRIPTION - PAIN TYPE
TYPE: ACUTE PAIN
TYPE: ACUTE PAIN

## 2022-04-17 ASSESSMENT — PAIN DESCRIPTION - LOCATION
LOCATION: NECK
LOCATION: NECK

## 2022-04-17 ASSESSMENT — PAIN DESCRIPTION - DESCRIPTORS
DESCRIPTORS: ACHING
DESCRIPTORS: ACHING

## 2022-04-17 NOTE — ED TRIAGE NOTES
Patient states that he fell last night around 10:30 pm.  States he hit his head on the arm of his couch before landing on the floor. Patient on Eliquis. Laceration to forehead and ear. Complaining of neck pain. C-collar placed.

## 2022-04-17 NOTE — ED NOTES
Pt up to edge of bed and tolerated well. Pt states that buttocks \"are sore\".  Pt able to use the urinal at bedside     Jeramy Rowe RN  04/17/22 [id number]

## 2022-04-17 NOTE — ED NOTES
[facility] at bedside and pt able to ambulate to [facility] stretcher at time of discharge with stand by assist     Nupur Bustamante RN  04/17/22 [id number]

## 2022-04-17 NOTE — ED NOTES
Aspen C-Collar due to be placed due to FX in neck.  Pt updated by Sukhjinder DELGADO at this time     Hazel Guerra RN  04/17/22 [id number]

## 2022-04-17 NOTE — ED PROVIDER NOTES
[address] [facility] ED  eMERGENCY dEPARTMENT eNCOUnter      Pt Name: Francisca Jurado  MRN: [id number]  [place] [birth year]  Date of evaluation: 4/17/2022  Provider: Jamila Hernandes PA-C    CHIEF COMPLAINT       Chief Complaint   Patient presents with    Fall    Laceration     Forehead and ear    Leg Swelling     Has not been taking diuretic for past month         HISTORY OF PRESENT ILLNESS   (Location/Symptom, Timing/Onset,Context/Setting, Quality, Duration, Modifying Factors, Severity)  Note limiting factors. Francisca Jurado is a 68 y.o. male who presents to the emergency department complaint of head pain, neck pain, secondary to fall which patient states occurred around 9 PM last evening. Patient states that he had lost balance at home, falling striking his head on a cabinet. He denies any loss consciousness, he states that he had a med alert alarm and contacted 911. EMS came to his home last evening, and assisted him up, he did not want to come to the hospital last evening. He states this morning neighbor came to check on him, he was still having pain in his head and upper neck, so they were transferred to the hospital for further evaluation, he denies any dizziness, no blurred vision, no nausea vomiting, no paresthesias. Patient states also he had stopped taking his diuretic approximately 1 month ago as it caused him frequent urination and he felt unsteady with ambulation so he quit taking diuretic. He has increasing edema to his lower extremities. Patient rates his current pain at this time is 8 out of 10 to the cervical spine predominantly patient denies any other complaints, no chest back or pelvic pain. Patient has been ambulatory since the incident occurred. C collar placed on pt arrive to triage. HPI    NursingNotes were reviewed.     REVIEW OF SYSTEMS    (2-9 systems for level 4, 10 or more for level 5)     Review of Systems   Constitutional: Negative for activity change and appetite change. HENT: Negative for congestion, ear discharge, ear pain, nosebleeds, rhinorrhea and sore throat. Head pain secondary to fall   Eyes: Negative for discharge. Respiratory: Negative for shortness of breath. Cardiovascular: Positive for leg swelling. Negative for chest pain and palpitations. Swelling to bilateral lower extremity secondary to discontinuation of diuretic 1 month ago   Gastrointestinal: Negative for abdominal distention, abdominal pain, constipation, nausea and vomiting. Genitourinary: Negative for difficulty urinating and dysuria. Musculoskeletal: Positive for neck pain. Negative for arthralgias. Skin: Negative for color change. Neurological: Negative for dizziness, tremors, syncope, weakness, numbness and headaches. Psychiatric/Behavioral: Negative for agitation and confusion. Except as noted above the remainder of the review of systems was reviewed and negative.        PAST MEDICAL HISTORY     Past Medical History:   Diagnosis Date    Bradycardia     CAD (coronary artery disease)     Cancer ([facility]     prostate- radiation     CHF (congestive heart failure) (HCC)     Depression     HSV-2 (herpes simplex virus 2) infection     Hyperlipidemia     Hypertension     Left knee DJD     Osteoarthritis     Rheumatoid arthritis(714.0)     Type 2 diabetes mellitus without complication, without long-term current use of insulin ([facility] 1/10/2019         SURGICALHISTORY       Past Surgical History:   Procedure Laterality Date    ANKLE SURGERY Right     pin    APPENDECTOMY      ARTHRODESIS Right 10/31/2016    RIGHT ANKLE ARTHRODESIS OF RIGHT ANKLE AND SUBTALAR JOINT, C-ARM, ABHIJEET EQUIPMENT SYNTHETIC BONE GRAFT, ALLOGRAFT CANCELLOUS, SHARON ANKLE FUSION NAIL, SHANDA IMPLANT BONE STIMULATOR, CHOICE ANESTHESIA, BLOCK  performed by Nusrat Hogan MD at [address] [facility]      stent x 5    COLONOSCOPY  7-19-11    FRACTURE SURGERY      right ankle    HARDWARE REMOVAL FOOT / ANKLE Right 11/6/2017    RIGHT ANKLE HARDWARE REMOVAL performed by Joy Márquez MD at [address] Bilateral     knees    VT COLON CA SCRN NOT  [address] IND N/A 7/11/2017    COLONOSCOPY performed by Rona Mcclain DO at [facility] [address]       Discharge Medication List as of 4/17/2022  4:59 PM      CONTINUE these medications which have NOT CHANGED    Details   nitroGLYCERIN (NITROSTAT) 0.4 MG SL tablet Place 0.4 mg under the tongue every 5 minutes as needed for Chest pain up to max of 3 total doses. If no relief after 1 dose, call 911. Historical Med      sotalol (BETAPACE) 120 MG tablet Take 120 mg by mouth 2 times daily Take 1/2 tab twice a dayHistorical Med      amLODIPine (NORVASC) 10 MG tablet Take 1 tablet by mouth daily, Disp-30 tablet, R-3Normal      losartan (COZAAR) 25 MG tablet Take 1 tablet by mouth daily, Disp-30 tablet, R-3Normal      metoprolol succinate (TOPROL XL) 25 MG extended release tablet Take 1 tablet by mouth daily, Disp-30 tablet, R-3Normal      torsemide (DEMADEX) 20 MG tablet Take 1 tablet by mouth daily, Disp-30 tablet, R-3Normal      apixaban (ELIQUIS) 5 MG TABS tablet Take 1 tablet by mouth 2 times daily, Disp-60 tablet, R-3Normal      potassium chloride (KLOR-CON M) 20 MEQ extended release tablet Take 2 tablets by mouth daily (with breakfast), Disp-60 tablet, R-3Normal      rosuvastatin (CRESTOR) 40 MG tablet Take 40 mg by mouth every evening Indications: High Amount of Fats in the Blood Historical Med      ketorolac 0.4 % SOLN ophthalmic solution Place 1 drop into both eyes 2 times daily Indications: Eye Pain , R-1Historical Med      hydrALAZINE (APRESOLINE) 50 MG tablet Take 50 mg by mouth nightly Indications: High Blood Pressure Disorder Historical Med      tamsulosin (FLOMAX) 0.4 MG capsule Take 1 capsule by mouth daily, Disp-90 capsule, R-3Normal folic acid (FOLVITE) 1 MG tablet Take 1 mg by mouth daily Indications: Nutritional Support Historical Med      meloxicam (MOBIC) 15 MG tablet Take 15 mg by mouth every evening Indications: Pain Historical Med      etanercept (ENBREL) 25 MG/0.5ML SOSY Inject 25 mg into the skin once a week Indications: Rheumatoid Arthritis Historical Med      allopurinol (ZYLOPRIM) 100 MG tablet Take 100 mg by mouth every evening Indications: Arthritis Historical Med      acetaminophen (TYLENOL) 325 MG tablet Take 650 mg by mouth every 4 hours as needed for Pain or Fever Historical Med      pantoprazole (PROTONIX) 40 MG tablet Take 40 mg by mouth daily Indications: Gastroesophageal Reflux Disease Historical Med      aspirin 81 MG tablet Take 81 mg by mouth daily Indications: Atrial Fibrillation Historical Med             ALLERGIES     Hylan g-f 20, Ace inhibitors, and Statins depletion therapy    FAMILY HISTORY       Family History   Problem Relation Age of Onset    Heart Attack Father     Cancer Father         colon    High Cholesterol Mother     Heart Disease Mother     Macular Degen Mother     Arthritis Sister         hip replacement    Other Brother         vertigo    No Known Problems Son     No Known Problems Son           SOCIAL HISTORY       Social History     Socioeconomic History    Marital status:      Spouse name: None    Number of children: None    Years of education: None    Highest education level: None   Occupational History    None   Tobacco Use    Smoking status: Former Smoker     Packs/day: 0.25     Years: 7.00     Pack years: 1.75     Types: Cigarettes     Start date: 5     Quit date: 6/3/1992     Years since quittin.8    Smokeless tobacco: Never Used   Vaping Use    Vaping Use: Never used   Substance and Sexual Activity    Alcohol use:  Yes     Alcohol/week: 0.0 standard drinks     Comment: weekly- 2 beers or wine    Drug use: No    Sexual activity: Never   Other Topics Concern    None   Social History Narrative    None     Social Determinants of Health     Financial Resource Strain:     Difficulty of Paying Living Expenses: Not on file   Food Insecurity:     Worried About Running Out of Food in the Last Year: Not on file    Robert of Food in the Last Year: Not on file   Transportation Needs:     Lack of Transportation (Medical): Not on file    Lack of Transportation (Non-Medical): Not on file   Physical Activity:     Days of Exercise per Week: Not on file    Minutes of Exercise per Session: Not on file   Stress:     Feeling of Stress : Not on file   Social Connections:     Frequency of Communication with Friends and Family: Not on file    Frequency of Social Gatherings with Friends and Family: Not on file    Attends Zoroastrian Services: Not on file    Active Member of [address] [place] [address] [employer] or Organizations: Not on file    Attends Club or Organization Meetings: Not on file    Marital Status: Not on file   Intimate Partner Violence:     Fear of Current or Ex-Partner: Not on file    Emotionally Abused: Not on file    Physically Abused: Not on file    Sexually Abused: Not on file   Housing Stability:     Unable to Pay for Housing in the Last Year: Not on file    Number of [place] in the Last Year: Not on file    Unstable Housing in the Last Year: Not on file       SCREENINGS    Andrez Coma Scale  Eye Opening: Spontaneous  Best Verbal Response: Oriented  Best Motor Response: Obeys commands  Greene Coma Scale Score: 15 @FLOW([id number])@      PHYSICAL EXAM    (up to 7 for level 4, 8 or more for level 5)     ED Triage Vitals   BP Temp Temp Source Pulse Resp SpO2 Height Weight   04/17/22 1052 04/17/22 1050 04/17/22 1050 04/17/22 1050 04/17/22 1050 04/17/22 1050 -- 04/17/22 1052   (!) 150/87 98.5 °F (36.9 °C) Oral 60 18 98 %  275 lb (124.7 kg)       Physical Exam  Vitals and nursing note reviewed. Constitutional:       General: He is not in acute distress.      Appearance: He is well-developed. He is not ill-appearing, toxic-appearing or diaphoretic. HENT:      Head: Normocephalic. Right Ear: Tympanic membrane normal.      Left Ear: Tympanic membrane normal.      Ears:      Comments: 1 cm laceration to left ear, tms intact no drainage or discharge     Nose: No congestion. Mouth/Throat:      Mouth: Mucous membranes are moist.      Pharynx: No oropharyngeal exudate or posterior oropharyngeal erythema. Eyes:      Extraocular Movements: Extraocular movements intact. Conjunctiva/sclera: Conjunctivae normal.      Pupils: Pupils are equal, round, and reactive to light. Neck:      Vascular: No JVD. Trachea: No tracheal deviation. Comments: Cervical collar was placed on patient on arrival to the ED due to neck pain from fall last evening. Patient complains of pain to the C5-C6 region. There is no crepitus, no step-off, no deformity, no subcu air, no upper airway stridor  Cardiovascular:      Rate and Rhythm: Normal rate. Pulses: Normal pulses. Heart sounds: Normal heart sounds. No murmur heard. No friction rub. No gallop. Pulmonary:      Effort: Pulmonary effort is normal. No tachypnea, accessory muscle usage, respiratory distress or retractions. Breath sounds: Normal breath sounds. No stridor. No wheezing, rhonchi or rales. Comments: Lung sounds are clear in all fields, there is no wheezes rales or rhonchi, no excess muscles, retractions, room air saturations are 98% no pain on palpation to chest wall, no crepitus, no instability, no flail segments or paradoxical movement. Chest:      Chest wall: No tenderness. Abdominal:      General: Abdomen is flat. Bowel sounds are normal. There is no distension or abdominal bruit. Palpations: Abdomen is soft. There is no shifting dullness, fluid wave, hepatomegaly, splenomegaly, mass or pulsatile mass. Tenderness: There is no abdominal tenderness.  There is no right CVA tenderness, left CVA tenderness, guarding or rebound. Negative signs include Avila's sign, Rovsing's sign and McBurney's sign. Comments: Abdomen soft nondistended nontender no guarding mass rebound, no CVA tenderness, purple bruising is noted to left lateral aspect of abdominal wall. Musculoskeletal:         General: No deformity. Cervical back: No rigidity. Comments: Cervical collar in place on patient arrival.  No midline tenderness, no step-offs, no crepitus no or deformity. No pain on palpation to  thoracic, lumbar spine, pelvis is stable there is no crepitus or instability, no shortening or rotation of bilateral lower extremities, no femoral pain, no knee pain, no tib-fib, foot or ankle pain bilaterally, full range of motion of bilateral lower extremities without increasing pain. No pain across shoulders, humerus, elbows, wrist hand or fingers bilaterally, full range of motion of upper extremities, upper or lower extremities neurovascular intact. Skin:     General: Skin is warm and dry. Capillary Refill: Capillary refill takes less than 2 seconds. Coloration: Skin is not jaundiced. Neurological:      General: No focal deficit present. Mental Status: He is alert and oriented to person, place, and time. Mental status is at baseline. Cranial Nerves: No cranial nerve deficit. Sensory: No sensory deficit. Motor: No weakness. Coordination: Coordination normal.      Comments: Patient is alert and orient x3, he is neurologically intact, no facial droop, no slurring of speech, no drift upper or lower extremities, no appreciable weakness to upper or lower extremities, good sensation.    Psychiatric:         Mood and Affect: Mood normal.         DIAGNOSTIC RESULTS     EKG: All EKG's are interpreted by the Emergency Department Physician who either signs or Co-signsthis chart in the absence of a cardiologist.    EKG shows atrial fibrillation with a slow ventricular sponsor 50 bpm nonspecific T wave abnormality, QTC is 432 ms    RADIOLOGY:   Non-plain filmimages such as CT, Ultrasound and MRI are read by the radiologist. Plain radiographic images are visualized and preliminarily interpreted by the emergency physician with the below findings:        Interpretation per the Radiologist below, if available at the time ofthis note:    CT CHEST WO CONTRAST   Final Result      Large right and trace left pleural effusion with atelectasis. Scattered groundglass opacities, likely inflammatory. CTA NECK W WO CONTRAST   Final Result      No large vessel occlusion or high-grade stenosis in the head or neck. No evidence of arterial injury or dissection. Atherosclerotic calcification of the bilateral carotid bifurcations with approximately 30% stenosis by NASCET criteria. CT THORACIC SPINE WO CONTRAST   Final Result      Multiple right-sided healing rib fractures. No vertebral body fractures or malalignment. Degenerative changes without high-grade canal stenosis or neural foraminal narrowing. Large right and trace left pleural effusion with atelectasis. CT LUMBAR SPINE WO CONTRAST   Final Result      No acute fracture or malalignment. Postoperative changes of L4-L5 posterior fusion with intact hardware. Advanced multilevel degenerative changes most prominent at L3-L4.   3.0 cm focal infrarenal abdominal aortic ectasia. CT Head WO Contrast   Final Result   Brain:   No evidence of acute intracranial process. Mild right frontal scalp soft tissue swelling. Cervical spine   Acute minimally displaced type II odontoid fracture. Multilevel degenerative changes most prominent at C6-C7. COMMUNICATION:  Communicated with: SANDY Molina on 4/17/2022 at 12:38 AM.                     CT CERVICAL SPINE WO CONTRAST   Final Result   Brain:   No evidence of acute intracranial process. Mild right frontal scalp soft tissue swelling.       Cervical spine   Acute minimally displaced type II odontoid fracture. Multilevel degenerative changes most prominent at C6-C7. COMMUNICATION:  Communicated with: SANDY Lanier on 4/17/2022 at 12:38 AM.                     CT ABDOMEN PELVIS W IV CONTRAST Additional Contrast? None   Final Result      No acute findings in abdomen and pelvis. 3.0 cm focal ectasia perirenal abdominal aorta. Moderate right and trace left pleural effusions.         ==========         XR CHEST PORTABLE   Final Result      Small right pleural effusion. Healing right-sided rib fractures. ED BEDSIDE ULTRASOUND:   Performed by ED Physician - none    LABS:  Labs Reviewed   COMPREHENSIVE METABOLIC PANEL - Abnormal; Notable for the following components:       Result Value    Glucose 111 (*)     CREATININE 0.68 (*)     Total Protein 6.2 (*)     Total Bilirubin 0.9 (*)     Alkaline Phosphatase 108 (*)     All other components within normal limits   CBC WITH AUTO DIFFERENTIAL - Abnormal; Notable for the following components:    RBC 4.42 (*)     .8 (*)     MCH 33.8 (*)     RDW 15.0 (*)     Neutrophils Absolute 8.7 (*)     Lymphocytes Absolute 0.6 (*)     Monocytes Absolute 0.9 (*)     All other components within normal limits   URINALYSIS WITH REFLEX TO CULTURE - Abnormal; Notable for the following components:    Protein, UA 30 (*)     All other components within normal limits   URINE DRUG SCREEN   ETHANOL   TROPONIN   MAGNESIUM   CK   BRAIN NATRIURETIC PEPTIDE   PROTIME-INR   APTT   MICROSCOPIC URINALYSIS   POCT VENOUS       All other labs were within normal range or not returned as of this dictation.     EMERGENCY DEPARTMENT COURSE and DIFFERENTIAL DIAGNOSIS/MDM:   Vitals:    Vitals:    04/17/22 1515 04/17/22 1545 04/17/22 1600 04/17/22 1645   BP: (!) 111/97 120/78 (!) 97/57 104/78   Pulse: 54 62 (!) 49 64   Resp: 18 27 19 21   Temp:       TempSrc:       SpO2: 98% 91% 93% 96%   Weight:              MDM  Number of Diagnoses or Management Options  Alcohol abuse  Closed nondisplaced odontoid fracture with type II morphology, initial encounter ([facility] [address]  Congestive heart failure, unspecified HF chronicity, unspecified heart failure type ([facility] [address]  H/O noncompliance with medical treatment, presenting hazards to health  Laceration of antihelix of left ear, initial encounter  Peripheral edema  Pleural effusion  Diagnosis management comments: Cannot presented ED with complaint of head, and neck pain secondary to a fall which patient states occurred around 7 PM yesterday. He claims that he lost his balance, falling at home, striking his head on the floor. He states he had contacted 9 11 and was assisted up, but did not come to the hospital at that time. He states he was still having issues this morning, contacted his neighbor who brought him to the ER by private vehicle. CT scan of the head shows no acute intracranial process, CT scan of the neck shows C2 fracture of the dens type II. I did call neurosurgery spoke with Dr. Savi Chavez, he felt this time this was a surgical intervention, requesting transfer to [facility]. patient was placed in a Lap[place] collar, and trauma services was also notified. . Pt seen and evaluated by Trauma services. CTA of the neck was completed which shows no acute vascular dissection. CT of the chest shows no acute process, there are 3 fractures to the right ribs which appear to be remote. Dr. Margarito Vidal of trauma services has spoke with Dr. Cecilio Morelos at [facility] who has accepted admission of this patient for surgical intervention of unstable C2 fracture. At the time of transfer, patient remains neurologically intact, there is no facial droop, no slurring of speech, no weakness or drift upper of lower extremities. CRITICAL CARE TIME   Total Critical Care time was 0 minutes, excluding separately reportableprocedures.   There was a high probability of clinicallysignificant/life threatening deterioration in the patient's condition which required my urgent intervention. CONSULTS:  None    PROCEDURES:  Unless otherwise noted below, none     Lac Repair    Date/Time: 4/17/2022 1:14 PM  Performed by: Agustina Toro PA-C  Authorized by: Agustina Toro PA-C     Consent:     Consent obtained:  Verbal    Consent given by:  Patient    Risks discussed:  Infection, pain, poor cosmetic result and poor wound healing    Alternatives discussed:  No treatment  Anesthesia (see MAR for exact dosages): Anesthesia method:  Local infiltration    Local anesthetic:  Lidocaine 2% w/o epi  Laceration details:     Location:  Ear    Ear location:  L ear    Length (cm):  1    Depth (mm):  2  Repair type:     Repair type:  Simple  Pre-procedure details:     Preparation:  Patient was prepped and draped in usual sterile fashion  Exploration:     Wound exploration: wound explored through full range of motion      Wound extent: no muscle damage noted, no nerve damage noted, no underlying fracture noted and no vascular damage noted      Contaminated: yes    Treatment:     Area cleansed with:  Betadine and saline    Amount of cleaning:  Standard    Irrigation solution:  Sterile water    Irrigation volume:  20    Irrigation method:  Tap    Visualized foreign bodies/material removed: no    Skin repair:     Repair method:  Sutures    Suture size:  4-0    Suture material:  Plain gut    Suture technique:  Simple interrupted    Number of sutures:  2  Approximation:     Approximation:  Loose  Post-procedure details:     Dressing:  Open (no dressing)        FINAL IMPRESSION      1. Closed nondisplaced odontoid fracture with type II morphology, initial encounter ([facility] [address]    2. Alcohol abuse    3. Laceration of antihelix of left ear, initial encounter    4. Pleural effusion    5. H/O noncompliance with medical treatment, presenting hazards to health    6. Peripheral edema    7.  Congestive heart failure, unspecified HF chronicity, unspecified heart failure type ([address] DISPOSITION/PLAN   DISPOSITION Decision To Transfer 04/17/2022 02:05:35 PM      PATIENT REFERRED TO:  No follow-up provider specified.     DISCHARGE MEDICATIONS:  Discharge Medication List as of 4/17/2022  4:59 PM             (Please note that portions of this note were completed with a voice recognition program.  Efforts were made to edit the dictations but occasionally words are mis-transcribed.)    Luc Vieira PA-C (electronically signed)  Attending Emergency Physician        Luc Vieira PA-C  04/17/22 [address] [facility] Yesenia Brown PA-C  04/17/22 [address] [facility] Yesenia Brown PA-C  04/17/22 [address] [facility] Yesenia Brown PA-C  04/20/22 6227

## 2022-04-17 NOTE — ED NOTES
Pt remains NPO due to neck fracture and transferring to [facility]  04/17/22 [facility], RN  04/17/22 0876 no chest pain/no palpitations

## 2022-04-17 NOTE — H&P
Trauma Consult / H & P Note    Reason for Consult: Trauma  Consulting Provider: Eduardo Cm PA-C      BASIC INJURY INFORMATION:  Level of activation: CAT 2  Mode of transport: Personal vehicle  Mechanism of injury: Fall down 5 steps  Complicating features: NA  Protective measures: NA    HISTORY OF PRESENT INJURY:   V[place] Montejo is a 68 y.o. male with a PMHx of A-fib (on Eliquis), HLD, CAD, HTN ad T2DM presents to [facility] [place] accompanied by his neighbor for evaluation s/p fall last night around 10pm. States he was midway down a flight of stairs when he became \"dizzy\" and fell forward about 5 steps down. Endorses neck pain, forehead pain and left ear pain. Presented this am after encouragement from his neighbor. Reports falling 3-4 times over the last several months. Notes increased consumption of alcohol after his wife  4 months ago. Reports of consuming a 5th of EtOH almost daily. Endorses bilateral LE swelling and states he has not taken his Lasix for the last few days. Denies loss of bowel/bladder function, numbness or weakness in extremities, chest pain, back pain, abdominal pain or SOB. He does take Eliquis for A-fib. PRIMARY SURVEY:  Airway: Intact  Breathing: Normal   Breath Sounds: Breath Sounds Equal Bilaterally  Circulation:    Pulses: Normal   Skin: Forehead laceration. Left ear lobe laceration. Disability:   Pupils: PERRL   GCS:    Best Eyes: 4    Best Verbal: 5    Best Motor: 6    Total: 15    Vitals:   Vitals:    22 1245 22 1300 22 1330 22 1345   BP: 135/68 (!) 157/64 (!) 176/95    Pulse: 54 59  53   Resp:    Temp:       TempSrc:       SpO2: 97% 96%  98%   Weight:           SECONDARY SURVEY:  Neurologic: Alert and Oriented, Appropriate, Moves all Extremities, Strength Symmetrical and No Sensory Deficits   HEENT:   Head: Midface stable to palpation. 8cm forehead laceration, hemostatic.     Eyes: PERRL, Corneas/Conjunctiva without lesions and EOM intact   Ears: Left ear lobe with laceration repaired prior by ED with absorbable suture. Nose: Septum Midline, No crepitus with motion; and No bloody discharge; Throat: Oral cavity without trauma   Neck: Midline tenderness noted. Aspen collar in place. Pulmonary: External exam: no crepitus or pain with palpation, no contusions or abrasions; and Lung exam: breath sounds clear, no wheezes, no rales  Cardiovascular:    Pulses: Bilateral radial, femoral, DP and PT pulses are normal;  Abdomen: Appearance: Non-distended, No scars, lacerations, contusions; and Palpation: no tenderness   Rectal: Not performed  Pelvis/Perineum: Pelvis is stable to palpation;  Musculoskeletal:    Back/Spine: Thoracolumbar spinal column non-tender; no step off or deformity;    Extremities: No gross upper or lower extremity signs of trauma; bilateral 3+ edema    PAST MEDICAL HISTORY:  Past Medical History:   Diagnosis Date    Bradycardia     CAD (coronary artery disease)     Cancer (HCC)     prostate- radiation     CHF (congestive heart failure) (HCC)     Depression     HSV-2 (herpes simplex virus 2) infection     Hyperlipidemia     Hypertension     Left knee DJD     Osteoarthritis     Rheumatoid arthritis(714.0)     Type 2 diabetes mellitus without complication, without long-term current use of insulin ([facility] [address] 1/10/2019       PAST SURGICAL HISTORY:  Past Surgical History:   Procedure Laterality Date    ANKLE SURGERY Right     pin    APPENDECTOMY      ARTHRODESIS Right 10/31/2016    RIGHT ANKLE ARTHRODESIS OF RIGHT ANKLE AND SUBTALAR JOINT, C-ARM, ABHIJEET EQUIPMENT SYNTHETIC BONE GRAFT, ALLOGRAFT CANCELLOUS, SHARON ANKLE FUSION NAIL, SHANDA IMPLANT BONE STIMULATOR, CHOICE ANESTHESIA, BLOCK  performed by Edy Lloyd MD at [address] [place] [address]      stent x 5    COLONOSCOPY  7-19-11    FRACTURE SURGERY      right ankle    HARDWARE REMOVAL FOOT / ANKLE Right 11/6/2017    RIGHT ANKLE HARDWARE REMOVAL performed by Tricia Diaz MD at [address] Bilateral     knees    NM COLON CA SCRN NOT  [address] IND N/A 7/11/2017    COLONOSCOPY performed by Vilma Berry DO at [facility] Left     TONSILLECTOMY AND ADENOIDECTOMY         PRE-ADMISSION MEDICATIONS:   Prior to Admission medications    Medication Sig Start Date End Date Taking? Authorizing Provider   nitroGLYCERIN (NITROSTAT) 0.4 MG SL tablet Place 0.4 mg under the tongue every 5 minutes as needed for Chest pain up to max of 3 total doses. If no relief after 1 dose, call 911.     Historical Provider, MD   sotalol (BETAPACE) 120 MG tablet Take 120 mg by mouth 2 times daily Take 1/2 tab twice a day    Historical Provider, MD   amLODIPine (NORVASC) 10 MG tablet Take 1 tablet by mouth daily  Patient taking differently: Take 10 mg by mouth daily Indications: High Blood Pressure Disorder  12/15/21   Aundrea Castillo MD   losartan (COZAAR) 25 MG tablet Take 1 tablet by mouth daily  Patient taking differently: Take 25 mg by mouth daily Indications: High Blood Pressure Disorder  12/16/21   Aundrea Castillo MD   metoprolol succinate (TOPROL XL) 25 MG extended release tablet Take 1 tablet by mouth daily  Patient taking differently: Take 25 mg by mouth nightly  12/16/21   Aundrea Castillo MD   torsemide (DEMADEX) 20 MG tablet Take 1 tablet by mouth daily  Patient taking differently: Take 20 mg by mouth daily Indications: Congestive Heart Failure  12/16/21   Aundrea Castillo MD   apixaban (ELIQUIS) 5 MG TABS tablet Take 1 tablet by mouth 2 times daily  Patient taking differently: Take 5 mg by mouth 2 times daily Indications: Atrial Fibrillation  12/15/21   Aundrea Castillo MD   potassium chloride (KLOR-CON M) 20 MEQ extended release tablet Take 2 tablets by mouth daily (with breakfast)  Patient taking differently: Take 20 mEq by mouth daily (with breakfast) Indications: Nutritional Support  12/15/21   Aundrea Castillo MD   rosuvastatin (CRESTOR) 40 MG tablet Take 40 mg by mouth every evening Indications: High Amount of Fats in the Blood  4/14/20   Historical Provider, MD   ketorolac 0.4 % SOLN ophthalmic solution Place 1 drop into both eyes 2 times daily Indications: Eye Pain  5/19/19   Historical Provider, MD   hydrALAZINE (APRESOLINE) 50 MG tablet Take 50 mg by mouth nightly Indications: High Blood Pressure Disorder     Historical Provider, MD   tamsulosin (FLOMAX) 0.4 MG capsule Take 1 capsule by mouth daily  Patient taking differently: Take 0.4 mg by mouth daily Indications: Urinary Retention  3/7/19   Joseline Magdaleno MD   folic acid (FOLVITE) 1 MG tablet Take 1 mg by mouth daily Indications: Nutritional Support     Historical Provider, MD   meloxicam (MOBIC) 15 MG tablet Take 15 mg by mouth every evening Indications: Pain     Historical Provider, MD   etanercept (ENBREL) 25 MG/0.5ML SOSY Inject 25 mg into the skin once a week Indications: Rheumatoid Arthritis   Patient not taking: Reported on 1/26/2022    Historical Provider, MD   allopurinol (ZYLOPRIM) 100 MG tablet Take 100 mg by mouth every evening Indications: Arthritis     Historical Provider, MD   acetaminophen (TYLENOL) 325 MG tablet Take 650 mg by mouth every 4 hours as needed for Pain or Fever     Historical Provider, MD   pantoprazole (PROTONIX) 40 MG tablet Take 40 mg by mouth daily Indications: Gastroesophageal Reflux Disease  1/25/17   Historical Provider, MD   aspirin 81 MG tablet Take 81 mg by mouth daily Indications: Atrial Fibrillation   Patient not taking: Reported on 1/26/2022    Historical Provider, MD       ALLERGIES:  Hylan g-f 20, Ace inhibitors, and Statins depletion therapy    SOCIAL HISTORY:   Social History     Socioeconomic History    Marital status:      Spouse name: None    Number of children: None    Years of education: None    Highest education level: None   Occupational History    None   Tobacco Use    Smoking status: Former Smoker     Packs/day: 0.25     Years: 7.00     Pack years: 1.75     Types: Cigarettes     Start date: 5     Quit date: 6/3/1992     Years since quittin.8    Smokeless tobacco: Never Used   Vaping Use    Vaping Use: Never used   Substance and Sexual Activity    Alcohol use: Yes     Alcohol/week: 0.0 standard drinks     Comment: weekly- 2 beers or wine    Drug use: No    Sexual activity: Never   Other Topics Concern    None   Social History Narrative    None     Social Determinants of Health     Financial Resource Strain:     Difficulty of Paying Living Expenses: Not on file   Food Insecurity:     Worried About Running Out of Food in the Last Year: Not on file    Robert of Food in the Last Year: Not on file   Transportation Needs:     Lack of Transportation (Medical): Not on file    Lack of Transportation (Non-Medical):  Not on file   Physical Activity:     Days of Exercise per Week: Not on file    Minutes of Exercise per Session: Not on file   Stress:     Feeling of Stress : Not on file   Social Connections:     Frequency of Communication with Friends and Family: Not on file    Frequency of Social Gatherings with Friends and Family: Not on file    Attends Mosque Services: Not on file    Active Member of [address] [place] [address] [employer] or Organizations: Not on file    Attends Club or Organization Meetings: Not on file    Marital Status: Not on file   Intimate Partner Violence:     Fear of Current or Ex-Partner: Not on file    Emotionally Abused: Not on file    Physically Abused: Not on file    Sexually Abused: Not on file   Housing Stability:     Unable to Pay for Housing in the Last Year: Not on file    Number of [place] in the Last Year: Not on file    Unstable Housing in the Last Year: Not on file       FAMILY HISTORY:  Family History   Problem Relation Age of Onset    Heart Attack Father     Cancer Father         colon    High Cholesterol Mother     Heart Disease Mother     Macular Degen Mother     Arthritis Sister         hip replacement  Other Brother         vertigo    No Known Problems Son     No Known Problems Son        REVIEW OF SYSTEMS:  Constitutional: Positive for multiple falls and neck pain  HENT: Negative for congestion, facial swelling and bloody nose. Positive for left ear pain. Eyes: Negative for vision changes  Respiratory: Negative for shortness of breath, difficulty breathing  Cardiovascular: Negative for chest wall pain. Gastrointestinal: Negative for abdominal distention, abdominal pain and vomiting. Genitourinary: Negative for hematuria  Musculoskeletal: Negative for gait difficulties   Skin: Negative for bruising, abrasions  Neurological: Positive for dizziness, weakness and light-headedness. Hematological: Negative for easy bruising/bleeding  Psychiatric/Behavioral: Positive for depression      Except as noted above the remainder of the review of systems was reviewed and negative.      BASIC LABS:   CBC with Differential:    Lab Results   Component Value Date    WBC 10.4 04/17/2022    RBC 4.42 04/17/2022    RBC 4.29 06/07/2012    HGB 15.0 04/17/2022    HCT 44.6 04/17/2022     04/17/2022    .8 04/17/2022    MCH 33.8 04/17/2022    MCHC 33.6 04/17/2022    RDW 15.0 04/17/2022    LYMPHOPCT 5.6 04/17/2022    MONOPCT 8.9 04/17/2022    EOSPCT 3.3 04/25/2012    BASOPCT 0.4 04/17/2022    MONOSABS 0.9 04/17/2022    LYMPHSABS 0.6 04/17/2022    EOSABS 0.2 04/17/2022    BASOSABS 0.0 04/17/2022     CMP:   Lab Results   Component Value Date     04/17/2022    K 4.0 04/17/2022     04/17/2022    CO2 20 04/17/2022    BUN 16 04/17/2022    CREATININE 0.68 (L) 04/17/2022    GLUCOSE 111 (H) 04/17/2022    CALCIUM 8.8 04/17/2022    PROT 6.2 (L) 04/17/2022    LABALBU 3.7 04/17/2022    BILITOT 0.9 (H) 04/17/2022    ALKPHOS 108 (H) 04/17/2022    AST 22 04/17/2022    ALT 20 04/17/2022    LABGLOM >60.0 04/17/2022    GFRAA >60.0 04/17/2022    GLOB 2.5 04/17/2022     Magnesium:   Lab Results   Component Value Date    MG 2.0 04/17/2022     Troponin:   Lab Results   Component Value Date    TROPONINI <0.010 04/17/2022     PT/INR:   Recent Labs     04/17/22  1145   PROTIME 14.6   INR 1.1     APTT:   Recent Labs     04/17/22  1145   APTT 27.0     EtOH:   Lab Results   Component Value Date    ETOH 63 04/17/2022       RADIOLOGY: (Personally reviewed and per Radiology Report)  CXR: Small right pleural effusion. Healing right-sided rib fractures. CT Head: No evidence of acute intracranial process. Mild right frontal scalp soft tissue swelling. CT C-Spine: Acute minimally displaced type II odontoid fracture. Multilevel degenerative changes most prominent at C6-C7. CTA Neck: No large vessel occlusion or high-grade stenosis in the head or neck. No evidence of arterial injury or dissection. Atherosclerotic calcification of the bilateral carotid bifurcations with approximately 30% stenosis by NASCET criteria. CT T-Spine: Multiple right-sided healing rib fractures. No vertebral body fractures or malalignment. Degenerative changes without high-grade canal stenosis or neural foraminal narrowing. Large right and trace left pleural effusion with atelectasis. CT L-Spine: No acute fracture or malalignment. Postoperative changes of L4-L5 posterior fusion with intact hardware. Advanced multilevel degenerative changes most prominent at L3-L4. 3.0 cm focal infrarenal abdominal aortic ectasia. CT Chest: Large right and trace left pleural effusion with atelectasis. Scattered groundglass opacities, likely inflammatory. CT Abdomen/Pelvis: No acute findings in abdomen and pelvis. 3.0 cm focal ectasia perirenal abdominal aorta. Moderate right and trace left pleural effusions. ASSESSMENT and PLAN:  Shanthi Marie is a 68 y.o. male with a PMHx of A-fib (on Eliquis), HLD, CAD, HTN ad T2DM presents to [address] [place] [address] accompanied by his neighbor for evaluation s/p fall last night around 10pm. (+) head strike, (-) LOC, (+) Eliquis, (+) EtOH. Pt was multiple falls over last two months. Attributes multiple falls to increased EtOH consumption after his wife passed away 4 months ago. Reports consuming approximately 1/5th of EtOH a day. On exam, GCS 15, VSS on room air, C-spine TTP, right forehead laceration is hemostatic and left ear lobe repaired prior by ED with absorbable sutures. There is no extremity weakness or sensory deficits. No chest wall tenderness or SOB. CT imaging with minimally displaced type II odontoid fracture. CTA neck without evidence of BCVI. Labs significant for pro-BNP of 1,993, no leukocytosis, no anemia, no acute renal or electrolyte imbalances. Pt received 40mEq of IV Lasix while in ED. Trauma workup found minimally displaced type II odontoid fracture. Rib fractures seen on plain film imaging are likely sub acute. - ED discussed C2 fracture with NSGY (Dr. Annita Sánchez). Upon review of imaging there is concern for fracture zaina unstable. - Discussed with patient and recommended transfer to high level of care at a Level 1 trauma center.  - Pt agreeable to transfer to [facility]  - Dr. Kurt Ahumada will be the accepting trauma surgeon at [address] C-spine precautions and Aspen collar at all times.       BERTHA TeagueC  Trauma/Critical Care/General Surgery  [phone] ([id number])  [phone]     This patient's plan of care was discussed and made in collaboration with Trauma Attending physician, Anabel Jeffers MD.

## 2022-04-18 LAB
EKG ATRIAL RATE: 46 BPM
EKG Q-T INTERVAL: 474 MS
EKG QRS DURATION: 84 MS
EKG QTC CALCULATION (BAZETT): 432 MS
EKG R AXIS: -8 DEGREES
EKG T AXIS: 182 DEGREES
EKG VENTRICULAR RATE: 50 BPM

## 2022-04-18 PROCEDURE — 93010 ELECTROCARDIOGRAM REPORT: CPT | Performed by: INTERNAL MEDICINE

## 2022-04-20 ASSESSMENT — ENCOUNTER SYMPTOMS
RHINORRHEA: 0
VOMITING: 0
ABDOMINAL PAIN: 0
SHORTNESS OF BREATH: 0
ABDOMINAL DISTENTION: 0
NAUSEA: 0
CONSTIPATION: 0
EYE DISCHARGE: 0
SORE THROAT: 0
COLOR CHANGE: 0

## 2022-06-13 ENCOUNTER — TELEPHONE (OUTPATIENT)
Dept: FAMILY MEDICINE CLINIC | Age: 77
End: 2022-06-13

## 2022-07-05 ENCOUNTER — TELEPHONE (OUTPATIENT)
Dept: FAMILY MEDICINE CLINIC | Age: 77
End: 2022-07-05

## 2022-07-06 NOTE — TELEPHONE ENCOUNTER
Patient has not been seen since [facility] and it was for a hospital follow up. I will need some form of documentation in the notes.

## 2022-07-20 ENCOUNTER — OFFICE VISIT (OUTPATIENT)
Dept: FAMILY MEDICINE CLINIC | Age: 77
End: 2022-07-20
Payer: MEDICARE

## 2022-07-20 VITALS
HEART RATE: 50 BPM | BODY MASS INDEX: 38.64 KG/M2 | OXYGEN SATURATION: 94 % | SYSTOLIC BLOOD PRESSURE: 110 MMHG | HEIGHT: 71 IN | DIASTOLIC BLOOD PRESSURE: 70 MMHG | TEMPERATURE: 97.2 F | RESPIRATION RATE: 14 BRPM | WEIGHT: 276 LBS

## 2022-07-20 DIAGNOSIS — M19.071 PRIMARY OSTEOARTHRITIS, RIGHT ANKLE AND FOOT: ICD-10-CM

## 2022-07-20 DIAGNOSIS — E11.29 TYPE 2 DIABETES MELLITUS WITH MICROALBUMINURIA, WITHOUT LONG-TERM CURRENT USE OF INSULIN (HCC): ICD-10-CM

## 2022-07-20 DIAGNOSIS — M54.2 NECK PAIN: ICD-10-CM
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
## 2022-07-20 DIAGNOSIS — S09.90XA CLOSED HEAD INJURY, INITIAL ENCOUNTER: ICD-10-CM

## 2022-07-20 DIAGNOSIS — E66.01 SEVERE OBESITY (BMI 35.0-39.9) WITH COMORBIDITY (HCC): ICD-10-CM

## 2022-07-20 DIAGNOSIS — I25.10 CORONARY ARTERY DISEASE INVOLVING NATIVE CORONARY ARTERY OF NATIVE HEART WITHOUT ANGINA PECTORIS: ICD-10-CM

## 2022-07-20 DIAGNOSIS — I50.43 CHF (CONGESTIVE HEART FAILURE), NYHA CLASS I, ACUTE ON CHRONIC, COMBINED (HCC): ICD-10-CM

## 2022-07-20 DIAGNOSIS — R80.9 TYPE 2 DIABETES MELLITUS WITH MICROALBUMINURIA, WITHOUT LONG-TERM CURRENT USE OF INSULIN (HCC): ICD-10-CM

## 2022-07-20 DIAGNOSIS — Z91.81 AT HIGH RISK FOR FALLS: ICD-10-CM

## 2022-07-20 DIAGNOSIS — I10 ESSENTIAL HYPERTENSION: ICD-10-CM

## 2022-07-20 DIAGNOSIS — M05.711 RHEUMATOID ARTHRITIS INVOLVING RIGHT SHOULDER WITH POSITIVE RHEUMATOID FACTOR (HCC): ICD-10-CM

## 2022-07-20 DIAGNOSIS — Z85.46 HISTORY OF PROSTATE CANCER: ICD-10-CM

## 2022-07-20 PROCEDURE — 1123F ACP DISCUSS/DSCN MKR DOCD: CPT | Performed by: FAMILY MEDICINE

## 2022-07-20 PROCEDURE — 3288F FALL RISK ASSESSMENT DOCD: CPT | Performed by: FAMILY MEDICINE

## 2022-07-20 PROCEDURE — 99214 OFFICE O/P EST MOD 30 MIN: CPT | Performed by: FAMILY MEDICINE

## 2022-07-20 RX ORDER — LANOLIN ALCOHOL/MO/W.PET/CERES
1000 CREAM (GRAM) TOPICAL DAILY
COMMUNITY
End: 2022-08-15 | Stop reason: ALTCHOICE

## 2022-07-20 RX ORDER — FUROSEMIDE 20 MG/1
20 TABLET ORAL 2 TIMES DAILY
COMMUNITY

## 2022-07-20 SDOH — ECONOMIC STABILITY: FOOD INSECURITY: WITHIN THE PAST 12 MONTHS, YOU WORRIED THAT YOUR FOOD WOULD RUN OUT BEFORE YOU GOT MONEY TO BUY MORE.: NEVER TRUE

## 2022-07-20 SDOH — ECONOMIC STABILITY: FOOD INSECURITY: WITHIN THE PAST 12 MONTHS, THE FOOD YOU BOUGHT JUST DIDN'T LAST AND YOU DIDN'T HAVE MONEY TO GET MORE.: NEVER TRUE

## 2022-07-20 ASSESSMENT — SOCIAL DETERMINANTS OF HEALTH (SDOH): HOW HARD IS IT FOR YOU TO PAY FOR THE VERY BASICS LIKE FOOD, HOUSING, MEDICAL CARE, AND HEATING?: NOT HARD AT ALL

## 2022-07-20 ASSESSMENT — ENCOUNTER SYMPTOMS
SHORTNESS OF BREATH: 0
VOMITING: 0
DIARRHEA: 0

## 2022-07-20 NOTE — PROGRESS NOTES
Subjective:      Patient ID: Kelsea García is a 68 y.o. male    Fall  The accident occurred More than 1 week ago. Associated symptoms include headaches. Pertinent negatives include no fever or vomiting. Headache   Pertinent negatives include no fever, vomiting or weakness. Here in follow up from nursing home -just got out last week. Had extended stay following fall and hospital stay for cervical fracture requiring repair. [facility] and [facility] and the rest thru multiple specialities. Home care provider with him today. .  Did fall just prior to leaving nursing home and hit head. No loc. Did have some neck pain since falling but pain somewhat better this week. Still some headache as did hit left side of head . Hx if htn and lipids and diabetes      Review of Systems   Constitutional:  Negative for chills and fever. Respiratory:  Negative for shortness of breath. Cardiovascular:  Negative for chest pain. Gastrointestinal:  Negative for diarrhea and vomiting. Neurological:  Positive for headaches. Negative for weakness. Reviewed allergy, medical, social, surgical, family and med list changes and updated   Files     Social History     Socioeconomic History    Marital status:    Tobacco Use    Smoking status: Former     Packs/day: 0.25     Years: 7.00     Pack years: 1.75     Types: Cigarettes     Start date: 5     Quit date: 6/3/1992     Years since quittin.1    Smokeless tobacco: Never   Vaping Use    Vaping Use: Never used   Substance and Sexual Activity    Alcohol use:  Yes     Alcohol/week: 0.0 standard drinks     Comment: weekly- 2 beers or wine    Drug use: No    Sexual activity: Never     Social Determinants of Health     Financial Resource Strain: Low Risk     Difficulty of Paying Living Expenses: Not hard at all   Food Insecurity: No Food Insecurity    Worried About [address] in the Last Year: Never true    [address] [facility] [facility] [address] in the Last Year: Never true     Current Outpatient Medications   Medication Sig Dispense Refill    vitamin B-12 (CYANOCOBALAMIN) 1000 MCG tablet Take 1,000 mcg by mouth in the morning. vitamin D (CHOLECALCIFEROL) 25 MCG (1000 UT) TABS tablet Take 1,000 Units by mouth in the morning. nitroGLYCERIN (NITROSTAT) 0.4 MG SL tablet Place 0.4 mg under the tongue every 5 minutes as needed for Chest pain up to max of 3 total doses. If no relief after 1 dose, call 911. potassium chloride (KLOR-CON M) 20 MEQ extended release tablet Take 2 tablets by mouth daily (with breakfast) (Patient taking differently: Take 20 mEq by mouth daily (with breakfast) Indications: Nutritional Support) 60 tablet 3    etanercept 25 MG/0.5ML SOSY Inject 25 mg into the skin once a week Indications: Rheumatoid Arthritis      allopurinol (ZYLOPRIM) 100 MG tablet Take 100 mg by mouth every evening Indications: Arthritis       pantoprazole (PROTONIX) 40 MG tablet Take 40 mg by mouth daily Indications: Gastroesophageal Reflux Disease       furosemide (LASIX) 20 MG tablet Take 20 mg by mouth in the morning and 20 mg before bedtime. sotalol (BETAPACE) 120 MG tablet Take 60 mg by mouth Take 1/2 tab twice a day      amLODIPine (NORVASC) 10 MG tablet Take 1 tablet by mouth daily (Patient taking differently: Take 10 mg by mouth in the morning. Indications: High Blood Pressure Disorder.) 30 tablet 3    losartan (COZAAR) 25 MG tablet Take 1 tablet by mouth daily (Patient taking differently: Take 25 mg by mouth in the morning. Indications: High Blood Pressure Disorder.) 30 tablet 3    metoprolol succinate (TOPROL XL) 25 MG extended release tablet Take 1 tablet by mouth daily (Patient taking differently: Take 25 mg by mouth nightly) 30 tablet 3    torsemide (DEMADEX) 20 MG tablet Take 1 tablet by mouth daily (Patient taking differently: Take 20 mg by mouth in the morning.  Indications: Congestive Heart Failure.) 30 tablet 3    apixaban (ELIQUIS) 5 MG TABS tablet Take 1 tablet by mouth 2 times daily (Patient taking differently: Take 5 mg by mouth in the morning and 5 mg before bedtime. Indications: Atrial Fibrillation.) 60 tablet 3    rosuvastatin (CRESTOR) 40 MG tablet Take 40 mg by mouth every evening Indications: High Amount of Fats in the Blood       ketorolac 0.4 % SOLN ophthalmic solution Place 1 drop into both eyes 2 times daily Indications: Eye Pain   1    hydrALAZINE (APRESOLINE) 50 MG tablet Take 50 mg by mouth nightly Indications: High Blood Pressure Disorder       tamsulosin (FLOMAX) 0.4 MG capsule Take 1 capsule by mouth daily (Patient taking differently: Take 0.4 mg by mouth in the morning. Indications: Urinary Retention.) 90 capsule 3    folic acid (FOLVITE) 1 MG tablet Take 1 mg by mouth daily Indications: Nutritional Support       meloxicam (MOBIC) 15 MG tablet Take 15 mg by mouth every evening Indications: Pain       acetaminophen (TYLENOL) 325 MG tablet Take by mouth every 4 hours as needed for Pain or Fever      aspirin 81 MG tablet Take 81 mg by mouth daily Indications: Atrial Fibrillation  (Patient not taking: No sig reported)       No current facility-administered medications for this visit.      Family History   Problem Relation Age of Onset    Heart Attack Father     Cancer Father         colon    High Cholesterol Mother     Heart Disease Mother     Macular Degen Mother     Arthritis Sister         hip replacement    Other Brother         vertigo    No Known Problems Son     No Known Problems Son      Past Medical History:   Diagnosis Date    Bradycardia     CAD (coronary artery disease)     Cancer ([address]     prostate- radiation     CHF (congestive heart failure) ([facility])     Depression     HSV-2 (herpes simplex virus 2) infection     Hyperlipidemia     Hypertension     Left knee DJD     Osteoarthritis     Rheumatoid arthritis(714.0)     Type 2 diabetes mellitus without complication, without long-term current use of insulin ([address] 1/10/2019     Objective: /70   Pulse 50   Temp 97.2 °F (36.2 °C)   Resp 14   Ht 5' 11\" (1.803 m)   Wt 276 lb (125.2 kg)   SpO2 94%   BMI 38.49 kg/m²     Physical Exam  Neuro;  no focal deficits. Neck:no carotid bruits. No masses. No adenopathy. No thyroid asymmetry. Minimal lateral rotation of neck   Lungs:clear and equal breath sounds. No wheezes or rales. Heart:rate reg. No murmur. No gallops   Pulses:Radials 2+ equal               Poster tib 1+ equal  Extremities:1+ pitting edema of both legs. No erythema or increase in warmth    Gen: In no acute distress  Abdomen; B.S present. Soft  Non tender. No hepatosplenomegaly. No masses    Assessment:       Diagnosis Orders   1. Fall, initial encounter  XR CERVICAL SPINE (4-5 VIEWS)    CT HEAD WO CONTRAST      2. Neck pain  XR CERVICAL SPINE (4-5 VIEWS)    CT HEAD WO CONTRAST      3. Closed head injury, initial encounter  CT HEAD WO CONTRAST      4. Type 2 diabetes mellitus with microalbuminuria, without long-term current use of insulin (HCC)        5. Severe obesity (BMI 35.0-39. 9) with comorbidity ([address]        6. Essential hypertension        7. Coronary artery disease involving native coronary artery of native heart without angina pectoris  External Referral to Cardiology      8. CHF (congestive heart failure), NYHA class I, acute on chronic, combined [facility])  External Referral to Cardiology      9. Rheumatoid arthritis involving right shoulder with positive rheumatoid factor (HCC)        10. Primary osteoarthritis, right ankle and foot        11.  History of prostate cancer                Plan:    Continue current meds for now   Needs to see neurosurgery -later this month-appt already made  F/u with specialities as already planned   Orders Placed This Encounter   Procedures    XR CERVICAL SPINE (4-5 VIEWS)     Standing Status:   Future     Standing Expiration Date:   7/20/2023    External Referral to Cardiology     Referral Priority:   Urgent     Referral Type: Eval and Treat     Referral Reason:   Specialty Services Required     Requested Specialty:   Cardiology     Number of Visits Requested:   1    Fasting blood work soon and xry soon-f/u after done

## 2022-07-26 DIAGNOSIS — I50.43 CHF (CONGESTIVE HEART FAILURE), NYHA CLASS I, ACUTE ON CHRONIC, COMBINED (HCC): ICD-10-CM

## 2022-07-26 DIAGNOSIS — I25.10 CORONARY ARTERY DISEASE INVOLVING NATIVE CORONARY ARTERY OF NATIVE HEART WITHOUT ANGINA PECTORIS: ICD-10-CM

## 2022-07-26 DIAGNOSIS — E11.29 TYPE 2 DIABETES MELLITUS WITH MICROALBUMINURIA, WITHOUT LONG-TERM CURRENT USE OF INSULIN (HCC): ICD-10-CM

## 2022-07-26 DIAGNOSIS — I10 ESSENTIAL HYPERTENSION: ICD-10-CM

## 2022-07-26 DIAGNOSIS — R80.9 TYPE 2 DIABETES MELLITUS WITH MICROALBUMINURIA, WITHOUT LONG-TERM CURRENT USE OF INSULIN (HCC): ICD-10-CM

## 2022-07-26 LAB
ALBUMIN SERPL-MCNC: 4.2 G/DL (ref 3.5–4.6)
ALP BLD-CCNC: 105 U/L (ref 35–104)
ALT SERPL-CCNC: 8 U/L (ref 0–41)
ANION GAP SERPL CALCULATED.3IONS-SCNC: 16 MEQ/L (ref 9–15)
AST SERPL-CCNC: 18 U/L (ref 0–40)
BASOPHILS ABSOLUTE: 0 K/UL (ref 0–0.2)
BASOPHILS RELATIVE PERCENT: 0.4 %
BILIRUB SERPL-MCNC: 0.8 MG/DL (ref 0.2–0.7)
BUN BLDV-MCNC: 31 MG/DL (ref 8–23)
CALCIUM SERPL-MCNC: 9 MG/DL (ref 8.5–9.9)
CHLORIDE BLD-SCNC: 96 MEQ/L (ref 95–107)
CHOLESTEROL, TOTAL: 192 MG/DL (ref 0–199)
CO2: 25 MEQ/L (ref 20–31)
CREAT SERPL-MCNC: 1.11 MG/DL (ref 0.7–1.2)
EOSINOPHILS ABSOLUTE: 0.4 K/UL (ref 0–0.7)
EOSINOPHILS RELATIVE PERCENT: 3.6 %
GFR AFRICAN AMERICAN: >60
GFR NON-AFRICAN AMERICAN: >60
GLOBULIN: 2.9 G/DL (ref 2.3–3.5)
GLUCOSE BLD-MCNC: 126 MG/DL (ref 70–99)
HBA1C MFR BLD: 6.1 % (ref 4.8–5.9)
HCT VFR BLD CALC: 40.6 % (ref 42–52)
HDLC SERPL-MCNC: 55 MG/DL (ref 40–59)
HEMOGLOBIN: 13.6 G/DL (ref 14–18)
LDL CHOLESTEROL CALCULATED: 115 MG/DL (ref 0–129)
LYMPHOCYTES ABSOLUTE: 0.6 K/UL (ref 1–4.8)
LYMPHOCYTES RELATIVE PERCENT: 4.7 %
MCH RBC QN AUTO: 32.3 PG (ref 27–31.3)
MCHC RBC AUTO-ENTMCNC: 33.5 % (ref 33–37)
MCV RBC AUTO: 96.4 FL (ref 80–100)
MONOCYTES ABSOLUTE: 0.9 K/UL (ref 0.2–0.8)
MONOCYTES RELATIVE PERCENT: 7.3 %
NEUTROPHILS ABSOLUTE: 10.2 K/UL (ref 1.4–6.5)
NEUTROPHILS RELATIVE PERCENT: 84 %
PDW BLD-RTO: 14.8 % (ref 11.5–14.5)
PLATELET # BLD: 303 K/UL (ref 130–400)
POTASSIUM SERPL-SCNC: 4.7 MEQ/L (ref 3.4–4.9)
RBC # BLD: 4.21 M/UL (ref 4.7–6.1)
SODIUM BLD-SCNC: 137 MEQ/L (ref 135–144)
TOTAL PROTEIN: 7.1 G/DL (ref 6.3–8)
TRIGL SERPL-MCNC: 110 MG/DL (ref 0–150)
WBC # BLD: 12.2 K/UL (ref 4.8–10.8)

## 2022-08-15 ENCOUNTER — OFFICE VISIT (OUTPATIENT)
Dept: FAMILY MEDICINE CLINIC | Age: 77
End: 2022-08-15
Payer: MEDICARE

## 2022-08-15 VITALS
RESPIRATION RATE: 14 BRPM | HEIGHT: 71 IN | WEIGHT: 272 LBS | HEART RATE: 60 BPM | TEMPERATURE: 97.9 F | DIASTOLIC BLOOD PRESSURE: 74 MMHG | SYSTOLIC BLOOD PRESSURE: 120 MMHG | BODY MASS INDEX: 38.08 KG/M2 | OXYGEN SATURATION: 96 %

## 2022-08-15 DIAGNOSIS — M05.711 RHEUMATOID ARTHRITIS INVOLVING RIGHT SHOULDER WITH POSITIVE RHEUMATOID FACTOR (HCC): ICD-10-CM

## 2022-08-15 DIAGNOSIS — N39.0 URINARY TRACT INFECTION WITHOUT HEMATURIA, SITE UNSPECIFIED: ICD-10-CM

## 2022-08-15 DIAGNOSIS — J18.9 PNEUMONIA DUE TO INFECTIOUS ORGANISM, UNSPECIFIED LATERALITY, UNSPECIFIED PART OF LUNG: ICD-10-CM

## 2022-08-15 DIAGNOSIS — Z09 HOSPITAL DISCHARGE FOLLOW-UP: Primary | ICD-10-CM

## 2022-08-15 PROCEDURE — 99214 OFFICE O/P EST MOD 30 MIN: CPT | Performed by: FAMILY MEDICINE

## 2022-08-15 PROCEDURE — 1123F ACP DISCUSS/DSCN MKR DOCD: CPT | Performed by: FAMILY MEDICINE

## 2022-08-15 RX ORDER — TRAMADOL HYDROCHLORIDE 50 MG/1
50 TABLET ORAL EVERY 6 HOURS PRN
COMMUNITY

## 2022-08-15 RX ORDER — AMOXICILLIN AND CLAVULANATE POTASSIUM 500; 125 MG/1; MG/1
TABLET, FILM COATED ORAL
COMMUNITY
Start: 2022-08-09

## 2022-08-15 RX ORDER — AZITHROMYCIN 500 MG/1
TABLET, FILM COATED ORAL
COMMUNITY
Start: 2022-08-09

## 2022-08-15 ASSESSMENT — PATIENT HEALTH QUESTIONNAIRE - PHQ9
SUM OF ALL RESPONSES TO PHQ QUESTIONS 1-9: 2
SUM OF ALL RESPONSES TO PHQ QUESTIONS 1-9: 2
1. LITTLE INTEREST OR PLEASURE IN DOING THINGS: 1
SUM OF ALL RESPONSES TO PHQ QUESTIONS 1-9: 2
SUM OF ALL RESPONSES TO PHQ QUESTIONS 1-9: 2
2. FEELING DOWN, DEPRESSED OR HOPELESS: 1
SUM OF ALL RESPONSES TO PHQ9 QUESTIONS 1 & 2: 2

## 2022-08-15 ASSESSMENT — ENCOUNTER SYMPTOMS
DIARRHEA: 0
SHORTNESS OF BREATH: 0

## 2022-08-15 NOTE — PROGRESS NOTES
Subjective:      Patient ID: Gwenlyn Kawasaki is a 68 y.o. male    Pneumonia  There is no shortness of breath. Pertinent negatives include no chest pain or fever. here with aid   Here in follow up from recent hospital stay for pacer placement and pnuemonia and uti and altered mental status- thru [facility].   --records not available. Breathing stable at this point   no chest pain. .     Review of Systems   Constitutional:  Negative for chills and fever. Respiratory:  Negative for shortness of breath. Cardiovascular:  Negative for chest pain. Gastrointestinal:  Negative for diarrhea. Reviewed allergy, medical, social, surgical, family and med list changes and updated   Files--reviewed blood work with borderline anemia and neck xry showing arthritic changes. --ct not done here but done thru [facility]-records not available      Social History     Socioeconomic History    Marital status:    Tobacco Use    Smoking status: Former     Packs/day: 0.25     Years: 7.00     Pack years: 1.75     Types: Cigarettes     Start date: 5     Quit date: 6/3/1992     Years since quittin.2    Smokeless tobacco: Never   Vaping Use    Vaping Use: Never used   Substance and Sexual Activity    Alcohol use:  Yes     Alcohol/week: 0.0 standard drinks     Comment: weekly- 2 beers or wine    Drug use: No    Sexual activity: Never     Social Determinants of Health     Financial Resource Strain: Low Risk     Difficulty of Paying Living Expenses: Not hard at all   Food Insecurity: No Food Insecurity    Worried About Running Out of Food in the Last Year: Never true    Ran Out of Food in the Last Year: Never true     Current Outpatient Medications   Medication Sig Dispense Refill    amoxicillin-clavulanate (AUGMENTIN) 500-125 MG per tablet TAKE 1 TABLET BY MOUTH TWICE A DAY      azithromycin (ZITHROMAX) 500 MG tablet TAKE 1 TABLET BY MOUTH EVERY DAY      traMADol (ULTRAM) 50 MG tablet Take 50 mg by mouth every 6 hours as needed for Pain. furosemide (LASIX) 20 MG tablet Take 20 mg by mouth in the morning and 20 mg before bedtime. nitroGLYCERIN (NITROSTAT) 0.4 MG SL tablet Place 0.4 mg under the tongue every 5 minutes as needed for Chest pain up to max of 3 total doses. If no relief after 1 dose, call 911. losartan (COZAAR) 25 MG tablet Take 1 tablet by mouth daily (Patient taking differently: Take 25 mg by mouth in the morning. Indications: High Blood Pressure Disorder.) 30 tablet 3    apixaban (ELIQUIS) 5 MG TABS tablet Take 1 tablet by mouth 2 times daily (Patient taking differently: Take 5 mg by mouth in the morning and 5 mg before bedtime. Indications: Atrial Fibrillation.) 60 tablet 3    rosuvastatin (CRESTOR) 40 MG tablet Take 40 mg by mouth every evening Indications: High Amount of Fats in the Blood       ketorolac 0.4 % SOLN ophthalmic solution Place 1 drop into both eyes 2 times daily Indications: Eye Pain   1    tamsulosin (FLOMAX) 0.4 MG capsule Take 1 capsule by mouth daily (Patient taking differently: Take 0.4 mg by mouth in the morning. Indications: Urinary Retention.) 90 capsule 3    acetaminophen (TYLENOL) 325 MG tablet Take 500 mg by mouth every 4 hours as needed for Pain or Fever      torsemide (DEMADEX) 20 MG tablet Take 1 tablet by mouth daily (Patient not taking: Reported on 8/15/2022) 30 tablet 3    etanercept 25 MG/0.5ML SOSY Inject 25 mg into the skin once a week Indications: Rheumatoid Arthritis (Patient not taking: Reported on 8/15/2022)       No current facility-administered medications for this visit.      Family History   Problem Relation Age of Onset    Heart Attack Father     Cancer Father         colon    High Cholesterol Mother     Heart Disease Mother     Macular Degen Mother     Arthritis Sister         hip replacement    Other Brother         vertigo    No Known Problems Son     No Known Problems Son      Past Medical History:   Diagnosis Date    Bradycardia     CAD (coronary artery disease) Cancer [facility])     prostate- radiation     CHF (congestive heart failure) (HCC)     Depression     HSV-2 (herpes simplex virus 2) infection     Hyperlipidemia     Hypertension     Left knee DJD     Osteoarthritis     Rheumatoid arthritis(714.0)     Type 2 diabetes mellitus without complication, without long-term current use of insulin ([facility] [address] 1/10/2019   Chest xry with cm and bibasilar fluid   Objective:   /74   Pulse 60   Temp 97.9 °F (36.6 °C)   Resp 14   Ht 5' 11\" (1.803 m)   Wt 272 lb (123.4 kg)   SpO2 96%   BMI 37.94 kg/m²     Physical Exam     Lungs: rales right base and clear left base  No wheezes or rales. Heart:rate reg. No murmur. No gallops   Back; No CVA tenderness bilaterally  Extremities: 1+ pitting edema   Gen: In no acute distress     Assessment:       Diagnosis Orders   1. Hospital discharge follow-up        2. Pneumonia due to infectious organism, unspecified laterality, unspecified part of lung        3. Urinary tract infection without hematuria, site unspecified        4. Rheumatoid arthritis involving right shoulder with positive rheumatoid factor ([facility] [address]               Plan: Old records   RA stable -continue current tx   Orders Placed This Encounter   Procedures    Culture, Urine     Standing Status:   Future     Standing Expiration Date:   8/15/2023     Order Specific Question:   Specify (ex-cath, midstream, cysto, etc)?      Answer:   mid    XR CHEST STANDARD (2 VW)     Standing Status:   Future     Standing Expiration Date:   8/15/2023    F/u in a week

## 2022-08-17 LAB — URINE CULTURE, ROUTINE: NORMAL

## 2022-08-22 ENCOUNTER — TELEPHONE (OUTPATIENT)
Dept: PHARMACY | Facility: CLINIC | Age: 77
End: 2022-08-22

## 2022-08-22 NOTE — TELEPHONE ENCOUNTER
[facility] CLINICAL PHARMACY: ADHERENCE REVIEW  Identified care gap per [employer]: fills at [facility]: ACE/ARB and Statin adherence    Last Visit: 08.15.22    Patient also appears to be prescribed: Rosuvastatin         ASSESSMENT  ACE/ARB ADHERENCE    Insurance Records claims through 08.05.22 (Prior Year [place] = PASSED; YTD Beto Gallegos =  79%; Potential Fail Date: 09.02.22 ):   Losartan last filled on 06.19.22 for 30 day supply. Next refill due: 07.19.22    Per  [place] Portal:  (same as above). BP Readings from Last 3 Encounters:   08/15/22 120/74   07/20/22 110/70   04/17/22 104/78     Estimated Creatinine Clearance: 93 mL/min (based on SCr of 0.89 mg/dL). STATIN ADHERENCE    Per Reconciled Dispense Report:  Rosuvastatin last filled on 04.01.22 for 90 day supply. Lab Results   Component Value Date    CHOL 192 07/26/2022    TRIG 110 07/26/2022    HDL 55 07/26/2022    LDLCALC 115 07/26/2022     ALT   Date Value Ref Range Status   08/05/2022 22 10 - 52 U/L Final     AST   Date Value Ref Range Status   08/05/2022 24 9 - 39 U/L Final     The 10-year ASCVD risk score (Usama Conley, et al., 2013) is: 46.8%    Values used to calculate the score:      Age: 68 years      Sex: Male      Is Non- : No      Diabetic: Yes      Tobacco smoker: No      Systolic Blood Pressure: 770 mmHg      Is BP treated: Yes      HDL Cholesterol: 55 mg/dL      Total Cholesterol: 192 mg/dL     PLAN  The following are interventions that have been identified:   - Patient overdue refilling Losartan & Rosuvastatin and active on home medication list.   - Patient eligible for 90 day supply of Losartan    Attempting to reach patient to review. Left message asking for return call.          Future Appointments   Date Time Provider Emiliano Bermudez   8/24/2022  2:15 PM Barry Crouch MD [facility] [address] Clinical Pharm[facility]  Phone: toll free [phone]

## 2022-08-24 ENCOUNTER — OFFICE VISIT (OUTPATIENT)
Dept: FAMILY MEDICINE CLINIC | Age: 77
End: 2022-08-24
Payer: MEDICARE

## 2022-08-24 VITALS
OXYGEN SATURATION: 96 % | RESPIRATION RATE: 14 BRPM | SYSTOLIC BLOOD PRESSURE: 130 MMHG | HEIGHT: 71 IN | WEIGHT: 276 LBS | HEART RATE: 71 BPM | DIASTOLIC BLOOD PRESSURE: 84 MMHG | TEMPERATURE: 97.7 F | BODY MASS INDEX: 38.64 KG/M2

## 2022-08-24 DIAGNOSIS — E11.29 TYPE 2 DIABETES MELLITUS WITH MICROALBUMINURIA, WITHOUT LONG-TERM CURRENT USE OF INSULIN (HCC): ICD-10-CM

## 2022-08-24 DIAGNOSIS — I50.43 CHF (CONGESTIVE HEART FAILURE), NYHA CLASS I, ACUTE ON CHRONIC, COMBINED (HCC): Primary | ICD-10-CM

## 2022-08-24 DIAGNOSIS — I25.10 CORONARY ARTERY DISEASE INVOLVING NATIVE CORONARY ARTERY OF NATIVE HEART WITHOUT ANGINA PECTORIS: ICD-10-CM

## 2022-08-24 DIAGNOSIS — J90 PLEURAL EFFUSION, RIGHT: ICD-10-CM

## 2022-08-24 DIAGNOSIS — Z86.010 HISTORY OF COLONIC POLYPS: ICD-10-CM

## 2022-08-24 DIAGNOSIS — R80.9 TYPE 2 DIABETES MELLITUS WITH MICROALBUMINURIA, WITHOUT LONG-TERM CURRENT USE OF INSULIN (HCC): ICD-10-CM

## 2022-08-24 PROCEDURE — 1123F ACP DISCUSS/DSCN MKR DOCD: CPT | Performed by: FAMILY MEDICINE

## 2022-08-24 PROCEDURE — 3044F HG A1C LEVEL LT 7.0%: CPT | Performed by: FAMILY MEDICINE

## 2022-08-24 PROCEDURE — 99213 OFFICE O/P EST LOW 20 MIN: CPT | Performed by: FAMILY MEDICINE

## 2022-08-24 ASSESSMENT — ENCOUNTER SYMPTOMS
DIARRHEA: 0
VOMITING: 0

## 2022-08-24 NOTE — PROGRESS NOTES
Subjective:      Patient ID: Vida Hannon is a 68 y.o. male    HPI  Here in follow up from recent chest xray and urine culture. No fever. Stable from respiratory standpoint. No cough   Review of Systems   Constitutional:  Negative for chills and unexpected weight change. Cardiovascular:  Negative for chest pain. Gastrointestinal:  Negative for diarrhea and vomiting. Genitourinary:  Negative for flank pain. Reviewed allergy, medical, social, surgical, family and med list changes and updated   Files--reviewed chest xray with tiny pleural effusion and bibasilar atelectasis and urine culture neg     Social History     Socioeconomic History    Marital status:      Spouse name: None    Number of children: None    Years of education: None    Highest education level: None   Tobacco Use    Smoking status: Former     Packs/day: 0.25     Years: 7.00     Pack years: 1.75     Types: Cigarettes     Start date:      Quit date: 6/3/1992     Years since quittin.2    Smokeless tobacco: Never   Vaping Use    Vaping Use: Never used   Substance and Sexual Activity    Alcohol use: Yes     Alcohol/week: 0.0 standard drinks     Comment: weekly- 2 beers or wine    Drug use: No    Sexual activity: Never     Social Determinants of Health     Financial Resource Strain: Low Risk     Difficulty of Paying Living Expenses: Not hard at all   Food Insecurity: No Food Insecurity    Worried About Running Out of Food in the Last Year: Never true    Ran Out of Food in the Last Year: Never true     Current Outpatient Medications   Medication Sig Dispense Refill    amoxicillin-clavulanate (AUGMENTIN) 500-125 MG per tablet TAKE 1 TABLET BY MOUTH TWICE A DAY      azithromycin (ZITHROMAX) 500 MG tablet TAKE 1 TABLET BY MOUTH EVERY DAY      traMADol (ULTRAM) 50 MG tablet Take 50 mg by mouth every 6 hours as needed for Pain. furosemide (LASIX) 20 MG tablet Take 20 mg by mouth in the morning and 20 mg before bedtime. nitroGLYCERIN (NITROSTAT) 0.4 MG SL tablet Place 0.4 mg under the tongue every 5 minutes as needed for Chest pain up to max of 3 total doses. If no relief after 1 dose, call 911. losartan (COZAAR) 25 MG tablet Take 1 tablet by mouth daily (Patient taking differently: Take 25 mg by mouth daily Indications: High Blood Pressure Disorder) 30 tablet 3    apixaban (ELIQUIS) 5 MG TABS tablet Take 1 tablet by mouth 2 times daily (Patient taking differently: Take 5 mg by mouth 2 times daily Indications: Atrial Fibrillation) 60 tablet 3    rosuvastatin (CRESTOR) 40 MG tablet Take 40 mg by mouth every evening Indications: High Amount of Fats in the Blood       ketorolac 0.4 % SOLN ophthalmic solution Place 1 drop into both eyes 2 times daily Indications: Eye Pain   1    tamsulosin (FLOMAX) 0.4 MG capsule Take 1 capsule by mouth daily (Patient taking differently: Take 0.4 mg by mouth daily Indications: Urinary Retention) 90 capsule 3    acetaminophen (TYLENOL) 325 MG tablet Take 500 mg by mouth every 4 hours as needed for Pain or Fever       No current facility-administered medications for this visit.      Family History   Problem Relation Age of Onset    Heart Attack Father     Cancer Father         colon    High Cholesterol Mother     Heart Disease Mother     Macular Degen Mother     Arthritis Sister         hip replacement    Other Brother         vertigo    No Known Problems Son     No Known Problems Son      Past Medical History:   Diagnosis Date    Bradycardia     CAD (coronary artery disease)     Cancer ([address]     prostate- radiation     CHF (congestive heart failure) ([facility])     Depression     HSV-2 (herpes simplex virus 2) infection     Hyperlipidemia     Hypertension     Left knee DJD     Osteoarthritis     Rheumatoid arthritis(714.0)     Type 2 diabetes mellitus without complication, without long-term current use of insulin ([facility] [address] 1/10/2019     Objective:   /84   Pulse 71   Temp 97.7 °F (36.5 °C)   Resp 14 Ht 5' 11\" (1.803 m)   Wt 276 lb (125.2 kg)   SpO2 96%   BMI 38.49 kg/m²     Physical Exam    Lungs:clear and equal breath sounds. No wheezes or rales. Heart:rate reg. No murmur. No gallops       Gen: In no acute distress     Assessment:       Diagnosis Orders   1. CHF (congestive heart failure), NYHA class I, acute on chronic, combined ([facility] [address]        2. History of colonic polyps  Kalyani Abrams MD, Gastroenterology, [place]      3. Pleural effusion, right  XR CHEST STANDARD (2 VW)      4. Type 2 diabetes mellitus with microalbuminuria, without long-term current use of insulin (HCC)        5.  Coronary artery disease involving native coronary artery of native heart without angina pectoris               Plan:      Orders Placed This Encounter   Procedures    XR CHEST STANDARD (2 VW)     Standing Status:   Future     Standing Expiration Date:   8/24/2023    Kalyani Abrams MD, Gastroenterology, [facility], [place]     Referral Priority:   Routine     Referral Type:   Eval and Treat     Referral Reason:   Specialty Services Required     Referred to Provider:   Khari Hunter MD     Requested Specialty:   Gastroenterology     Number of Visits Requested:   1    Fasting blood work in October and f/u after done

## 2022-08-26 NOTE — FLOWSHEET NOTE
Patient awake in chair. Patient complains of pain. Patient medicated with PRN percocet per mar order. Patient seems to have some Short term memory loss. Patient is however able to answer questions regarding today's date and his name however. Chair alarm in place. Call light within reach.  Electronically signed by Bland Holter, RN on 12/12/2021 at 10:16 AM 190

## 2022-08-29 NOTE — TELEPHONE ENCOUNTER
Patient's caregiver called back regarding adherence. Stated she has just started taking care of patient this past month and has been scrambling to get all of his medications correct so he can start taking them as prescribed. Stated he just picked up a 90 d/s of Losartan yesterday from pharmacy. Stated he was getting low on Rosuvastatin. Stated his PCP, Dr. Martin Pearce, was not prescribing this medication. [facility] and she stated they would contact them as soon as possible to get new prescription for medication sent to pharmacy so they could  since he was getting low. Thanked them for the call back.          Laura Angel, [address] [facility]   Phone: [phone]

## 2022-10-24 ENCOUNTER — TELEPHONE (OUTPATIENT)
Dept: FAMILY MEDICINE CLINIC | Age: 77
End: 2022-10-24

## 2022-10-24 NOTE — TELEPHONE ENCOUNTER
Called patient about no show and he stated he forgot he had appointment and was with his caregiver. He rescheduled appointment.

## 2022-11-30 ENCOUNTER — TELEPHONE (OUTPATIENT)
Dept: FAMILY MEDICINE CLINIC | Age: 77
End: 2022-11-30

## 2022-12-05 ENCOUNTER — HOSPITAL ENCOUNTER (OUTPATIENT)
Dept: CARDIOLOGY | Age: 77
Discharge: HOME | End: 2022-12-05
Payer: MEDICARE

## 2022-12-05 PROCEDURE — 93296 REM INTERROG EVL PM/IDS: CPT

## 2023-01-11 ENCOUNTER — APPOINTMENT (OUTPATIENT)
Dept: CT IMAGING | Age: 78
DRG: 640 | End: 2023-01-11
Payer: OTHER GOVERNMENT

## 2023-01-11 ENCOUNTER — APPOINTMENT (OUTPATIENT)
Dept: GENERAL RADIOLOGY | Age: 78
DRG: 640 | End: 2023-01-11
Payer: OTHER GOVERNMENT

## 2023-01-11 ENCOUNTER — HOSPITAL ENCOUNTER (INPATIENT)
Age: 78
LOS: 5 days | Discharge: NURSING, REHAB OR LONG-TERM CARE | DRG: 640 | End: 2023-01-17
Attending: EMERGENCY MEDICINE | Admitting: INTERNAL MEDICINE
Payer: OTHER GOVERNMENT

## 2023-01-11 DIAGNOSIS — I71.9 AORTIC ANEURYSM WITHOUT RUPTURE, UNSPECIFIED PORTION OF AORTA (HCC): ICD-10-CM
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
## 2023-01-11 DIAGNOSIS — R26.2 UNABLE TO AMBULATE: ICD-10-CM

## 2023-01-11 DIAGNOSIS — J90 PLEURAL EFFUSION: ICD-10-CM

## 2023-01-11 DIAGNOSIS — I50.9 CHRONIC CONGESTIVE HEART FAILURE, UNSPECIFIED HEART FAILURE TYPE (HCC): ICD-10-CM

## 2023-01-11 DIAGNOSIS — R62.7 ADULT FAILURE TO THRIVE: ICD-10-CM

## 2023-01-11 DIAGNOSIS — R77.8 ELEVATED TROPONIN: Primary | ICD-10-CM

## 2023-01-11 LAB
ALBUMIN SERPL-MCNC: 4 G/DL (ref 3.5–4.6)
ALP BLD-CCNC: 123 U/L (ref 35–104)
ALT SERPL-CCNC: 15 U/L (ref 0–41)
ANION GAP SERPL CALCULATED.3IONS-SCNC: 14 MEQ/L (ref 9–15)
APTT: 35.4 SEC (ref 24.4–36.8)
AST SERPL-CCNC: 27 U/L (ref 0–40)
BASOPHILS ABSOLUTE: 0 K/UL (ref 0–0.2)
BASOPHILS RELATIVE PERCENT: 0.4 %
BILIRUB SERPL-MCNC: 1 MG/DL (ref 0.2–0.7)
BUN BLDV-MCNC: 18 MG/DL (ref 8–23)
CALCIUM SERPL-MCNC: 9.1 MG/DL (ref 8.5–9.9)
CHLORIDE BLD-SCNC: 106 MEQ/L (ref 95–107)
CHP ED QC CHECK: YES
CO2: 24 MEQ/L (ref 20–31)
CREAT SERPL-MCNC: 0.82 MG/DL (ref 0.7–1.2)
EOSINOPHILS ABSOLUTE: 0.2 K/UL (ref 0–0.7)
EOSINOPHILS RELATIVE PERCENT: 1.8 %
ETHANOL PERCENT: NORMAL G/DL
ETHANOL: <10 MG/DL (ref 0–0.08)
GFR SERPL CREATININE-BSD FRML MDRD: >60 ML/MIN/{1.73_M2}
GLOBULIN: 2.4 G/DL (ref 2.3–3.5)
GLUCOSE BLD-MCNC: 114 MG/DL (ref 70–99)
GLUCOSE BLD-MCNC: 93 MG/DL
GLUCOSE BLD-MCNC: 93 MG/DL (ref 70–99)
HCT VFR BLD CALC: 46 % (ref 42–52)
HEMOGLOBIN: 15.4 G/DL (ref 14–18)
INR BLD: 1.1
LIPASE: 19 U/L (ref 12–95)
LYMPHOCYTES ABSOLUTE: 0.7 K/UL (ref 1–4.8)
LYMPHOCYTES RELATIVE PERCENT: 6.7 %
MAGNESIUM: 1.8 MG/DL (ref 1.7–2.4)
MCH RBC QN AUTO: 30.9 PG (ref 27–31.3)
MCHC RBC AUTO-ENTMCNC: 33.5 % (ref 33–37)
MCV RBC AUTO: 92.3 FL (ref 79–92.2)
MONOCYTES ABSOLUTE: 0.9 K/UL (ref 0.2–0.8)
MONOCYTES RELATIVE PERCENT: 8.4 %
NEUTROPHILS ABSOLUTE: 8.5 K/UL (ref 1.4–6.5)
NEUTROPHILS RELATIVE PERCENT: 82.7 %
PDW BLD-RTO: 15.6 % (ref 11.5–14.5)
PERFORMED ON: NORMAL
PLATELET # BLD: 271 K/UL (ref 130–400)
POC CREATININE WHOLE BLOOD: 0.8
POTASSIUM SERPL-SCNC: 4.1 MEQ/L (ref 3.4–4.9)
PRO-BNP: 2030 PG/ML
PROTHROMBIN TIME: 14.3 SEC (ref 12.3–14.9)
RBC # BLD: 4.98 M/UL (ref 4.7–6.1)
SODIUM BLD-SCNC: 144 MEQ/L (ref 135–144)
TOTAL CK: 672 U/L (ref 0–190)
TOTAL PROTEIN: 6.4 G/DL (ref 6.3–8)
TROPONIN: 0.04 NG/ML (ref 0–0.01)
WBC # BLD: 10.2 K/UL (ref 4.8–10.8)

## 2023-01-11 PROCEDURE — 85379 FIBRIN DEGRADATION QUANT: CPT

## 2023-01-11 PROCEDURE — 71045 X-RAY EXAM CHEST 1 VIEW: CPT

## 2023-01-11 PROCEDURE — 6830039000 HC L3 TRAUMA ALERT

## 2023-01-11 PROCEDURE — 6360000004 HC RX CONTRAST MEDICATION: Performed by: EMERGENCY MEDICINE

## 2023-01-11 PROCEDURE — 36415 COLL VENOUS BLD VENIPUNCTURE: CPT

## 2023-01-11 PROCEDURE — 74177 CT ABD & PELVIS W/CONTRAST: CPT

## 2023-01-11 PROCEDURE — 80053 COMPREHEN METABOLIC PANEL: CPT

## 2023-01-11 PROCEDURE — 83735 ASSAY OF MAGNESIUM: CPT

## 2023-01-11 PROCEDURE — 71260 CT THORAX DX C+: CPT

## 2023-01-11 PROCEDURE — 93005 ELECTROCARDIOGRAM TRACING: CPT | Performed by: EMERGENCY MEDICINE

## 2023-01-11 PROCEDURE — 80307 DRUG TEST PRSMV CHEM ANLYZR: CPT

## 2023-01-11 PROCEDURE — 83880 ASSAY OF NATRIURETIC PEPTIDE: CPT

## 2023-01-11 PROCEDURE — 84484 ASSAY OF TROPONIN QUANT: CPT

## 2023-01-11 PROCEDURE — 72131 CT LUMBAR SPINE W/O DYE: CPT

## 2023-01-11 PROCEDURE — 85025 COMPLETE CBC W/AUTO DIFF WBC: CPT

## 2023-01-11 PROCEDURE — 81001 URINALYSIS AUTO W/SCOPE: CPT

## 2023-01-11 PROCEDURE — 82077 ASSAY SPEC XCP UR&BREATH IA: CPT

## 2023-01-11 PROCEDURE — 72128 CT CHEST SPINE W/O DYE: CPT

## 2023-01-11 PROCEDURE — 85610 PROTHROMBIN TIME: CPT

## 2023-01-11 PROCEDURE — 72125 CT NECK SPINE W/O DYE: CPT

## 2023-01-11 PROCEDURE — 83690 ASSAY OF LIPASE: CPT

## 2023-01-11 PROCEDURE — 99285 EMERGENCY DEPT VISIT HI MDM: CPT

## 2023-01-11 PROCEDURE — 85730 THROMBOPLASTIN TIME PARTIAL: CPT

## 2023-01-11 PROCEDURE — 70450 CT HEAD/BRAIN W/O DYE: CPT

## 2023-01-11 PROCEDURE — 82550 ASSAY OF CK (CPK): CPT

## 2023-01-11 RX ADMIN — IOPAMIDOL 50 ML: 612 INJECTION, SOLUTION INTRAVENOUS at 22:19

## 2023-01-11 ASSESSMENT — ENCOUNTER SYMPTOMS
COUGH: 0
SHORTNESS OF BREATH: 0

## 2023-01-11 ASSESSMENT — LIFESTYLE VARIABLES
HOW OFTEN DO YOU HAVE A DRINK CONTAINING ALCOHOL: 4 OR MORE TIMES A WEEK
HOW MANY STANDARD DRINKS CONTAINING ALCOHOL DO YOU HAVE ON A TYPICAL DAY: 1 OR 2

## 2023-01-11 ASSESSMENT — PAIN DESCRIPTION - DESCRIPTORS: DESCRIPTORS: DULL;SHARP

## 2023-01-11 ASSESSMENT — PAIN DESCRIPTION - PAIN TYPE: TYPE: ACUTE PAIN

## 2023-01-11 ASSESSMENT — PAIN SCALES - GENERAL: PAINLEVEL_OUTOF10: 9

## 2023-01-11 ASSESSMENT — PAIN DESCRIPTION - LOCATION: LOCATION: CHEST

## 2023-01-11 ASSESSMENT — PAIN DESCRIPTION - ORIENTATION: ORIENTATION: MID

## 2023-01-12 ENCOUNTER — APPOINTMENT (OUTPATIENT)
Dept: ULTRASOUND IMAGING | Age: 78
DRG: 640 | End: 2023-01-12
Payer: OTHER GOVERNMENT

## 2023-01-12 LAB
D DIMER: 3.54 MG/L FEU (ref 0–0.5)
LV EF: 60 %
LVEF MODALITY: NORMAL
TROPONIN: 0.03 NG/ML (ref 0–0.01)

## 2023-01-12 PROCEDURE — 2580000003 HC RX 258: Performed by: INTERNAL MEDICINE

## 2023-01-12 PROCEDURE — 6360000002 HC RX W HCPCS: Performed by: NURSE PRACTITIONER

## 2023-01-12 PROCEDURE — 6370000000 HC RX 637 (ALT 250 FOR IP): Performed by: INTERNAL MEDICINE

## 2023-01-12 PROCEDURE — 6360000002 HC RX W HCPCS: Performed by: INTERNAL MEDICINE

## 2023-01-12 PROCEDURE — 93970 EXTREMITY STUDY: CPT

## 2023-01-12 PROCEDURE — 6370000000 HC RX 637 (ALT 250 FOR IP): Performed by: NURSE PRACTITIONER

## 2023-01-12 PROCEDURE — 97166 OT EVAL MOD COMPLEX 45 MIN: CPT

## 2023-01-12 PROCEDURE — 2500000003 HC RX 250 WO HCPCS: Performed by: NURSE PRACTITIONER

## 2023-01-12 PROCEDURE — 2580000003 HC RX 258: Performed by: NURSE PRACTITIONER

## 2023-01-12 PROCEDURE — 6360000002 HC RX W HCPCS: Performed by: EMERGENCY MEDICINE

## 2023-01-12 PROCEDURE — 97162 PT EVAL MOD COMPLEX 30 MIN: CPT

## 2023-01-12 PROCEDURE — 6360000004 HC RX CONTRAST MEDICATION: Performed by: INTERNAL MEDICINE

## 2023-01-12 PROCEDURE — 1210000000 HC MED SURG R&B

## 2023-01-12 PROCEDURE — C8929 TTE W OR WO FOL WCON,DOPPLER: HCPCS

## 2023-01-12 PROCEDURE — 93005 ELECTROCARDIOGRAM TRACING: CPT | Performed by: NURSE PRACTITIONER

## 2023-01-12 PROCEDURE — 94762 N-INVAS EAR/PLS OXIMTRY CONT: CPT

## 2023-01-12 RX ORDER — TAMSULOSIN HYDROCHLORIDE 0.4 MG/1
0.4 CAPSULE ORAL DAILY
Status: DISCONTINUED | OUTPATIENT
Start: 2023-01-12 | End: 2023-01-17 | Stop reason: HOSPADM

## 2023-01-12 RX ORDER — SODIUM CHLORIDE 9 MG/ML
INJECTION, SOLUTION INTRAVENOUS PRN
Status: DISCONTINUED | OUTPATIENT
Start: 2023-01-12 | End: 2023-01-17 | Stop reason: HOSPADM

## 2023-01-12 RX ORDER — METOPROLOL TARTRATE 50 MG/1
50 TABLET, FILM COATED ORAL 2 TIMES DAILY
Status: DISCONTINUED | OUTPATIENT
Start: 2023-01-12 | End: 2023-01-17 | Stop reason: HOSPADM

## 2023-01-12 RX ORDER — ROSUVASTATIN CALCIUM 40 MG/1
40 TABLET, COATED ORAL EVERY EVENING
Status: DISCONTINUED | OUTPATIENT
Start: 2023-01-12 | End: 2023-01-17 | Stop reason: HOSPADM

## 2023-01-12 RX ORDER — KETOROLAC TROMETHAMINE 5 MG/ML
1 SOLUTION OPHTHALMIC 4 TIMES DAILY
Status: DISCONTINUED | OUTPATIENT
Start: 2023-01-12 | End: 2023-01-17 | Stop reason: HOSPADM

## 2023-01-12 RX ORDER — HYDRALAZINE HYDROCHLORIDE 50 MG/1
50 TABLET, FILM COATED ORAL 2 TIMES DAILY
COMMUNITY
Start: 2021-04-10

## 2023-01-12 RX ORDER — HYDRALAZINE HYDROCHLORIDE 50 MG/1
50 TABLET, FILM COATED ORAL 2 TIMES DAILY
Status: DISCONTINUED | OUTPATIENT
Start: 2023-01-12 | End: 2023-01-17 | Stop reason: HOSPADM

## 2023-01-12 RX ORDER — LORAZEPAM 2 MG/ML
1 INJECTION INTRAMUSCULAR
Status: DISCONTINUED | OUTPATIENT
Start: 2023-01-12 | End: 2023-01-17 | Stop reason: HOSPADM

## 2023-01-12 RX ORDER — LORAZEPAM 1 MG/1
1 TABLET ORAL
Status: DISCONTINUED | OUTPATIENT
Start: 2023-01-12 | End: 2023-01-17 | Stop reason: HOSPADM

## 2023-01-12 RX ORDER — ACETAMINOPHEN 80 MG
TABLET,CHEWABLE ORAL ONCE
Status: COMPLETED | OUTPATIENT
Start: 2023-01-12 | End: 2023-01-12

## 2023-01-12 RX ORDER — TRAMADOL HYDROCHLORIDE 50 MG/1
25 TABLET ORAL EVERY 6 HOURS PRN
Status: DISCONTINUED | OUTPATIENT
Start: 2023-01-12 | End: 2023-01-17 | Stop reason: HOSPADM

## 2023-01-12 RX ORDER — ACETAMINOPHEN 325 MG/1
650 TABLET ORAL EVERY 6 HOURS PRN
Status: DISCONTINUED | OUTPATIENT
Start: 2023-01-12 | End: 2023-01-17 | Stop reason: HOSPADM

## 2023-01-12 RX ORDER — ONDANSETRON 4 MG/1
4 TABLET, ORALLY DISINTEGRATING ORAL EVERY 8 HOURS PRN
Status: DISCONTINUED | OUTPATIENT
Start: 2023-01-12 | End: 2023-01-17 | Stop reason: HOSPADM

## 2023-01-12 RX ORDER — LORAZEPAM 2 MG/ML
2 INJECTION INTRAMUSCULAR
Status: DISCONTINUED | OUTPATIENT
Start: 2023-01-12 | End: 2023-01-17 | Stop reason: HOSPADM

## 2023-01-12 RX ORDER — SODIUM CHLORIDE 0.9 % (FLUSH) 0.9 %
5-40 SYRINGE (ML) INJECTION EVERY 12 HOURS SCHEDULED
Status: DISCONTINUED | OUTPATIENT
Start: 2023-01-12 | End: 2023-01-17 | Stop reason: HOSPADM

## 2023-01-12 RX ORDER — MAGNESIUM SULFATE IN WATER 40 MG/ML
2000 INJECTION, SOLUTION INTRAVENOUS ONCE
Status: COMPLETED | OUTPATIENT
Start: 2023-01-12 | End: 2023-01-12

## 2023-01-12 RX ORDER — LIDOCAINE 4 G/G
3 PATCH TOPICAL DAILY
Status: DISCONTINUED | OUTPATIENT
Start: 2023-01-12 | End: 2023-01-17 | Stop reason: HOSPADM

## 2023-01-12 RX ORDER — MORPHINE SULFATE 2 MG/ML
2 INJECTION, SOLUTION INTRAMUSCULAR; INTRAVENOUS ONCE
Status: COMPLETED | OUTPATIENT
Start: 2023-01-12 | End: 2023-01-12

## 2023-01-12 RX ORDER — LANOLIN ALCOHOL/MO/W.PET/CERES
100 CREAM (GRAM) TOPICAL DAILY
Status: DISCONTINUED | OUTPATIENT
Start: 2023-01-12 | End: 2023-01-17 | Stop reason: HOSPADM

## 2023-01-12 RX ORDER — HYDRALAZINE HYDROCHLORIDE 20 MG/ML
10 INJECTION INTRAMUSCULAR; INTRAVENOUS EVERY 6 HOURS PRN
Status: DISCONTINUED | OUTPATIENT
Start: 2023-01-12 | End: 2023-01-17 | Stop reason: HOSPADM

## 2023-01-12 RX ORDER — LOSARTAN POTASSIUM 25 MG/1
25 TABLET ORAL 2 TIMES DAILY
Status: COMPLETED | OUTPATIENT
Start: 2023-01-12 | End: 2023-01-14

## 2023-01-12 RX ORDER — LOSARTAN POTASSIUM 25 MG/1
25 TABLET ORAL DAILY
Status: DISCONTINUED | OUTPATIENT
Start: 2023-01-12 | End: 2023-01-12

## 2023-01-12 RX ORDER — LIDOCAINE 4 G/G
1 PATCH TOPICAL DAILY
Status: DISCONTINUED | OUTPATIENT
Start: 2023-01-12 | End: 2023-01-12

## 2023-01-12 RX ORDER — POLYETHYLENE GLYCOL 3350 17 G/17G
17 POWDER, FOR SOLUTION ORAL DAILY PRN
Status: DISCONTINUED | OUTPATIENT
Start: 2023-01-12 | End: 2023-01-17 | Stop reason: HOSPADM

## 2023-01-12 RX ORDER — SODIUM CHLORIDE 0.9 % (FLUSH) 0.9 %
5-40 SYRINGE (ML) INJECTION PRN
Status: DISCONTINUED | OUTPATIENT
Start: 2023-01-12 | End: 2023-01-17 | Stop reason: HOSPADM

## 2023-01-12 RX ORDER — FUROSEMIDE 10 MG/ML
10 INJECTION INTRAMUSCULAR; INTRAVENOUS 2 TIMES DAILY
Status: DISCONTINUED | OUTPATIENT
Start: 2023-01-12 | End: 2023-01-14

## 2023-01-12 RX ORDER — ENOXAPARIN SODIUM 150 MG/ML
1 INJECTION SUBCUTANEOUS 2 TIMES DAILY
Status: DISCONTINUED | OUTPATIENT
Start: 2023-01-12 | End: 2023-01-12

## 2023-01-12 RX ORDER — PANTOPRAZOLE SODIUM 40 MG/1
40 TABLET, DELAYED RELEASE ORAL DAILY
COMMUNITY
Start: 2021-03-08

## 2023-01-12 RX ORDER — LANOLIN ALCOHOL/MO/W.PET/CERES
400 CREAM (GRAM) TOPICAL DAILY
Status: DISCONTINUED | OUTPATIENT
Start: 2023-01-12 | End: 2023-01-17 | Stop reason: HOSPADM

## 2023-01-12 RX ORDER — PANTOPRAZOLE SODIUM 40 MG/1
40 TABLET, DELAYED RELEASE ORAL DAILY
Status: DISCONTINUED | OUTPATIENT
Start: 2023-01-12 | End: 2023-01-17 | Stop reason: HOSPADM

## 2023-01-12 RX ORDER — LABETALOL HYDROCHLORIDE 5 MG/ML
10 INJECTION, SOLUTION INTRAVENOUS EVERY 4 HOURS PRN
Status: DISCONTINUED | OUTPATIENT
Start: 2023-01-12 | End: 2023-01-17 | Stop reason: HOSPADM

## 2023-01-12 RX ORDER — FOLIC ACID 1 MG/1
1 TABLET ORAL DAILY
Status: COMPLETED | OUTPATIENT
Start: 2023-01-12 | End: 2023-01-16

## 2023-01-12 RX ORDER — LORAZEPAM 1 MG/1
4 TABLET ORAL
Status: DISCONTINUED | OUTPATIENT
Start: 2023-01-12 | End: 2023-01-17 | Stop reason: HOSPADM

## 2023-01-12 RX ORDER — ENOXAPARIN SODIUM 150 MG/ML
1 INJECTION SUBCUTANEOUS 2 TIMES DAILY
Status: DISCONTINUED | OUTPATIENT
Start: 2023-01-12 | End: 2023-01-14

## 2023-01-12 RX ORDER — KETOROLAC TROMETHAMINE 15 MG/ML
15 INJECTION, SOLUTION INTRAMUSCULAR; INTRAVENOUS EVERY 6 HOURS PRN
Status: DISCONTINUED | OUTPATIENT
Start: 2023-01-12 | End: 2023-01-17 | Stop reason: HOSPADM

## 2023-01-12 RX ORDER — LORAZEPAM 2 MG/ML
3 INJECTION INTRAMUSCULAR
Status: DISCONTINUED | OUTPATIENT
Start: 2023-01-12 | End: 2023-01-17 | Stop reason: HOSPADM

## 2023-01-12 RX ORDER — LORAZEPAM 1 MG/1
3 TABLET ORAL
Status: DISCONTINUED | OUTPATIENT
Start: 2023-01-12 | End: 2023-01-17 | Stop reason: HOSPADM

## 2023-01-12 RX ORDER — LORAZEPAM 1 MG/1
2 TABLET ORAL
Status: DISCONTINUED | OUTPATIENT
Start: 2023-01-12 | End: 2023-01-17 | Stop reason: HOSPADM

## 2023-01-12 RX ORDER — ONDANSETRON 2 MG/ML
4 INJECTION INTRAMUSCULAR; INTRAVENOUS EVERY 6 HOURS PRN
Status: DISCONTINUED | OUTPATIENT
Start: 2023-01-12 | End: 2023-01-17 | Stop reason: HOSPADM

## 2023-01-12 RX ORDER — LORAZEPAM 2 MG/ML
4 INJECTION INTRAMUSCULAR
Status: DISCONTINUED | OUTPATIENT
Start: 2023-01-12 | End: 2023-01-17 | Stop reason: HOSPADM

## 2023-01-12 RX ORDER — ACETAMINOPHEN 650 MG/1
650 SUPPOSITORY RECTAL EVERY 6 HOURS PRN
Status: DISCONTINUED | OUTPATIENT
Start: 2023-01-12 | End: 2023-01-17 | Stop reason: HOSPADM

## 2023-01-12 RX ADMIN — HYDRALAZINE HYDROCHLORIDE 10 MG: 20 INJECTION INTRAMUSCULAR; INTRAVENOUS at 03:28

## 2023-01-12 RX ADMIN — PANTOPRAZOLE SODIUM 40 MG: 40 TABLET, DELAYED RELEASE ORAL at 10:44

## 2023-01-12 RX ADMIN — MAGNESIUM SULFATE HEPTAHYDRATE 2000 MG: 40 INJECTION, SOLUTION INTRAVENOUS at 03:31

## 2023-01-12 RX ADMIN — ENOXAPARIN SODIUM 120 MG: 150 INJECTION SUBCUTANEOUS at 21:22

## 2023-01-12 RX ADMIN — Medication 10 ML: at 21:17

## 2023-01-12 RX ADMIN — Medication: at 10:55

## 2023-01-12 RX ADMIN — HYDRALAZINE HYDROCHLORIDE 50 MG: 50 TABLET, FILM COATED ORAL at 10:44

## 2023-01-12 RX ADMIN — TAMSULOSIN HYDROCHLORIDE 0.4 MG: 0.4 CAPSULE ORAL at 10:44

## 2023-01-12 RX ADMIN — Medication 400 MG: at 11:29

## 2023-01-12 RX ADMIN — FUROSEMIDE 10 MG: 10 INJECTION, SOLUTION INTRAMUSCULAR; INTRAVENOUS at 13:15

## 2023-01-12 RX ADMIN — TRAMADOL HYDROCHLORIDE 25 MG: 50 TABLET ORAL at 21:22

## 2023-01-12 RX ADMIN — KETOROLAC TROMETHAMINE 1 DROP: 5 SOLUTION/ DROPS OPHTHALMIC at 14:29

## 2023-01-12 RX ADMIN — HYDRALAZINE HYDROCHLORIDE 50 MG: 50 TABLET, FILM COATED ORAL at 21:17

## 2023-01-12 RX ADMIN — Medication 10 ML: at 11:11

## 2023-01-12 RX ADMIN — LOSARTAN POTASSIUM 25 MG: 25 TABLET, FILM COATED ORAL at 21:17

## 2023-01-12 RX ADMIN — MORPHINE SULFATE 2 MG: 2 INJECTION, SOLUTION INTRAMUSCULAR; INTRAVENOUS at 00:50

## 2023-01-12 RX ADMIN — MICONAZOLE NITRATE: 2 POWDER TOPICAL at 21:17

## 2023-01-12 RX ADMIN — Medication 100 MG: at 10:44

## 2023-01-12 RX ADMIN — APIXABAN 5 MG: 5 TABLET, FILM COATED ORAL at 10:44

## 2023-01-12 RX ADMIN — METOPROLOL TARTRATE 50 MG: 50 TABLET, FILM COATED ORAL at 21:17

## 2023-01-12 RX ADMIN — MICONAZOLE NITRATE: 2 POWDER TOPICAL at 10:54

## 2023-01-12 RX ADMIN — TRAMADOL HYDROCHLORIDE 25 MG: 50 TABLET ORAL at 11:31

## 2023-01-12 RX ADMIN — FUROSEMIDE 10 MG: 10 INJECTION, SOLUTION INTRAMUSCULAR; INTRAVENOUS at 18:09

## 2023-01-12 RX ADMIN — FOLIC ACID 1 MG: 1 TABLET ORAL at 11:35

## 2023-01-12 RX ADMIN — PERFLUTREN 1.5 ML: 6.52 INJECTION, SUSPENSION INTRAVENOUS at 09:12

## 2023-01-12 RX ADMIN — LOSARTAN POTASSIUM 25 MG: 25 TABLET, FILM COATED ORAL at 10:44

## 2023-01-12 RX ADMIN — METOPROLOL TARTRATE 50 MG: 50 TABLET, FILM COATED ORAL at 11:29

## 2023-01-12 RX ADMIN — Medication 10 ML: at 10:50

## 2023-01-12 RX ADMIN — KETOROLAC TROMETHAMINE 1 DROP: 5 SOLUTION/ DROPS OPHTHALMIC at 18:11

## 2023-01-12 RX ADMIN — ROSUVASTATIN CALCIUM 40 MG: 40 TABLET, FILM COATED ORAL at 18:09

## 2023-01-12 RX ADMIN — KETOROLAC TROMETHAMINE 1 DROP: 5 SOLUTION/ DROPS OPHTHALMIC at 10:56

## 2023-01-12 ASSESSMENT — PAIN DESCRIPTION - DESCRIPTORS
DESCRIPTORS: ACHING
DESCRIPTORS: ACHING

## 2023-01-12 ASSESSMENT — ENCOUNTER SYMPTOMS
SHORTNESS OF BREATH: 0
DIARRHEA: 0
NAUSEA: 0
RHINORRHEA: 0
VOMITING: 0
BACK PAIN: 0
SORE THROAT: 0
COUGH: 0
WHEEZING: 0
ABDOMINAL PAIN: 0
PHOTOPHOBIA: 0

## 2023-01-12 ASSESSMENT — PAIN DESCRIPTION - PAIN TYPE: TYPE: ACUTE PAIN

## 2023-01-12 ASSESSMENT — PAIN SCALES - GENERAL
PAINLEVEL_OUTOF10: 7
PAINLEVEL_OUTOF10: 8
PAINLEVEL_OUTOF10: 5

## 2023-01-12 ASSESSMENT — PAIN DESCRIPTION - LOCATION
LOCATION: BACK;GENERALIZED
LOCATION: GENERALIZED;RIB CAGE

## 2023-01-12 ASSESSMENT — PAIN DESCRIPTION - ORIENTATION: ORIENTATION: LEFT

## 2023-01-12 NOTE — PROGRESS NOTES
Patient is A&Ox3, he was not sure of the correct date and is forgetful of information he is given. VS, assessment completed and required med's given. Dr. Lara Lamb was informed by me face to face about patients elevated BP this morning of 178/73. I have been unable to collect a urine sample as of yet today. CIWA completed and patient has not score higher than a 6. Patient was up to chair this morning for about 2 hours. Patient denied any further needs and I will continue to monitor. Bed is in lowest position and call light is within reach. Electronically signed by Kamar Stauffer RN on 1/12/2023 at 5:30 PM    1429- I was in patients room to administer his eye drops and the  was also in the room. While we were in there the patient said \"Next time I come here I am going to bring a gun\" I said why is that and the patient stated \"because the next time a person come into my room to wake me up while I am sleeping I will shoot them\". I informed Filiberto Moran and he informed Dr. Lara Lamb, Dr. Melba Alanis and the nurse manager.  Electronically signed by Kamar Stauffer RN on 1/12/2023 at 5:35 PM

## 2023-01-12 NOTE — CONSULTS
Cardiology Consult Note      Date:   1/12/2023  Patient name:  Thomas Castro  Date of admission:  1/11/2023  7:19 PM  MRN:   [id number]  YOB: 1945  Time of Consult:  8:14 AM    Consulting Cardiologist: Dr. Gia Day APRN-CNP  Primary Cardiologist:  Dr. Mallory Keane /Dr. Jemima Tafoya  Referring Provider: Dr. Jade Mancuso Reason:  Elevated troponin, CHF, irregular rhythm     Please see excellent note by nurse practitioner. Patient was personally examined, interviewed, and decision making by myself. HPI:  Patient apparently had a falling spell. He did have a little dizziness prior to this episode. He was found down on the floor by a neighbor, and brought in for further evaluation. At that time, the patient had quite a work-up. In terms of his cardiovascular status, the patient had a CT of the chest to rule out pulmonary embolism. It did show no pulmonary embolism, but relatively large right pleural effusion is noted, with none on the left. Presently, the patient's main complaint is substernal chest pain where he really hit a chair. There is no bruising. Denies any anginal symptoms or excessive shortness of breath. The patient also had the following lab work. CBC was 10.2 terms of white count. Normal hemoglobin. Normal platelets metabolic profile showed normal BUN and creatinine. Mild elevation of alk phos and total bilirubin, but liver function tests otherwise are normal.  Lipase is normal.  Magnesium was 1.8. Pro time and INR normal.  Payne level was normal.  Opponent was 0.037, but looking back through his lab work all the way back to 2021 he really had mild elevation troponins. CPK was elevated at 672. BNP was elevated at 2030. Patient's D-dimer was 3.5 and that is subsequent CT scanning of the chest.    On exam, the patient is reasonably alert. HEENT is  normocephalic without carotid bruit. No jugular venous distention is noted.   Upper extremity. Be both symmetrical.  AP diameter chest is enlarged. Pacemaker well-healed left upper chest wall. Cardiac exam shows irregular regular rhythm. No S3 is noted. No rub is noted. No obvious murmurs noted. Lungs are clear to auscultation no rhonchi rales or wheeze. Abdomen is obese but benign. He does have 1-2+ edema bilaterally. It is nontender. Distal pulses. Evidence of knee surgery is also noted. CT scan of the chest as above, chest x-ray however shows some borderline cardiomegaly, no increased vasculature, and somewhat blunted right costophrenic angle    Echo performed today shows the following:     Conclusions      Summary   Left ventricular ejection fraction is visually estimated at 60%. Mild to moderate concentric left ventricular hypertrophy. Indeterminate diastolic relaxation   Mild (1+) mitral regurgitation is present. There is mild ( 1 +) tricuspid regurgitation with estimated RVSP of 50 mm   Hg.--moderate pulmonary hypertension   Moderately dilated left atrium. Normal right ventricular size with preserved RV function. Pacemaker Wire visualized in right ventricle. There is a (trivial) pericardial effusion. Previous echo was 2014      Signature      EKG shows atrial fibrillation. Intermittent pacing. Nonspecific changes. A PVC versus [place]'s phenomena. Past cardiac history as outlined below. Patient does have coronary disease, but most recent cardiac catheterization in 2013 showed medical therapy. Past medical history is notable for prostate cancer, depression, history of herpes simplex-2 infection. Hyperlipidemia and hypertension left knee surgery rheumatoid arthritis and apparently diabetes.   Patient also has obstructive sleep apnea but is noncompliant with his CPAP mask    Past surgical history is notable for ankle surgery on the right, appendectomy, the above-mentioned heart catheterization, colonoscopy, right ankle fracture, bilateral knee replacement, colonoscopy, and shoulder surgery. He is also had a T and a    There is a family history of cancer    Patient is a active drinker. He denies any use of cigarette recently and quit in 1992. Denies any drug abuse    The patient is  and lives at home by himself. The patient has allergies to 3 medicines, most specifically from a cardiovascular standpoint is weakness from Crestor, and cough with ACE inhibitor's    Review of symptoms:  Presently, just some tactile sternal pain. Not angina. He also has some element of weakness. He had above-mentioned falling/possible syncopal spell    Assessment:  Etiology of fall is unclear, but he states that he he got dizzy and fell. Patient is stopped all his medicines recently  Significant cardiac history--involving coronary disease to a modest degree-most recent heart catheterization showed medical therapy in 2013  Obstructive sleep apnea-does not sound like he has been using his mask. Sick sinus syndrome status post pacemaker placement  Paroxysmal atrial fibrillation  Hypertension  Right pleural effusion--clear on the left 1 and questions whether he had trauma to the left chest wall but does not appear to be apparent  Alcohol use  Attempted cardioversion relatively recently, but back in atrial fibrillation--was on sotalol and Eliquis    Plan: For now, will give subcu Lovenox with planning to restart Eliquis. Question whether patient would benefit from thoracentesis on the right  Renew medications  May not be able to resume sotalol as this patient has been off his Eliquis for quite some time  Gentle diuresis  Most likely will need some rehab  Will need counseling for his alcohol  Overnight pulse oximetry  Pacemaker check  Check orthostatics  Control blood pressure  Afterload reduction  See orders                    HPI:   aTqueria Kenney is a 68 y.o.   male patient who is being at the request of Dr. Godwin Cates for inpatient consultation of Elevated troponin, CHF, irregular rhythm. He was admitted on 2023. Previous [address] [facility] and [address] [place] records have been reviewed in detail. 68 yr old male with a PMH of HTN, HLD with statin intolerance, atrial fibrillation, long term anticoagulation, CAD, sleep apnea, prostate cancer status postradiation who presented to [facility] 2023 with CCL status post fall. He stated he was at home attempted to move and tripped and fell striking his chest on a chair leg. He stated he was unable to get himself up, neighbor checked on him later in the day found him on the floor and called EMS. He stated that he lives at home alone he normally has a home health aide to provide assistance. Recently stopped coming. He admitted he has not been taking any his medications and admitted to being a daily drinker and I had 1 drink in the morning prior to his fall. EKG showed atrial fibrillation with frequent ventricular paced complexes. LVH, ST and T bili abnormality. HR 61 bpm.  Chest x-ray showed no acute process. CT of the chest showed moderate right pleural effusion with associated compressive atelectasis. Abnormal labs total CK6 172, BNP 2030, troponin 0.037, 0.031, alk phos 123, D-dimer 3.54. CT of the chest negative for pulmonary emboli. Cardiac History/Testin.  2022 successful cardioversion       2. 2022 dual-chamber pacemaker; [employer] Aziza XT DR MRI  3. 2022 Echo: LVEF 75 % dilated atrium. 4.Echocardiogram 2021-LVEF 55% moderate biatrial enlargement moderate left ventricular hypertrophy and diastolic dysfunction no significant valvular heart disease no pericardial effusion RV systolic pressure 37 mmHg left atrial diameter 4.26 cm left atrial volume index 99 mL/m²  5. Carotid ultrasound -calcified plaque left carotid bifurcation and proximal internal carotid artery with focal stenosis at the origin of the left internal carotid artery of approximately 63%  6.  Left heart cath: that show moderate diffuse disease with areas of ectatic in the right coronary artery with previously deployed patent stents in the proximal and mid right coronary artery. The posterior descending artery has severe tandem proximal disease and is 100% occluded in the midsegment. Distal vessel fills delayed by left to right collaterals. The left main coronary artery was normal. The left anterior descending artery had mid 50% stenosis. Major diagonal branch has subtotal occlusion stenting of the ostium fillin by left to right collaterals. Ramus intermedius is a large and normal with a left circumflex artery appears normal except for mild luminal irregularities. Medical therapy was recommended. Left ventricular ejection fraction was 80%. 7.  2012 stress test show abnormalities consistent with mid left anterior descending artery stenosis and ischemia on that distribution. 8.  2014 New onset atrial fibrillation with successful direct cardioversion        [facility] Medication List 7/28/2022  Medication Name Instruction   Acetaminophen 500 MG Oral Tablet TAKE 1 TABLET EVERY 4 TO 6 HOURS AS NEEDED. Eliquis 5 MG Oral Tablet Take 1 tablet twice daily   Losartan Potassium 25 MG Oral Tablet TAKE 1 TABLET DAILY. Nitroglycerin 0.4 MG Sublingual Tablet Sublingual PLACE 1 TABLET UNDER THE TONGUE EVERY 5 MINUTES FOR UP TO 3 DOSES AS NEEDED FOR CHEST PAIN. CALL 911 IF PAIN PERSISTS. Rosuvastatin Calcium 40 MG Oral Tablet TAKE 1 TABLET DAILY. Sotalol HCl - 120 MG Oral Tablet TAKE 1/2 TABLET TWICE DAILY   Tamsulosin HCl - 0.4 MG Oral Capsule Take 1 daily   Torsemide 20 MG Oral Tablet TAKE 1 TABLET DAILY. Allergies:   Allergies   Allergen Reactions    Hylan G-F 20 Other (See Comments) and Swelling     Swelling in leg    Ace Inhibitors      cough    Statins Depletion Therapy Other (See Comments)     OKAY WITH CRESTOR, weakness       Past Medical History:  Past Medical History:   Diagnosis Date    Bradycardia     CAD (coronary artery disease)     Cancer (HCC)     prostate- radiation     CHF (congestive heart failure) (HCC)     Depression     HSV-2 (herpes simplex virus 2) infection     Hyperlipidemia     Hypertension     Left knee DJD     Osteoarthritis     Rheumatoid arthritis(714.0)     Type 2 diabetes mellitus without complication, without long-term current use of insulin ([facility] [address] 1/10/2019       Past Surgical History:  Past Surgical History:   Procedure Laterality Date    ANKLE SURGERY Right     pin    APPENDECTOMY      ARTHRODESIS Right 10/31/2016    RIGHT ANKLE ARTHRODESIS OF RIGHT ANKLE AND SUBTALAR JOINT, C-ARM, ABHIJEET EQUIPMENT SYNTHETIC BONE GRAFT, ALLOGRAFT CANCELLOUS, SHARON ANKLE FUSION NAIL, SHANDA IMPLANT BONE STIMULATOR, CHOICE ANESTHESIA, BLOCK  performed by Goldie Galindo MD at [address]      stent x 5    COLONOSCOPY  11    FRACTURE SURGERY      right ankle    HARDWARE REMOVAL FOOT / ANKLE Right 2017    RIGHT ANKLE HARDWARE REMOVAL performed by Rajiv Segovia MD at [address] Bilateral     knees    NV COLON CA SCRN NOT  W [address] [place] [address] IND N/A 2017    COLONOSCOPY performed by Chery Og DO at [facility] Left     TONSILLECTOMY AND ADENOIDECTOMY         Family History:       Problem Relation Age of Onset    Heart Attack Father     Cancer Father         colon    High Cholesterol Mother     Heart Disease Mother     Macular Degen Mother     Arthritis Sister         hip replacement    Other Brother         vertigo    No Known Problems Son     No Known Problems Son        Social  History:     Social History     Tobacco Use    Smoking status: Former     Packs/day: 0.25     Years: 7.00     Pack years: 1.75     Types: Cigarettes     Start date: 5     Quit date: 6/3/1992     Years since quittin.6    Smokeless tobacco: Never   Substance Use Topics    Alcohol use:  Yes     Alcohol/week: 2.0 standard drinks     Types: 2 Shots of liquor per week Comment: daily, 2 glassess of vodka       Home Medications:    Prior to Admission medications    Medication Sig Start Date End Date Taking? Authorizing Provider   hydrALAZINE (APRESOLINE) 50 MG tablet Take 50 mg by mouth in the morning and at bedtime 4/10/21  Yes Historical Provider, MD   pantoprazole (PROTONIX) 40 MG tablet Take 40 mg by mouth daily 3/8/21  Yes Historical Provider, MD   traMADol (ULTRAM) 50 MG tablet Take 50 mg by mouth every 6 hours as needed for Pain. Historical Provider, MD   furosemide (LASIX) 20 MG tablet Take 20 mg by mouth in the morning and 20 mg before bedtime. Historical Provider, MD   nitroGLYCERIN (NITROSTAT) 0.4 MG SL tablet Place 0.4 mg under the tongue every 5 minutes as needed for Chest pain up to max of 3 total doses. If no relief after 1 dose, call 911.     Historical Provider, MD   losartan (COZAAR) 25 MG tablet Take 1 tablet by mouth daily  Patient taking differently: Take 25 mg by mouth daily Indications: High Blood Pressure Disorder 12/16/21   Guera Douglas MD   apixaban (ELIQUIS) 5 MG TABS tablet Take 1 tablet by mouth 2 times daily  Patient taking differently: Take 5 mg by mouth 2 times daily Indications: Atrial Fibrillation 12/15/21   Guera Douglas MD   rosuvastatin (CRESTOR) 40 MG tablet Take 40 mg by mouth every evening Indications: High Amount of Fats in the Blood  4/14/20   Historical Provider, MD   ketorolac 0.4 % SOLN ophthalmic solution Place 1 drop into both eyes 2 times daily Indications: Eye Pain  5/19/19   Historical Provider, MD   tamsulosin (FLOMAX) 0.4 MG capsule Take 1 capsule by mouth daily  Patient taking differently: Take 0.4 mg by mouth daily Indications: Urinary Retention 3/7/19   Susy Cabrera MD   acetaminophen (TYLENOL) 325 MG tablet Take 500 mg by mouth every 4 hours as needed for Pain or Fever    Historical Provider, MD       Current Medications:   sodium chloride         IV Medications:  Current Facility-Administered Medications Medication Dose Route Frequency Provider Last Rate Last Admin    sodium chloride flush 0.9 % injection 5-40 mL  5-40 mL IntraVENous 2 times per day Abeba Mcconnell, APRN - CNP        sodium chloride flush 0.9 % injection 5-40 mL  5-40 mL IntraVENous PRN Abeba Chavez-Roche, APRN - CNP        0.9 % sodium chloride infusion   IntraVENous PRN Abeba Mcconnell, APRN - CNP        ondansetron (ZOFRAN-ODT) disintegrating tablet 4 mg  4 mg Oral Q8H PRN Abeba Chavez-Roche, APRN - CNP        Or    ondansetron (ZOFRAN) injection 4 mg  4 mg IntraVENous Q6H PRN Abeba Chavez-Roche, APRN - CNP        polyethylene glycol (GLYCOLAX) packet 17 g  17 g Oral Daily PRN Abeba QuesadaRoche, APRN - CNP        acetaminophen (TYLENOL) tablet 650 mg  650 mg Oral Q6H PRN Abeba Chavez-Roche, APRN - CNP        Or    acetaminophen (TYLENOL) suppository 650 mg  650 mg Rectal Q6H PRN Abeba Chavez-Roche, APRN - CNP        thiamine tablet 100 mg  100 mg Oral Daily Abeba Chavez-Roche, APRN - CNP        hydrALAZINE (APRESOLINE) injection 10 mg  10 mg IntraVENous Q6H PRN Abeba Chavez-Roche, APRN - CNP   10 mg at 01/12/23 0328    labetalol (NORMODYNE;TRANDATE) injection 10 mg  10 mg IntraVENous Q4H PRN Abeba Chavez-Roche, APRN - CNP        furosemide (LASIX) injection 10 mg  10 mg IntraVENous BID Abeba Chavez-Roche, APRN - CNP        apixaban (ELIQUIS) tablet 5 mg  5 mg Oral BID Abeba Ramn-Roche, APRN - CNP        hydrALAZINE (APRESOLINE) tablet 50 mg  50 mg Oral BID Abeba Ramn-Roche, APRN - CNP        ketorolac (ACULAR) 0.5 % ophthalmic solution 1 drop  1 drop Both Eyes 4x Daily Abeba Chvaez-Roche, APRN - CNP        losartan (COZAAR) tablet 25 mg  25 mg Oral Daily Abeba Ramn-Roche, APRN - CNP        pantoprazole (PROTONIX) tablet 40 mg  40 mg Oral Daily Abeba [facility], APRN - CNP        rosuvastatin (CRESTOR) tablet 40 mg  40 mg Oral QPM Nicodori [facility], APRN - CNP        tamsulosin (FLOMAX) capsule 0.4 mg  0.4 mg Oral Daily GIUSEPPE Mensah CNP        traMADol (ULTRAM) tablet 25 mg  25 mg Oral Q6H PRN GIUSEPPE Mensah CNP        miconazole (MICOTIN) 2 % powder   Topical BID GIUSEPPE Mensah CNP        lidocaine 4 % external patch 3 patch  3 patch TransDERmal Daily GIUSEPPE Mensah CNP        pill splitter   Does not apply Once GIUSEPPE Mensah CNP           ROS:   12 point review of systems was obtained in detail and is negative other than that noted above or below. Vital Signs:  Vitals:    23 0100 23 0233 23 0529 23 0714   BP: (!) 160/87 (!) 178/79 (!) 151/60 135/71   Pulse: 66 67 64 60   Resp: 17 18  18   Temp:  97 °F (36.1 °C)  97.3 °F (36.3 °C)   TempSrc:  Oral  Oral   SpO2: 97% 96%  98%   Weight:       Height:         No intake or output data in the 24 hours ending 23 0814    Patient Vitals for the past 96 hrs (Last 3 readings):   Weight   23 1926 276 lb (125.2 kg)       Physical Examination:      Diagnostics:    [id number]  Atrial fibrillation with frequent ventricular-paced complexes  Minimal voltage criteria for LVH, may be normal variant  ST & T wave abnormality, consider inferior ischemia  ST & T wave abnormality, consider anterolateral ischemia    Telemetry: atrial fibrillation - controlled.     Echo   Pending    Lab Data:  BMP:  Recent Labs     23      K 4.1      CO2 24   BUN 18   CREATININE 0.82   LABGLOM >60.0       CBC:  Recent Labs     23   WBC 10.2   RBC 4.98   HGB 15.4   HCT 46.0   MCV 92.3*   MCH 30.9   MCHC 33.5   RDW 15.6*          Cardiac Enzymes:   Recent Labs     23  2322   CKTOTAL 672*  --    TROPONINI 0.037* 0.031*       Hepatic Function Panel:  Recent Labs     23   ALKPHOS 123*   ALT 15   AST 27   PROT 6.4   BILITOT 1.0*   LABALBU 4.0 Magnesium:  Recent Labs     01/11/23 2032   MG 1.8       Pro-BNP:  Lab Results   Component Value Date    PROBNP 2,030 01/11/2023    PROBNP 1,993 04/17/2022    PROBNP 1,830 02/02/2022       INR:  Recent Labs     01/11/23 2032   PROTIME 14.3   INR 1.1       TSH:  Lab Results   Component Value Date/Time    TSH 0.86 08/07/2022 06:28 AM       Lipid Profile:  Lab Results   Component Value Date/Time    TRIG 110 07/26/2022 12:12 PM    HDL 55 07/26/2022 12:12 PM    LDLCALC 115 07/26/2022 12:12 PM       HgbA1C:  Lab Results   Component Value Date/Time    LABA1C 6.1 07/26/2022 12:12 PM       Lactate Level:  No results for input(s): LACTA in the last 72 hours. CMP:  Recent Labs     01/11/23 2002 01/11/23 2032   NA  --  144   K  --  4.1   CL  --  106   CO2  --  24   BUN  --  18   CREATININE  --  0.82   GLUCOSE 93 114*   CALCIUM  --  9.1   PROT  --  6.4   LABALBU  --  4.0   BILITOT  --  1.0*   ALKPHOS  --  123*   AST  --  27   ALT  --  15       Amylase:  No results for input(s): AMYLASE in the last 72 hours. Lipase:  Recent Labs     01/11/23 2032   LIPASE 19       ABG:  No results for input(s): PH, PO2, PCO2, HCO3, BE, O2SAT in the last 72 hours. Radiology:   CT HEAD WO CONTRAST   Final Result   No acute intracranial abnormality. RECOMMENDATIONS:   Careful clinical correlation and follow up recommended. CT CERVICAL SPINE WO CONTRAST   Final Result   1. No acute abnormality of the cervical spine. 2. Chronic type 2 odontoid fracture with minimal 2 mm retrolisthesis of the   odontoid fragment, which is unchanged from prior examinations. RECOMMENDATIONS:   Careful clinical correlation and follow up recommended. CT THORACIC SPINE WO CONTRAST   Final Result   1. No acute disease of the thoracic spine. 2. Moderate right pleural effusion and associated right lung compressive   atelectasis. RECOMMENDATIONS:   Careful clinical correlation and follow up recommended.          CT LUMBAR SPINE WO CONTRAST   Final Result   1. No acute lumbar spine injury. 2. 3.2 cm saccular infrarenal abdominal aortic aneurysm. No acute features. Unchanged size in comparison to prior examination of April 17, 2022. RECOMMENDATIONS:   Careful clinical correlation and follow up recommended. 3.2 cm infrarenal saccular abdominal aortic aneurysm. Recommend vascular   consultation. Reference: J Vasc Surg 1395;92:5-17. CT CHEST W CONTRAST   Final Result   1. No acute thoracic injury. Sternum intact. 2. Moderate right pleural effusion and associated compressive atelectasis. RECOMMENDATIONS:   Careful clinical correlation and follow up recommended. CT ABDOMEN PELVIS W IV CONTRAST Additional Contrast? None   Final Result   1. No acute disease. No acute abdominopelvic injury. 2. Cholelithiasis. 3. Saccular infrarenal abdominal aortic aneurysm measuring 3.2 cm unchanged   from prior examination of April 17, 2022. No acute features. RECOMMENDATIONS:   Careful clinical correlation and follow up recommended. XR CHEST PORTABLE   Final Result   No acute process. US DUP LOWER EXTREMITIES BILATERAL VENOUS    (Results Pending)           Impression:   Principal Problem:    Unable to ambulate  Resolved Problems:    * No resolved hospital problems.  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, left eye    Presbyopia    Regular astigmatism    Hyperuricemia    Chronic gastritis without bleeding    Type 2 diabetes mellitus with microalbuminuria, without long-term current use of insulin ([facility]    Retinal hemorrhage, bilateral    Intermediate stage nonexudative age-related macular degeneration of both eyes    Primary osteoarthritis, right ankle and foot    Pain in right ankle    Encounter for other orthopedic aftercare    Arthrodesis status    Unable to ambulate    PAF (paroxysmal atrial fibrillation)     CHF (congestive heart failure), NYHA class I, acute on chronic, combined ([facility] [address]       Assessment  1. Elevated troponin; 0.037, 0.031. Trivial elevation with flat pattern. Has a history of chronically elevated troponins  2. Paroxysmal atrial fibrillation; noted to be atrial fibrillation on EKG on admission. Stated he has not been taking any of his cardiac medications including antiarrhythmic sotalol  3. Long-term anticoagulation; has previously been on Eliquis but as noted above he has not been taking any of his medications  4. Elevated D-dimer 3.54; CT of the chest negative for pulmonary emboli, pending lower extremity venous duplex  5. Hypertension; uncontrolled hypertension most likely secondary to medication noncompliance  6. Elevated BNP 2030; pending echocardiogram  7. Further recommendations per Dr. Nancy Cordero    Thank you for the opportunity to participate in the care of your patient. Do not hesitate to call if you have any questions.     Electronically signed by GIUSEPPE Gann CNP, [email or web address] on 1/12/2023 at 8:14 AM

## 2023-01-12 NOTE — PROGRESS NOTES
Brief Hospitalist Progress Update Note  DOS: 01/12/23 (Same DOS as H&P)    Updates since admission:  Patient a poor historian on examination. Impaired recall of most specifics. Thinks that he stopped taking all of his prescription medications 2-3 months ago. When asked why he says \"I do not know\", but had previously told case management that he \"could not afford them\". Also previously had caregivers set up by his KATARZYNA son Joanna Toledo last year, but had apparently let them go in the interim, also stating that he could not afford them. Admits to drinking at least 3 glasses of vodka per day at home. It sounds as though family (including sons Amanuel Kilpatrick and Joanna Toledo), had been trying to help him when he was admitted to the hospital with similar issues last year, but patient had been very resistant to their help. Reported to have had a falling out with KATARZYNA son Joanna Toledo at that time, since then they have been largely estranged. At that time, Joanna Toledo had quit his job to take care of his father full-time for a period of several months in summer/fall 2022, but did not had stopped accepting sons help at some point after falling else over patient's heavy alcohol use and reportedly being scammed for large sums of money by fake women on the Internet. When patient was asked which family member was is medical  he stipulated Joanna Toledo despite their previous falling out. That son was contacted, filled in many of the details mentioned above, stated that patient has been an alcoholic for many many years but that it become worse after patient's spouse had passed away in recent years.   Joanna Paris states the patient was suspected by most family to have early dementia of some kind, but that he had had some type of capacity evaluation in 2022 when admitted for similar issues which found him to not lack capacity at that time, at which time he had cut off most of the rest of the family from assisting him and returned to his self-destructive patterns. Since that time son Yennifer Peck return to his job as a long- and has had very little contact with him. Son Perri Aceves Junior strongly suggests and requests formal cognitive and capacity testing, as he feels the patient will continue with the same patterns of self neglect, alcoholism, and being scammed online - to his own detriment. [facility] protocol in place. Psychiatry consult now placed for capacity evaluation in setting of alcoholism, medical noncompliance (including especially important cardiac medications such as Eliquis blood thinner and antiarrhythmics such as sotalol), depression following spells, and suspicion for MCI versus early dementia with combined self-neglect in setting of all the aforementioned  Neurology consult also placed in setting of weakness with falls, likely but not definitively as of yet attributable to the aforementioned. Would also appreciate any input they have regarding patient's overall MCI/dementia cognitive status and capacity.   Appreciate further cardiology recommendations as they become available      Lillian Corbett DO

## 2023-01-12 NOTE — CARE COORDINATION
[facility] AT [facility] Case Management Initial Discharge Assessment    Met with Patient to discuss discharge plan. PCP: Harvy Duane, MD                                Date of Last Visit: 8/24/22    [facility] Patient: Yes        [facility] Notified: no- pt declines wanting to transfer to [facility]     If no PCP, list provided? N/A    Discharge Planning    Living Arrangements: independently at home    Who do you live with? Alone    Who helps you with your care:  self    If lives at home:     Do you have any barriers navigating in your home? no    Patient can perform ADL? Yes    Current Services (outpatient and in home) :  [facility] [employer] ([facility] )    Dialysis: No    Is transportation available to get to your appointments? Yes, pt states he drives     DME Equipment:  yes - cane, walker    Respiratory equipment: CPAP without O2    Respiratory provider:  no     Pharmacy:  pt couldn't remember    Consult with Medication Assistance Program?  No      Patient agreeable to [facility]? Yes, [facility]     Patient agreeable to SNF/Rehab? Declined    Other discharge needs identified? N/A    Does Patient Have a High-Risk for Readmission Diagnosis (CHF, PN, MI, COPD)? No      Initial Discharge Plan? (Note: please see concurrent daily documentation for any updates after initial note). Met with pt at bedside to discuss discharge planning. Pt plans to return home and would like [facility]. States he has a life alert button. Pt states he does \"not want to go to a SNF or rehab\" on discharge.      Readmission Risk              Risk of Unplanned Readmission:  11         Electronically signed by Bennetta Goldberg, RN on 1/12/2023 at 9:45 AM

## 2023-01-12 NOTE — PROGRESS NOTES
Physical Therapy Med Surg Initial Assessment  Facility/Department: [facility] MED SURG UNIT  Room: [place] [address]       NAME: Lebron Ragland  : 1155 (27 y.o.)  MRN: [id number]  CODE STATUS: Full Code    Date of Service: 2023    Patient Diagnosis(es): Adult failure to thrive [R62.7]  Pleural effusion [J90]  Elevated troponin [R77.8]  Unable to ambulate [R26.2]  Fall, initial encounter [W19. XXXA]  Aortic aneurysm without rupture, unspecified portion of aorta (HCC) [I71.9]  Chronic congestive heart failure, unspecified heart failure type [facility]) [I50.9]   Chief Complaint   Patient presents with    Fall     Patient Active Problem List    Diagnosis Date Noted    CHF (congestive heart failure), NYHA class I, acute on chronic, combined ([facility] [address] 2021    Unable to ambulate 12/10/2021    PAF (paroxysmal atrial fibrillation)  12/10/2021    Retinal hemorrhage, bilateral 2019    Intermediate stage nonexudative age-related macular degeneration of both eyes 2019    Hyperuricemia 01/10/2019    Chronic gastritis without bleeding 01/10/2019    Type 2 diabetes mellitus with microalbuminuria, without long-term current use of insulin ([address] 01/10/2019    Arthrodesis status 2017    Primary osteoarthritis, right ankle and foot 2017    Pain in right ankle 2017    Encounter for other orthopedic aftercare 2017    History of colon polyps 2017    Nuclear sclerosis of both eyes 2015    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, left eye 2015    Presbyopia 2015    Regular astigmatism 2015    History of prostate cancer 2014    Stented coronary artery 2013    Essential hypertension 2013    Numbness in feet 2013    RA (rheumatoid arthritis) ([address] 10/24/2011    Bradycardia     Mixed hyperlipidemia     Primary osteoarthritis involving multiple joints     Dysthymia     CAD (coronary artery disease)         Past Medical History: Diagnosis Date    Bradycardia     CAD (coronary artery disease)     Cancer (HCC)     prostate- radiation     CHF (congestive heart failure) (HCC)     Depression     HSV-2 (herpes simplex virus 2) infection     Hyperlipidemia     Hypertension     Left knee DJD     Osteoarthritis     Rheumatoid arthritis(714.0)     Type 2 diabetes mellitus without complication, without long-term current use of insulin ([facility] [address] 1/10/2019     Past Surgical History:   Procedure Laterality Date    ANKLE SURGERY Right     pin    APPENDECTOMY      ARTHRODESIS Right 10/31/2016    RIGHT ANKLE ARTHRODESIS OF RIGHT ANKLE AND SUBTALAR JOINT, C-ARM, ABHIJEET EQUIPMENT SYNTHETIC BONE GRAFT, ALLOGRAFT CANCELLOUS, SHARON ANKLE FUSION NAIL, SHANDA IMPLANT BONE STIMULATOR, CHOICE ANESTHESIA, BLOCK  performed by Arnold Logan MD at [facility]      stent x 5    COLONOSCOPY  7-19-11    FRACTURE SURGERY      right ankle    HARDWARE REMOVAL FOOT / ANKLE Right 11/6/2017    RIGHT ANKLE HARDWARE REMOVAL performed by Ngozi Sanchez MD at [address] [place] [address]. Bilateral     knees    CA COLON CA SCRN NOT  [address] [place] [address] IND N/A 7/11/2017    COLONOSCOPY performed by Joselyn Lange DO at [facility] Left     TONSILLECTOMY AND ADENOIDECTOMY         Chart Reviewed: Yes  Family / Caregiver Present: No  Diagnosis: Unable to ambulate  General Comment  Comments: Pt resting in bed - agreeable to PT evaluation    Restrictions:  Restrictions/Precautions: Fall Risk     SUBJECTIVE:   Subjective: \"I'm ok. \"    Pain  Pain: Denies pre and post session pain.     Prior Level of Function:  Social/Functional History  Lives With: Alone  Type of Home: House  Home Layout: Multi-level (split level; 5-6steps between levels; stair glides both levels)  Home Access: Ramped entrance  Bathroom Shower/Tub: Walk-in shower  Bathroom Equipment: Shower chair, Grab bars in shower, Hand-held shower  Home Equipment: [place] Hoot, rolling, Alert Button, Lift chair  ADL Assistance: Independent  Ambulation Assistance: Independent ([address] vs [employer])  Transfer Assistance: Independent  Active : Yes  IADL Comments: HHA completes grocery shopping  Additional Comments: Can be an inconsistent historian    OBJECTIVE:   Vision  Vision Exceptions: Wears glasses for reading  Hearing: Within functional limits    Cognition:  Orientation Level: Disoriented to time, Oriented to time, Oriented to place, Oriented to situation  Follows Commands: Within Functional Limits  Cognition Comment: follows one step commands consistently         ROM:  RLE PROM: WFL  LLE PROM: WFL    Strength:  Strength RLE  Strength RLE: WFL  Strength LLE  Strength LLE: WFL    Neuro:  Balance  Sitting - Static: Good;-  Sitting - Dynamic: Fair  Standing - Static: Fair  Standing - Dynamic: Fair                      Tone: Normal    Sensation: Intact    Bed mobility  Rolling to Left: Maximum assistance  Supine to Sit: Maximum assistance  Bed Mobility Comments: Increased time and effort to complete. Hand over hand cues    Transfers  Sit to Stand: Minimal Assistance  Stand to Sit: Minimal Assistance  Bed to Chair: Minimal assistance  Comment: Slow to complete. Verbal cues throughout. Ambulation  Surface: Level tile  Device: Rolling Walker  Assistance: Contact guard assistance  Quality of Gait: Steadying intermittently. Increased time to complete requiring near constant verbal cues for direction. Distance: 3ft                   Activity Tolerance  Activity Tolerance: Patient tolerated treatment well    Patient Education  Education Given To: Patient  Education Provided: Role of Therapy;Plan of Care  Education Method: Verbal  Education Outcome: Verbalized understanding       ASSESSMENT:   Body Structures, Functions, Activity Limitations Requiring Skilled Therapeutic Intervention: Decreased functional mobility ; Decreased ADL status; Decreased body mechanics; Decreased strength;Decreased safe awareness;Decreased cognition;Decreased endurance;Decreased balance  Decision Making: Medium Complexity  History: High  Exam: Med  Clinical Presentation: Med    Therapy Prognosis: Good  Barriers to Learning: none         DISCHARGE RECOMMENDATIONS:  Discharge Recommendations: Continue to assess pending progress, Patient would benefit from continued therapy after discharge    Assessment: Continued PT indicated to progress mobility and facilitate DC at highest level of indep and safety. Requires PT Follow-Up: Yes       PLAN OF CARE:  Physcial Therapy Plan  General Plan: 1 time a day 3-6 times a week  Current Treatment Recommendations: Strengthening, ROM, Balance training, Functional mobility training, Transfer training, ADL/Self-care training, Endurance training, Gait training, Stair training, Neuromuscular re-education, Manual, Home exercise program, Safety education & training, Patient/Caregiver education & training, Equipment evaluation, education, & procurement    Safety Devices  Type of Devices: All fall risk precautions in place    Goals:  Long Term Goals  Long Term Goal 1: Pt to complete bed mobility with Supervision  Long Term Goal 2: Pt to complete transfers with supervision  Long Term Goal 3: Pt to ambulate 50ft with LRD and supervision  Long Term Goal 4: Pt to manage ramp with LRD and SBA    [facility] (6 CLICK) BASIC MOBILITY  AM-PAC Inpatient Mobility Raw Score : 15     Therapy Time:   Individual   Time In 1122   Time Out 1136   Minutes Laura [address] [place] [address], 01/12/23 at 1:24 PM         Definitions for assistance levels  Independent = pt does not require any physical supervision or assistance from another person for activity completion. Device may be needed.   Stand by assistance = pt requires verbal cues or instructions from another person, close to but not touching, to perform the activity  Minimal assistance= pt performs 75% or more of the activity; assistance is required to complete the activity  Moderate assistance= pt performs 50% of the activity; assistance is required to complete the activity  Maximal assistance = pt performs 25% of the activity; assistance is required to complete the activity  Dependent = pt requires total physical assistance to accomplish the task

## 2023-01-12 NOTE — ED NOTES
Report received on patient, pt resting comfortably in bed. Waiting for CT.        Maria Elena Graham, DAMIEN  01/11/23 [id number]

## 2023-01-12 NOTE — ED PROVIDER NOTES
[address] [facility] ED  EMERGENCY DEPARTMENT ENCOUNTER      Pt Name: Mago Hurley  MRN: [id number]  Veliagfzack [birth year]  Date of evaluation: 1/11/2023  Provider: Yu Garcia [id number]       Chief Complaint   Patient presents with    Fall         HISTORY OF PRESENT ILLNESS   (Location/Symptom, Timing/Onset, Context/Setting, Quality, Duration, Modifying Factors, Severity)  Note limiting factors. Mago Hurley is a 68 y.o. male who presents to the emergency department via EMS status post fall. History of bradycardia, hyperlipidemia, CAD s/p stent, hypertension, DM, PAF, CHF, prostate CA s/p radiation, lasix use, eliquis use. Patient endorses daily alcohol use. He lives at home alone. He normally has a home health aide however they stopped coming recently. He has not been taking any of his medications. Patient says that he was at his baseline, had 1 drink this morning. Says that he was attempting to move and tripped and fell striking his chest on a chair leg. He does not believe he hit his head. He denies headache or vision change, neck pain, abdominal pain, numbness or focal weakness. He says he was feeling fine prior to hitting his chest but now he has reproducible, nonradiating chest wall pain. Reports chronic lower extremity edema. The patient states he was unable to get himself up but waited the entire day to use his life alert  HPI    Nursing Notes were reviewed. REVIEW OF SYSTEMS    (2-9 systems for level 4, 10 or more for level 5)     Review of Systems   Constitutional:  Negative for fever. Alcohol use, fall, chest wall pain, chronic lower extremity edema   Respiratory:  Negative for cough and shortness of breath. Genitourinary:  Negative for dysuria and flank pain. Neurological:  Negative for weakness and numbness. All other systems reviewed and are negative. Except as noted above the remainder of the review of systems was reviewed and negative. PAST MEDICAL HISTORY     Past Medical History:   Diagnosis Date    Bradycardia     CAD (coronary artery disease)     Cancer (HCC)     prostate- radiation     CHF (congestive heart failure) (HCC)     Depression     HSV-2 (herpes simplex virus 2) infection     Hyperlipidemia     Hypertension     Left knee DJD     Osteoarthritis     Rheumatoid arthritis(714.0)     Type 2 diabetes mellitus without complication, without long-term current use of insulin ([facility] [address] 1/10/2019         SURGICAL HISTORY       Past Surgical History:   Procedure Laterality Date    ANKLE SURGERY Right     pin    APPENDECTOMY      ARTHRODESIS Right 10/31/2016    RIGHT ANKLE ARTHRODESIS OF RIGHT ANKLE AND SUBTALAR JOINT, C-ARM, ABHIJEET EQUIPMENT SYNTHETIC BONE GRAFT, ALLOGRAFT CANCELLOUS, SHARON ANKLE FUSION NAIL, SHANDA IMPLANT BONE STIMULATOR, CHOICE ANESTHESIA, BLOCK  performed by Parag Concepcion MD at [address] [place] [address]      stent x 5    COLONOSCOPY  7-19-11    FRACTURE SURGERY      right ankle    HARDWARE REMOVAL FOOT / ANKLE Right 11/6/2017    RIGHT ANKLE HARDWARE REMOVAL performed by Adelfo Torres MD at [address] [place] [address]. Bilateral     knees    [place] COLON CA SCRN NOT  [address] [place] [address] IND N/A 7/11/2017    COLONOSCOPY performed by Elis Fernandez DO at [facility] Left     TONSILLECTOMY AND ADENOIDECTOMY           CURRENT MEDICATIONS       Previous Medications    ACETAMINOPHEN (TYLENOL) 325 MG TABLET    Take 500 mg by mouth every 4 hours as needed for Pain or Fever    AMOXICILLIN-CLAVULANATE (AUGMENTIN) 500-125 MG PER TABLET    TAKE 1 TABLET BY MOUTH TWICE A DAY    APIXABAN (ELIQUIS) 5 MG TABS TABLET    Take 1 tablet by mouth 2 times daily    AZITHROMYCIN (ZITHROMAX) 500 MG TABLET    TAKE 1 TABLET BY MOUTH EVERY DAY    FUROSEMIDE (LASIX) 20 MG TABLET    Take 20 mg by mouth in the morning and 20 mg before bedtime.     KETOROLAC 0.4 % SOLN OPHTHALMIC SOLUTION    Place 1 drop into both eyes 2 times daily Indications: Eye Pain     LOSARTAN (COZAAR) 25 MG TABLET    Take 1 tablet by mouth daily    NITROGLYCERIN (NITROSTAT) 0.4 MG SL TABLET    Place 0.4 mg under the tongue every 5 minutes as needed for Chest pain up to max of 3 total doses. If no relief after 1 dose, call 911. ROSUVASTATIN (CRESTOR) 40 MG TABLET    Take 40 mg by mouth every evening Indications: High Amount of Fats in the Blood     TAMSULOSIN (FLOMAX) 0.4 MG CAPSULE    Take 1 capsule by mouth daily    TRAMADOL (ULTRAM) 50 MG TABLET    Take 50 mg by mouth every 6 hours as needed for Pain. ALLERGIES     Hylan g-f 20, Ace inhibitors, and Statins depletion therapy    FAMILY HISTORY       Family History   Problem Relation Age of Onset    Heart Attack Father     Cancer Father         colon    High Cholesterol Mother     Heart Disease Mother     Macular Degen Mother     Arthritis Sister         hip replacement    Other Brother         vertigo    No Known Problems Son     No Known Problems Son           SOCIAL HISTORY       Social History     Socioeconomic History    Marital status:      Spouse name: None    Number of children: None    Years of education: None    Highest education level: None   Tobacco Use    Smoking status: Former     Packs/day: 0.25     Years: 7.00     Pack years: 1.75     Types: Cigarettes     Start date: 5     Quit date: 6/3/1992     Years since quittin.6    Smokeless tobacco: Never   Vaping Use    Vaping Use: Never used   Substance and Sexual Activity    Alcohol use:  Yes     Alcohol/week: 2.0 standard drinks     Types: 2 Shots of liquor per week     Comment: daily, 2 glassess of vodka    Drug use: No    Sexual activity: Never     Social Determinants of Health     Financial Resource Strain: Low Risk     Difficulty of Paying Living Expenses: Not hard at all   Food Insecurity: No Food Insecurity    Worried About Running Out of Food in the Last Year: Never true    Ran Out of Food in the Last Year: Never true       SCREENINGS Andrez Coma Scale  Eye Opening: Spontaneous  Best Verbal Response: Oriented  Best Motor Response: Obeys commands  Andrez Coma Scale Score: 15                     CIWA Assessment  BP: (!) 187/88  Heart Rate: 62                 PHYSICAL EXAM    (up to 7 for level 4, 8 or more for level 5)     ED Triage Vitals   BP Temp Temp Source Heart Rate Resp SpO2 Height Weight   01/11/23 1923 01/11/23 1926 01/11/23 1926 01/11/23 1923 01/11/23 1923 01/11/23 1923 01/11/23 1926 01/11/23 1926   (!) 188/110 98.1 °F (36.7 °C) Oral 70 18 98 % 5' 11\" (1.803 m) 276 lb (125.2 kg)       Physical Exam  Vitals reviewed. Constitutional:       Appearance: He is obese. He is not toxic-appearing or diaphoretic. Comments: Chronic scar posterior scalp. Airway intact, breath sounds equal, GCS 15. No midline vertebral tenderness or step-off. No chest wall deformity. Reproducible costochondral and sternal tenderness. Nontender abdomen. Pelvis stable. Patient seems deconditioned with general weakness however no focal lateralizing weakness or numbness. HENT:      Head: Normocephalic and atraumatic. Nose: Nose normal.      Mouth/Throat:      Mouth: Mucous membranes are moist.   Eyes:      General: No scleral icterus. Extraocular Movements: Extraocular movements intact. Pupils: Pupils are equal, round, and reactive to light. Comments: No facial droop   Cardiovascular:      Rate and Rhythm: Normal rate and regular rhythm. Pulses: Normal pulses. Pulmonary:      Effort: Pulmonary effort is normal.      Breath sounds: Normal breath sounds. Abdominal:      Tenderness: There is no abdominal tenderness. There is no guarding or rebound. Musculoskeletal:      Cervical back: No tenderness. Right lower leg: Edema present. Left lower leg: Edema present. Comments: Chronic per pt   Skin:     Capillary Refill: Capillary refill takes less than 2 seconds.    Neurological:      General: No focal deficit present. Mental Status: He is alert and oriented to person, place, and time. Sensory: No sensory deficit. Psychiatric:         Mood and Affect: Mood normal.       DIAGNOSTIC RESULTS     EKG: All EKG's are interpreted by the Emergency Department Physician who either signs or Co-signs this chart in the absence of a cardiologist.    Atrial fibrillation, PVCs, rate 61, , nonspecific ST and T wave change    RADIOLOGY:   Non-plain film images such as CT, Ultrasound and MRI are read by the radiologist. Plain radiographic images are visualized and preliminarily interpreted by the emergency physician with the below findings:    No traumatic acute pathology    Interpretation per the Radiologist below, if available at the time of this note:    CT HEAD WO CONTRAST   Final Result   No acute intracranial abnormality. RECOMMENDATIONS:   Careful clinical correlation and follow up recommended. CT CERVICAL SPINE WO CONTRAST   Final Result   1. No acute abnormality of the cervical spine. 2. Chronic type 2 odontoid fracture with minimal 2 mm retrolisthesis of the   odontoid fragment, which is unchanged from prior examinations. RECOMMENDATIONS:   Careful clinical correlation and follow up recommended. CT THORACIC SPINE WO CONTRAST   Final Result   1. No acute disease of the thoracic spine. 2. Moderate right pleural effusion and associated right lung compressive   atelectasis. RECOMMENDATIONS:   Careful clinical correlation and follow up recommended. CT LUMBAR SPINE WO CONTRAST   Final Result   1. No acute lumbar spine injury. 2. 3.2 cm saccular infrarenal abdominal aortic aneurysm. No acute features. Unchanged size in comparison to prior examination of April 17, 2022. RECOMMENDATIONS:   Careful clinical correlation and follow up recommended. 3.2 cm infrarenal saccular abdominal aortic aneurysm. Recommend vascular   consultation.    Reference: J Vasc Surg 2018;67:2-77. CT CHEST W CONTRAST   Final Result   1. No acute thoracic injury. Sternum intact. 2. Moderate right pleural effusion and associated compressive atelectasis. RECOMMENDATIONS:   Careful clinical correlation and follow up recommended. CT ABDOMEN PELVIS W IV CONTRAST Additional Contrast? None   Final Result   1. No acute disease. No acute abdominopelvic injury. 2. Cholelithiasis. 3. Saccular infrarenal abdominal aortic aneurysm measuring 3.2 cm unchanged   from prior examination of April 17, 2022. No acute features. RECOMMENDATIONS:   Careful clinical correlation and follow up recommended. XR CHEST PORTABLE   Final Result   No acute process. ED BEDSIDE ULTRASOUND:   Performed by ED Physician - none    LABS:  Labs Reviewed   CBC WITH AUTO DIFFERENTIAL - Abnormal; Notable for the following components:       Result Value    MCV 92.3 (*)     RDW 15.6 (*)     Neutrophils Absolute 8.5 (*)     Lymphocytes Absolute 0.7 (*)     Monocytes Absolute 0.9 (*)     All other components within normal limits   COMPREHENSIVE METABOLIC PANEL - Abnormal; Notable for the following components:    Glucose 114 (*)     Total Bilirubin 1.0 (*)     Alkaline Phosphatase 123 (*)     All other components within normal limits    Narrative:     Steffi Garcia tel. [phone],  Chemistry results called to and read back by gean, 01/11/2023 21:39, by  PATRICIA   CK - Abnormal; Notable for the following components:     Total  (*)     All other components within normal limits    Narrative:     Steffi Garcia tel. [phone],  Chemistry results called to and read back by gena, 01/11/2023 21:39, by  [address]   TROPONIN - Abnormal; Notable for the following components:    Troponin 0.037 (*)     All other components within normal limits    Narrative:     Steffi Garcia tel. [phone],  Chemistry results called to and read back by gena, 01/11/2023 21:39, by  [address]   TROPONIN - Abnormal; Notable for the following components:    Troponin 0.031 (*)     All other components within normal limits    Narrative:     Rajiv Ivy tel. [phone],  Chemistry results called to and read back by dr Mattie Cullen, 01/12/2023 00:22, by  [address] [facility] - Normal   POCT CREATININE - Normal   LIPASE    Narrative:     Rajiv Ivy tel. [phone],  Chemistry results called to and read back by gena, 01/11/2023 21:39, by  Suhail Ramirez    Narrative:     Rajiv Ivy tel. [phone],  Chemistry results called to and read back by gena, 01/11/2023 21:39, by  Aminata Hint   APTT   [address] [facility]    Narrative:     Rajiv Ivy tel. [phone],  Chemistry results called to and read back by gena, 01/11/2023 21:39, by  [place]   POCT GLUCOSE       All other labs were within normal range or not returned as of this dictation. EMERGENCY DEPARTMENT COURSE and DIFFERENTIAL DIAGNOSIS/MDM:   Vitals:    Vitals:    01/11/23 2300 01/11/23 2315 01/11/23 2330 01/11/23 2345   BP: (!) 186/77  (!) 187/88    Pulse: 63 61 60 62   Resp: 18 27 29 24   Temp:       TempSrc:       SpO2: 96% 97% 97% 97%   Weight:       Height:             No leukocytosis or significant anemia. Troponin is elevated however it has been similarly elevated at points in the past.  Possibly secondary to patient's CHF. EKG without STEMI. Patient did not have any chest wall pain until he struck his chest.  Unable to rule out adequately cardiac contusion. I do not think there is indication for emergent cardiac intervention such as catheterization. Will trend troponin. Medical Decision Making  Amount and/or Complexity of Data Reviewed  Labs: ordered. Radiology: ordered. ECG/medicine tests: ordered. Risk  Prescription drug management. Decision regarding hospitalization. Patient presents emergency department with chest wall pain status post fall. Atypical chest discomfort after traumatic injury. CT indicated. CT not showing any acute pathology. There are numerous chronic findings noted on imaging results. The patient needs to be admitted secondary to his deconditioning, inability to take care of himself at home. Dr Jesse Ascencio accepts admission    REASSESSMENT      Resting comfortably, symptom improvement after analgesia administration. This is reassuring. CONSULTS:  None    PROCEDURES:  Unless otherwise noted below, none     Procedures      FINAL IMPRESSION      1. Elevated troponin    2. Chronic congestive heart failure, unspecified heart failure type ([address]    3. Aortic aneurysm without rupture, unspecified portion of aorta ([address]    4. Pleural effusion    5. Fall, initial encounter    6. Adult failure to thrive    7. Unable to ambulate          DISPOSITION/PLAN   DISPOSITION Admitted 01/12/2023 12:23:38 AM      PATIENT REFERRED TO:  No follow-up provider specified. DISCHARGE MEDICATIONS:  New Prescriptions    No medications on file     Controlled Substances Monitoring:     No flowsheet data found.     (Please note that portions of this note were completed with a voice recognition program.  Efforts were made to edit the dictations but occasionally words are mis-transcribed.)    Joss Salter MD (electronically signed)  Attending Emergency Physician            Joss Salter MD  01/12/23 0009       Cheryle SangMaribeth Becker, MD  01/12/23 9417

## 2023-01-12 NOTE — ED TRIAGE NOTES
Pt arrived via [facility]     Pt fell from bed around 11 am this morning and has been on the floor since    Pt lives home alone    Pt had a home health care aid 3 weeks ago until they stopped coming d/t no payment     Pt stated he hit his chest on an end table and has pain 9/10     Pt has excoriation on both sides of groin and abdominal folds     Pt states he drinks 1-2 glasses of vodka a day    Pt stated his last drink was this morning     BLE extremities swollen

## 2023-01-12 NOTE — PROGRESS NOTES
0230- Pt to floor. /79. Other vitals stable on RA. NSR on tele. Admission assessment completed. 2RN skin assessment completed with RN Kaylene- see head to toe. PT denies SOB. Reports pain at chest from landing on it during fall at home and pain on his left side/hip. Bruising noted. Pt reports he is out of his medications at home and that he had to cancel his home health care aide dt financial strain. Pt denies any needs at this time. Call light in reach. Falls precautions in place. 0328-10mg hydralazine given. 0- Perfectserve to NP Enedina with critical D-Dimer of 3.54.

## 2023-01-12 NOTE — ED NOTES
Critical lab troponin 0.037     Leanne Castro RN  01/11/23 2139  Dr. Pablo Chua aware.      Leanne Castro RN  01/11/23 214

## 2023-01-12 NOTE — ED NOTES
Pt report was called to [facility], pt tele was placed. Pt advised of room number. Awaiting PCA to take to floor at this time.       Leif Null RN  01/12/23 7503

## 2023-01-12 NOTE — FLOWSHEET NOTE
46 - Pt told Mirella RN \"\"Next time I come to the hospital, I am going to bring a gun so I can shoot the next person that wakes me up\". Dr. Odonnell Degree made aware. 26 - Dr. Odonnell Degree states to let Dr. Suleman Menard know. Dr. Odonnell Degree made [facility] Supervisor aware. 12 - Dr. Suleman Menard notified.

## 2023-01-12 NOTE — ED NOTES
Called to given Report Rn for pt is at [facility] at this time will call when cleared and returned to floor.       Drew Alejandra RN  01/12/23 [address], RN  01/12/23 7012

## 2023-01-12 NOTE — H&P
[place] [address] MEDICINE    HISTORY AND PHYSICAL EXAM    PATIENT NAME:  Sam Syed    MRN:  [id number]  SERVICE DATE:  1/12/2023   SERVICE TIME:  12:58 AM    Primary Care Physician: Carlos Alberto Garnett MD         SUBJECTIVE  CHIEF COMPLAINT:  Fall       HPI: Patient being admitted for fall and failure to thrive as well as elevated troponin. Patient is a pleasant, alert and oriented x1 80-year-old  male. Patient states that he fell today to get slightly dizzy resulting in a injury to his sternum on a chair. Patient denies any loss of consciousness. Patient states he was able to get up and walk up the stairs to his bedroom but then fell next to his bed and stayed there for a little over 12 hours. Friend finally checked on him and called EMS to bring him in. Patient currently denies any chest pain, shortness of breath, nausea, or vomiting. Patient states he does not know if his legs are swollen more than normal despite obvious bilateral lower extremity edema. Patient states he has not been taking any of his medications for several weeks due to running out and not getting them filled. Patient also had home health care, but states that it was too expensive. Patient has been able to eat and drink. Patient also drinks alcohol daily. Patient reports he drinks about 3 shots of vodka daily. Patient's past medical history includes history of diastolic heart failure, history A. fib, CAD, HTN, HLD, BPH.     PAST MEDICAL HISTORY:    Past Medical History:   Diagnosis Date    Bradycardia     CAD (coronary artery disease)     Cancer (HCC)     prostate- radiation     CHF (congestive heart failure) (HCC)     Depression     HSV-2 (herpes simplex virus 2) infection     Hyperlipidemia     Hypertension     Left knee DJD     Osteoarthritis     Rheumatoid arthritis(714.0)     Type 2 diabetes mellitus without complication, without long-term current use of insulin ([facility] 1/10/2019     PAST SURGICAL HISTORY: Past Surgical History:   Procedure Laterality Date    ANKLE SURGERY Right     pin    APPENDECTOMY      ARTHRODESIS Right 10/31/2016    RIGHT ANKLE ARTHRODESIS OF RIGHT ANKLE AND SUBTALAR JOINT, C-ARM, ABHIJEET EQUIPMENT SYNTHETIC BONE GRAFT, ALLOGRAFT CANCELLOUS, SHARON ANKLE FUSION NAIL, SHANDA IMPLANT BONE STIMULATOR, CHOICE ANESTHESIA, BLOCK  performed by Donna Aguila MD at [facility] 96      stent x 5    COLONOSCOPY  11    FRACTURE SURGERY      right ankle    HARDWARE REMOVAL FOOT / ANKLE Right 2017    RIGHT ANKLE HARDWARE REMOVAL performed by Ginna Vigil MD at [address] [place] [address]. Bilateral     knees    DE COLON CA SCRN NOT  [address]  IND N/A 2017    COLONOSCOPY performed by Radhika Perla DO at [facility] Left     TONSILLECTOMY AND ADENOIDECTOMY       FAMILY HISTORY:    Family History   Problem Relation Age of Onset    Heart Attack Father     Cancer Father         colon    High Cholesterol Mother     Heart Disease Mother     Macular Degen Mother     Arthritis Sister         hip replacement    Other Brother         vertigo    No Known Problems Son     No Known Problems Son      SOCIAL HISTORY:    Social History     Socioeconomic History    Marital status:      Spouse name: Not on file    Number of children: Not on file    Years of education: Not on file    Highest education level: Not on file   Occupational History    Not on file   Tobacco Use    Smoking status: Former     Packs/day: 0.25     Years: 7.00     Pack years: 1.75     Types: Cigarettes     Start date: 5     Quit date: 6/3/1992     Years since quittin.6    Smokeless tobacco: Never   Vaping Use    Vaping Use: Never used   Substance and Sexual Activity    Alcohol use:  Yes     Alcohol/week: 2.0 standard drinks     Types: 2 Shots of liquor per week     Comment: daily, 2 glassess of vodka    Drug use: No    Sexual activity: Never   Other Topics Concern    Not on file   Social History Narrative    Not on file     Social Determinants of Health     Financial Resource Strain: Low Risk     Difficulty of Paying Living Expenses: Not hard at all   Food Insecurity: No Food Insecurity    Worried About Running Out of Food in the Last Year: Never true    Ran Out of Food in the Last Year: Never true   Transportation Needs: Not on file   Physical Activity: Not on file   Stress: Not on file   Social Connections: Not on file   Intimate Partner Violence: Not on file   Housing Stability: Not on file     MEDICATIONS:   Prior to Admission medications    Medication Sig Start Date End Date Taking? Authorizing Provider   amoxicillin-clavulanate (AUGMENTIN) 500-125 MG per tablet TAKE 1 TABLET BY MOUTH TWICE A DAY 8/9/22   Historical Provider, MD   azithromycin (ZITHROMAX) 500 MG tablet TAKE 1 TABLET BY MOUTH EVERY DAY 8/9/22   Historical Provider, MD   traMADol (ULTRAM) 50 MG tablet Take 50 mg by mouth every 6 hours as needed for Pain. Historical Provider, MD   furosemide (LASIX) 20 MG tablet Take 20 mg by mouth in the morning and 20 mg before bedtime. Historical Provider, MD   nitroGLYCERIN (NITROSTAT) 0.4 MG SL tablet Place 0.4 mg under the tongue every 5 minutes as needed for Chest pain up to max of 3 total doses. If no relief after 1 dose, call 911.     Historical Provider, MD   losartan (COZAAR) 25 MG tablet Take 1 tablet by mouth daily  Patient taking differently: Take 25 mg by mouth daily Indications: High Blood Pressure Disorder 12/16/21   Francesca Josue MD   apixaban (ELIQUIS) 5 MG TABS tablet Take 1 tablet by mouth 2 times daily  Patient taking differently: Take 5 mg by mouth 2 times daily Indications: Atrial Fibrillation 12/15/21   Francesca Josue MD   rosuvastatin (CRESTOR) 40 MG tablet Take 40 mg by mouth every evening Indications: High Amount of Fats in the Blood  4/14/20   Historical Provider, MD   ketorolac 0.4 % SOLN ophthalmic solution Place 1 drop into both eyes 2 times daily Indications: Eye Pain  5/19/19   Historical Provider, MD   tamsulosin (FLOMAX) 0.4 MG capsule Take 1 capsule by mouth daily  Patient taking differently: Take 0.4 mg by mouth daily Indications: Urinary Retention 3/7/19   Sedrick Whiting MD   acetaminophen (TYLENOL) 325 MG tablet Take 500 mg by mouth every 4 hours as needed for Pain or Fever    Historical Provider, MD       ALLERGIES: Hylan g-f 20, Ace inhibitors, and Statins depletion therapy    REVIEW OF SYSTEM:   Review of Systems   Constitutional:  Negative for activity change, chills, fatigue and fever. HENT:  Negative for congestion, ear pain, rhinorrhea and sore throat. Eyes:  Negative for photophobia and visual disturbance. Respiratory:  Negative for cough, shortness of breath and wheezing. Cardiovascular:  Negative for chest pain. Gastrointestinal:  Negative for abdominal pain, diarrhea, nausea and vomiting. Endocrine: Negative for polydipsia, polyphagia and polyuria. Genitourinary:  Negative for dysuria, flank pain, hematuria and urgency. Musculoskeletal:  Positive for gait problem. Negative for back pain, myalgias and neck stiffness. Skin:  Negative for rash and wound. Allergic/Immunologic: Negative for immunocompromised state. Neurological:  Positive for dizziness. Negative for syncope and headaches. Psychiatric/Behavioral:  Negative for behavioral problems and confusion. OBJECTIVE  PHYSICAL EXAM: BP (!) 181/78   Pulse 61   Temp 98.1 °F (36.7 °C) (Oral)   Resp 24   Ht 5' 11\" (1.803 m)   Wt 276 lb (125.2 kg)   SpO2 96%   BMI 38.49 kg/m²     Physical Exam  Vitals and nursing note reviewed. Constitutional:       Appearance: He is well-developed. HENT:      Head: Normocephalic and atraumatic. Nose: Nose normal.   Eyes:      Pupils: Pupils are equal, round, and reactive to light. Cardiovascular:      Rate and Rhythm: Normal rate and regular rhythm. Pulses: Normal pulses. Heart sounds: Normal heart sounds. Pulmonary:      Effort: Pulmonary effort is normal. No respiratory distress. Breath sounds: Normal breath sounds. No wheezing. Chest:      Chest wall: Tenderness (bruising) present. No deformity or crepitus. Abdominal:      General: Bowel sounds are normal.      Palpations: Abdomen is soft. There is no mass. Tenderness: There is no abdominal tenderness. Musculoskeletal:         General: Normal range of motion. Cervical back: Normal range of motion. Right lower le+ Pitting Edema present. Left lower le+ Pitting Edema present. Lymphadenopathy:      Cervical: No cervical adenopathy. Skin:     General: Skin is warm and dry. Capillary Refill: Capillary refill takes less than 2 seconds. Neurological:      Mental Status: He is alert and oriented to person, place, and time. Deep Tendon Reflexes: Reflexes normal.   Psychiatric:         Thought Content: Thought content normal.         DATA:     Diagnostic tests reviewed for today's visit:    Most recent labs and imaging results reviewed. LABS:    Recent Results (from the past 24 hour(s))   POCT Glucose    Collection Time: 23  7:58 PM   Result Value Ref Range    POC Glucose 93 70 - 99 mg/dl    Performed on ACCU-CHEK    POCT Glucose    Collection Time: 23  8:02 PM   Result Value Ref Range    Glucose 93 mg/dL    QC OK?  yes    POCT CREATININE    Collection Time: 23  8:04 PM   Result Value Ref Range    POC CREATININE WHOLE BLOOD 0.8    EKG 12 Lead    Collection Time: 23  8:12 PM   Result Value Ref Range    Ventricular Rate 61 BPM    Atrial Rate 441 BPM    QRS Duration 82 ms    Q-T Interval 432 ms    QTc Calculation (Bazett) 434 ms    R Axis -4 degrees    T Axis 230 degrees   CBC with Auto Differential    Collection Time: 23  8:32 PM   Result Value Ref Range    WBC 10.2 4.8 - 10.8 K/uL    RBC 4.98 4.70 - 6.10 M/uL    Hemoglobin 15.4 14.0 - 18.0 g/dL    Hematocrit 46.0 42.0 - 52.0 %    MCV 92.3 (H) 79.0 - 92.2 fL    MCH 30.9 27.0 - 31.3 pg    MCHC 33.5 33.0 - 37.0 %    RDW 15.6 (H) 11.5 - 14.5 %    Platelets 165 097 - 322 K/uL    Neutrophils % 82.7 %    Lymphocytes % 6.7 %    Monocytes % 8.4 %    Eosinophils % 1.8 %    Basophils % 0.4 %    Neutrophils Absolute 8.5 (H) 1.4 - 6.5 K/uL    Lymphocytes Absolute 0.7 (L) 1.0 - 4.8 K/uL    Monocytes Absolute 0.9 (H) 0.2 - 0.8 K/uL    Eosinophils Absolute 0.2 0.0 - 0.7 K/uL    Basophils Absolute 0.0 0.0 - 0.2 K/uL   CMP    Collection Time: 01/11/23  8:32 PM   Result Value Ref Range    Sodium 144 135 - 144 mEq/L    Potassium 4.1 3.4 - 4.9 mEq/L    Chloride 106 95 - 107 mEq/L    CO2 24 20 - 31 mEq/L    Anion Gap 14 9 - 15 mEq/L    Glucose 114 (H) 70 - 99 mg/dL    BUN 18 8 - 23 mg/dL    Creatinine 0.82 0.70 - 1.20 mg/dL    Est, Glom Filt Rate >60.0 >60    Calcium 9.1 8.5 - 9.9 mg/dL    Total Protein 6.4 6.3 - 8.0 g/dL    Albumin 4.0 3.5 - 4.6 g/dL    Total Bilirubin 1.0 (H) 0.2 - 0.7 mg/dL    Alkaline Phosphatase 123 (H) 35 - 104 U/L    ALT 15 0 - 41 U/L    AST 27 0 - 40 U/L    Globulin 2.4 2.3 - 3.5 g/dL   Lipase    Collection Time: 01/11/23  8:32 PM   Result Value Ref Range    Lipase 19 12 - 95 U/L   Magnesium    Collection Time: 01/11/23  8:32 PM   Result Value Ref Range    Magnesium 1.8 1.7 - 2.4 mg/dL   Protime-INR    Collection Time: 01/11/23  8:32 PM   Result Value Ref Range    Protime 14.3 12.3 - 14.9 sec    INR 1.1    APTT    Collection Time: 01/11/23  8:32 PM   Result Value Ref Range    aPTT 35.4 24.4 - 36.8 sec   CK    Collection Time: 01/11/23  8:32 PM   Result Value Ref Range    Total  (H) 0 - 190 U/L   ETOH    Collection Time: 01/11/23  8:32 PM   Result Value Ref Range    Ethanol Lvl <10 mg/dL    Ethanol percent Not indicated G/dL   Troponin    Collection Time: 01/11/23  8:32 PM   Result Value Ref Range    Troponin 0.037 (HH) 0.000 - 0.010 ng/mL   Brain Natriuretic Peptide    Collection Time: 01/11/23  8:32 PM   Result Value Ref Range    Pro-BNP 2,030 pg/mL   Troponin    Collection Time: 01/11/23 11:22 PM   Result Value Ref Range    Troponin 0.031 (HH) 0.000 - 0.010 ng/mL       IMAGING:   CT HEAD WO CONTRAST    Result Date: 1/11/2023  No acute intracranial abnormality. RECOMMENDATIONS: Careful clinical correlation and follow up recommended. CT CHEST W CONTRAST    Result Date: 1/11/2023  1. No acute thoracic injury. Sternum intact. 2. Moderate right pleural effusion and associated compressive atelectasis. RECOMMENDATIONS: Careful clinical correlation and follow up recommended. CT CERVICAL SPINE WO CONTRAST    Result Date: 1/11/2023  1. No acute abnormality of the cervical spine. 2. Chronic type 2 odontoid fracture with minimal 2 mm retrolisthesis of the odontoid fragment, which is unchanged from prior examinations. RECOMMENDATIONS: Careful clinical correlation and follow up recommended. CT THORACIC SPINE WO CONTRAST    Result Date: 1/11/2023  1. No acute disease of the thoracic spine. 2. Moderate right pleural effusion and associated right lung compressive atelectasis. RECOMMENDATIONS: Careful clinical correlation and follow up recommended. CT LUMBAR SPINE WO CONTRAST    Result Date: 1/11/2023  1. No acute lumbar spine injury. 2. 3.2 cm saccular infrarenal abdominal aortic aneurysm. No acute features. Unchanged size in comparison to prior examination of April 17, 2022. RECOMMENDATIONS: Careful clinical correlation and follow up recommended. 3.2 cm infrarenal saccular abdominal aortic aneurysm. Recommend vascular consultation. Reference: J Vasc Surg 0728;69:0-07. CT ABDOMEN PELVIS W IV CONTRAST Additional Contrast? None    Result Date: 1/11/2023  1. No acute disease. No acute abdominopelvic injury. 2. Cholelithiasis. 3. Saccular infrarenal abdominal aortic aneurysm measuring 3.2 cm unchanged from prior examination of April 17, 2022. No acute features. RECOMMENDATIONS: Careful clinical correlation and follow up recommended.      XR CHEST PORTABLE    Result Date: 1/11/2023  No acute process. VTE Prophylaxis: [facility] [place]     ASSESSMENT AND PLAN    Principal Problem:  Fall: Fall at home after mild dizziness and inability to ambulate afterwards. Pan CT scans unremarkable. Troponin elevated but at baseline. Likely 2/2 failure to thrive, medication noncompliance, and alcohol use. Will get PT OT. We will get case management for home health care versus SNF placement. Elevated troponin: Troponin 0.037 near baseline and repeat troponin 0.031. EKG shows a fib/paced ST elevation. We will cycle troponin and repeat EKG. Active problems:  Heart failure: Preserved EF of 09% with diastolic dysfunction and biatrial moderate enlargement. Patient on beta-blocker and diuretic. Will resume beta-blocker and administer Lasix IV. We will monitor fluid status closely. We will get echo. History of A. Fib: Patient on beta-blocker and [facility] [place]. Patient has presence of pacemaker. Will resume home meds. CAD: History of multiple stents. Patient on aspirin and statin. Will resume home meds  HTN: PT on home meds to control. Will resume home meds, as tolerated. HLD: Patient on statin. Will resume home meds  Alcohol dependence: Patient reported 3 vodkas daily.   We will keep patient on [address] [place] [address] discussed with: patient    SIGNATURE: GIUSEPPE Conklin CNP  DATE: January 12, 2023  TIME: 12:58 AM     Magaly Benoit MD - supervising physician

## 2023-01-12 NOTE — PROGRESS NOTES
[facility] OCCUPATIONAL THERAPY EVALUATION - ACUTE     NAME: Remigio Molina  :  (33 y.o.)  MRN: [id number]  CODE STATUS: Full Code  Room: E662/D669-66    Date of Service: 2023    Patient Diagnosis(es): Adult failure to thrive [R62.7]  Pleural effusion [J90]  Elevated troponin [R77.8]  Unable to ambulate [R26.2]  Fall, initial encounter [W19. XXXA]  Aortic aneurysm without rupture, unspecified portion of aorta (HCC) [I71.9]  Chronic congestive heart failure, unspecified heart failure type [facility]) [I50.9]   Patient Active Problem List    Diagnosis Date Noted    CHF (congestive heart failure), NYHA class I, acute on chronic, combined ([facility] 2021    Unable to ambulate 12/10/2021    PAF (paroxysmal atrial fibrillation)  12/10/2021    Retinal hemorrhage, bilateral 2019    Intermediate stage nonexudative age-related macular degeneration of both eyes 2019    Hyperuricemia 01/10/2019    Chronic gastritis without bleeding 01/10/2019    Type 2 diabetes mellitus with microalbuminuria, without long-term current use of insulin ([facility] 01/10/2019    Arthrodesis status 2017    Primary osteoarthritis, right ankle and foot 2017    Pain in right ankle 2017    Encounter for other orthopedic aftercare 2017    History of colon polyps 2017    Nuclear sclerosis of both eyes 2015    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, left eye 2015    Presbyopia 2015    Regular astigmatism 2015    History of prostate cancer 2014    Stented coronary artery 2013    Essential hypertension 2013    Numbness in feet 2013    RA (rheumatoid arthritis) ([facility] 10/24/2011    Bradycardia     Mixed hyperlipidemia     Primary osteoarthritis involving multiple joints     Dysthymia     CAD (coronary artery disease)         Past Medical History:   Diagnosis Date    Bradycardia     CAD (coronary artery disease)     Cancer (HCC)     prostate- radiation     CHF (congestive heart failure) (HCC)     Depression     HSV-2 (herpes simplex virus 2) infection     Hyperlipidemia     Hypertension     Left knee DJD     Osteoarthritis     Rheumatoid arthritis(714.0)     Type 2 diabetes mellitus without complication, without long-term current use of insulin [facility] 1/10/2019     Past Surgical History:   Procedure Laterality Date    ANKLE SURGERY Right     pin    APPENDECTOMY      ARTHRODESIS Right 10/31/2016    RIGHT ANKLE ARTHRODESIS OF RIGHT ANKLE AND SUBTALAR JOINT, C-ARM, ABHIJEET EQUIPMENT SYNTHETIC BONE GRAFT, ALLOGRAFT CANCELLOUS, SHARON ANKLE FUSION NAIL, SHANDA IMPLANT BONE STIMULATOR, CHOICE ANESTHESIA, BLOCK  performed by Rafael Alvares MD at [place] [address]      stent x 5    COLONOSCOPY  7-19-11    FRACTURE SURGERY      right ankle    HARDWARE REMOVAL FOOT / ANKLE Right 11/6/2017    RIGHT ANKLE HARDWARE REMOVAL [facility] Bilateral     knees    ND COLON CA SCRN NOT  [address] IND N/A 7/11/2017    COLONOSCOPY performed by Samina Jiménez DO at [facility] Left     TONSILLECTOMY AND ADENOIDECTOMY          Restrictions  Restrictions/Precautions: Fall Risk              Safety Devices: Safety Devices  Type of Devices: All fall risk precautions in place;Call light within reach; Left in chair;Nurse notified     Patient's date of birth confirmed: Yes    General:       Subjective:Pt pleasant and cooperative. \"My aide shops for me. \"          Pain at start of treatment: No    Pain at end of treatment: No    Location:   Nursing notified: No  RN:   Intervention: Repositioned    Prior Level of Function:  Social/Functional History  Lives With: Alone  Type of Home: House  Home Layout: Multi-level (split level; 5-6steps between levels; stair glides both levels)  Home Access: Stairs to enter without rails  Bathroom Shower/Tub: Walk-in shower  Bathroom Equipment: Shower chair, Grab bars in shower, Hand-held shower  Home Equipment: Erma Quarto, rolling, Alert Button, Lift chair  Has the patient had two or more falls in the past year or any fall with injury in the past year?: Yes  ADL Assistance: Independent  Homemaking Assistance: Needs assistance  Ambulation Assistance: Independent ([facility] vs [employer])  Transfer Assistance: Independent  Active : Yes  IADL Comments: HHA completes grocery shopping  Additional Comments: Can be an inconsistent historian    OBJECTIVE:     Orientation Status:  Orientation  Orientation Level: Disoriented to time;Oriented to time;Oriented to place;Oriented to situation    Observation:  Observation/Palpation  Posture: Fair  Observation: flexed posture. scab on right hand  Edema: all extremities    Cognition Status:  Cognition  Cognition Comment: follows one step commands consistently    Perception Status:  Perception  Overall Perceptual Status: WFL    Vision and Hearing Status:  Vision  Vision Exceptions: Wears glasses for reading  Hearing  Hearing: Within functional limits   Vision - Basic Assessment  Prior Vision: Wears glasses only for reading  Visual History: No significant visual history  Patient Visual Report: No visual complaint reported. Visual Field Cut: No  Oculo Motor Control: WNL    GROSS ASSESSMENT AROM/PROM:  AROM:  (limited bilateral shoulder flexion)       ROM:   LUE AROM (degrees)  LUE General AROM: limited shoulder  Left Hand AROM (degrees)  Left Hand AROM: WFL  RUE AROM (degrees)  RUE General AROM: limited shoulder  Right Hand AROM (degrees)  Right Hand AROM: WFL    UE STRENGTH:  Strength:  (4-/5 bilateral UE strength)    UE COORDINATION:  Coordination: Within functional limits    UE TONE:  Tone: Normal    UE SENSATION:  Sensation: Intact    Hand Dominance:  Hand Dominance  Hand Dominance: Right    ADL Status:  ADL  Feeding: Unable to assess(comment)  Grooming: Minimal assistance; Increased time to complete  UE Bathing: Moderate assistance; Increased time to complete  LE Bathing: Moderate assistance; Increased time to complete  UE Dressing: Moderate assistance; Increased time to complete  LE Dressing: Moderate assistance; Increased time to complete  Toileting: Unable to assess(comment)    Functional Mobility:    Transfers  Sit to stand: Minimal assistance  Stand to sit: Minimal assistance    Patient ambulated to bedside chair with [address] at [facility] level. Bed Mobility  Bed mobility  Rolling to Left: Maximum assistance  Supine to Sit: Maximum assistance  Bed Mobility Comments: Increased time and effort to complete.  Hand over hand cues    Seated and Standing Balance:  Balance  Sitting:  (supervision)  Standing:  (CGA/MIn A)    Functional Endurance:  Activity Tolerance  Activity Tolerance: Patient limited by fatigue;Treatment limited secondary to medical complications (free text)    D/C Recommendations:  OT D/C RECOMMENDATIONS  REQUIRES OT FOLLOW-UP: Yes    Equipment Recommendations:       OT Education:        OT Follow Up:    OT D/C RECOMMENDATIONS  REQUIRES OT FOLLOW-UP: Yes       Assessment/Discharge Disposition:     Performance deficits / Impairments: Decreased functional mobility , Decreased balance, Decreased ADL status, Decreased high-level IADLs, Decreased endurance, Decreased strength  Prognosis: Good  Discharge Recommendations: Continue to assess pending progress  Decision Making: Medium Complexity  History: moderate  Exam: 6 deficits  Assistance / Modification: mod A    [facility] Self care Score    How much help for putting on and taking off regular lower body clothing?: A Lot  How much help for Bathing?: A Lot  How much help for Toileting?: A Little  How much help for putting on and taking off regular upper body clothing?: A Little  How much help for taking care of personal grooming?: A Little  How much help for eating meals?: None  AM-PAC Inpatient Daily Activity Raw Score: 17  AM-PAC Inpatient ADL T-Scale Score : 37.26  ADL Inpatient CMS 0-100% Score: 50.11    Therapy key for assistance levels -   Independent/Mod I = Pt. is able to perform task with no assistance but may require a device   Stand by assistance = Pt. does not perform task at an independent level but does not need physical assistance, requires verbal cues  Minimal, Moderate, Maximal Assistance = Pt. requires physical assistance (25%, 50%, 75% assist from helper) for task but is able to actively participate in task   Dependent = Pt. requires total assistance with task and is not able to actively participate with task completion     Plan:  Occupational Therapy Plan  Times Per Week: 1-4x/wk  Current Treatment Recommendations: Strengthening, Balance training, Functional mobility training, Endurance training, Neuromuscular re-education, Self-Care / ADL    Goals:   Patient will:    - Improve functional endurance to tolerate/complete 15-20 mins of ADL's  - Be Set up in UB ADLs   - Be Min A in LB ADLs  - Be Supervision in ADL transfers without LOB  - Be Supervision in toileting tasks  - Improve bilateral UE strength and endurance to fair in order to participate in self-care activities as projected. Patient Goal: Patient goals :  To return to home when ready     Discussed and agreed upon: Yes Comments:       Therapy Time:   Individual   Time In 1122   Time Out 1136   Minutes 14          Eval: 14 minutes     Electronically signed by:    MAGGIE Pittman/L,   9/96/3990, 2:22 PM Electronically signed by MAGGIE Pittman/L on 9/71/10 at 2:22 PM EST

## 2023-01-12 NOTE — ED NOTES
Gary Parrish from [facility] police secured pts belongings and tag was placed on chart.       Geremias Hutton RN  01/12/23 [id number]

## 2023-01-12 NOTE — ACP (ADVANCE CARE PLANNING)
This patient has an [address] document in [facility] in which he named his son Kranthi Sweeney as his primary medical decision maker. There is a discrepancy indicated under decision makers. Simi Ramirez (son) is listed as the active decision maker. However, his named [place] crossed out [place]. Need to check with patient and, if needed, he would need to make changes to his AD documentation.

## 2023-01-13 ENCOUNTER — APPOINTMENT (OUTPATIENT)
Dept: MRI IMAGING | Age: 78
DRG: 640 | End: 2023-01-13
Payer: OTHER GOVERNMENT

## 2023-01-13 PROBLEM — R77.8 ELEVATED TROPONIN: Status: ACTIVE | Noted: 2023-01-13

## 2023-01-13 PROBLEM — F43.20 ADJUSTMENT DISORDER: Status: ACTIVE | Noted: 2023-01-13

## 2023-01-13 PROBLEM — R79.89 ELEVATED TROPONIN: Status: ACTIVE | Noted: 2023-01-13

## 2023-01-13 LAB
ALBUMIN SERPL-MCNC: 3.2 G/DL (ref 3.5–4.6)
ALP BLD-CCNC: 102 U/L (ref 35–104)
ALT SERPL-CCNC: 12 U/L (ref 0–41)
AMMONIA: 24 UMOL/L (ref 16–60)
AMPHETAMINE SCREEN, URINE: NORMAL
ANION GAP SERPL CALCULATED.3IONS-SCNC: 13 MEQ/L (ref 9–15)
AST SERPL-CCNC: 17 U/L (ref 0–40)
BACTERIA: NEGATIVE /HPF
BARBITURATE SCREEN URINE: NORMAL
BASOPHILS ABSOLUTE: 0 K/UL (ref 0–0.2)
BASOPHILS RELATIVE PERCENT: 0.4 %
BENZODIAZEPINE SCREEN, URINE: NORMAL
BILIRUB SERPL-MCNC: 1 MG/DL (ref 0.2–0.7)
BILIRUBIN URINE: NEGATIVE
BLOOD, URINE: NEGATIVE
BUN BLDV-MCNC: 18 MG/DL (ref 8–23)
CALCIUM SERPL-MCNC: 8.3 MG/DL (ref 8.5–9.9)
CANNABINOID SCREEN URINE: NORMAL
CHLORIDE BLD-SCNC: 103 MEQ/L (ref 95–107)
CHOLESTEROL, TOTAL: 176 MG/DL (ref 0–199)
CLARITY: CLEAR
CO2: 21 MEQ/L (ref 20–31)
COCAINE METABOLITE SCREEN URINE: NORMAL
COLOR: YELLOW
CREAT SERPL-MCNC: 0.78 MG/DL (ref 0.7–1.2)
EKG ATRIAL RATE: 340 BPM
EKG ATRIAL RATE: 441 BPM
EKG Q-T INTERVAL: 432 MS
EKG Q-T INTERVAL: 470 MS
EKG QRS DURATION: 82 MS
EKG QRS DURATION: 88 MS
EKG QTC CALCULATION (BAZETT): 434 MS
EKG QTC CALCULATION (BAZETT): 503 MS
EKG R AXIS: -4 DEGREES
EKG R AXIS: -9 DEGREES
EKG T AXIS: 230 DEGREES
EKG T AXIS: 257 DEGREES
EKG VENTRICULAR RATE: 61 BPM
EKG VENTRICULAR RATE: 69 BPM
EOSINOPHILS ABSOLUTE: 0.2 K/UL (ref 0–0.7)
EOSINOPHILS RELATIVE PERCENT: 2.3 %
EPITHELIAL CELLS, UA: NORMAL /HPF (ref 0–5)
FENTANYL SCREEN, URINE: NORMAL
GFR SERPL CREATININE-BSD FRML MDRD: >60 ML/MIN/{1.73_M2}
GLOBULIN: 2.4 G/DL (ref 2.3–3.5)
GLUCOSE BLD-MCNC: 106 MG/DL (ref 70–99)
GLUCOSE URINE: NEGATIVE MG/DL
HCT VFR BLD CALC: 41.4 % (ref 42–52)
HDLC SERPL-MCNC: 44 MG/DL (ref 40–59)
HEMOGLOBIN: 14.2 G/DL (ref 14–18)
HYALINE CASTS: NORMAL /HPF (ref 0–5)
KETONES, URINE: NEGATIVE MG/DL
LDL CHOLESTEROL CALCULATED: 112 MG/DL (ref 0–129)
LEUKOCYTE ESTERASE, URINE: NEGATIVE
LYMPHOCYTES ABSOLUTE: 0.6 K/UL (ref 1–4.8)
LYMPHOCYTES RELATIVE PERCENT: 7.6 %
Lab: NORMAL
MAGNESIUM: 2 MG/DL (ref 1.7–2.4)
MCH RBC QN AUTO: 31.8 PG (ref 27–31.3)
MCHC RBC AUTO-ENTMCNC: 34.4 % (ref 33–37)
MCV RBC AUTO: 92.3 FL (ref 79–92.2)
METHADONE SCREEN, URINE: NORMAL
MONOCYTES ABSOLUTE: 0.8 K/UL (ref 0.2–0.8)
MONOCYTES RELATIVE PERCENT: 10.1 %
NEUTROPHILS ABSOLUTE: 6.4 K/UL (ref 1.4–6.5)
NEUTROPHILS RELATIVE PERCENT: 79.6 %
NITRITE, URINE: NEGATIVE
OPIATE SCREEN URINE: NORMAL
OXYCODONE URINE: NORMAL
PDW BLD-RTO: 15.8 % (ref 11.5–14.5)
PH UA: 5.5 (ref 5–9)
PHENCYCLIDINE SCREEN URINE: NORMAL
PHOSPHORUS: 3.2 MG/DL (ref 2.3–4.8)
PLATELET # BLD: 248 K/UL (ref 130–400)
POTASSIUM SERPL-SCNC: 3.5 MEQ/L (ref 3.4–4.9)
PROPOXYPHENE SCREEN: NORMAL
PROTEIN UA: 30 MG/DL
RBC # BLD: 4.48 M/UL (ref 4.7–6.1)
RBC UA: NORMAL /HPF (ref 0–5)
SODIUM BLD-SCNC: 137 MEQ/L (ref 135–144)
SPECIFIC GRAVITY UA: 1.01 (ref 1–1.03)
TOTAL CK: 170 U/L (ref 0–190)
TOTAL PROTEIN: 5.6 G/DL (ref 6.3–8)
TRIGL SERPL-MCNC: 99 MG/DL (ref 0–150)
TSH REFLEX: 1.62 UIU/ML (ref 0.44–3.86)
URINE REFLEX TO CULTURE: ABNORMAL
UROBILINOGEN, URINE: 0.2 E.U./DL
WBC # BLD: 8.1 K/UL (ref 4.8–10.8)
WBC UA: NORMAL /HPF (ref 0–5)

## 2023-01-13 PROCEDURE — 84100 ASSAY OF PHOSPHORUS: CPT

## 2023-01-13 PROCEDURE — 99222 1ST HOSP IP/OBS MODERATE 55: CPT | Performed by: PSYCHIATRY & NEUROLOGY

## 2023-01-13 PROCEDURE — 6360000002 HC RX W HCPCS: Performed by: INTERNAL MEDICINE

## 2023-01-13 PROCEDURE — 6360000002 HC RX W HCPCS: Performed by: NURSE PRACTITIONER

## 2023-01-13 PROCEDURE — 93010 ELECTROCARDIOGRAM REPORT: CPT | Performed by: INTERNAL MEDICINE

## 2023-01-13 PROCEDURE — 80061 LIPID PANEL: CPT

## 2023-01-13 PROCEDURE — 72148 MRI LUMBAR SPINE W/O DYE: CPT

## 2023-01-13 PROCEDURE — 6370000000 HC RX 637 (ALT 250 FOR IP): Performed by: INTERNAL MEDICINE

## 2023-01-13 PROCEDURE — 6370000000 HC RX 637 (ALT 250 FOR IP): Performed by: NURSE PRACTITIONER

## 2023-01-13 PROCEDURE — 82746 ASSAY OF FOLIC ACID SERUM: CPT

## 2023-01-13 PROCEDURE — 82140 ASSAY OF AMMONIA: CPT

## 2023-01-13 PROCEDURE — 80053 COMPREHEN METABOLIC PANEL: CPT

## 2023-01-13 PROCEDURE — 36415 COLL VENOUS BLD VENIPUNCTURE: CPT

## 2023-01-13 PROCEDURE — APPSS45 APP SPLIT SHARED TIME 31-45 MINUTES: Performed by: NURSE PRACTITIONER

## 2023-01-13 PROCEDURE — 1210000000 HC MED SURG R&B

## 2023-01-13 PROCEDURE — 82550 ASSAY OF CK (CPK): CPT

## 2023-01-13 PROCEDURE — 2580000003 HC RX 258: Performed by: INTERNAL MEDICINE

## 2023-01-13 PROCEDURE — 99212 OFFICE O/P EST SF 10 MIN: CPT

## 2023-01-13 PROCEDURE — 83735 ASSAY OF MAGNESIUM: CPT

## 2023-01-13 PROCEDURE — 82607 VITAMIN B-12: CPT

## 2023-01-13 PROCEDURE — 70551 MRI BRAIN STEM W/O DYE: CPT

## 2023-01-13 PROCEDURE — 85025 COMPLETE CBC W/AUTO DIFF WBC: CPT

## 2023-01-13 PROCEDURE — 84443 ASSAY THYROID STIM HORMONE: CPT

## 2023-01-13 RX ORDER — THIAMINE HYDROCHLORIDE 100 MG/ML
500 INJECTION, SOLUTION INTRAMUSCULAR; INTRAVENOUS DAILY
Status: COMPLETED | OUTPATIENT
Start: 2023-01-13 | End: 2023-01-15

## 2023-01-13 RX ADMIN — Medication 10 ML: at 22:28

## 2023-01-13 RX ADMIN — HYDRALAZINE HYDROCHLORIDE 50 MG: 50 TABLET, FILM COATED ORAL at 09:06

## 2023-01-13 RX ADMIN — ROSUVASTATIN CALCIUM 40 MG: 40 TABLET, FILM COATED ORAL at 17:22

## 2023-01-13 RX ADMIN — LOSARTAN POTASSIUM 25 MG: 25 TABLET, FILM COATED ORAL at 22:26

## 2023-01-13 RX ADMIN — KETOROLAC TROMETHAMINE 15 MG: 15 INJECTION, SOLUTION INTRAMUSCULAR; INTRAVENOUS at 09:36

## 2023-01-13 RX ADMIN — PANTOPRAZOLE SODIUM 40 MG: 40 TABLET, DELAYED RELEASE ORAL at 09:06

## 2023-01-13 RX ADMIN — KETOROLAC TROMETHAMINE 1 DROP: 5 SOLUTION/ DROPS OPHTHALMIC at 22:27

## 2023-01-13 RX ADMIN — KETOROLAC TROMETHAMINE 1 DROP: 5 SOLUTION/ DROPS OPHTHALMIC at 17:22

## 2023-01-13 RX ADMIN — ENOXAPARIN SODIUM 120 MG: 150 INJECTION SUBCUTANEOUS at 09:07

## 2023-01-13 RX ADMIN — MICONAZOLE NITRATE: 2 POWDER TOPICAL at 09:07

## 2023-01-13 RX ADMIN — METOPROLOL TARTRATE 50 MG: 50 TABLET, FILM COATED ORAL at 22:26

## 2023-01-13 RX ADMIN — FUROSEMIDE 10 MG: 10 INJECTION, SOLUTION INTRAMUSCULAR; INTRAVENOUS at 08:54

## 2023-01-13 RX ADMIN — TAMSULOSIN HYDROCHLORIDE 0.4 MG: 0.4 CAPSULE ORAL at 09:07

## 2023-01-13 RX ADMIN — LOSARTAN POTASSIUM 25 MG: 25 TABLET, FILM COATED ORAL at 09:06

## 2023-01-13 RX ADMIN — ENOXAPARIN SODIUM 120 MG: 150 INJECTION SUBCUTANEOUS at 22:26

## 2023-01-13 RX ADMIN — FUROSEMIDE 10 MG: 10 INJECTION, SOLUTION INTRAMUSCULAR; INTRAVENOUS at 17:22

## 2023-01-13 RX ADMIN — HYDRALAZINE HYDROCHLORIDE 50 MG: 50 TABLET, FILM COATED ORAL at 22:26

## 2023-01-13 RX ADMIN — Medication 10 ML: at 09:17

## 2023-01-13 RX ADMIN — FOLIC ACID 1 MG: 1 TABLET ORAL at 09:06

## 2023-01-13 RX ADMIN — KETOROLAC TROMETHAMINE 15 MG: 15 INJECTION, SOLUTION INTRAMUSCULAR; INTRAVENOUS at 00:37

## 2023-01-13 RX ADMIN — METOPROLOL TARTRATE 50 MG: 50 TABLET, FILM COATED ORAL at 09:05

## 2023-01-13 RX ADMIN — Medication 100 MG: at 09:06

## 2023-01-13 RX ADMIN — MICONAZOLE NITRATE: 2 POWDER TOPICAL at 22:29

## 2023-01-13 RX ADMIN — THIAMINE HYDROCHLORIDE 500 MG: 100 INJECTION, SOLUTION INTRAMUSCULAR; INTRAVENOUS at 17:31

## 2023-01-13 RX ADMIN — KETOROLAC TROMETHAMINE 1 DROP: 5 SOLUTION/ DROPS OPHTHALMIC at 09:08

## 2023-01-13 RX ADMIN — Medication 400 MG: at 09:06

## 2023-01-13 ASSESSMENT — ENCOUNTER SYMPTOMS
CHEST TIGHTNESS: 0
WHEEZING: 0
NAUSEA: 0
VOMITING: 0
BACK PAIN: 0
COLOR CHANGE: 0
SHORTNESS OF BREATH: 0
TROUBLE SWALLOWING: 0
COUGH: 0

## 2023-01-13 ASSESSMENT — PAIN DESCRIPTION - LOCATION: LOCATION: BACK

## 2023-01-13 ASSESSMENT — PAIN SCALES - GENERAL
PAINLEVEL_OUTOF10: 7
PAINLEVEL_OUTOF10: 8

## 2023-01-13 ASSESSMENT — PAIN DESCRIPTION - ORIENTATION: ORIENTATION: LEFT

## 2023-01-13 ASSESSMENT — PAIN DESCRIPTION - DESCRIPTORS: DESCRIPTORS: ACHING;STABBING

## 2023-01-13 NOTE — PROGRESS NOTES
Progress Note  Date:2023       [facility]  Patient Joy Fernandez     YOB: 1945     Age:77 y.o. Subjective      Vital signs stable on room air overnight. Negative desaturation/hypoxia screening exam overnight. Patient noted late  to have made comment to care staff that \"I should bring again the next time I come to the hospital to shoot the next person that wakes me up\", per nursing care staff. House supervisor, , and charge nurse all notified as well as psychiatry who is currently on consult. Patient denies recalling having said that when brought up to him today during exam.      Objective         Vitals Last 24 Hours:  TEMPERATURE:  Temp  Av.4 °F (36.3 °C)  Min: 97.2 °F (36.2 °C)  Max: 97.9 °F (36.6 °C)  RESPIRATIONS RANGE: Resp  Av.4  Min: 18  Max: 20  PULSE OXIMETRY RANGE: SpO2  Av.3 %  Min: 95 %  Max: 97 %  PULSE RANGE: Pulse  Av  Min: 58  Max: 72  BLOOD PRESSURE RANGE: Systolic (01NIP), GFK:859 , Min:127 , TTU:224   ; Diastolic (96LHT), VLN:68, Min:55, Max:81    I/O (24Hr): Intake/Output Summary (Last 24 hours) at 2023 0815  Last data filed at 2023 3070  Gross per 24 hour   Intake 300 ml   Output 600 ml   Net -300 ml     Objective  Constitutional: Obese elderly adult male reclining in bed in no acute distress  Head: NCAT  Eyes: PERRLA, EOMI  Neck: Trachea midline, phonation normal  Cardiovascular: RRR with occasional extra beats. No significant murmurs appreciated. Warm well perfused peripherally. +1 bilateral pretibial pitting edema. Pacemaker site noted on left chest.  Pulmonary: Normal rate and effort of respiration on room air. Bibasilar diminished lung sounds, R >L. No coughing during exam.  Abdomen: Obese, soft, nontense. Bowel sounds appreciated. Nontender to palpation. Neurologic: Somnolent, oriented x2 of 5. Follows commands. Mild generalized weakness. No tremors appreciated.   Moderately poor historian. Psychiatric: Calm and cooperative during exam.  Denies any homicidal ideation. Denies recalling having made statements regarding \"bring a gun and shoot next person who wakes him up\" reported by nursing on 1/12/2023. MSK/integumentary: Bilateral lower extremity skin wounds consistent with excoriation. Labs/Imaging/Diagnostics    Labs:  CBC:  Recent Labs     01/11/23 2032 01/13/23  0548   WBC 10.2 8.1   RBC 4.98 4.48*   HGB 15.4 14.2   HCT 46.0 41.4*   MCV 92.3* 92.3*   RDW 15.6* 15.8*    248     CHEMISTRIES:  Recent Labs     01/11/23 2002 01/11/23 2032 01/13/23  0548   NA  --  144 137   K  --  4.1 3.5   CL  --  106 103   CO2  --  24 21   BUN  --  18 18   CREATININE  --  0.82 0.78   GLUCOSE 93 114* 106*   PHOS  --   --  3.2   MG  --  1.8 2.0     PT/INR:  Recent Labs     01/11/23 2032   PROTIME 14.3   INR 1.1     APTT:  Recent Labs     01/11/23 2032   APTT 35.4     LIVER PROFILE:  Recent Labs     01/11/23 2032 01/13/23  0548   AST 27 17   ALT 15 12   BILITOT 1.0* 1.0*   ALKPHOS 123* 102       Imaging Last 24 Hours:  CT HEAD WO CONTRAST    Result Date: 1/11/2023  EXAMINATION: CT OF THE HEAD WITHOUT CONTRAST  1/11/2023 10:16 pm TECHNIQUE: CT of the head was performed without the administration of intravenous contrast. Automated exposure control, iterative reconstruction, and/or weight based adjustment of the mA/kV was utilized to reduce the radiation dose to as low as reasonably achievable. COMPARISON: None. HISTORY: ORDERING SYSTEM PROVIDED HISTORY: fall, intox TECHNOLOGIST PROVIDED HISTORY: Reason for exam:->fall, intox Has a \"code stroke\" or \"stroke alert\" been called? ->No Decision Support Exception - unselect if not a suspected or confirmed emergency medical condition->Emergency Medical Condition (MA) What reading provider will be dictating this exam?->CRC FINDINGS: BRAIN/VENTRICLES: There is no acute intracranial hemorrhage, mass effect or midline shift.   No abnormal extra-axial fluid collection. The gray-white differentiation is maintained without evidence of an acute infarct. There is no evidence of hydrocephalus. Moderate atrophy. ORBITS: The visualized portion of the orbits demonstrate no acute abnormality. SINUSES: The visualized paranasal sinuses and mastoid air cells demonstrate no acute abnormality. SOFT TISSUES/SKULL:  No acute abnormality of the visualized skull or soft tissues. No acute intracranial abnormality. RECOMMENDATIONS: Careful clinical correlation and follow up recommended. CT CHEST W CONTRAST    Result Date: 1/11/2023  EXAMINATION: CT OF THE CHEST WITH CONTRAST 1/11/2023 10:16 pm TECHNIQUE: CT of the chest was performed with the administration of intravenous contrast. Multiplanar reformatted images are provided for review. Automated exposure control, iterative reconstruction, and/or weight based adjustment of the mA/kV was utilized to reduce the radiation dose to as low as reasonably achievable. COMPARISON: None. HISTORY: ORDERING SYSTEM PROVIDED HISTORY: fall, sternal pain TECHNOLOGIST PROVIDED HISTORY: Reason for exam:->fall, sternal pain Decision Support Exception - unselect if not a suspected or confirmed emergency medical condition->Emergency Medical Condition (MA) What reading provider will be dictating this exam?->CRC FINDINGS: Mediastinum: Normal size heart. No pericardial effusion. Pacemaker leads. Normal caliber and contour of the thoracic aorta. No pulmonary emboli identified. No adenopathy. Lungs/pleura: Moderate right pleural effusion and associated compressive atelectasis. No pulmonary infiltrates. Upper Abdomen: No acute process. Soft Tissues/Bones: No acute osseous or body wall soft tissue abnormalities. Chronic bilateral rib inferolateral fracture deformities. 1. No acute thoracic injury. Sternum intact. 2. Moderate right pleural effusion and associated compressive atelectasis.  RECOMMENDATIONS: Careful clinical correlation and follow up recommended. CT CERVICAL SPINE WO CONTRAST    Result Date: 1/11/2023  EXAMINATION: CT OF THE CERVICAL SPINE WITHOUT CONTRAST 1/11/2023 10:16 pm TECHNIQUE: CT of the cervical spine was performed without the administration of intravenous contrast. Multiplanar reformatted images are provided for review. Automated exposure control, iterative reconstruction, and/or weight based adjustment of the mA/kV was utilized to reduce the radiation dose to as low as reasonably achievable. COMPARISON: Radiographs of July 26, 2022 CT examination of April 17, 2022 HISTORY: ORDERING SYSTEM PROVIDED HISTORY: fall, intox TECHNOLOGIST PROVIDED HISTORY: Reason for exam:->fall, intox Decision Support Exception - unselect if not a suspected or confirmed emergency medical condition->Emergency Medical Condition (MA) What reading provider will be dictating this exam?->CRC FINDINGS: BONES/ALIGNMENT: There is no acute fracture or traumatic malalignment. Chronic type 2 odontoid fracture with minimal 2 mm retrolisthesis of the odontoid fragment, which is unchanged from prior examinations. DEGENERATIVE CHANGES: Moderate disc degenerative changes C5-C6 and C6-C7, with chronic severe bilateral foraminal narrowing at C5-C6. No significant central canal narrowing. SOFT TISSUES: There is no prevertebral soft tissue swelling. 1. No acute abnormality of the cervical spine. 2. Chronic type 2 odontoid fracture with minimal 2 mm retrolisthesis of the odontoid fragment, which is unchanged from prior examinations. RECOMMENDATIONS: Careful clinical correlation and follow up recommended. CT THORACIC SPINE WO CONTRAST    Result Date: 1/11/2023  EXAMINATION: CT OF THE THORACIC SPINE WITHOUT CONTRAST  1/11/2023 10:16 pm: TECHNIQUE: CT of the thoracic spine was performed without the administration of intravenous contrast. Multiplanar reformatted images are provided for review.  Automated exposure control, iterative reconstruction, and/or weight based adjustment of the mA/kV was utilized to reduce the radiation dose to as low as reasonably achievable. COMPARISON: None. HISTORY: ORDERING SYSTEM PROVIDED HISTORY: fall TECHNOLOGIST PROVIDED HISTORY: Reason for exam:->fall What reading provider will be dictating this exam?->CRC FINDINGS: BONES/ALIGNMENT: There is normal alignment of the spine. The vertebral body heights are maintained. No osseous destructive lesion is seen. DEGENERATIVE CHANGES: No gross spinal canal stenosis or bony neural foraminal narrowing of the thoracic spine. SOFT TISSUES: Pacemaker. Moderate right effusion and associated atelectasis. 1. No acute disease of the thoracic spine. 2. Moderate right pleural effusion and associated right lung compressive atelectasis. RECOMMENDATIONS: Careful clinical correlation and follow up recommended. CT LUMBAR SPINE WO CONTRAST    Result Date: 1/11/2023  EXAMINATION: CT OF THE LUMBAR SPINE WITHOUT CONTRAST  1/11/2023 TECHNIQUE: CT of the lumbar spine was performed without the administration of intravenous contrast. Multiplanar reformatted images are provided for review. Adjustment of mA and/or kV according to patient size was utilized. Automated exposure control, iterative reconstruction, and/or weight based adjustment of the mA/kV was utilized to reduce the radiation dose to as low as reasonably achievable. COMPARISON: April 17, 2022. HISTORY: ORDERING SYSTEM PROVIDED HISTORY: fall TECHNOLOGIST PROVIDED HISTORY: Reason for exam:->fall Decision Support Exception - unselect if not a suspected or confirmed emergency medical condition->Emergency Medical Condition (MA) What reading provider will be dictating this exam?->CRC FINDINGS: BONES/ALIGNMENT: There is normal alignment of the spine. The vertebral body heights are maintained. No osseous destructive lesion is seen. Chronic L4-L5 decompression and fusion.  DEGENERATIVE CHANGES: Chronic straightening of the normal lumbar lordosis with diffuse degenerative change. Severe disc degenerative change L5-S1. Mild central stenosis is likely present at L2-L3. SOFT TISSUES/RETROPERITONEUM: Moderate right effusion and associated atelectasis. Saccular 3.2 cm infrarenal abdominal aortic aneurysm. No acute features. 1. No acute lumbar spine injury. 2. 3.2 cm saccular infrarenal abdominal aortic aneurysm. No acute features. Unchanged size in comparison to prior examination of April 17, 2022. RECOMMENDATIONS: Careful clinical correlation and follow up recommended. 3.2 cm infrarenal saccular abdominal aortic aneurysm. Recommend vascular consultation. Reference: J Vasc Surg 7321;79:4-04. CT ABDOMEN PELVIS W IV CONTRAST Additional Contrast? None    Result Date: 1/11/2023  EXAMINATION: CT OF THE ABDOMEN AND PELVIS WITH CONTRAST 1/11/2023 10:16 pm TECHNIQUE: CT of the abdomen and pelvis was performed with the administration of intravenous contrast. Multiplanar reformatted images are provided for review. Automated exposure control, iterative reconstruction, and/or weight based adjustment of the mA/kV was utilized to reduce the radiation dose to as low as reasonably achievable. COMPARISON: April 17, 2022 HISTORY: ORDERING SYSTEM PROVIDED HISTORY: fall, intox TECHNOLOGIST PROVIDED HISTORY: Reason for exam:->fall, intox Additional Contrast?->None Decision Support Exception - unselect if not a suspected or confirmed emergency medical condition->Emergency Medical Condition (MA) What reading provider will be dictating this exam?->CRC FINDINGS: Lower Chest: Moderate right pleural effusion and associated compressive atelectasis. Organs: Cholelithiasis. Otherwise, the Liver, biliary tree, bilateral adrenal glands, bilateral kidneys, spleen and pancreas are normal. GI/Bowel: No ileus or obstruction. No mesenteric contusion. No inflammatory bowel process. Moderate descending and sigmoid colonic diverticulosis. No diverticulitis. Pelvis: No adenopathy or free fluid. Peritoneum/Retroperitoneum: No hernia, ascites, or hemorrhage. Saccular infrarenal abdominal aortic aneurysm measuring 3.2 cm unchanged from prior examination of April 17, 2022. No acute features. Bones/Soft Tissues: Spinal decompression and fusion. No acute osseous or body wall soft tissue abnormalities. 1. No acute disease. No acute abdominopelvic injury. 2. Cholelithiasis. 3. Saccular infrarenal abdominal aortic aneurysm measuring 3.2 cm unchanged from prior examination of April 17, 2022. No acute features. RECOMMENDATIONS: Careful clinical correlation and follow up recommended. XR CHEST PORTABLE    Result Date: 1/11/2023  EXAMINATION: ONE XRAY VIEW OF THE CHEST 1/11/2023 7:59 pm COMPARISON: 08/15/2022 HISTORY: ORDERING SYSTEM PROVIDED HISTORY: NAJMA ba TECHNOLOGIST PROVIDED HISTORY: Reason for exam:->fall, CP What reading provider will be dictating this exam?->CRC FINDINGS: The lungs are without acute focal process. There is no effusion or pneumothorax. Stable heart size. Left-sided transvenous pacer device the osseous structures are without acute process. No acute process. US DUP LOWER EXTREMITIES BILATERAL VENOUS    Result Date: 1/12/2023  EXAMINATION: DUPLEX VENOUS ULTRASOUND OF THE BILATERAL LOWER EXTREMITIES1/12/2023 7:43 am TECHNIQUE: Duplex ultrasound using B-mode/gray scaled imaging, Doppler spectral analysis and color flow Doppler was obtained of the deep venous structures of the lower bilateral extremities. COMPARISON: None. HISTORY: ORDERING SYSTEM PROVIDED HISTORY: r/o dvt TECHNOLOGIST PROVIDED HISTORY: Reason for exam:->r/o dvt What reading provider will be dictating this exam?->CRC FINDINGS: The visualized veins of the bilateral lower extremities are patent and free of echogenic thrombus. The veins demonstrate good compressibility with normal color flow study and spectral analysis. Suboptimal visualization of the calf veins due to patient's body habitus.      No evidence of DVT in the visualized veins of bilateral lower extremities. .     Assessment//Plan           Hospital Problems             Last Modified POA    * (Principal) Unable to ambulate 1/12/2023 Yes     Assessment & Plan    Principal Problem:  Falls at home: Fall at home after mild dizziness and inability to ambulate afterwards. Pan CT scans unremarkable. Troponin elevated but at baseline. Likely 2/2 failure to thrive, medication noncompliance, and alcohol use. Will get PT OT. We will get case management for home health care versus SNF placement. 1/13: Neurology on consultation, appreciate recommendations when available. Elevated troponin: Troponin 0.037 near baseline and repeat troponin 0.031. EKG shows a fib/paced ST elevation. We will cycle troponin and repeat EKG. Heart failure: Preserved EF of 47% with diastolic dysfunction and biatrial moderate enlargement. Patient on beta-blocker and diuretic. Will resume beta-blocker and administer Lasix IV. We will monitor fluid status closely. We will get echo. History of A. Fib: Patient on beta-blocker and [address]. Patient has presence of pacemaker. NSR vs paced rhythm on exam.  CAD: History of multiple stents. Patient on aspirin and statin. 1/13: Cardiology on consultation, appreciate recommendations. They've expressed concern that moderate sized right pleural effusion noted on admission pan scan CT might not be purely cardiac in etiology, have recommended possible diagnostic/therapeutic thoracentesis for further evaluation. IR has been consulted for thoracentesis consideration. Chronic severe alcohol dependence: Patient reported 3 vodkas daily. We will keep patient on CIWA. Monitoring closely for Sx acute withdrawal.  Neurology and Psychiatry on consult. Encephalopathy in setting of above, superimposed on suspected baseline MCI versus early dementia (per family/friends).   1/13: Patient made concerning statement regarding bringing a gun whenever he was in the hospital next to \"shoot whoever would wake him up\" from sleep on 1/12. Psychiatry on consult, and now aware, as are remainder of care team including nursing house supervisor, , and charge nurse. Pending capacity evaluation and neurologic evaluation by psychiatry and neurology respectively. HTN: PT on home meds to control. Will resume home meds, as tolerated. HLD: Patient on statin.   Will resume home meds       Electronically signed by Stan Og DO on 1/13/23 at 8:15 AM EST

## 2023-01-13 NOTE — PROGRESS NOTES
[facility] OF [facility] Progress Note      Patient: Prince Yeh    Unit/Bed: Y225/R810-94  YOB: 1945  MRN: [id number]  [address] [facility] Date:  1/11/2023  Hospital Day: 1    Rounding Date: 1/13/2023    Rounding Cardiologist:  PD Marylen Crown, MD    PRIMARY Cardiologist: Donita Rod    Subjective Complaint:   Has some chest pain-but clearly over the sternum where he fell. Not typical for angina. Physical Examination:     BP (!) 152/64   Pulse 60   Temp 97.5 °F (36.4 °C) (Oral)   Resp 18   Ht 5' 11\" (1.803 m)   Wt 276 lb (125.2 kg)   SpO2 96%   BMI 38.49 kg/m²       Intake/Output Summary (Last 24 hours) at 1/13/2023 1135  Last data filed at 1/13/2023 7262  Gross per 24 hour   Intake 310 ml   Output 600 ml   Net -290 ml                  Prince Yeh examined at bedside in in no apparent distress and cooperative. Focused exam reveals:     Cardiac: Heart regular rate and rhythm--the occasional ectopy. --Paced     Lungs:  clear to auscultation bilaterally- no wheezes, rales or rhonchi, normal air movement, no respiratory distress    Extremities:   1+ edema    Telemetry:      atrial fibrillation - controlled, paced, paroxismal supraventricular tachycardia, and 5 beat run of likely SVT          LABS:   CBC:   Recent Labs     01/11/23 2032 01/13/23  0548   WBC 10.2 8.1   HGB 15.4 14.2    248      BMP:    Recent Labs     01/11/23 2002 01/11/23 2032 01/13/23  0548   NA  --  144 137   K  --  4.1 3.5   CL  --  106 103   CO2  --  24 21   BUN  --  18 18   CREATININE  --  0.82 0.78   GLUCOSE 93 114* 106*              Troponin: No results for input(s): TROPONINT in the last 72 hours. BNP:   Recent Labs     01/11/23 2032   PROBNP 2,030      INR:   Recent Labs     01/11/23 2032   INR 1.1      Mg:   Recent Labs     01/13/23  0548   MG 2.0       Cardiac Testing:    Summary   Left ventricular ejection fraction is visually estimated at 60%. Mild to moderate concentric left ventricular hypertrophy. Indeterminate diastolic relaxation   Mild (1+) mitral regurgitation is present. There is mild ( 1 +) tricuspid regurgitation with estimated RVSP of 50 mm   Hg.--moderate pulmonary hypertension   Moderately dilated left atrium. Normal right ventricular size with preserved RV function. Pacemaker Wire visualized in right ventricle. There is a (trivial) pericardial effusion. Previous echo was 2014    Assessment:  Falling spell-etiology is not clear at this time. Differential diagnosis include neurologic event, versus dehydration. Also patient has a history of significant alcohol use although alcohol level was negative upon presentation. I believe he lost his wife in the not too distant past  Patient has been noncompliant with his medications recently. No indication of pacemaker and valve function  No obvious evidence of congestive heart failure. Patient has O2 sats in the 95 to 97% range on room air  Evidence for right-sided findings given edema. Has moderate pulmonary hypertension--see echo report above  Systemic hypertension  Symmetric pleural effusion. Significant on the right, none on the left.   Questionable whether this is traumatic or not, although no obvious evidence of rib fracture  Patient is on systemic Lovenox--and lieu of his usual Eliquis in case patient needs a percutaneous drainage of his right pleural effusion  History of coronary disease, but no intervention  Sleep apnea-but not compliant with CPAP mask  Recurrence of atrial fibrillation although patient in the past has had cardioversion    Plan:  Patient to get MRI today--patient's pacemaker is MRI compatible  Will discuss with hospitalist about possible interventional radiology to do thoracentesis  We will continue to give low-dose diuretics in case this is atypical presentation to congestive heart failure  Supportive care until that time  See orders      Electronically signed by Charlotte Self MD on 1/13/2023 at 11:35 AM

## 2023-01-13 NOTE — CONSULTS
[id number] [facility] Neurology Consult Note  Name: Geronimo Simmons  Age: 68 y.o. Gender: male  CodeStatus: Full Code  Allergies: Hylan G-F 20  Ace Inhibitors  Statins Depletion Therapy    Chief Complaint:Fall    Primary Care Provider: Deanna Villagran MD  InpatientTreatment Team: Treatment Team: Attending Provider: Moo Max DO; Consulting Physician: Melanie Horowitz MD; Consulting Physician: Demarco Stinson MD; Consulting Physician: Vasu Holland MD; : William Starkey RN; LPN: Tova Solis LPN; : Rolando Azul, DAMIEN; Respiratory Therapist (Day): Sony Kerns RCP; Utilization Reviewer: Tunde Mcdonnell RN; Julia Travis Nurse: Naina Hurts RN  Admission Date: 1/11/2023      HPI   Consulting provider: Dr. Lynn Feliciano for weakness/falls, possible MCI/early dementia, insetting of chronic alcohol and medication noncompliance, plus generalized self-neglect. Pt seen and examined for neurology consult. Patient is a 60-year-old  male with past medical history of diabetes mellitus type 2, rheumatoid arthritis, hypertension, hyperlipidemia, HSV 2, depression, CHF, prostate cancer, CAD status post multiple cardiac stents, daily alcohol use, paroxysmal atrial fibrillation on Eliquis who presented to [facility] EMERGENCY [facility] [place] emergency room on 1/11/2023 via EMS after a fall. According to patient he tripped and fell striking his chest on the leg of a chair. Denied head injury. No loss of consciousness. Complained of reproducible chest pain. Was unable to get himself up off the floor and stayed there the entire day before using his life alert. On presentation blood pressure was 188/110. Afebrile. No hypoxia. EKG showed atrial fibrillation with a heart rate of 61. CT of the head was negative for acute findings. CT of the cervical spine showed stable chronic type II odontoid fracture. CT of the thoracic and lumbar spine were negative for acute fractures. No mention of canal stenosis. Patient was noted to have elevated troponins. CK was elevated at 672. D-dimer was elevated at 3.54. It is reported that patient was noncompliant with his medications prior to admission. Patient is currently alert and oriented x2, no acute distress, cooperative. No current behavioral issues. No hallucinations. Patient reports that he was dizzy which caused him to fall. He reports recurrent falls with preceding dizziness. Also reports increasing lower extremity weakness over the last couple of weeks. Also complains of bowel and bladder incontinence recently. Denies back or neck pain. No radiculopathy. On exam he is areflexic in the lower extremities. He also was noted to have increasing left lower extremity weakness greater than right mainly distally. Events noted. Patient is Multiple medical issues going on for some time. CT scan did not show anything significant. Vitals:    23 0905   BP: (!) 152/64   Pulse: 60   Resp:    Temp:    SpO2:      Family History   Problem Relation Age of Onset    Heart Attack Father     Cancer Father         colon    High Cholesterol Mother     Heart Disease Mother     Macular Degen Mother     Arthritis Sister         hip replacement    Other Brother         vertigo    No Known Problems Son     No Known Problems Son      Social History     Socioeconomic History    Marital status:      Spouse name: Not on file    Number of children: Not on file    Years of education: Not on file    Highest education level: Not on file   Occupational History    Not on file   Tobacco Use    Smoking status: Former     Packs/day: 0.25     Years: 7.00     Pack years: 1.75     Types: Cigarettes     Start date: 5     Quit date: 6/3/1992     Years since quittin.6    Smokeless tobacco: Never   Vaping Use    Vaping Use: Never used   Substance and Sexual Activity    Alcohol use:  Yes     Alcohol/week: 2.0 standard drinks     Types: 2 Shots of liquor per week     Comment: daily, 2 glassess of vodka    Drug use: No    Sexual activity: Never   Other Topics Concern    Not on file   Social History Narrative    Not on file     Social Determinants of Health     Financial Resource Strain: Low Risk     Difficulty of Paying Living Expenses: Not hard at all   Food Insecurity: No Food Insecurity    Worried About Running Out of Food in the Last Year: Never true    Ran Out of Food in the Last Year: Never true   Transportation Needs: Not on file   Physical Activity: Not on file   Stress: Not on file   Social Connections: Not on file   Intimate Partner Violence: Not on file   Housing Stability: Not on file          Review of Systems   Constitutional:  Negative for appetite change and fever. HENT:  Negative for hearing loss and trouble swallowing. Eyes:  Negative for visual disturbance. Respiratory:  Negative for cough, chest tightness, shortness of breath and wheezing. Cardiovascular:  Negative for chest pain, palpitations and leg swelling. Gastrointestinal:  Negative for nausea and vomiting. Genitourinary:  Positive for difficulty urinating. Musculoskeletal:  Positive for gait problem. Negative for back pain, neck pain and neck stiffness. Skin:  Negative for color change and rash. Neurological:  Positive for weakness and light-headedness. Negative for dizziness, tremors, seizures, syncope, facial asymmetry, speech difficulty, numbness and headaches. Psychiatric/Behavioral:  Positive for confusion. Negative for agitation and hallucinations. The patient is not nervous/anxious. Physical Exam  Vitals and nursing note reviewed. Constitutional:       General: He is not in acute distress. Appearance: He is not diaphoretic. HENT:      Head: Normocephalic. Eyes:      General: No visual field deficit. Extraocular Movements: Extraocular movements intact. Pupils: Pupils are equal, round, and reactive to light.    Cardiovascular:      Rate and Rhythm: Normal rate and regular rhythm. Pulmonary:      Effort: Pulmonary effort is normal. No respiratory distress. Breath sounds: Normal breath sounds. Abdominal:      General: Bowel sounds are normal.      Palpations: Abdomen is soft. Skin:     General: Skin is warm and dry. Neurological:      Mental Status: He is alert. He is disoriented. Cranial Nerves: No cranial nerve deficit, dysarthria or facial asymmetry. Motor: Weakness present. No tremor, atrophy, abnormal muscle tone, seizure activity or pronator drift. Coordination: Coordination normal. Finger-Nose-Finger Test normal.      Deep Tendon Reflexes: Reflexes abnormal.      Reflex Scores:       Patellar reflexes are 0 on the right side and 0 on the left side. Achilles reflexes are 0 on the right side and 0 on the left side.             Medications:  Reviewed    Infusion Medications:    sodium chloride      sodium chloride       Scheduled Medications:    mupirocin   Topical Daily    sodium chloride flush  5-40 mL IntraVENous 2 times per day    thiamine  100 mg Oral Daily    furosemide  10 mg IntraVENous BID    [Held by provider] apixaban  5 mg Oral BID    hydrALAZINE  50 mg Oral BID    ketorolac  1 drop Both Eyes 4x Daily    pantoprazole  40 mg Oral Daily    rosuvastatin  40 mg Oral QPM    tamsulosin  0.4 mg Oral Daily    miconazole   Topical BID    lidocaine  3 patch TransDERmal Daily    sodium chloride flush  5-40 mL IntraVENous 2 times per day    folic acid  1 mg Oral Daily    metoprolol tartrate  50 mg Oral BID    losartan  25 mg Oral BID    magnesium oxide  400 mg Oral Daily    enoxaparin  1 mg/kg SubCUTAneous BID     PRN Meds: sodium chloride flush, sodium chloride, ondansetron **OR** ondansetron, polyethylene glycol, acetaminophen **OR** acetaminophen, hydrALAZINE, labetalol, traMADol, sodium chloride flush, sodium chloride, LORazepam **OR** LORazepam **OR** LORazepam **OR** LORazepam **OR** LORazepam **OR** LORazepam **OR** LORazepam **OR** LORazepam, ketorolac    Labs:   Recent Labs     01/11/23 2032 01/13/23  0548   WBC 10.2 8.1   HGB 15.4 14.2   HCT 46.0 41.4*    248     Recent Labs     01/11/23 2032 01/13/23  0548    137   K 4.1 3.5    103   CO2 24 21   BUN 18 18   CREATININE 0.82 0.78   CALCIUM 9.1 8.3*   PHOS  --  3.2     Recent Labs     01/11/23 2032 01/13/23  0548   AST 27 17   ALT 15 12   BILITOT 1.0* 1.0*   ALKPHOS 123* 102     Recent Labs     01/11/23 2032   INR 1.1     Recent Labs     01/11/23 2032 01/11/23  2322   CKTOTAL 672*  --    TROPONINI 0.037* 0.031*       Urinalysis:   Lab Results   Component Value Date/Time    NITRU NEGATIVE 08/05/2022 09:34 PM    WBCUA <1 08/05/2022 09:34 PM    BACTERIA Negative 04/17/2022 11:15 AM    RBCUA 1 08/05/2022 09:34 PM    BLOODU NEGATIVE 08/05/2022 09:34 PM    SPECGRAV 1.012 04/17/2022 11:15 AM    GLUCOSEU Negative 04/17/2022 11:15 AM    GLUCOSEU NEG 06/07/2012 03:53 PM       Radiology:   Most recent    EEG No valid procedures specified. MRI of Brain No results found for this or any previous visit. Results for orders placed during the hospital encounter of 12/10/21    MRI BRAIN WO CONTRAST    Narrative  EXAMINATION: MRI BRAIN WO CONTRAST    CLINICAL HISTORY:  cognitive decline    COMPARISONS: NONE AVAILABLE    TECHNIQUE:    Multiplanar multisequence images of the brain were obtained without contrast. Diffusion perfusion imaging was obtained. BRAIN MRI FINDINGS:    There are no extra-axial collections. There is no evidence of hemorrhage. There are no areas of perfusion diffusion signal abnormality to suggest ischemia. The susceptibility images do not demonstrate evidence of hemosiderin deposition within the brain  parenchyma or the leptomeninges. There is preservation of the gray-white matter differentiation. There are a few scattered foci of T2/FLAIR increased signal in the subcortical and periventricular white matter without associated edema or mass effect. There is prominence of the sulci and  ventricles consistent with moderate global cerebral atrophy and chronic involutional changes. The midline structures are intact, the corpus callosum is within normal limits. The region of the pineal gland and the sella turcica are unremarkable. There are no space-occupying lesions in the posterior fossa. The basilar cisterns are patent. The craniocervical junction is unremarkable. The visualized portions of the orbits are within normal limits, the globes are intact. The visualized portions of the paranasal sinuses are within normal limits. The calvarium and soft tissues are unremarkable. Impression  There are no acute intracranial changes, there is no evidence of hemorrhage or acute ischemia. MRA of the Head and Neck: No results found for this or any previous visit. No results found for this or any previous visit. No results found for this or any previous visit. CT of the Head: Results for orders placed during the hospital encounter of 01/11/23    CT HEAD WO CONTRAST    Narrative  EXAMINATION:  CT OF THE HEAD WITHOUT CONTRAST  1/11/2023 10:16 pm    TECHNIQUE:  CT of the head was performed without the administration of intravenous  contrast. Automated exposure control, iterative reconstruction, and/or weight  based adjustment of the mA/kV was utilized to reduce the radiation dose to as  low as reasonably achievable. COMPARISON:  None. HISTORY:  ORDERING SYSTEM PROVIDED HISTORY: fall, intox  TECHNOLOGIST PROVIDED HISTORY:  Reason for exam:->fall, intox  Has a \"code stroke\" or \"stroke alert\" been called? ->No  Decision Support Exception - unselect if not a suspected or confirmed  emergency medical condition->Emergency Medical Condition (MA)  What reading provider will be dictating this exam?->CRC    FINDINGS:  BRAIN/VENTRICLES: There is no acute intracranial hemorrhage, mass effect or  midline shift.   No abnormal extra-axial fluid collection. The gray-white  differentiation is maintained without evidence of an acute infarct. There is  no evidence of hydrocephalus. Moderate atrophy. ORBITS: The visualized portion of the orbits demonstrate no acute abnormality. SINUSES: The visualized paranasal sinuses and mastoid air cells demonstrate  no acute abnormality. SOFT TISSUES/SKULL:  No acute abnormality of the visualized skull or soft  tissues. Impression  No acute intracranial abnormality. RECOMMENDATIONS:  Careful clinical correlation and follow up recommended. No results found for this or any previous visit. No results found for this or any previous visit. Carotid duplex: No results found for this or any previous visit. No results found for this or any previous visit. Results for orders placed during the hospital encounter of 12/10/21    US CAROTID ARTERY BILATERAL    Narrative  History:  carotid stenosis    Technique:  US CAROTID ARTERY BILATERAL    Comparison: March 16, 2016      Findings: On the right calcified plaque is seen in the bulb and extends into the internal carotid artery. On the left mild mixed plaque is seen in the bulb. Calcified plaque is seen in the distal common carotid artery. There is a large amount of calcified plaque  is seen in the internal carotid artery. Increased velocity is seen in the proximal left internal carotid artery. Normal antegrade flow is seen in the vertebral arteries. Doppler findings:  ARTERIAL BLOOD FLOW VELOCITY    RIGHT PS    Prox CCA 124cm/s  Mid CCA 109cm/s  Dist CCA 89cm/s  Prox ICA 89cm/s  Mid ICA 118cm/s  Dist ICA 74cm/s  Prox ECA 162cm/s  Dist VERT 54cm/s  Bulb  ICA/CCA 1.1    LEFT PS    Prox CCA 93cm/s  Mid CCA 83cm/s  Dist CCA 83cm/s  Prox ICA 173cm/s  Mid ICA 108cm/s  Dist ICA 105cm/s  Prox ECA 125cm/s  Prox VERT 37cm/s  Bulb  ICA/CCA 2.1    Impression  Impression: 1.   No hemodynamic significant stenosis on the right   2. 50-69% stenosis seen on the left    Validated velocity measurements with angiographic measurements, velocity criteria are extrapolated from diameter data as defined by the Society of radiologist in ultrasound consensus conference radiology 2003; 229; 340-346. Echo No results found for this or any previous visit. Assessment/Plan:  Patient is a 26-year-old  male with past medical history of alcohol use, medication noncompliance, diabetes mellitus type 2, rheumatoid arthritis, hypertension, hyperlipidemia, HSV-2, depression, CHF, prostate cancer, CAD status post multiple cardiac stents, PAF on Eliquis, pacemaker placement who presented to [facility] on 1/11/2023 after a fall at home. Patient reports increasing falls recently preceded by dizziness. Denies head strike or loss of consciousness. He also reports increasing bilateral lower extremity weakness recently and bowel and bladder incontinence. Initial CT of the head was negative for any acute findings. On exam patient has left lower extremity weakness distal 3 out of 5 and generalized weakness at 4 out of 5. Patient was noncompliant with medications prior to admission including his Eliquis for paroxysmal atrial fibrillation putting him at risk for CVA. .  Given medication noncompliance, multiple risk factors for CVD, and left lower extremity weakness we will obtain MRI of the brain. Given patient's weakness, recurrent falls and areflexia in the lower extremities we will obtain MRI of the lumbar spine. Will have to see if pacemaker is compatible with MRI. Will assess orthostatic blood pressures. Patient does have ongoing confusion and cognitive impairment. Baseline is unknown. Thyroid studies are normal.  We will check B12 and folate given chronic alcohol use. Will initiate on high-dose thiamine. MRI of the brain per above. Further recommendations to follow.     I have personally performed a face to face diagnostic evaluation on this patient, reviewed all data and investigations, and am the sole provider of all clinical decisions on the neurological status of this patient. Patient seen and examined. Multiple medical issues and complication going on. We will try and obtain MRI as patient is on Eliquis and has atrial fibrillation underlying shower of emboli cannot be ruled out. Depending on the pacemaker we will get the MRI. 60% time spent on evaluating the patient myself      Jeremy Zepeda MD, [id number] American Board of Psychiatry & Neurology  Board Certified in Vascular Neurology  Board Certified in Neuromuscular Medicine  Certified in Neurorehabilitation         Collaborating physicians: Dr Tiff Zepeda    Electronically signed by GIUSEPPE Jurado CNP on 1/13/2023 at 10:08 AM

## 2023-01-13 NOTE — FLOWSHEET NOTE
1540 spoke with Christine Mclaughlin RN, regarding inability to proceed with thoracentesis due to Lovenox dose today. Brent Stern NP aware. Will follow up with pt on Monday.

## 2023-01-13 NOTE — PROGRESS NOTES
0930 Assessment complete. Pt  A & O x 4. LS clear. BS hypoactive. LBM 1/11/23 per pt. Pt inc and purewick in place draining straw colored, cloudy urine. Pt has 2+ non pitting edema to BLE. Scabbed areas to R toes and LLE. L abd fold has an approx 1/4\" vertical opening. Nystatin applied to abd folds. Ecchymotic area noted to L side just below shoulder blade. Pt c/o 7/10 pain to L side and lidocaine patches (x3) applied. Skin is dry and flaky. Falls precautions in place. Call light and bedside table within reach.

## 2023-01-13 NOTE — PLAN OF CARE
Problem: Discharge Planning  Goal: Discharge to home or other facility with appropriate resources  Outcome: Progressing     Problem: Pain  Goal: Verbalizes/displays adequate comfort level or baseline comfort level  Outcome: Progressing     Problem: Skin/Tissue Integrity  Goal: Absence of new skin breakdown  Outcome: Progressing     Problem: Safety - Adult  Goal: Free from fall injury  Outcome: Progressing     Problem: Chronic Conditions and Co-morbidities  Goal: Patient's chronic conditions and co-morbidity symptoms are monitored and maintained or improved  Outcome: Progressing

## 2023-01-13 NOTE — CARE COORDINATION
This LSW met with patient at bedside this am. Patient and I discussed discharge plans at length. Patient admits that he will need extended PT/OT before returning home. Per patient request referral submitted to [facility]. Awaiting a response at this time. LSW / CM to follow. Electronically signed by ALEXIA Mai LSW on 1/13/23 at 9:59 AM EST    Patient has been accepted to [facility]. Authorization must be obtained from [facility]. I have contacted Remigio Bass at the [facility] to obtain authorization. Awaiting response at this time. Electronically signed by ALEXIA Mai LSW on 1/13/23 at 12:31 PM EST   This LSW was notified that patient does not have VA coverage. According to Patient he also has Medicare. I called and notified [facility]- she will run his benefits and contact me. Electronically signed by ALEXIA Mai LSW on 1/13/23 at 4:06 PM CATHERINE Donnelly at [facility] contacted me. Patient does have Medicare A benefit and has been accepted. Patient to be transferred to [facility] when medically cleared.   Electronically signed by ALEXIA Mai LSW on 1/13/23 at 4:14 PM EST

## 2023-01-13 NOTE — CONSULTS
[facility] [place], Department of Psychiatry  Behavioral Health Consult    REASON FOR CONSULT: Yi aomr    CONSULTING PHYSICIAN: Dr Stephanie Chun    History obtained from: patient    HISTORY OF PRESENT ILLNESS:      The patient is a 68 y.o. male with no significant past psychiatric history of  who presents with fall and confusion. Patient reported that he lives alone and he had a fall recently following which he claims that he broke his neck and is having severe pain. Patient admitted to drinking daily a few beers but unable to give me the exact quantity of alcohol use. Patient denies going through any withdrawal.  Patient reported that he is in severe pain and he needs help with that. Patient appeared much more calm and cooperative today than yesterday when tried to evaluate him. Patient reported that he was feeling weak and tired and that he needed help. When asked about him being noncompliant with medication he could not answer the reason for his noncompliance. However he said that he wants to get better and he is willing to do what ever it takes including going to nursing home to get his strength back. Patient is understanding about the risk of fall again if discharged back home. Patient is cooperating with all the treatment and investigation that was proposed to him. Patient apparently made a statement about wanting to go home and the next time when he comes to the hospital he would come with the gun. When inquired about it patient reported that he did not mean what he said. He reported that he was very frustrated yesterday about people coming every 5 to 10 minutes asking questions without helping his pain and he was so annoyed about his condition. He made a statement without really thinking about it. Patient denies having any access to guns or weapons. Patient denies feeling depressed. He denies any hopeless or worthless feeling. He is anxious about current situation.   Previous alert and oriented x2. Patient denies any audiovisual hallucinations or paranoid thoughts. The patient is not currently receiving care for the above psychiatric illness. Psychiatric Review of Systems       Depression: denies     Madison or Hypomania:  no     Panic Attacks:  no     Phobias:  no     Obsessions and Compulsions:  no     PTSD : no     Hallucinations:  no     Delusions:  no      Substance Abuse History:  ETOH: yes-unable to assess the severity of his alcohol use  Marijuana: no  Opiates: no  Other Drugs: no      Past Psychiatric History:  Denies any past suicidal attempt.   Denies any previous admission to psychiatric unit    Past Medical History:        Diagnosis Date    Bradycardia     CAD (coronary artery disease)     Cancer (HCC)     prostate- radiation     CHF (congestive heart failure) (HCC)     Depression     HSV-2 (herpes simplex virus 2) infection     Hyperlipidemia     Hypertension     Left knee DJD     Osteoarthritis     Rheumatoid arthritis(714.0)     Type 2 diabetes mellitus without complication, without long-term current use of insulin ([facility] [address] 1/10/2019       Past Surgical History:        Procedure Laterality Date    ANKLE SURGERY Right     pin    APPENDECTOMY      ARTHRODESIS Right 10/31/2016    RIGHT ANKLE ARTHRODESIS OF RIGHT ANKLE AND SUBTALAR JOINT, C-ARM, ABHIJEET EQUIPMENT SYNTHETIC BONE GRAFT, ALLOGRAFT CANCELLOUS, SHARON ANKLE FUSION NAIL, SHANDA IMPLANT BONE STIMULATOR, CHOICE ANESTHESIA, BLOCK  performed by Shara Fiore MD at [facility]      stent x 5    COLONOSCOPY  7-19-11    FRACTURE SURGERY      right ankle    HARDWARE REMOVAL FOOT / ANKLE Right 11/6/2017    RIGHT ANKLE HARDWARE REMOVAL performed by Anant Garza MD at [address] [place] [address]. Bilateral     knees    AK COLON CA SCRN NOT  [address] IND N/A 7/11/2017    COLONOSCOPY performed by Alphonse Sanchez DO at [address] [facility] Left     TONSILLECTOMY AND ADENOIDECTOMY         Medications Prior to Admission:   Medications Prior to Admission: hydrALAZINE (APRESOLINE) 50 MG tablet, Take 50 mg by mouth in the morning and at bedtime  pantoprazole (PROTONIX) 40 MG tablet, Take 40 mg by mouth daily  traMADol (ULTRAM) 50 MG tablet, Take 50 mg by mouth every 6 hours as needed for Pain. [DISCONTINUED] amoxicillin-clavulanate (AUGMENTIN) 500-125 MG per tablet, TAKE 1 TABLET BY MOUTH TWICE A DAY  [DISCONTINUED] azithromycin (ZITHROMAX) 500 MG tablet, TAKE 1 TABLET BY MOUTH EVERY DAY  furosemide (LASIX) 20 MG tablet, Take 20 mg by mouth in the morning and 20 mg before bedtime. nitroGLYCERIN (NITROSTAT) 0.4 MG SL tablet, Place 0.4 mg under the tongue every 5 minutes as needed for Chest pain up to max of 3 total doses. If no relief after 1 dose, call 911.   losartan (COZAAR) 25 MG tablet, Take 1 tablet by mouth daily (Patient taking differently: Take 25 mg by mouth daily Indications: High Blood Pressure Disorder)  apixaban (ELIQUIS) 5 MG TABS tablet, Take 1 tablet by mouth 2 times daily (Patient taking differently: Take 5 mg by mouth 2 times daily Indications: Atrial Fibrillation)  rosuvastatin (CRESTOR) 40 MG tablet, Take 40 mg by mouth every evening Indications: High Amount of Fats in the Blood   ketorolac 0.4 % SOLN ophthalmic solution, Place 1 drop into both eyes 2 times daily Indications: Eye Pain   tamsulosin (FLOMAX) 0.4 MG capsule, Take 1 capsule by mouth daily (Patient taking differently: Take 0.4 mg by mouth daily Indications: Urinary Retention)  acetaminophen (TYLENOL) 325 MG tablet, Take 500 mg by mouth every 4 hours as needed for Pain or Fever    Allergies:  Hylan g-f 20, Ace inhibitors, and Statins depletion therapy    FAMILY/SOCIAL HISTORY:  Family History   Problem Relation Age of Onset    Heart Attack Father     Cancer Father         colon    High Cholesterol Mother     Heart Disease Mother     Macular Degen Mother     Arthritis Sister         hip replacement    Other Brother         vertigo No Known Problems Son     No Known Problems Son      Social History     Socioeconomic History    Marital status:      Spouse name: Not on file    Number of children: Not on file    Years of education: Not on file    Highest education level: Not on file   Occupational History    Not on file   Tobacco Use    Smoking status: Former     Packs/day: 0.25     Years: 7.00     Pack years: 1.75     Types: Cigarettes     Start date: 5     Quit date: 6/3/1992     Years since quittin.6    Smokeless tobacco: Never   Vaping Use    Vaping Use: Never used   Substance and Sexual Activity    Alcohol use:  Yes     Alcohol/week: 2.0 standard drinks     Types: 2 Shots of liquor per week     Comment: daily, 2 glassess of vodka    Drug use: No    Sexual activity: Never   Other Topics Concern    Not on file   Social History Narrative    Not on file     Social Determinants of Health     Financial Resource Strain: Low Risk     Difficulty of Paying Living Expenses: Not hard at all   Food Insecurity: No Food Insecurity    Worried About Running Out of Food in the Last Year: Never true    Ran Out of Food in the Last Year: Never true   Transportation Needs: Not on file   Physical Activity: Not on file   Stress: Not on file   Social Connections: Not on file   Intimate Partner Violence: Not on file   Housing Stability: Not on file       REVIEW OF SYSTEMS    Constitutional: [] fever  [] chills  [] weight loss  []weakness [] Other:  Eyes:  [] photophobia  [] discharge [] acuity change   [] Diplopia   [] Other:  HENT:  [] sore throat  [] ear pain [] Tinnitus   [] Other  Respiratory:  [] Cough  [] Shortness of breath   [] Sputum   [] Other:   Cardiac: []Chest pain   []Palpitations []Edema  []PND  [] Other:  GI:  []Abdominal pain   []Nausea  []Vomiting  []Diarrhea  [] Other:  :  [] Dysuria   []Frequency  []Hematuria  []Discharge  [] Other:  Possible Pregnancy: []Yes   []No   LMP:   Musculoskeletal:  []Back pain  []Neck pain  []Recent Injury   Skin:  []Rash  [] Itching  [] Other:  Neurologic:  [] Headache  [] Focal weakness  [] Sensory changes []Other:  Endocrine:  [] Polyuria  [] Polydipsia  [] Hair Loss  [] Other:  Lymphatic:   [] Swollen glands   Psychiatric:  As per HPI      All other systems negative except as marked or mentioned/indicated in the HPI. Jude Payne      PHYSICAL EXAM:  Vitals:  BP (!) 146/79   Pulse (!) 103   Temp 97.5 °F (36.4 °C) (Oral)   Resp 14   Ht 5' 11\" (1.803 m)   Wt 276 lb (125.2 kg)   SpO2 96%   BMI 38.49 kg/m²      Neuro Exam:   Muscle Strength & Tone: full ROM  Gait: in bed  Involuntary Movements: No    Mental Status Examination:    Level of consciousness:  within normal limits   Appearance:  ill-appearing  Behavior/Motor:  no abnormalities noted  Attitude toward examiner:  cooperative  Speech:  slow   Mood: anxious  Affect:  mood congruent  Thought processes:  slow   Association  Thought content:  Suicidal Ideation:  denies suicidal ideation  Cognition:  oriented to person, place  Attention & Concentration intact  Memory   Mini Mental Status not completed  Insight fair   Judgement poor   Fund of Knowledge limited      DIAGNOSIS:     Adjustment disorder unspecified        RISK ASSESSMENT:        LABS: REVIEWED TODAY:  Recent Labs     01/11/23 2032 01/13/23  0548   WBC 10.2 8.1   HGB 15.4 14.2    248     Recent Labs     01/11/23 2002 01/11/23 2032 01/13/23  0548   NA  --  144 137   K  --  4.1 3.5   CL  --  106 103   CO2  --  24 21   BUN  --  18 18   CREATININE  --  0.82 0.78   GLUCOSE 93 114* 106*     Recent Labs     01/11/23 2032 01/13/23  0548   BILITOT 1.0* 1.0*   ALKPHOS 123* 102   AST 27 17   ALT 15 12     Lab Results   Component Value Date/Time    LABAMPH Neg 01/11/2023 07:45 PM    BARBSCNU Neg 01/11/2023 07:45 PM    LABBENZ Neg 01/11/2023 07:45 PM    LABMETH Neg 01/11/2023 07:45 PM    OPIATESCREENURINE Neg 01/11/2023 07:45 PM    PHENCYCLIDINESCREENURINE Neg 01/11/2023 07:45 PM    ETOH <10 01/11/2023 08:32 PM     Lab Results   Component Value Date/Time    TSH 0.86 08/07/2022 06:28 AM     No results found for: LITHIUM  No results found for: VALPROATE, CBMZ  No results found for: LITHIUM, VALPROATE    FURTHER LABS ORDERED :      Radiology   CT HEAD WO CONTRAST    Result Date: 1/11/2023  EXAMINATION: CT OF THE HEAD WITHOUT CONTRAST  1/11/2023 10:16 pm TECHNIQUE: CT of the head was performed without the administration of intravenous contrast. Automated exposure control, iterative reconstruction, and/or weight based adjustment of the mA/kV was utilized to reduce the radiation dose to as low as reasonably achievable. COMPARISON: None. HISTORY: ORDERING SYSTEM PROVIDED HISTORY: fall, intox TECHNOLOGIST PROVIDED HISTORY: Reason for exam:->fall, intox Has a \"code stroke\" or \"stroke alert\" been called? ->No Decision Support Exception - unselect if not a suspected or confirmed emergency medical condition->Emergency Medical Condition ([place]) What reading provider will be dictating this exam?->CRC FINDINGS: BRAIN/VENTRICLES: There is no acute intracranial hemorrhage, mass effect or midline shift. No abnormal extra-axial fluid collection. The gray-white differentiation is maintained without evidence of an acute infarct. There is no evidence of hydrocephalus. Moderate atrophy. ORBITS: The visualized portion of the orbits demonstrate no acute abnormality. SINUSES: The visualized paranasal sinuses and mastoid air cells demonstrate no acute abnormality. SOFT TISSUES/SKULL:  No acute abnormality of the visualized skull or soft tissues. No acute intracranial abnormality. RECOMMENDATIONS: Careful clinical correlation and follow up recommended. CT CHEST W CONTRAST    Result Date: 1/11/2023  EXAMINATION: CT OF THE CHEST WITH CONTRAST 1/11/2023 10:16 pm TECHNIQUE: CT of the chest was performed with the administration of intravenous contrast. Multiplanar reformatted images are provided for review.  Automated exposure control, iterative reconstruction, and/or weight based adjustment of the mA/kV was utilized to reduce the radiation dose to as low as reasonably achievable. COMPARISON: None. HISTORY: ORDERING SYSTEM PROVIDED HISTORY: fall, sternal pain TECHNOLOGIST PROVIDED HISTORY: Reason for exam:->fall, sternal pain Decision Support Exception - unselect if not a suspected or confirmed emergency medical condition->Emergency Medical Condition ([place]) What reading provider will be dictating this exam?->CRC FINDINGS: Mediastinum: Normal size heart. No pericardial effusion. Pacemaker leads. Normal caliber and contour of the thoracic aorta. No pulmonary emboli identified. No adenopathy. Lungs/pleura: Moderate right pleural effusion and associated compressive atelectasis. No pulmonary infiltrates. Upper Abdomen: No acute process. Soft Tissues/Bones: No acute osseous or body wall soft tissue abnormalities. Chronic bilateral rib inferolateral fracture deformities. 1. No acute thoracic injury. Sternum intact. 2. Moderate right pleural effusion and associated compressive atelectasis. RECOMMENDATIONS: Careful clinical correlation and follow up recommended. CT CERVICAL SPINE WO CONTRAST    Result Date: 1/11/2023  EXAMINATION: CT OF THE CERVICAL SPINE WITHOUT CONTRAST 1/11/2023 10:16 pm TECHNIQUE: CT of the cervical spine was performed without the administration of intravenous contrast. Multiplanar reformatted images are provided for review. Automated exposure control, iterative reconstruction, and/or weight based adjustment of the mA/kV was utilized to reduce the radiation dose to as low as reasonably achievable.  COMPARISON: Radiographs of July 26, 2022 CT examination of April 17, 2022 HISTORY: ORDERING SYSTEM PROVIDED HISTORY: fall, intox TECHNOLOGIST PROVIDED HISTORY: Reason for exam:->fall, intox Decision Support Exception - unselect if not a suspected or confirmed emergency medical condition->Emergency Medical Condition ([place]) What reading provider will be dictating this exam?->CRC FINDINGS: BONES/ALIGNMENT: There is no acute fracture or traumatic malalignment. Chronic type 2 odontoid fracture with minimal 2 mm retrolisthesis of the odontoid fragment, which is unchanged from prior examinations. DEGENERATIVE CHANGES: Moderate disc degenerative changes C5-C6 and C6-C7, with chronic severe bilateral foraminal narrowing at C5-C6. No significant central canal narrowing. SOFT TISSUES: There is no prevertebral soft tissue swelling. 1. No acute abnormality of the cervical spine. 2. Chronic type 2 odontoid fracture with minimal 2 mm retrolisthesis of the odontoid fragment, which is unchanged from prior examinations. RECOMMENDATIONS: Careful clinical correlation and follow up recommended. CT THORACIC SPINE WO CONTRAST    Result Date: 1/11/2023  EXAMINATION: CT OF THE THORACIC SPINE WITHOUT CONTRAST  1/11/2023 10:16 pm: TECHNIQUE: CT of the thoracic spine was performed without the administration of intravenous contrast. Multiplanar reformatted images are provided for review. Automated exposure control, iterative reconstruction, and/or weight based adjustment of the mA/kV was utilized to reduce the radiation dose to as low as reasonably achievable. COMPARISON: None. HISTORY: ORDERING SYSTEM PROVIDED HISTORY: fall TECHNOLOGIST PROVIDED HISTORY: Reason for exam:->fall What reading provider will be dictating this exam?->CRC FINDINGS: BONES/ALIGNMENT: There is normal alignment of the spine. The vertebral body heights are maintained. No osseous destructive lesion is seen. DEGENERATIVE CHANGES: No gross spinal canal stenosis or bony neural foraminal narrowing of the thoracic spine. SOFT TISSUES: Pacemaker. Moderate right effusion and associated atelectasis. 1. No acute disease of the thoracic spine. 2. Moderate right pleural effusion and associated right lung compressive atelectasis.  RECOMMENDATIONS: Careful clinical correlation and follow up recommended. CT LUMBAR SPINE WO CONTRAST    Result Date: 1/11/2023  EXAMINATION: CT OF THE LUMBAR SPINE WITHOUT CONTRAST  1/11/2023 TECHNIQUE: CT of the lumbar spine was performed without the administration of intravenous contrast. Multiplanar reformatted images are provided for review. Adjustment of mA and/or kV according to patient size was utilized. Automated exposure control, iterative reconstruction, and/or weight based adjustment of the mA/kV was utilized to reduce the radiation dose to as low as reasonably achievable. COMPARISON: April 17, 2022. HISTORY: ORDERING SYSTEM PROVIDED HISTORY: fall TECHNOLOGIST PROVIDED HISTORY: Reason for exam:->fall Decision Support Exception - unselect if not a suspected or confirmed emergency medical condition->Emergency Medical Condition ([place]) What reading provider will be dictating this exam?->CRC FINDINGS: BONES/ALIGNMENT: There is normal alignment of the spine. The vertebral body heights are maintained. No osseous destructive lesion is seen. Chronic L4-L5 decompression and fusion. DEGENERATIVE CHANGES: Chronic straightening of the normal lumbar lordosis with diffuse degenerative change. Severe disc degenerative change L5-S1. Mild central stenosis is likely present at L2-L3. SOFT TISSUES/RETROPERITONEUM: Moderate right effusion and associated atelectasis. Saccular 3.2 cm infrarenal abdominal aortic aneurysm. No acute features. 1. No acute lumbar spine injury. 2. 3.2 cm saccular infrarenal abdominal aortic aneurysm. No acute features. Unchanged size in comparison to prior examination of April 17, 2022. RECOMMENDATIONS: Careful clinical correlation and follow up recommended. 3.2 cm infrarenal saccular abdominal aortic aneurysm. Recommend vascular consultation. Reference: J Vasc Surg 5177;96:1-96.      CT ABDOMEN PELVIS W IV CONTRAST Additional Contrast? None    Result Date: 1/11/2023  EXAMINATION: CT OF THE ABDOMEN AND PELVIS WITH CONTRAST 1/11/2023 10:16 pm TECHNIQUE: CT of the abdomen and pelvis was performed with the administration of intravenous contrast. Multiplanar reformatted images are provided for review. Automated exposure control, iterative reconstruction, and/or weight based adjustment of the mA/kV was utilized to reduce the radiation dose to as low as reasonably achievable. COMPARISON: April 17, 2022 HISTORY: ORDERING SYSTEM PROVIDED HISTORY: fall, intox TECHNOLOGIST PROVIDED HISTORY: Reason for exam:->fall, intox Additional Contrast?->None Decision Support Exception - unselect if not a suspected or confirmed emergency medical condition->Emergency Medical Condition ([place]) What reading provider will be dictating this exam?->CRC FINDINGS: Lower Chest: Moderate right pleural effusion and associated compressive atelectasis. Organs: Cholelithiasis. Otherwise, the Liver, biliary tree, bilateral adrenal glands, bilateral kidneys, spleen and pancreas are normal. GI/Bowel: No ileus or obstruction. No mesenteric contusion. No inflammatory bowel process. Moderate descending and sigmoid colonic diverticulosis. No diverticulitis. Pelvis: No adenopathy or free fluid. Peritoneum/Retroperitoneum: No hernia, ascites, or hemorrhage. Saccular infrarenal abdominal aortic aneurysm measuring 3.2 cm unchanged from prior examination of April 17, 2022. No acute features. Bones/Soft Tissues: Spinal decompression and fusion. No acute osseous or body wall soft tissue abnormalities. 1. No acute disease. No acute abdominopelvic injury. 2. Cholelithiasis. 3. Saccular infrarenal abdominal aortic aneurysm measuring 3.2 cm unchanged from prior examination of April 17, 2022. No acute features. RECOMMENDATIONS: Careful clinical correlation and follow up recommended.      XR CHEST PORTABLE    Result Date: 1/11/2023  EXAMINATION: ONE XRAY VIEW OF THE CHEST 1/11/2023 7:59 pm COMPARISON: 08/15/2022 HISTORY: ORDERING SYSTEM PROVIDED HISTORY: fall, CP TECHNOLOGIST PROVIDED HISTORY: Reason for exam:->fall, CP What reading provider will be dictating this exam?->CRC FINDINGS: The lungs are without acute focal process. There is no effusion or pneumothorax. Stable heart size. Left-sided transvenous pacer device the osseous structures are without acute process. No acute process. US DUP LOWER EXTREMITIES BILATERAL VENOUS    Result Date: 1/12/2023  EXAMINATION: DUPLEX VENOUS ULTRASOUND OF THE BILATERAL LOWER EXTREMITIES1/12/2023 7:43 am TECHNIQUE: Duplex ultrasound using B-mode/gray scaled imaging, Doppler spectral analysis and color flow Doppler was obtained of the deep venous structures of the lower bilateral extremities. COMPARISON: None. HISTORY: ORDERING SYSTEM PROVIDED HISTORY: r/o dvt TECHNOLOGIST PROVIDED HISTORY: Reason for exam:->r/o dvt What reading provider will be dictating this exam?->CRC FINDINGS: The visualized veins of the bilateral lower extremities are patent and free of echogenic thrombus. The veins demonstrate good compressibility with normal color flow study and spectral analysis. Suboptimal visualization of the calf veins due to patient's body habitus. No evidence of DVT in the visualized veins of bilateral lower extremities. .       EKG: TRACING REVIEWED    RECOMMENDATIONS    Risk Management:  routine:  no special precautions necessary    Based on my assessment yesterday and today I believe the patient at certain level has capacity to make decisions regarding his medical condition and the treatment that he needs including medication compliance. However he is choosing to drink daily in spite of him knowing the consequence of his action. Patient is currently cooperative and wanting to get better. I would deem him competent to make decisions at this time. Discussed with the treating physician/ team about the patient and treatment plan    Thanks for the consult. Please call me if needed.     Electronically signed by Flavio Benedict MD on 1/13/2023 at 4:01 PM

## 2023-01-13 NOTE — PROGRESS NOTES
Wound Ostomy Continence Nurse  Consult Note       NAME:  Charles Miller RECORD NUMBER:  [id number]  AGE: 68 y.o. GENDER: male  : 1945  TODAY'S DATE:  2023    Subjective   Reason for [address] Nurse Evaluation and Assessment: Superficial, scabbed abrasions       Remigio Molina is a 68 y.o. male referred by:   [x] Physician  [] Nursing  [] Other:     Wound Identification:  Wound Type:  abrasions  Contributing Factors:  patient reports that he picks his skin    Wound History: Patient admitted to [facility] with some superficial scabbed abrasions to bilateral LE and dorsal feet.  Patient reports that he picks his skin  Current Wound Care Treatment:  Recommending 1) continue pressure injury prevention interventions 2) daily application of antibiotic ointment    Patient Goal of Care:  [x] Wound Healing  [] Odor Control  [] Palliative Care  [] Pain Control   [] Other:         PAST MEDICAL HISTORY        Diagnosis Date    Bradycardia     CAD (coronary artery disease)     Cancer (HCC)     prostate- radiation     CHF (congestive heart failure) (HCC)     Depression     HSV-2 (herpes simplex virus 2) infection     Hyperlipidemia     Hypertension     Left knee DJD     Osteoarthritis     Rheumatoid arthritis(714.0)     Type 2 diabetes mellitus without complication, without long-term current use of insulin ([facility] [address] 1/10/2019       PAST SURGICAL HISTORY    Past Surgical History:   Procedure Laterality Date    ANKLE SURGERY Right     pin    APPENDECTOMY      ARTHRODESIS Right 10/31/2016    RIGHT ANKLE ARTHRODESIS OF RIGHT ANKLE AND SUBTALAR JOINT, C-ARM, ABHIJEET EQUIPMENT SYNTHETIC BONE GRAFT, ALLOGRAFT CANCELLOUS, SHARON ANKLE FUSION NAIL, SHANDA IMPLANT BONE STIMULATOR, CHOICE ANESTHESIA, BLOCK  performed by Edy Llyod MD at [place] [address]      stent x 5    COLONOSCOPY  11    FRACTURE SURGERY      right ankle    HARDWARE REMOVAL FOOT / ANKLE Right 2017    RIGHT ANKLE HARDWARE REMOVAL performed by Rachael Scales MD at [facility] [address] Bilateral     knees    CA COLON CA SCRN NOT  [address]  IND N/A 2017    COLONOSCOPY performed by Massimo Mccarty DO at [facility] Left     TONSILLECTOMY AND ADENOIDECTOMY         FAMILY HISTORY    Family History   Problem Relation Age of Onset    Heart Attack Father     Cancer Father         colon    High Cholesterol Mother     Heart Disease Mother     Macular Degen Mother     Arthritis Sister         hip replacement    Other Brother         vertigo    No Known Problems Son     No Known Problems Son        SOCIAL HISTORY    Social History     Tobacco Use    Smoking status: Former     Packs/day: 0.25     Years: 7.00     Pack years: 1.75     Types: Cigarettes     Start date: 5     Quit date: 6/3/1992     Years since quittin.6    Smokeless tobacco: Never   Vaping Use    Vaping Use: Never used   Substance Use Topics    Alcohol use: Yes     Alcohol/week: 2.0 standard drinks     Types: 2 Shots of liquor per week     Comment: daily, 2 glassess of vodka    Drug use: No       ALLERGIES    Allergies   Allergen Reactions    Hylan G-F 20 Other (See Comments) and Swelling     Swelling in leg    Ace Inhibitors      cough    Statins Depletion Therapy Other (See Comments)     OKAY WITH CRESTOR, weakness       MEDICATIONS    No current facility-administered medications on file prior to encounter. Current Outpatient Medications on File Prior to Encounter   Medication Sig Dispense Refill    hydrALAZINE (APRESOLINE) 50 MG tablet Take 50 mg by mouth in the morning and at bedtime      pantoprazole (PROTONIX) 40 MG tablet Take 40 mg by mouth daily      traMADol (ULTRAM) 50 MG tablet Take 50 mg by mouth every 6 hours as needed for Pain. furosemide (LASIX) 20 MG tablet Take 20 mg by mouth in the morning and 20 mg before bedtime.       nitroGLYCERIN (NITROSTAT) 0.4 MG SL tablet Place 0.4 mg under the tongue every 5 minutes as needed for Chest pain up to max of 3 total doses. If no relief after 1 dose, call 911.       losartan (COZAAR) 25 MG tablet Take 1 tablet by mouth daily (Patient taking differently: Take 25 mg by mouth daily Indications: High Blood Pressure Disorder) 30 tablet 3    apixaban (ELIQUIS) 5 MG TABS tablet Take 1 tablet by mouth 2 times daily (Patient taking differently: Take 5 mg by mouth 2 times daily Indications: Atrial Fibrillation) 60 tablet 3    rosuvastatin (CRESTOR) 40 MG tablet Take 40 mg by mouth every evening Indications: High Amount of Fats in the Blood       ketorolac 0.4 % SOLN ophthalmic solution Place 1 drop into both eyes 2 times daily Indications: Eye Pain   1    tamsulosin (FLOMAX) 0.4 MG capsule Take 1 capsule by mouth daily (Patient taking differently: Take 0.4 mg by mouth daily Indications: Urinary Retention) 90 capsule 3    acetaminophen (TYLENOL) 325 MG tablet Take 500 mg by mouth every 4 hours as needed for Pain or Fever         Objective    BP (!) 152/64   Pulse 60   Temp 97.5 °F (36.4 °C) (Oral)   Resp 18   Ht 5' 11\" (1.803 m)   Wt 276 lb (125.2 kg)   SpO2 96%   BMI 38.49 kg/m²     LABS:  WBC:    Lab Results   Component Value Date/Time    WBC 8.1 01/13/2023 05:48 AM     H/H:    Lab Results   Component Value Date/Time    HGB 14.2 01/13/2023 05:48 AM    HCT 41.4 01/13/2023 05:48 AM     PTT:    Lab Results   Component Value Date/Time    APTT 35.4 01/11/2023 08:32 PM    PTT 26.1 09/24/2012 12:05 PM   [APTT}  PT/INR:    Lab Results   Component Value Date/Time    PROTIME 14.3 01/11/2023 08:32 PM    INR 1.1 01/11/2023 08:32 PM     HgBA1c:    Lab Results   Component Value Date/Time    LABA1C 6.1 07/26/2022 12:12 PM       Assessment   Hima Risk Score: Hima Scale Score: 15    Patient Active Problem List   Diagnosis    Bradycardia    Mixed hyperlipidemia    Primary osteoarthritis involving multiple joints    Dysthymia    CAD (coronary artery disease)    RA (rheumatoid arthritis) ([facility]) Essential hypertension    Numbness in feet    Stented coronary artery    History of prostate cancer    History of colon polyps    Nuclear sclerosis of both eyes    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, left eye    Presbyopia    Regular astigmatism    Hyperuricemia    Chronic gastritis without bleeding    Type 2 diabetes mellitus with microalbuminuria, without long-term current use of insulin (HCC)    Retinal hemorrhage, bilateral    Intermediate stage nonexudative age-related macular degeneration of both eyes    Primary osteoarthritis, right ankle and foot    Pain in right ankle    Encounter for other orthopedic aftercare    Arthrodesis status    Unable to ambulate    PAF (paroxysmal atrial fibrillation)     CHF (congestive heart failure), NYHA class I, acute on chronic, combined ([facility] [address]     Assessment:    Patient has few scattered scabbed abrasion to the Left LE and the Right dorsal foot - all abrasions are superficial, dry, scabbed and without any s/sx of infection. Plan   Plan of Care:  see above    Specialty Bed Required : N/A   [] Low Air Loss   [] Pressure Redistribution  [] Fluid Immersion  [] Bariatric  [] Other:     Current Diet: ADULT DIET;  Regular; 4 carb choices (60 gm/meal)  Dietician consult:  N/A    Discharge Plan:  Placement for patient upon discharge: Possible [facility] facility   Patient appropriate for Outpatient [address]: N/A    Referrals:  []   [] [address] [facility] [facility]  [] Supplies  [] Other    Patient/Caregiver Teaching:  Level of patient/caregiver understanding able to:   [] Indicates understanding       [] Needs reinforcement  [] Unsuccessful      [] Verbal Understanding  [] Demonstrated understanding       [] No evidence of learning  [] Refused teaching         [x] N/A       Electronically signed by BRII Morales, RN, Kerry Burnette on 1/13/2023 at 9:41 AM

## 2023-01-13 NOTE — PROGRESS NOTES
2100-HS assessment completed. Vitals stable on RA. Pt denies CP/SOB. 2+ pitting edema to BLE. Medicated with PRN Ultram for reports of 7/10 pain on Left side from where he fell. Falls precautions in place. Call light in reach. [phone]-Perfectserve to TRISTAN Mcconnell for pts reports of continued pain. Orders received. RN to nazanin.

## 2023-01-13 NOTE — FLOWSHEET NOTE
1530 pt placed in Surescan mode remotely by [address] and MRI tech. DOO 85  Placed onto MRI monitor. Pt states he is not claustrophobic. Exam explained. Transferred to MRI table. 1535 exam started. Pt has call ball in reach. 1540 pt tolerating MRI well at this time. 1555 VSS no issues at this time. 1610 exam continues, no changes noted at this time. 1615 exam complete, pt assisted back onto cart and off MRI monitor. Pt telemetry monitor placed. 1648  PM placed back into original mode remotely by Vaishali Martinez from [employer] and [employer]  MRI tech. Pt pleasant denies any concerns at this time,  Taken back to room via cart with transport.

## 2023-01-13 NOTE — PROGRESS NOTES
Physical Therapy Missed Treatment   Facility/Department: [facility] MED SURG V836/I802-13    NAME: [place]    :  (89 y.o.)  MRN: [id number]    Account: [de-identified]  Gender: male    Chart reviewed, attempted PT at 1400. Patient unavailable 2° to:      [x] Pt declined treatment stating \"can we tomorrow? My chest hurts when I move, cough, or try to roll over. \" Attempted to get patient to participate in exercises in supine position, however patient reports he has been performing them throughout the day. Will attempt PT treatment again at earliest convenience.       Electronically signed by Germán Lazcano PTA on 23 at 2:07 PM EST

## 2023-01-14 LAB
ALBUMIN SERPL-MCNC: 3.1 G/DL (ref 3.5–4.6)
ALP BLD-CCNC: 98 U/L (ref 35–104)
ALT SERPL-CCNC: 10 U/L (ref 0–41)
AMMONIA: 26 UMOL/L (ref 16–60)
ANION GAP SERPL CALCULATED.3IONS-SCNC: 14 MEQ/L (ref 9–15)
AST SERPL-CCNC: 12 U/L (ref 0–40)
BASOPHILS ABSOLUTE: 0.1 K/UL (ref 0–0.2)
BASOPHILS RELATIVE PERCENT: 0.6 %
BILIRUB SERPL-MCNC: 0.9 MG/DL (ref 0.2–0.7)
BUN BLDV-MCNC: 20 MG/DL (ref 8–23)
CALCIUM SERPL-MCNC: 8.2 MG/DL (ref 8.5–9.9)
CHLORIDE BLD-SCNC: 103 MEQ/L (ref 95–107)
CO2: 22 MEQ/L (ref 20–31)
CREAT SERPL-MCNC: 0.75 MG/DL (ref 0.7–1.2)
EOSINOPHILS ABSOLUTE: 0.2 K/UL (ref 0–0.7)
EOSINOPHILS RELATIVE PERCENT: 1.9 %
FOLATE: 14.5 NG/ML
GFR SERPL CREATININE-BSD FRML MDRD: >60 ML/MIN/{1.73_M2}
GLOBULIN: 2.4 G/DL (ref 2.3–3.5)
GLUCOSE BLD-MCNC: 111 MG/DL (ref 70–99)
HCT VFR BLD CALC: 40 % (ref 42–52)
HEMOGLOBIN: 13.9 G/DL (ref 14–18)
LYMPHOCYTES ABSOLUTE: 0.6 K/UL (ref 1–4.8)
LYMPHOCYTES RELATIVE PERCENT: 6.9 %
MAGNESIUM: 1.7 MG/DL (ref 1.7–2.4)
MCH RBC QN AUTO: 32.2 PG (ref 27–31.3)
MCHC RBC AUTO-ENTMCNC: 34.8 % (ref 33–37)
MCV RBC AUTO: 92.5 FL (ref 79–92.2)
MONOCYTES ABSOLUTE: 0.8 K/UL (ref 0.2–0.8)
MONOCYTES RELATIVE PERCENT: 9.1 %
NEUTROPHILS ABSOLUTE: 7.5 K/UL (ref 1.4–6.5)
NEUTROPHILS RELATIVE PERCENT: 81.5 %
PDW BLD-RTO: 15.6 % (ref 11.5–14.5)
PHOSPHORUS: 3 MG/DL (ref 2.3–4.8)
PLATELET # BLD: 256 K/UL (ref 130–400)
POTASSIUM SERPL-SCNC: 3.7 MEQ/L (ref 3.4–4.9)
RBC # BLD: 4.33 M/UL (ref 4.7–6.1)
SODIUM BLD-SCNC: 139 MEQ/L (ref 135–144)
TOTAL CK: 87 U/L (ref 0–190)
TOTAL PROTEIN: 5.5 G/DL (ref 6.3–8)
VITAMIN B-12: 260 PG/ML (ref 232–1245)
WBC # BLD: 9.2 K/UL (ref 4.8–10.8)

## 2023-01-14 PROCEDURE — 6360000002 HC RX W HCPCS: Performed by: INTERNAL MEDICINE

## 2023-01-14 PROCEDURE — 97116 GAIT TRAINING THERAPY: CPT

## 2023-01-14 PROCEDURE — 85025 COMPLETE CBC W/AUTO DIFF WBC: CPT

## 2023-01-14 PROCEDURE — 80053 COMPREHEN METABOLIC PANEL: CPT

## 2023-01-14 PROCEDURE — 36415 COLL VENOUS BLD VENIPUNCTURE: CPT

## 2023-01-14 PROCEDURE — 6370000000 HC RX 637 (ALT 250 FOR IP): Performed by: INTERNAL MEDICINE

## 2023-01-14 PROCEDURE — 84100 ASSAY OF PHOSPHORUS: CPT

## 2023-01-14 PROCEDURE — 99232 SBSQ HOSP IP/OBS MODERATE 35: CPT | Performed by: NURSE PRACTITIONER

## 2023-01-14 PROCEDURE — 83735 ASSAY OF MAGNESIUM: CPT

## 2023-01-14 PROCEDURE — 2500000003 HC RX 250 WO HCPCS: Performed by: NURSE PRACTITIONER

## 2023-01-14 PROCEDURE — 2580000003 HC RX 258: Performed by: NURSE PRACTITIONER

## 2023-01-14 PROCEDURE — 1210000000 HC MED SURG R&B

## 2023-01-14 PROCEDURE — 82140 ASSAY OF AMMONIA: CPT

## 2023-01-14 PROCEDURE — 97535 SELF CARE MNGMENT TRAINING: CPT

## 2023-01-14 PROCEDURE — 6370000000 HC RX 637 (ALT 250 FOR IP): Performed by: NURSE PRACTITIONER

## 2023-01-14 PROCEDURE — 6360000002 HC RX W HCPCS: Performed by: NURSE PRACTITIONER

## 2023-01-14 PROCEDURE — 82550 ASSAY OF CK (CPK): CPT

## 2023-01-14 PROCEDURE — 2580000003 HC RX 258: Performed by: INTERNAL MEDICINE

## 2023-01-14 RX ORDER — FUROSEMIDE 10 MG/ML
20 INJECTION INTRAMUSCULAR; INTRAVENOUS 2 TIMES DAILY
Status: DISCONTINUED | OUTPATIENT
Start: 2023-01-14 | End: 2023-01-16

## 2023-01-14 RX ORDER — LOSARTAN POTASSIUM 50 MG/1
50 TABLET ORAL DAILY
Status: DISCONTINUED | OUTPATIENT
Start: 2023-01-15 | End: 2023-01-15

## 2023-01-14 RX ORDER — CYANOCOBALAMIN 1000 UG/ML
1000 INJECTION, SOLUTION INTRAMUSCULAR; SUBCUTANEOUS ONCE
Status: COMPLETED | OUTPATIENT
Start: 2023-01-14 | End: 2023-01-14

## 2023-01-14 RX ORDER — ENOXAPARIN SODIUM 150 MG/ML
1 INJECTION SUBCUTANEOUS ONCE
Status: COMPLETED | OUTPATIENT
Start: 2023-01-14 | End: 2023-01-14

## 2023-01-14 RX ADMIN — Medication 10 ML: at 08:51

## 2023-01-14 RX ADMIN — METOPROLOL TARTRATE 50 MG: 50 TABLET, FILM COATED ORAL at 08:40

## 2023-01-14 RX ADMIN — FUROSEMIDE 20 MG: 10 INJECTION, SOLUTION INTRAMUSCULAR; INTRAVENOUS at 17:01

## 2023-01-14 RX ADMIN — TRAMADOL HYDROCHLORIDE 25 MG: 50 TABLET ORAL at 03:18

## 2023-01-14 RX ADMIN — FUROSEMIDE 10 MG: 10 INJECTION, SOLUTION INTRAMUSCULAR; INTRAVENOUS at 08:48

## 2023-01-14 RX ADMIN — HYDRALAZINE HYDROCHLORIDE 50 MG: 50 TABLET, FILM COATED ORAL at 08:40

## 2023-01-14 RX ADMIN — LOSARTAN POTASSIUM 25 MG: 25 TABLET, FILM COATED ORAL at 22:39

## 2023-01-14 RX ADMIN — KETOROLAC TROMETHAMINE 1 DROP: 5 SOLUTION/ DROPS OPHTHALMIC at 08:41

## 2023-01-14 RX ADMIN — Medication 10 ML: at 23:38

## 2023-01-14 RX ADMIN — THIAMINE HYDROCHLORIDE 500 MG: 100 INJECTION, SOLUTION INTRAMUSCULAR; INTRAVENOUS at 08:48

## 2023-01-14 RX ADMIN — Medication 400 MG: at 08:47

## 2023-01-14 RX ADMIN — MICONAZOLE NITRATE: 2 POWDER TOPICAL at 08:41

## 2023-01-14 RX ADMIN — Medication 100 MG: at 08:40

## 2023-01-14 RX ADMIN — FOLIC ACID 1 MG: 1 TABLET ORAL at 08:40

## 2023-01-14 RX ADMIN — KETOROLAC TROMETHAMINE 1 DROP: 5 SOLUTION/ DROPS OPHTHALMIC at 17:39

## 2023-01-14 RX ADMIN — METOPROLOL TARTRATE 50 MG: 50 TABLET, FILM COATED ORAL at 22:39

## 2023-01-14 RX ADMIN — HYDRALAZINE HYDROCHLORIDE 50 MG: 50 TABLET, FILM COATED ORAL at 22:39

## 2023-01-14 RX ADMIN — ROSUVASTATIN CALCIUM 40 MG: 40 TABLET, FILM COATED ORAL at 17:01

## 2023-01-14 RX ADMIN — ENOXAPARIN SODIUM 120 MG: 150 INJECTION SUBCUTANEOUS at 22:39

## 2023-01-14 RX ADMIN — PANTOPRAZOLE SODIUM 40 MG: 40 TABLET, DELAYED RELEASE ORAL at 08:40

## 2023-01-14 RX ADMIN — KETOROLAC TROMETHAMINE 1 DROP: 5 SOLUTION/ DROPS OPHTHALMIC at 12:57

## 2023-01-14 RX ADMIN — LOSARTAN POTASSIUM 25 MG: 25 TABLET, FILM COATED ORAL at 08:40

## 2023-01-14 RX ADMIN — KETOROLAC TROMETHAMINE 1 DROP: 5 SOLUTION/ DROPS OPHTHALMIC at 22:42

## 2023-01-14 RX ADMIN — TAMSULOSIN HYDROCHLORIDE 0.4 MG: 0.4 CAPSULE ORAL at 08:40

## 2023-01-14 RX ADMIN — Medication 10 ML: at 08:52

## 2023-01-14 RX ADMIN — ENOXAPARIN SODIUM 120 MG: 150 INJECTION SUBCUTANEOUS at 08:40

## 2023-01-14 RX ADMIN — CYANOCOBALAMIN 1000 MCG: 1000 INJECTION, SOLUTION INTRAMUSCULAR; SUBCUTANEOUS at 12:57

## 2023-01-14 RX ADMIN — MICONAZOLE NITRATE: 2 POWDER TOPICAL at 22:39

## 2023-01-14 ASSESSMENT — ENCOUNTER SYMPTOMS
VOMITING: 0
COUGH: 0
COLOR CHANGE: 0
CHEST TIGHTNESS: 0
BACK PAIN: 0
TROUBLE SWALLOWING: 0
WHEEZING: 0
SHORTNESS OF BREATH: 0
NAUSEA: 0

## 2023-01-14 ASSESSMENT — PAIN DESCRIPTION - LOCATION: LOCATION: HEAD

## 2023-01-14 ASSESSMENT — PAIN SCALES - GENERAL: PAINLEVEL_OUTOF10: 8

## 2023-01-14 NOTE — PLAN OF CARE
Problem: Discharge Planning  Goal: Discharge to home or other facility with appropriate resources  1/14/2023 0510 by Mckenzie Lang RN  Outcome: Progressing  1/13/2023 1748 by Ann-Marie William RN  Outcome: Progressing     Problem: Pain  Goal: Verbalizes/displays adequate comfort level or baseline comfort level  1/13/2023 1748 by Ann-Marie William RN  Outcome: Progressing     Problem: Skin/Tissue Integrity  Goal: Absence of new skin breakdown  Description: 1. Monitor for areas of redness and/or skin breakdown  2. Assess vascular access sites hourly  3. Every 4-6 hours minimum:  Change oxygen saturation probe site  4. Every 4-6 hours:  If on nasal continuous positive airway pressure, respiratory therapy assess nares and determine need for appliance change or resting period.   1/13/2023 1748 by Ann-Marie William RN  Outcome: Progressing     Problem: Safety - Adult  Goal: Free from fall injury  1/13/2023 1748 by Ann-Marie William RN  Outcome: Progressing     Problem: Chronic Conditions and Co-morbidities  Goal: Patient's chronic conditions and co-morbidity symptoms are monitored and maintained or improved  1/13/2023 1748 by Ann-Marie William RN  Outcome: Progressing

## 2023-01-14 NOTE — PROGRESS NOTES
Progress Note  Date:2023       [facility]  Patient Shen Scanlon     YOB: 1945     Age:77 y.o. Subjective      Vital signs stable on room air overnight. CK trending significant improvement, down to 87 today from 672 two days previously. Ammonia well-controlled at 26  Very mildly hypertensive, otherwise VSS on RA. Morning labs fairly reassuring. Patient very disoriented and moderately hypoactive during exam.  Does not meaningfully engage in conversation. 1-2 word answers. Requires frequent recapture of attention to interact with this author. Denies any acute pain or dyspnea. Objective         Vitals Last 24 Hours:  TEMPERATURE:  Temp  Av.1 °F (36.7 °C)  Min: 97.7 °F (36.5 °C)  Max: 98.4 °F (36.9 °C)  RESPIRATIONS RANGE: Resp  Av.8  Min: 12  Max: 20  PULSE OXIMETRY RANGE: SpO2  Av.2 %  Min: 94 %  Max: 98 %  PULSE RANGE: Pulse  Av.1  Min: 60  Max: 120  BLOOD PRESSURE RANGE: Systolic (08YWH), RKP:852 , Min:127 , SOC:061   ; Diastolic (19PNL), EYH:30, Min:61, Max:83    I/O (24Hr): Intake/Output Summary (Last 24 hours) at 2023 0849  Last data filed at 2023 1747  Gross per 24 hour   Intake 250 ml   Output 650 ml   Net -400 ml       Objective:  Vital signs: (most recent): Blood pressure (!) 159/74, pulse 60, temperature 97.7 °F (36.5 °C), temperature source Oral, resp. rate 20, height 5' 11\" (1.803 m), weight 276 lb (125.2 kg), SpO2 98 %. Constitutional: Obese elderly adult male sitting up in bedside chair in no acute distress, appearing disoriented and hypoactive  Head: NCAT  Eyes: PERRLA, EOMI  Neck: Trachea midline, phonation normal  Cardiovascular: RRR with occasional extra beats. No significant murmurs appreciated. Warm well perfused peripherally. +1 bilateral pretibial pitting edema. Pacemaker site noted on left chest.  Pulmonary: Normal rate and effort of respiration on room air. Bibasilar diminished lung sounds, R >L.   No coughing during exam.  Abdomen: Obese, soft, nontense. Bowel sounds appreciated. Nontender to palpation. Neurologic: Awake but hypoactive. Attention appears to drift off repeatedly. Does not meaningfully engage in conversation, responding with 1-2 word answers when directly prompted. Appears to have significantly impaired orientation, insight, and judgment. Follows some commands but not others. No tremors appreciated. Psychiatric: Hypoactive and fairly disoriented today. MSK/integumentary: Bilateral lower extremity skin wounds consistent with excoriation. Labs/Imaging/Diagnostics    Labs:  CBC:  Recent Labs     01/11/23 2032 01/13/23 0548 01/14/23 0459   WBC 10.2 8.1 9.2   RBC 4.98 4.48* 4.33*   HGB 15.4 14.2 13.9*   HCT 46.0 41.4* 40.0*   MCV 92.3* 92.3* 92.5*   RDW 15.6* 15.8* 15.6*    248 256       CHEMISTRIES:  Recent Labs     01/11/23 2032 01/13/23 0548 01/14/23 0459    137 139   K 4.1 3.5 3.7    103 103   CO2 24 21 22   BUN 18 18 20   CREATININE 0.82 0.78 0.75   GLUCOSE 114* 106* 111*   PHOS  --  3.2 3.0   MG 1.8 2.0 1.7       PT/INR:  Recent Labs     01/11/23 2032   PROTIME 14.3   INR 1.1       APTT:  Recent Labs     01/11/23 2032   APTT 35.4       LIVER PROFILE:  Recent Labs     01/11/23 2032 01/13/23 0548 01/14/23 0459   AST 27 17 12   ALT 15 12 10   BILITOT 1.0* 1.0* 0.9*   ALKPHOS 123* 102 98         Imaging Last 24 Hours:  CT HEAD WO CONTRAST    Result Date: 1/11/2023  EXAMINATION: CT OF THE HEAD WITHOUT CONTRAST  1/11/2023 10:16 pm TECHNIQUE: CT of the head was performed without the administration of intravenous contrast. Automated exposure control, iterative reconstruction, and/or weight based adjustment of the mA/kV was utilized to reduce the radiation dose to as low as reasonably achievable. COMPARISON: None.  HISTORY: ORDERING SYSTEM PROVIDED HISTORY: fall, intox TECHNOLOGIST PROVIDED HISTORY: Reason for exam:->fall, intox Has a \"code stroke\" or \"stroke alert\" been called? ->No Decision Support Exception - unselect if not a suspected or confirmed emergency medical condition->Emergency Medical Condition (MA) What reading provider will be dictating this exam?->CRC FINDINGS: BRAIN/VENTRICLES: There is no acute intracranial hemorrhage, mass effect or midline shift. No abnormal extra-axial fluid collection. The gray-white differentiation is maintained without evidence of an acute infarct. There is no evidence of hydrocephalus. Moderate atrophy. ORBITS: The visualized portion of the orbits demonstrate no acute abnormality. SINUSES: The visualized paranasal sinuses and mastoid air cells demonstrate no acute abnormality. SOFT TISSUES/SKULL:  No acute abnormality of the visualized skull or soft tissues. No acute intracranial abnormality. RECOMMENDATIONS: Careful clinical correlation and follow up recommended. CT CHEST W CONTRAST    Result Date: 1/11/2023  EXAMINATION: CT OF THE CHEST WITH CONTRAST 1/11/2023 10:16 pm TECHNIQUE: CT of the chest was performed with the administration of intravenous contrast. Multiplanar reformatted images are provided for review. Automated exposure control, iterative reconstruction, and/or weight based adjustment of the mA/kV was utilized to reduce the radiation dose to as low as reasonably achievable. COMPARISON: None. HISTORY: ORDERING SYSTEM PROVIDED HISTORY: fall, sternal pain TECHNOLOGIST PROVIDED HISTORY: Reason for exam:->fall, sternal pain Decision Support Exception - unselect if not a suspected or confirmed emergency medical condition->Emergency Medical Condition (MA) What reading provider will be dictating this exam?->CRC FINDINGS: Mediastinum: Normal size heart. No pericardial effusion. Pacemaker leads. Normal caliber and contour of the thoracic aorta. No pulmonary emboli identified. No adenopathy. Lungs/pleura: Moderate right pleural effusion and associated compressive atelectasis. No pulmonary infiltrates.  Upper Abdomen: No acute process. Soft Tissues/Bones: No acute osseous or body wall soft tissue abnormalities. Chronic bilateral rib inferolateral fracture deformities. 1. No acute thoracic injury. Sternum intact. 2. Moderate right pleural effusion and associated compressive atelectasis. RECOMMENDATIONS: Careful clinical correlation and follow up recommended. CT CERVICAL SPINE WO CONTRAST    Result Date: 1/11/2023  EXAMINATION: CT OF THE CERVICAL SPINE WITHOUT CONTRAST 1/11/2023 10:16 pm TECHNIQUE: CT of the cervical spine was performed without the administration of intravenous contrast. Multiplanar reformatted images are provided for review. Automated exposure control, iterative reconstruction, and/or weight based adjustment of the mA/kV was utilized to reduce the radiation dose to as low as reasonably achievable. COMPARISON: Radiographs of July 26, 2022 CT examination of April 17, 2022 HISTORY: ORDERING SYSTEM PROVIDED HISTORY: fall, intox TECHNOLOGIST PROVIDED HISTORY: Reason for exam:->fall, intox Decision Support Exception - unselect if not a suspected or confirmed emergency medical condition->Emergency Medical Condition (MA) What reading provider will be dictating this exam?->CRC FINDINGS: BONES/ALIGNMENT: There is no acute fracture or traumatic malalignment. Chronic type 2 odontoid fracture with minimal 2 mm retrolisthesis of the odontoid fragment, which is unchanged from prior examinations. DEGENERATIVE CHANGES: Moderate disc degenerative changes C5-C6 and C6-C7, with chronic severe bilateral foraminal narrowing at C5-C6. No significant central canal narrowing. SOFT TISSUES: There is no prevertebral soft tissue swelling. 1. No acute abnormality of the cervical spine. 2. Chronic type 2 odontoid fracture with minimal 2 mm retrolisthesis of the odontoid fragment, which is unchanged from prior examinations. RECOMMENDATIONS: Careful clinical correlation and follow up recommended.      CT THORACIC SPINE WO CONTRAST    Result Date: 1/11/2023  EXAMINATION: CT OF THE THORACIC SPINE WITHOUT CONTRAST  1/11/2023 10:16 pm: TECHNIQUE: CT of the thoracic spine was performed without the administration of intravenous contrast. Multiplanar reformatted images are provided for review. Automated exposure control, iterative reconstruction, and/or weight based adjustment of the mA/kV was utilized to reduce the radiation dose to as low as reasonably achievable. COMPARISON: None. HISTORY: ORDERING SYSTEM PROVIDED HISTORY: fall TECHNOLOGIST PROVIDED HISTORY: Reason for exam:->fall What reading provider will be dictating this exam?->CRC FINDINGS: BONES/ALIGNMENT: There is normal alignment of the spine. The vertebral body heights are maintained. No osseous destructive lesion is seen. DEGENERATIVE CHANGES: No gross spinal canal stenosis or bony neural foraminal narrowing of the thoracic spine. SOFT TISSUES: Pacemaker. Moderate right effusion and associated atelectasis. 1. No acute disease of the thoracic spine. 2. Moderate right pleural effusion and associated right lung compressive atelectasis. RECOMMENDATIONS: Careful clinical correlation and follow up recommended. CT LUMBAR SPINE WO CONTRAST    Result Date: 1/11/2023  EXAMINATION: CT OF THE LUMBAR SPINE WITHOUT CONTRAST  1/11/2023 TECHNIQUE: CT of the lumbar spine was performed without the administration of intravenous contrast. Multiplanar reformatted images are provided for review. Adjustment of mA and/or kV according to patient size was utilized. Automated exposure control, iterative reconstruction, and/or weight based adjustment of the mA/kV was utilized to reduce the radiation dose to as low as reasonably achievable. COMPARISON: April 17, 2022.  HISTORY: ORDERING SYSTEM PROVIDED HISTORY: fall TECHNOLOGIST PROVIDED HISTORY: Reason for exam:->fall Decision Support Exception - unselect if not a suspected or confirmed emergency medical condition->Emergency Medical Condition (MA) What reading provider will be dictating this exam?->CRC FINDINGS: BONES/ALIGNMENT: There is normal alignment of the spine. The vertebral body heights are maintained. No osseous destructive lesion is seen. Chronic L4-L5 decompression and fusion. DEGENERATIVE CHANGES: Chronic straightening of the normal lumbar lordosis with diffuse degenerative change. Severe disc degenerative change L5-S1. Mild central stenosis is likely present at L2-L3. SOFT TISSUES/RETROPERITONEUM: Moderate right effusion and associated atelectasis. Saccular 3.2 cm infrarenal abdominal aortic aneurysm. No acute features. 1. No acute lumbar spine injury. 2. 3.2 cm saccular infrarenal abdominal aortic aneurysm. No acute features. Unchanged size in comparison to prior examination of April 17, 2022. RECOMMENDATIONS: Careful clinical correlation and follow up recommended. 3.2 cm infrarenal saccular abdominal aortic aneurysm. Recommend vascular consultation. Reference: J Vasc Surg 7992;68:6-66. CT ABDOMEN PELVIS W IV CONTRAST Additional Contrast? None    Result Date: 1/11/2023  EXAMINATION: CT OF THE ABDOMEN AND PELVIS WITH CONTRAST 1/11/2023 10:16 pm TECHNIQUE: CT of the abdomen and pelvis was performed with the administration of intravenous contrast. Multiplanar reformatted images are provided for review. Automated exposure control, iterative reconstruction, and/or weight based adjustment of the mA/kV was utilized to reduce the radiation dose to as low as reasonably achievable. COMPARISON: April 17, 2022 HISTORY: ORDERING SYSTEM PROVIDED HISTORY: fall, intox TECHNOLOGIST PROVIDED HISTORY: Reason for exam:->fall, intox Additional Contrast?->None Decision Support Exception - unselect if not a suspected or confirmed emergency medical condition->Emergency Medical Condition (MA) What reading provider will be dictating this exam?->CRC FINDINGS: Lower Chest: Moderate right pleural effusion and associated compressive atelectasis.  Organs: Cholelithiasis. Otherwise, the Liver, biliary tree, bilateral adrenal glands, bilateral kidneys, spleen and pancreas are normal. GI/Bowel: No ileus or obstruction. No mesenteric contusion. No inflammatory bowel process. Moderate descending and sigmoid colonic diverticulosis. No diverticulitis. Pelvis: No adenopathy or free fluid. Peritoneum/Retroperitoneum: No hernia, ascites, or hemorrhage. Saccular infrarenal abdominal aortic aneurysm measuring 3.2 cm unchanged from prior examination of April 17, 2022. No acute features. Bones/Soft Tissues: Spinal decompression and fusion. No acute osseous or body wall soft tissue abnormalities. 1. No acute disease. No acute abdominopelvic injury. 2. Cholelithiasis. 3. Saccular infrarenal abdominal aortic aneurysm measuring 3.2 cm unchanged from prior examination of April 17, 2022. No acute features. RECOMMENDATIONS: Careful clinical correlation and follow up recommended. XR CHEST PORTABLE    Result Date: 1/11/2023  EXAMINATION: ONE XRAY VIEW OF THE CHEST 1/11/2023 7:59 pm COMPARISON: 08/15/2022 HISTORY: ORDERING SYSTEM PROVIDED HISTORY: fall, CP TECHNOLOGIST PROVIDED HISTORY: Reason for exam:->fall, CP What reading provider will be dictating this exam?->CRC FINDINGS: The lungs are without acute focal process. There is no effusion or pneumothorax. Stable heart size. Left-sided transvenous pacer device the osseous structures are without acute process. No acute process. US DUP LOWER EXTREMITIES BILATERAL VENOUS    Result Date: 1/12/2023  EXAMINATION: DUPLEX VENOUS ULTRASOUND OF THE BILATERAL LOWER EXTREMITIES1/12/2023 7:43 am TECHNIQUE: Duplex ultrasound using B-mode/gray scaled imaging, Doppler spectral analysis and color flow Doppler was obtained of the deep venous structures of the lower bilateral extremities. COMPARISON: None.  HISTORY: ORDERING SYSTEM PROVIDED HISTORY: r/o dvt TECHNOLOGIST PROVIDED HISTORY: Reason for exam:->r/o dvt What reading provider will be dictating this exam?->CRC FINDINGS: The visualized veins of the bilateral lower extremities are patent and free of echogenic thrombus. The veins demonstrate good compressibility with normal color flow study and spectral analysis. Suboptimal visualization of the calf veins due to patient's body habitus. No evidence of DVT in the visualized veins of bilateral lower extremities. .     Assessment//Plan           Hospital Problems             Last Modified POA    * (Principal) Unable to ambulate 1/12/2023 Yes    Elevated troponin 1/13/2023 Yes    Adjustment disorder 1/13/2023 Yes   Assessment & Plan    Principal Problem:  Falls at home: Fall at home after mild dizziness and inability to ambulate afterwards. Pan CT scans unremarkable. Troponin elevated but at baseline. Likely 2/2 failure to thrive, medication noncompliance, and alcohol use. Will get PT OT. We will get case management for home health care versus SNF placement. 1/13: Neurology on consultation, appreciate recommendations when available. 1/14: MRI brain per neurology with no acute findings, but chronic appearing microvascular ischemic and likely age-related changes. MRI lumbar spine with evidence of prior lumbar spine surgeries, some localized fibrosis consistent with scar tissue from prior surgeries, as well as possible spinal stenosis which may account for some of patient's recurrent falls if contributory to her lower extremity weakness. She further neurology recommendations when available. Elevated troponin: Troponin 0.037 near baseline and repeat troponin 0.031. EKG shows a fib/paced ST elevation. We will cycle troponin and repeat EKG. Heart failure: Preserved EF of 23% with diastolic dysfunction and biatrial moderate enlargement. Patient on beta-blocker and diuretic. Will resume beta-blocker and administer Lasix IV. We will monitor fluid status closely. We will get echo. History of A.  Fib: Patient on beta-blocker and [address] Patient has presence of pacemaker. NSR vs paced rhythm on exam.  CAD: History of multiple stents. Patient on aspirin and statin. 1/13: Cardiology on consultation, appreciate recommendations. They've expressed concern that moderate sized right pleural effusion noted on admission pan scan CT might not be purely cardiac in etiology, have recommended possible diagnostic/therapeutic thoracentesis for further evaluation. IR has been consulted for thoracentesis consideration. 1/14: Medication recommendations per cardiology. They are reluctant to resume anticoagulation and some additional potentially dangerous cardiac medications in setting of poor baseline patient compliance and repeated falls in setting of chronic alcoholism. Discussion of possibility of eventual watchman device placement in setting of simultaneous atrial fibrillation VTE risks as well as bleeding risk from recurrent falls and alcoholism. Chronic severe alcohol dependence: Patient reported 3 vodkas daily. We will keep patient on CIWA. Monitoring closely for Sx acute withdrawal.  Neurology and Psychiatry on consult. 1/14: No evidence of acute DTs as of yet. Appearance actually most consistent with hypoactive delirium on examination today. Encephalopathy in setting of above, superimposed on suspected baseline MCI versus early dementia (per family/friends). 1/13: Patient made concerning statement regarding bringing a gun whenever he was in the hospital next to \"shoot whoever would wake him up\" from sleep on 1/12. Psychiatry on consult, and now aware, as are remainder of care team including nursing house supervisor, , and charge nurse. Pending capacity evaluation and neurologic evaluation by psychiatry and neurology respectively. 1/14: Patient was evaluated by psychiatry service yesterday afternoon, who found him to be slightly altered but felt that at that time he did have overall decision-making capacity.   On examination by this author this morning, patient is hypoactive and poorly interactive, and has severely impaired insight and judgment, and does not appear to have capacity to me this morning. Discussion with neurology NP who had similar findings. No focal neurologic changes concerning for new neurologic event such as stroke. Presentation hypoactive, with no other signs/symptoms concerning for acute alcohol withdrawal either. Unclear etiology for hypoactivity and impairment this morning. Would likely suggest repeat psychiatric evaluation prior to discharge if this impaired mental status persists, as I personally do not feel that this patient possesses capacity at time of examination today, despite Psychiatry evaluation the previous afternoon finding that he seemed to possess capacity at that time. Appearance actually most consistent with hypoactive delirium today. We will continue to monitor, with frequent reorientation and day/night hygiene. HTN: PT on home meds to control. Will resume home meds, as tolerated. HLD: Patient on statin.   Will resume home meds       Electronically signed by Lor Cage DO on 1/14/23

## 2023-01-14 NOTE — PROGRESS NOTES
Physical Therapy Med Surg Daily Treatment Note  Facility/Department: [facility]  Room: [place] [address]       NAME: Sam Syed  :  (27 y.o.)  MRN: [id number]  CODE STATUS: Full Code    Date of Service: 2023    Patient Diagnosis(es): Adult failure to thrive [R62.7]  Pleural effusion [J90]  Elevated troponin [R77.8]  Unable to ambulate [R26.2]  Fall, initial encounter [W19. XXXA]  Aortic aneurysm without rupture, unspecified portion of aorta (HCC) [I71.9]  Chronic congestive heart failure, unspecified heart failure type [facility]) [I50.9]   Chief Complaint   Patient presents with    Fall     Patient Active Problem List    Diagnosis Date Noted    Elevated troponin 2023    Adjustment disorder 2023    CHF (congestive heart failure), NYHA class I, acute on chronic, combined ([facility] [address] 2021    Unable to ambulate 12/10/2021    PAF (paroxysmal atrial fibrillation)  12/10/2021    Retinal hemorrhage, bilateral 2019    Intermediate stage nonexudative age-related macular degeneration of both eyes 2019    Hyperuricemia 01/10/2019    Chronic gastritis without bleeding 01/10/2019    Type 2 diabetes mellitus with microalbuminuria, without long-term current use of insulin ([facility] [address] 01/10/2019    Arthrodesis status 2017    Primary osteoarthritis, right ankle and foot 2017    Pain in right ankle 2017    Encounter for other orthopedic aftercare 2017    History of colon polyps 2017    Nuclear sclerosis of both eyes 2015    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, left eye 2015    Presbyopia 2015    Regular astigmatism 2015    History of prostate cancer 2014    Stented coronary artery 2013    Essential hypertension 2013    Numbness in feet 2013    RA (rheumatoid arthritis) ([facility] [address] 10/24/2011    Bradycardia     Mixed hyperlipidemia     Primary osteoarthritis involving multiple joints     Dysthymia CAD (coronary artery disease)         Past Medical History:   Diagnosis Date    Bradycardia     CAD (coronary artery disease)     Cancer (HCC)     prostate- radiation     CHF (congestive heart failure) (HCC)     Depression     HSV-2 (herpes simplex virus 2) infection     Hyperlipidemia     Hypertension     Left knee DJD     Osteoarthritis     Rheumatoid arthritis(714.0)     Type 2 diabetes mellitus without complication, without long-term current use of insulin ([facility] [address] 1/10/2019     Past Surgical History:   Procedure Laterality Date    ANKLE SURGERY Right     pin    APPENDECTOMY      ARTHRODESIS Right 10/31/2016    RIGHT ANKLE ARTHRODESIS OF RIGHT ANKLE AND SUBTALAR JOINT, C-ARM, ABHIJEET EQUIPMENT SYNTHETIC BONE GRAFT, ALLOGRAFT CANCELLOUS, SHARON ANKLE FUSION NAIL, SHANDA IMPLANT BONE STIMULATOR, CHOICE ANESTHESIA, BLOCK  performed by Christiano Frazier MD at [facility] 96      stent x 5    COLONOSCOPY  7-19-11    FRACTURE SURGERY      right ankle    HARDWARE REMOVAL FOOT / ANKLE Right 11/6/2017    RIGHT ANKLE HARDWARE REMOVAL [facility] Bilateral     knees    CA COLON CA SCRN NOT  [facility] IND N/A 7/11/2017    COLONOSCOPY performed by Rona Mcclain DO at [facility] Left     TONSILLECTOMY AND ADENOIDECTOMY              Restrictions:  Restrictions/Precautions: Fall Risk    SUBJECTIVE:   Subjective: Pt in bed upon arrival. Pleasant and agreeable to therapy.     Pain  Pain: Pre tx: 8/10 neck, Post: 5/10 neck    OBJECTIVE:   Bed mobility  Rolling to Left: Moderate assistance;Maximum assistance  Supine to Sit: Moderate assistance;Maximum assistance  Bed Mobility Comments: Increased time and effort, VC/TC for step by step sequencing    Transfers  Sit to Stand: Minimal Assistance  Stand to Sit: Minimal Assistance  Comment: Increased time to complete, 3 trials needed to complete stand with cues for anterior wt shift and hand placement    Ambulation  Surface: Level tile  Device: Rolling Walker  Assistance: Contact guard assistance  Quality of Gait: Forward flexed, downward gaze, decreased step length and height, step by step VC/TC for turning to chair  Distance: 3ft     ASSESSMENT   Assessment: Increased time and effort for all tx activities with cues needed for step by step sequencing and increased time for processing. Reports decreased pain post session. Discharge Recommendations:  Continue to assess pending progress, Patient would benefit from continued therapy after discharge       Goals  Long Term Goals  Long Term Goal 1: Pt to complete bed mobility with Supervision  Long Term Goal 2: Pt to complete transfers with supervision  Long Term Goal 3: Pt to ambulate 50ft with LRD and supervision  Long Term Goal 4: Pt to manage ramp with LRD and SBA    PLAN    General Plan: 1 time a day 3-6 times a week  Safety Devices  Type of Devices: All fall risk precautions in place, Call light within reach, Left in chair, Nurse notified     [facility] (6 CLICK) [address] Mobility Raw Score : 15     Therapy Time   Individual   Time In 0758   Time Out 0830   Minutes 32        Activity Minutes Units   Gait 10 1   Transfers/BM 20 1          Robie Curling, PTA, 01/14/23 at 8:36 AM         Definitions for assistance levels  Independent = pt does not require any physical supervision or assistance from another person for activity completion. Device may be needed.   Stand by assistance = pt requires verbal cues or instructions from another person, close to but not touching, to perform the activity  Minimal assistance= pt performs 75% or more of the activity; assistance is required to complete the activity  Moderate assistance= pt performs 50% of the activity; assistance is required to complete the activity  Maximal assistance = pt performs 25% of the activity; assistance is required to complete the activity  Dependent = pt requires total physical assistance to accomplish the task

## 2023-01-14 NOTE — PLAN OF CARE
Problem: Discharge Planning  Goal: Discharge to home or other facility with appropriate resources  1/14/2023 1831 by Laureen Wolfe RN  Outcome: Progressing  1/14/2023 0510 by Yesenia Enamorado RN  Outcome: Progressing  Flowsheets (Taken 1/13/2023 3643)  Discharge to home or other facility with appropriate resources: Identify barriers to discharge with patient and caregiver     Problem: Pain  Goal: Verbalizes/displays adequate comfort level or baseline comfort level  Outcome: Progressing     Problem: Skin/Tissue Integrity  Goal: Absence of new skin breakdown  Description: 1. Monitor for areas of redness and/or skin breakdown  2. Assess vascular access sites hourly  3. Every 4-6 hours minimum:  Change oxygen saturation probe site  4. Every 4-6 hours:  If on nasal continuous positive airway pressure, respiratory therapy assess nares and determine need for appliance change or resting period.   Outcome: Progressing     Problem: Safety - Adult  Goal: Free from fall injury  Outcome: Progressing     Problem: Chronic Conditions and Co-morbidities  Goal: Patient's chronic conditions and co-morbidity symptoms are monitored and maintained or improved  Outcome: Progressing

## 2023-01-14 NOTE — PROGRESS NOTES
[facility] Progress Note      Patient:  Amalia Gauthier  YOB: 1945  MRN: [id number]   Acct: [de-identified]  Admit Date:  1/11/2023  Primary Cardiologist:Dr. Krissy Mahoney    [facility] Cardiologist: Aleksandr Paige MD      Impression/Plan:     Atrial fibrillation: Rate is controlled on current medical regimen. Continue Lopressor. He is lack of compliance and his lack of symptoms in A. fib contraindicate proceeding with antiarrhythmic therapy at this point. Continue rate control  Anticoagulation issues: He has had multiple falls related to alcohol abuse. Here in the hospital reasonable to resume Eliquis. If he is to go home could consider short course of Eliquis with plan for watchman device in the near future as he is not reliable to take anticoagulation and when he takes anticoagulation if he drinks at the same time is at high risk of bleeding. Depending upon home situation and social circumstances may need to discharge without Eliquis. Right pleural effusion: Moderate in size. Etiology unclear. He hit the front of his chest without evidence of sternum or rib fracture. Thoracentesis could not be done as he had received Lovenox. He is not short of breath he is mildly volume overloaded probably related diastolic dysfunction from hypertension diuresis alone may improve his effusion  Acute on chronic diastolic CHF with poorly controlled blood pressure: He does have a pleural effusion, increased lower extremity edema suboptimally controlled blood pressure would continue IV diuretic today. Increase AR-2 blocker depending on blood pressure after further IV diuretic tomorrow. Losartan is a once daily drug will utilize 50 a day starting tomorrow if blood pressure remains elevated will increase tomorrow's dose to 100. Elevated troponin: Likely related diastolic CHF flat pattern no acute ST change    Chief Complaint/Active Symptoms: Patient currently sitting in chair comfortable.   Little insight into his medical issues. Does say his lower extremity edema is troublesome to him and somewhat uncomfortable. He denies chest pain, shortness of breath, palpitation, lightheadedness or syncope at this time. He has fallen multiple times at home secondary to continued alcohol abuse but believes he can stop drinking at home. He has not been able to do so over the last many years. Admitted after fall. Also with large right pleural effusion. Plan is for rate control as to atrial fibrillation. Pacer functioning normally during this hospital stay. Cardiogram showed normal LV function with pulm hypertension at 50 mm. Was to have MRI yesterday. Also concern as to whether patient might need a thoracentesis was raised yesterday. However he got Lovenox and hence was postponed. Was seen by psychiatry yesterday felt to be competent but patient clearly had issues with alcohol abuse and daily alcohol consumption. MRI of brain yesterday mild to moderate cerebral volume loss otherwise unremarkable    Review of Systems:   Describes fatigue perhaps slight lightheadedness    CARDIAC HISTORY:    1.  8/1/2022 successful cardioversion       2. 8/1/2022 dual-chamber pacemaker; [employer] Aziza XT DR MRI  3. 4/2022 Echo: LVEF 75 % dilated atrium. 4.Echocardiogram December 2021-LVEF 55% moderate biatrial enlargement moderate left ventricular hypertrophy and diastolic dysfunction no significant valvular heart disease no pericardial effusion RV systolic pressure 37 mmHg left atrial diameter 4.26 cm left atrial volume index 99 mL/m²  5. Carotid ultrasound 2022-calcified plaque left carotid bifurcation and proximal internal carotid artery with focal stenosis at the origin of the left internal carotid artery of approximately 63%  6. 2013 Left heart cath: that show moderate diffuse disease with areas of ectatic in the right coronary artery with previously deployed patent stents in the proximal and mid right coronary artery. The posterior descending artery has severe tandem proximal disease and is 100% occluded in the midsegment. Distal vessel fills delayed by left to right collaterals. The left main coronary artery was normal. The left anterior descending artery had mid 50% stenosis. Major diagonal branch has subtotal occlusion stenting of the ostium fillin by left to right collaterals. Ramus intermedius is a large and normal with a left circumflex artery appears normal except for mild luminal irregularities. Medical therapy was recommended. Left ventricular ejection fraction was 80%. 7.  2012 stress test show abnormalities consistent with mid left anterior descending artery stenosis and ischemia on that distribution.    8.  2014 New onset atrial fibrillation with successful direct cardioversion    Objective:   Vitals:    01/13/23 1609 01/13/23 1720 01/13/23 2103 01/13/23 2226   BP: (!) 152/83 (!) 158/72 (!) 152/62 (!) 159/74   Pulse: (!) 109 60 70 60   Resp: 12 16 20    Temp:  98.4 °F (36.9 °C) 97.7 °F (36.5 °C)    TempSrc:  Oral Oral    SpO2: 94% 98% 98%    Weight:       Height:          Wt Readings from Last 3 Encounters:   01/11/23 276 lb (125.2 kg)   08/24/22 276 lb (125.2 kg)   08/15/22 272 lb (123.4 kg)       TELEMETRY: atrial fibrillation - controlled                            Rhythm Strip: v paced    Physical Exam:  General Appearance: alert and oriented to person, place and time, in no acute distress poor insight  Cardiovascular: normal rate, regular rhythm, normal S1 and S2, no murmurs, rubs, clicks, or gallops,  no JVD  Pulmonary/Chest: clear to auscultation bilaterally- no wheezes, rales or rhonchi, normal air movement, no respiratory distress  Abdomen: soft, non-tender, non-distended, normal bowel sounds, no masses   Extremities: no cyanosis, clubbing , 2+ edema  Skin: warm and dry, no rash or erythema  Eyes: EOMI  Neck: supple and non-tender without mass, no thyromegaly   Neurological: alert, oriented, normal speech, no focal findings or movement disorder noted    Allergies: Allergies   Allergen Reactions    Hylan G-F 20 Other (See Comments) and Swelling     Swelling in leg    Ace Inhibitors      cough    Statins Depletion Therapy Other (See Comments)     OKAY WITH CRESTOR, weakness        Medications:    mupirocin   Topical Daily    thiamine  500 mg IntraVENous Daily    sodium chloride flush  5-40 mL IntraVENous 2 times per day    thiamine  100 mg Oral Daily    furosemide  10 mg IntraVENous BID    [Held by provider] apixaban  5 mg Oral BID    hydrALAZINE  50 mg Oral BID    ketorolac  1 drop Both Eyes 4x Daily    pantoprazole  40 mg Oral Daily    rosuvastatin  40 mg Oral QPM    tamsulosin  0.4 mg Oral Daily    miconazole   Topical BID    lidocaine  3 patch TransDERmal Daily    sodium chloride flush  5-40 mL IntraVENous 2 times per day    folic acid  1 mg Oral Daily    metoprolol tartrate  50 mg Oral BID    losartan  25 mg Oral BID    magnesium oxide  400 mg Oral Daily    enoxaparin  1 mg/kg SubCUTAneous BID      sodium chloride      sodium chloride       sodium chloride flush, 5-40 mL, PRN  sodium chloride, , PRN  ondansetron, 4 mg, Q8H PRN   Or  ondansetron, 4 mg, Q6H PRN  polyethylene glycol, 17 g, Daily PRN  acetaminophen, 650 mg, Q6H PRN   Or  acetaminophen, 650 mg, Q6H PRN  hydrALAZINE, 10 mg, Q6H PRN  labetalol, 10 mg, Q4H PRN  traMADol, 25 mg, Q6H PRN  sodium chloride flush, 5-40 mL, PRN  sodium chloride, , PRN  LORazepam, 1 mg, Q1H PRN   Or  LORazepam, 1 mg, Q1H PRN   Or  LORazepam, 2 mg, Q1H PRN   Or  LORazepam, 2 mg, Q1H PRN   Or  LORazepam, 3 mg, Q1H PRN   Or  LORazepam, 3 mg, Q1H PRN   Or  LORazepam, 4 mg, Q1H PRN   Or  LORazepam, 4 mg, Q1H PRN  ketorolac, 15 mg, Q6H PRN        Diagnostics:  EKG:  a.fib v pace    Echo: 1/11   Left ventricular ejection fraction is visually estimated at 60%. Mild to moderate concentric left ventricular hypertrophy.    Indeterminate diastolic relaxation   Mild (1+) mitral regurgitation is present. There is mild ( 1 +) tricuspid regurgitation with estimated RVSP of 50 mm   Hg.--moderate pulmonary hypertension   Moderately dilated left atrium. Normal right ventricular size with preserved RV function. Pacemaker Wire visualized in right ventricle. There is a (trivial) pericardial effusion. CXR:             Portable: Results for orders placed during the hospital encounter of 01/11/23    XR CHEST PORTABLE    Narrative  EXAMINATION:  ONE XRAY VIEW OF THE CHEST    1/11/2023 7:59 pm    COMPARISON:  08/15/2022    HISTORY:  ORDERING SYSTEM PROVIDED HISTORY: NAJMA ba  TECHNOLOGIST PROVIDED HISTORY:  Reason for exam:->jesenia, CP  What reading provider will be dictating this exam?->CRC    FINDINGS:  The lungs are without acute focal process. There is no effusion or  pneumothorax. Stable heart size. Left-sided transvenous pacer device the  osseous structures are without acute process. Impression  No acute process.         Lab Data:    Cardiac Enzymes:  Recent Labs     01/11/23 2032 01/11/23 2322 01/13/23 0548 01/14/23 0459   CKTOTAL 672*  --  170 87   TROPONINI 0.037* 0.031*  --   --      ProBNP:   Lab Results   Component Value Date/Time    PROBNP 2,030 01/11/2023 08:32 PM       CBC:   Recent Labs     01/11/23 2032 01/13/23 0548 01/14/23 0459   WBC 10.2 8.1 9.2   RBC 4.98 4.48* 4.33*   HGB 15.4 14.2 13.9*   HCT 46.0 41.4* 40.0*    248 256       CMP:    Recent Labs     01/11/23 2032 01/13/23 0548 01/14/23 0459    137 139   K 4.1 3.5 3.7    103 103   CO2 24 21 22   BUN 18 18 20   CREATININE 0.82 0.78 0.75   LABGLOM >60.0 >60.0 >60.0   GLUCOSE 114* 106* 111*   CALCIUM 9.1 8.3* 8.2*       Hepatic Function Panel:    Recent Labs     01/11/23 2032 01/13/23 0548 01/14/23 0459   ALKPHOS 123* 102 98   ALT 15 12 10   AST 27 17 12   PROT 6.4 5.6* 5.5*   BILITOT 1.0* 1.0* 0.9*   LABALBU 4.0 3.2* 3.1*       Magnesium:    Recent Labs     01/11/23 2032 01/13/23  0548 01/14/23  0459   MG 1.8 2.0 1.7       PT/INR:    Recent Labs     01/11/23 2032   PROTIME 14.3   INR 1.1       Lipids:    Recent Labs     01/13/23  0548   CHOL 176   TRIG 99   HDL 44   LDLCALC 112       Principal Problem:    Unable to ambulate  Active Problems:    Elevated troponin    Adjustment disorder  Resolved Problems:    * No resolved hospital problems.  *           Electronically signed by Rylie Acosta MD on 1/14/2023 at 11:11 AM

## 2023-01-14 NOTE — PROGRESS NOTES
[id number] [facility] Neurology Daily Progress Note  Name: Ayesha Amin  Age: 68 y.o. Gender: male  CodeStatus: Full Code  Allergies: Hylan G-F 20  Ace Inhibitors  Statins Depletion Therapy    Chief Complaint:Fall    Primary Care Provider: Jessica Hurtado MD  InpatientTreatment Team: Treatment Team: Attending Provider: Jenni Livingston DO; Consulting Physician: Marta Osei MD; Consulting Physician: Cade Hardy MD; Consulting Physician: Yvette Palacios MD; : Edy Brown, DAMIEN; : Cayetano Barajas. Jorge Alberto, RN; Utilization Reviewer: Melida Golden RN; Wound/Ostomy Nurse: Antoine Pete, DAMIEN; Consulting Physician: Maria De Jesus Jimenez MD; Registered Nurse: Shahla Costa RN; Utilization Reviewer: Warren Escobedo RN  Admission Date: 1/11/2023      HPI   Pt seen and examined for neuro follow up. Currently alert and oriented x2 at best, no acute distress, cooperative. Flat affect. Somewhat bradykinetic. Generalized weakness worse in the lower extremities. Nonfocal.  Denies headache or vision changes. No dysphagia. Afebrile. Vitals:    01/13/23 2226   BP: (!) 159/74   Pulse: 60   Resp:    Temp:    SpO2:         Review of Systems   Constitutional:  Negative for appetite change and fever. HENT:  Negative for hearing loss and trouble swallowing. Eyes:  Negative for visual disturbance. Respiratory:  Negative for cough, chest tightness, shortness of breath and wheezing. Cardiovascular:  Negative for chest pain, palpitations and leg swelling. Gastrointestinal:  Negative for nausea and vomiting. Musculoskeletal:  Positive for gait problem. Negative for back pain, neck pain and neck stiffness. Skin:  Negative for color change and rash. Neurological:  Positive for weakness. Negative for dizziness, tremors, seizures, syncope, facial asymmetry, speech difficulty, light-headedness, numbness and headaches. Psychiatric/Behavioral:  Positive for confusion.  Negative for agitation and hallucinations. The patient is not nervous/anxious. Physical Exam  Vitals and nursing note reviewed. Constitutional:       General: He is not in acute distress. Appearance: He is not diaphoretic. HENT:      Head: Normocephalic. Eyes:      Extraocular Movements: Extraocular movements intact. Pupils: Pupils are equal, round, and reactive to light. Cardiovascular:      Rate and Rhythm: Normal rate and regular rhythm. Pulmonary:      Effort: Pulmonary effort is normal. No respiratory distress. Breath sounds: Normal breath sounds. Abdominal:      General: Bowel sounds are normal. There is no distension. Palpations: Abdomen is soft. Tenderness: There is no abdominal tenderness. Skin:     General: Skin is warm and dry. Neurological:      Mental Status: He is alert. He is disoriented. Cranial Nerves: No cranial nerve deficit. Motor: Weakness present. No tremor or seizure activity.       Coordination: Coordination normal.      Gait: Gait abnormal.      Deep Tendon Reflexes: Reflexes abnormal.             Medications:  Reviewed    Infusion Medications:    sodium chloride      sodium chloride       Scheduled Medications:    furosemide  20 mg IntraVENous BID    [START ON 1/15/2023] losartan  50 mg Oral Daily    mupirocin   Topical Daily    thiamine  500 mg IntraVENous Daily    sodium chloride flush  5-40 mL IntraVENous 2 times per day    thiamine  100 mg Oral Daily    apixaban  5 mg Oral BID    hydrALAZINE  50 mg Oral BID    ketorolac  1 drop Both Eyes 4x Daily    pantoprazole  40 mg Oral Daily    rosuvastatin  40 mg Oral QPM    tamsulosin  0.4 mg Oral Daily    miconazole   Topical BID    lidocaine  3 patch TransDERmal Daily    sodium chloride flush  5-40 mL IntraVENous 2 times per day    folic acid  1 mg Oral Daily    metoprolol tartrate  50 mg Oral BID    losartan  25 mg Oral BID    magnesium oxide  400 mg Oral Daily     PRN Meds: sodium chloride flush, sodium chloride, ondansetron **OR** ondansetron, polyethylene glycol, acetaminophen **OR** acetaminophen, hydrALAZINE, labetalol, traMADol, sodium chloride flush, sodium chloride, LORazepam **OR** LORazepam **OR** LORazepam **OR** LORazepam **OR** LORazepam **OR** LORazepam **OR** LORazepam **OR** LORazepam, ketorolac    Labs:   Recent Labs     01/11/23 2032 01/13/23  0548 01/14/23  0459   WBC 10.2 8.1 9.2   HGB 15.4 14.2 13.9*   HCT 46.0 41.4* 40.0*    248 256     Recent Labs     01/11/23 2032 01/13/23  0548 01/14/23  0459    137 139   K 4.1 3.5 3.7    103 103   CO2 24 21 22   BUN 18 18 20   CREATININE 0.82 0.78 0.75   CALCIUM 9.1 8.3* 8.2*   PHOS  --  3.2 3.0     Recent Labs     01/11/23 2032 01/13/23  0548 01/14/23  0459   AST 27 17 12   ALT 15 12 10   BILITOT 1.0* 1.0* 0.9*   ALKPHOS 123* 102 98     Recent Labs     01/11/23 2032   INR 1.1     Recent Labs     01/11/23 2032 01/11/23 2322 01/13/23  0548 01/14/23  0459   CKTOTAL 672*  --  170 87   TROPONINI 0.037* 0.031*  --   --        Urinalysis:   Lab Results   Component Value Date/Time    NITRU Negative 01/11/2023 07:45 PM    WBCUA 0-2 01/11/2023 07:45 PM    WBCUA <1 08/05/2022 09:34 PM    BACTERIA Negative 01/11/2023 07:45 PM    RBCUA 0-2 01/11/2023 07:45 PM    RBCUA 1 08/05/2022 09:34 PM    BLOODU Negative 01/11/2023 07:45 PM    SPECGRAV 1.015 01/11/2023 07:45 PM    GLUCOSEU Negative 01/11/2023 07:45 PM    GLUCOSEU NEG 06/07/2012 03:53 PM       Radiology:   Most recent    EEG No valid procedures specified. MRI of Brain No results found for this or any previous visit. Results for orders placed during the hospital encounter of 01/11/23    MRI BRAIN WO CONTRAST    Narrative  EXAMINATION:  MRI OF THE BRAIN WITHOUT CONTRAST  1/13/2023 3:40 pm    TECHNIQUE:  Multiplanar multisequence MRI of the brain was performed without the  administration of intravenous contrast.    COMPARISON:  None.     HISTORY:  ORDERING SYSTEM PROVIDED HISTORY: Recurrent falls, gait ataxia, left lower  extremity weakness, history of A. fib noncompliant with anticoagulation  TECHNOLOGIST PROVIDED HISTORY:  Reason for exam:->Recurrent falls, gait ataxia, left lower extremity  weakness, history of A. fib noncompliant with anticoagulation  What reading provider will be dictating this exam?->CRC    FINDINGS:  INTRACRANIAL STRUCTURES/VENTRICLES: There is no acute infarct. No mass  effect, edema or hemorrhage is seen. Mild-to-moderate cerebral volume loss  is noted with mild-to-moderate chronic microvascular ischemic changes. No  hydrocephalus or extra-axial fluid is seen. ORBITS: Prosthetic lenses are seen in the globes bilaterally. The orbits are  otherwise grossly unremarkable. SINUSES: Mild mucosal thickening in the paranasal sinuses. Small right  mastoid effusion. BONES/SOFT TISSUES: The bone marrow signal intensity appears normal. The soft  tissues demonstrate no acute abnormality. Impression  1. No acute intracranial abnormality. 2. Mild-to-moderate cerebral volume loss with mild-to-moderate chronic  microvascular ischemic changes. RECOMMENDATIONS:  Unavailable                            MRA of the Head and Neck: No results found for this or any previous visit. No results found for this or any previous visit. No results found for this or any previous visit. CT of the Head: Results for orders placed during the hospital encounter of 01/11/23    CT HEAD WO CONTRAST    Narrative  EXAMINATION:  CT OF THE HEAD WITHOUT CONTRAST  1/11/2023 10:16 pm    TECHNIQUE:  CT of the head was performed without the administration of intravenous  contrast. Automated exposure control, iterative reconstruction, and/or weight  based adjustment of the mA/kV was utilized to reduce the radiation dose to as  low as reasonably achievable. COMPARISON:  None.     HISTORY:  ORDERING SYSTEM PROVIDED HISTORY: fall, intox  TECHNOLOGIST PROVIDED HISTORY:  Reason for exam:->fall, intox  Has a \"code stroke\" or \"stroke alert\" been called? ->No  Decision Support Exception - unselect if not a suspected or confirmed  emergency medical condition->Emergency Medical Condition (MA)  What reading provider will be dictating this exam?->CRC    FINDINGS:  BRAIN/VENTRICLES: There is no acute intracranial hemorrhage, mass effect or  midline shift. No abnormal extra-axial fluid collection. The gray-white  differentiation is maintained without evidence of an acute infarct. There is  no evidence of hydrocephalus. Moderate atrophy. ORBITS: The visualized portion of the orbits demonstrate no acute abnormality. SINUSES: The visualized paranasal sinuses and mastoid air cells demonstrate  no acute abnormality. SOFT TISSUES/SKULL:  No acute abnormality of the visualized skull or soft  tissues. Impression  No acute intracranial abnormality. RECOMMENDATIONS:  Careful clinical correlation and follow up recommended. No results found for this or any previous visit. No results found for this or any previous visit. Carotid duplex: No results found for this or any previous visit. No results found for this or any previous visit. Results for orders placed during the hospital encounter of 12/10/21    US CAROTID ARTERY BILATERAL    Narrative  History:  carotid stenosis    Technique:  US CAROTID ARTERY BILATERAL    Comparison: March 16, 2016      Findings: On the right calcified plaque is seen in the bulb and extends into the internal carotid artery. On the left mild mixed plaque is seen in the bulb. Calcified plaque is seen in the distal common carotid artery. There is a large amount of calcified plaque  is seen in the internal carotid artery. Increased velocity is seen in the proximal left internal carotid artery. Normal antegrade flow is seen in the vertebral arteries.     Doppler findings:  ARTERIAL BLOOD FLOW VELOCITY    RIGHT PS    Prox CCA 124cm/s  Mid CCA 109cm/s  Dist CCA 89cm/s  Prox ICA 89cm/s  Mid ICA 118cm/s  Dist ICA 74cm/s  Prox ECA 162cm/s  Dist VERT 54cm/s  Bulb  ICA/CCA 1.1    LEFT PS    Prox CCA 93cm/s  Mid CCA 83cm/s  Dist CCA 83cm/s  Prox ICA 173cm/s  Mid ICA 108cm/s  Dist ICA 105cm/s  Prox ECA 125cm/s  Prox VERT 37cm/s  Bulb  ICA/CCA 2.1    Impression  Impression: 1. No hemodynamic significant stenosis on the right   2. 50-69% stenosis seen on the left    Validated velocity measurements with angiographic measurements, velocity criteria are extrapolated from diameter data as defined by the Society of radiologist in ultrasound consensus conference radiology 2003; 229; 340-346. Echo No results found for this or any previous visit. Assessment/Plan:    1/13/23:  Patient is a 75-year-old  male with past medical history of alcohol use, medication noncompliance, diabetes mellitus type 2, rheumatoid arthritis, hypertension, hyperlipidemia, HSV-2, depression, CHF, prostate cancer, CAD status post multiple cardiac stents, PAF on Eliquis, pacemaker placement who presented to [facility] on 1/11/2023 after a fall at home. Patient reports increasing falls recently preceded by dizziness. Denies head strike or loss of consciousness. He also reports increasing bilateral lower extremity weakness recently and bowel and bladder incontinence. Initial CT of the head was negative for any acute findings. On exam patient has left lower extremity weakness distal 3 out of 5 and generalized weakness at 4 out of 5. Patient was noncompliant with medications prior to admission including his Eliquis for paroxysmal atrial fibrillation putting him at risk for CVA. .  Given medication noncompliance, multiple risk factors for CVD, and left lower extremity weakness we will obtain MRI of the brain. Given patient's weakness, recurrent falls and areflexia in the lower extremities we will obtain MRI of the lumbar spine.   Will have to see if pacemaker is compatible with MRI.  Will assess orthostatic blood pressures. Patient does have ongoing confusion and cognitive impairment. Baseline is unknown. Thyroid studies are normal.  We will check B12 and folate given chronic alcohol use. Will initiate on high-dose thiamine. MRI of the brain per above. Further recommendations to follow. I have personally performed a face to face diagnostic evaluation on this patient, reviewed all data and investigations, and am the sole provider of all clinical decisions on the neurological status of this patient. Patient seen and examined. Multiple medical issues and complication going on. We will try and obtain MRI as patient is on Eliquis and has atrial fibrillation underlying shower of emboli cannot be ruled out. Depending on the pacemaker we will get the MRI. 60% time spent on evaluating the patient myself     2023:  Fall with gait ataxia  lower extremity weakness  ETOH abuse   MRI of the brain negative for acute findings. MRI of the lumbar spine negative for significant canal stenosis but shows chronic postsurgical and degenerative changes likely contributing to gait ataxia. Vitamin B12 borderline low. Will replace. Patient with ongoing confusion. Will need to get a better understanding of patient's baseline. Called and spoke with son CalixtoFlimperWilhelm BrightNest who reports pt as had ongoing confusion with concern for dementia for at least 6 months to a year. Pt wife  last year and son also concerned he is depressed. Son reports pt \"going down hill quickly\" since wife . Patient noncompliant with medications at home. Continues to drink daily. Making poor decisions. Will need discharge planning as he is likely not safe to go home alone.   Collaborating physicians: Dr Babatunde Hurtado    Electronically signed by GIUSEPPE Moulton CNP on 2023 at 12:00 PM

## 2023-01-15 LAB
ANION GAP SERPL CALCULATED.3IONS-SCNC: 12 MEQ/L (ref 9–15)
BASOPHILS ABSOLUTE: 0 K/UL (ref 0–0.2)
BASOPHILS RELATIVE PERCENT: 0.3 %
BUN BLDV-MCNC: 19 MG/DL (ref 8–23)
CALCIUM SERPL-MCNC: 8.7 MG/DL (ref 8.5–9.9)
CHLORIDE BLD-SCNC: 98 MEQ/L (ref 95–107)
CO2: 26 MEQ/L (ref 20–31)
CREAT SERPL-MCNC: 0.8 MG/DL (ref 0.7–1.2)
EOSINOPHILS ABSOLUTE: 0.1 K/UL (ref 0–0.7)
EOSINOPHILS RELATIVE PERCENT: 1.1 %
GFR SERPL CREATININE-BSD FRML MDRD: >60 ML/MIN/{1.73_M2}
GLUCOSE BLD-MCNC: 148 MG/DL (ref 70–99)
HCT VFR BLD CALC: 44.2 % (ref 42–52)
HEMOGLOBIN: 15.5 G/DL (ref 14–18)
LYMPHOCYTES ABSOLUTE: 0.5 K/UL (ref 1–4.8)
LYMPHOCYTES RELATIVE PERCENT: 4.5 %
MAGNESIUM: 2 MG/DL (ref 1.7–2.4)
MCH RBC QN AUTO: 32.4 PG (ref 27–31.3)
MCHC RBC AUTO-ENTMCNC: 35 % (ref 33–37)
MCV RBC AUTO: 92.4 FL (ref 79–92.2)
MONOCYTES ABSOLUTE: 0.8 K/UL (ref 0.2–0.8)
MONOCYTES RELATIVE PERCENT: 7.5 %
NEUTROPHILS ABSOLUTE: 8.7 K/UL (ref 1.4–6.5)
NEUTROPHILS RELATIVE PERCENT: 86.6 %
PDW BLD-RTO: 15.8 % (ref 11.5–14.5)
PLATELET # BLD: 296 K/UL (ref 130–400)
POTASSIUM SERPL-SCNC: 3.9 MEQ/L (ref 3.4–4.9)
RBC # BLD: 4.79 M/UL (ref 4.7–6.1)
SODIUM BLD-SCNC: 136 MEQ/L (ref 135–144)
WBC # BLD: 10 K/UL (ref 4.8–10.8)

## 2023-01-15 PROCEDURE — 6360000002 HC RX W HCPCS: Performed by: NURSE PRACTITIONER

## 2023-01-15 PROCEDURE — 2580000003 HC RX 258: Performed by: INTERNAL MEDICINE

## 2023-01-15 PROCEDURE — 6370000000 HC RX 637 (ALT 250 FOR IP): Performed by: INTERNAL MEDICINE

## 2023-01-15 PROCEDURE — 6370000000 HC RX 637 (ALT 250 FOR IP)

## 2023-01-15 PROCEDURE — 85025 COMPLETE CBC W/AUTO DIFF WBC: CPT

## 2023-01-15 PROCEDURE — 36415 COLL VENOUS BLD VENIPUNCTURE: CPT

## 2023-01-15 PROCEDURE — 80048 BASIC METABOLIC PNL TOTAL CA: CPT

## 2023-01-15 PROCEDURE — 6360000002 HC RX W HCPCS: Performed by: INTERNAL MEDICINE

## 2023-01-15 PROCEDURE — 1210000000 HC MED SURG R&B

## 2023-01-15 PROCEDURE — 83735 ASSAY OF MAGNESIUM: CPT

## 2023-01-15 PROCEDURE — 6370000000 HC RX 637 (ALT 250 FOR IP): Performed by: NURSE PRACTITIONER

## 2023-01-15 PROCEDURE — 2580000003 HC RX 258: Performed by: NURSE PRACTITIONER

## 2023-01-15 RX ORDER — LOSARTAN POTASSIUM 100 MG/1
100 TABLET ORAL DAILY
Status: DISCONTINUED | OUTPATIENT
Start: 2023-01-16 | End: 2023-01-17 | Stop reason: HOSPADM

## 2023-01-15 RX ORDER — LOSARTAN POTASSIUM 50 MG/1
50 TABLET ORAL ONCE
Status: COMPLETED | OUTPATIENT
Start: 2023-01-15 | End: 2023-01-15

## 2023-01-15 RX ADMIN — TRAMADOL HYDROCHLORIDE 25 MG: 50 TABLET ORAL at 22:11

## 2023-01-15 RX ADMIN — TAMSULOSIN HYDROCHLORIDE 0.4 MG: 0.4 CAPSULE ORAL at 09:04

## 2023-01-15 RX ADMIN — METOPROLOL TARTRATE 50 MG: 50 TABLET, FILM COATED ORAL at 22:11

## 2023-01-15 RX ADMIN — PANTOPRAZOLE SODIUM 40 MG: 40 TABLET, DELAYED RELEASE ORAL at 09:04

## 2023-01-15 RX ADMIN — FUROSEMIDE 20 MG: 10 INJECTION, SOLUTION INTRAMUSCULAR; INTRAVENOUS at 17:10

## 2023-01-15 RX ADMIN — Medication 5 ML: at 09:27

## 2023-01-15 RX ADMIN — Medication 10 ML: at 09:13

## 2023-01-15 RX ADMIN — FOLIC ACID 1 MG: 1 TABLET ORAL at 09:04

## 2023-01-15 RX ADMIN — MICONAZOLE NITRATE: 2 POWDER TOPICAL at 09:17

## 2023-01-15 RX ADMIN — ROSUVASTATIN CALCIUM 40 MG: 40 TABLET, FILM COATED ORAL at 17:10

## 2023-01-15 RX ADMIN — Medication 100 MG: at 09:04

## 2023-01-15 RX ADMIN — LOSARTAN POTASSIUM 50 MG: 50 TABLET, FILM COATED ORAL at 13:21

## 2023-01-15 RX ADMIN — HYDRALAZINE HYDROCHLORIDE 50 MG: 50 TABLET, FILM COATED ORAL at 22:11

## 2023-01-15 RX ADMIN — THIAMINE HYDROCHLORIDE 500 MG: 100 INJECTION, SOLUTION INTRAMUSCULAR; INTRAVENOUS at 09:04

## 2023-01-15 RX ADMIN — Medication 10 ML: at 22:12

## 2023-01-15 RX ADMIN — MUPIROCIN: 20 OINTMENT TOPICAL at 09:02

## 2023-01-15 RX ADMIN — LOSARTAN POTASSIUM 50 MG: 50 TABLET, FILM COATED ORAL at 09:04

## 2023-01-15 RX ADMIN — MICONAZOLE NITRATE: 2 POWDER TOPICAL at 22:12

## 2023-01-15 RX ADMIN — METOPROLOL TARTRATE 50 MG: 50 TABLET, FILM COATED ORAL at 09:04

## 2023-01-15 RX ADMIN — KETOROLAC TROMETHAMINE 1 DROP: 5 SOLUTION/ DROPS OPHTHALMIC at 12:59

## 2023-01-15 RX ADMIN — Medication 400 MG: at 09:03

## 2023-01-15 RX ADMIN — HYDRALAZINE HYDROCHLORIDE 50 MG: 50 TABLET, FILM COATED ORAL at 09:03

## 2023-01-15 RX ADMIN — KETOROLAC TROMETHAMINE 1 DROP: 5 SOLUTION/ DROPS OPHTHALMIC at 09:17

## 2023-01-15 RX ADMIN — KETOROLAC TROMETHAMINE 1 DROP: 5 SOLUTION/ DROPS OPHTHALMIC at 17:14

## 2023-01-15 RX ADMIN — TRAMADOL HYDROCHLORIDE 25 MG: 50 TABLET ORAL at 09:23

## 2023-01-15 RX ADMIN — KETOROLAC TROMETHAMINE 1 DROP: 5 SOLUTION/ DROPS OPHTHALMIC at 22:12

## 2023-01-15 RX ADMIN — FUROSEMIDE 20 MG: 10 INJECTION, SOLUTION INTRAMUSCULAR; INTRAVENOUS at 09:04

## 2023-01-15 ASSESSMENT — PAIN SCALES - GENERAL
PAINLEVEL_OUTOF10: 7
PAINLEVEL_OUTOF10: 2
PAINLEVEL_OUTOF10: 8

## 2023-01-15 ASSESSMENT — PAIN DESCRIPTION - DESCRIPTORS
DESCRIPTORS: ACHING
DESCRIPTORS: ACHING;DISCOMFORT

## 2023-01-15 ASSESSMENT — PAIN DESCRIPTION - LOCATION
LOCATION: NECK
LOCATION: NECK

## 2023-01-15 NOTE — CARE COORDINATION
SPOKE W/DR. PARKER WHO INFORMED THAT HE SPOKE W/PT'S SON CHET REGARDING POA PAPERWORK. SON WOULD LIKE LSW/CM TO CONTACT HIM AND PROVIDE AN EMAIL ADDRESS TO SEND COPY OF POA PAPERWORK. CM/LSW TO CONTACT SON CHET IN THE MORNING. SON IS A , CURRENTLY DRIVING OUT OF STATE AND IS ABLE TO SEND PAPERWORK WHEN HE GETS EMAIL ADDRESS.

## 2023-01-15 NOTE — FLOWSHEET NOTE
This nurse spoke with Dr. Albino Cortez regarding scheduled thoracentesis and eliquis order. New order to hold eliquis to allow procedure.

## 2023-01-15 NOTE — PROGRESS NOTES
[facility] Progress Note      Patient:  Villa Montejo  YOB: 1945  MRN: [id number]   Acct: [de-identified]  Admit Date:  1/11/2023  Primary Cardiologist:Dr. Dean Wilder    [facility] Cardiologist: Duane Pellant, MD      Impression/Plan:     Atrial fibrillation: Rate is controlled on current medical regimen. Continue Lopressor. He is lack of compliance and his lack of symptoms in A. fib contraindicate proceeding with antiarrhythmic therapy at this point. Continue rate control  Anticoagulation issues: He has had multiple falls related to alcohol abuse. Here in the hospital reasonable to resume Eliquis. If he is to go home could consider short course of Eliquis with plan for watchman device in the near future as he is not reliable to take anticoagulation and when he takes anticoagulation if he drinks at the same time is at high risk of bleeding. Depending upon home situation and social circumstances may need to discharge without Eliquis. Need to consider watchman device  Right pleural effusion: Moderate in size. Etiology unclear. He hit the front of his chest without evidence of sternum or rib fracture. Thoracentesis is now planned for tomorrow. Hold Eliquis  Acute on chronic diastolic CHF with poorly controlled blood pressure: He does have a pleural effusion, increased lower extremity edema suboptimally controlled blood pressure would continue IV diuretic today. Increase AR-2 blocker depending on blood pressure after further IV diuretic tomorrow. Losartan is a once daily drug will utilize 50 a day as blood pressure remains elevated will increase dose to 100. Elevated troponin: Likely related diastolic CHF flat pattern no acute ST change    Chief Complaint/Active Symptoms: No specific complaints at this time  Admitted after fall. Also with large right pleural effusion. Plan is for rate control as to atrial fibrillation. Pacer functioning normally during this hospital stay. Cardiogram showed normal LV function with pulm hypertension at 50 mm. Was to have MRI yesterday. Also concern as to whether patient might need a thoracentesis was raised yesterday. However he got Lovenox and hence was postponed. Was seen by psychiatry yesterday felt to be competent but patient clearly had issues with alcohol abuse and daily alcohol consumption. MRI of brain yesterday mild to moderate cerebral volume loss otherwise unremarkable    Review of Systems:   Describes fatigue perhaps slight lightheadedness    CARDIAC HISTORY:    1.  8/1/2022 successful cardioversion       2. 8/1/2022 dual-chamber pacemaker; [employer] Lockhart XT DR MRI  3. 4/2022 Echo: LVEF 75 % dilated atrium. 4.Echocardiogram December 2021-LVEF 55% moderate biatrial enlargement moderate left ventricular hypertrophy and diastolic dysfunction no significant valvular heart disease no pericardial effusion RV systolic pressure 37 mmHg left atrial diameter 4.26 cm left atrial volume index 99 mL/m²  5. Carotid ultrasound 2022-calcified plaque left carotid bifurcation and proximal internal carotid artery with focal stenosis at the origin of the left internal carotid artery of approximately 63%  6. 2013 Left heart cath: that show moderate diffuse disease with areas of ectatic in the right coronary artery with previously deployed patent stents in the proximal and mid right coronary artery. The posterior descending artery has severe tandem proximal disease and is 100% occluded in the midsegment. Distal vessel fills delayed by left to right collaterals. The left main coronary artery was normal. The left anterior descending artery had mid 50% stenosis. Major diagonal branch has subtotal occlusion stenting of the ostium fillin by left to right collaterals. Ramus intermedius is a large and normal with a left circumflex artery appears normal except for mild luminal irregularities. Medical therapy was recommended.  Left ventricular ejection fraction was 80%. 7.  2012 stress test show abnormalities consistent with mid left anterior descending artery stenosis and ischemia on that distribution. 8.  2014 New onset atrial fibrillation with successful direct cardioversion    Objective:   Vitals:    01/14/23 2243 01/15/23 0123 01/15/23 0903 01/15/23 0953   BP:  (!) 154/70 (!) 154/80    Pulse:  60     Resp: 18 18  16   Temp:  97.3 °F (36.3 °C)     TempSrc: Oral Oral     SpO2:  99%     Weight:       Height:          Wt Readings from Last 3 Encounters:   01/11/23 276 lb (125.2 kg)   08/24/22 276 lb (125.2 kg)   08/15/22 272 lb (123.4 kg)       TELEMETRY: atrial fibrillation - controlled                            Rhythm Strip: v paced    Physical Exam:  General Appearance: alert and oriented to person, place and time, in no acute distress poor insight  Cardiovascular: normal rate, regular rhythm, normal S1 and S2, no murmurs, rubs, clicks, or gallops,  no JVD  Pulmonary/Chest: clear to auscultation bilaterally- no wheezes, rales or rhonchi, normal air movement, no respiratory distress  Abdomen: soft, non-tender, non-distended, normal bowel sounds, no masses   Extremities: no cyanosis, clubbing , 2+ edema  Skin: warm and dry, no rash or erythema  Eyes: EOMI  Neck: supple and non-tender without mass, no thyromegaly   Neurological: alert, oriented, normal speech, no focal findings or movement disorder noted    Allergies:   Allergies   Allergen Reactions    Hylan G-F 20 Other (See Comments) and Swelling     Swelling in leg    Ace Inhibitors      cough    Statins Depletion Therapy Other (See Comments)     OKAY WITH CRESTOR, weakness        Medications:    furosemide  20 mg IntraVENous BID    losartan  50 mg Oral Daily    mupirocin   Topical Daily    sodium chloride flush  5-40 mL IntraVENous 2 times per day    thiamine  100 mg Oral Daily    [Held by provider] apixaban  5 mg Oral BID    hydrALAZINE  50 mg Oral BID    ketorolac  1 drop Both Eyes 4x Daily    pantoprazole  40 mg Oral Daily    rosuvastatin  40 mg Oral QPM    tamsulosin  0.4 mg Oral Daily    miconazole   Topical BID    lidocaine  3 patch TransDERmal Daily    sodium chloride flush  5-40 mL IntraVENous 2 times per day    folic acid  1 mg Oral Daily    metoprolol tartrate  50 mg Oral BID    magnesium oxide  400 mg Oral Daily      sodium chloride      sodium chloride       sodium chloride flush, 5-40 mL, PRN  sodium chloride, , PRN  ondansetron, 4 mg, Q8H PRN   Or  ondansetron, 4 mg, Q6H PRN  polyethylene glycol, 17 g, Daily PRN  acetaminophen, 650 mg, Q6H PRN   Or  acetaminophen, 650 mg, Q6H PRN  hydrALAZINE, 10 mg, Q6H PRN  labetalol, 10 mg, Q4H PRN  traMADol, 25 mg, Q6H PRN  sodium chloride flush, 5-40 mL, PRN  sodium chloride, , PRN  LORazepam, 1 mg, Q1H PRN   Or  LORazepam, 1 mg, Q1H PRN   Or  LORazepam, 2 mg, Q1H PRN   Or  LORazepam, 2 mg, Q1H PRN   Or  LORazepam, 3 mg, Q1H PRN   Or  LORazepam, 3 mg, Q1H PRN   Or  LORazepam, 4 mg, Q1H PRN   Or  LORazepam, 4 mg, Q1H PRN  ketorolac, 15 mg, Q6H PRN      Diagnostics:  EKG:  a.fib v pace    Echo: 1/11   Left ventricular ejection fraction is visually estimated at 60%. Mild to moderate concentric left ventricular hypertrophy. Indeterminate diastolic relaxation   Mild (1+) mitral regurgitation is present. There is mild ( 1 +) tricuspid regurgitation with estimated RVSP of 50 mm   Hg.--moderate pulmonary hypertension   Moderately dilated left atrium. Normal right ventricular size with preserved RV function. Pacemaker Wire visualized in right ventricle. There is a (trivial) pericardial effusion.     CXR:             Portable: Results for orders placed during the hospital encounter of 01/11/23    XR CHEST PORTABLE    Narrative  EXAMINATION:  ONE XRAY VIEW OF THE CHEST    1/11/2023 7:59 pm    COMPARISON:  08/15/2022    HISTORY:  ORDERING SYSTEM PROVIDED HISTORY: fall, CP  TECHNOLOGIST PROVIDED HISTORY:  Reason for exam:->fall, CP  What reading provider will be dictating this exam?->CRC    FINDINGS:  The lungs are without acute focal process. There is no effusion or  pneumothorax. Stable heart size. Left-sided transvenous pacer device the  osseous structures are without acute process. Impression  No acute process. Lab Data:    Cardiac Enzymes:  Recent Labs     01/13/23 0548 01/14/23 0459   CKTOTAL 170 87     ProBNP:   Lab Results   Component Value Date/Time    PROBNP 2,030 01/11/2023 08:32 PM       CBC:   Recent Labs     01/13/23 0548 01/14/23  0459 01/15/23  0833   WBC 8.1 9.2 10.0   RBC 4.48* 4.33* 4.79   HGB 14.2 13.9* 15.5   HCT 41.4* 40.0* 44.2    256 296       CMP:    Recent Labs     01/13/23  0548 01/14/23  0459 01/15/23  0833    139 136   K 3.5 3.7 3.9    103 98   CO2 21 22 26   BUN 18 20 19   CREATININE 0.78 0.75 0.80   LABGLOM >60.0 >60.0 >60.0   GLUCOSE 106* 111* 148*   CALCIUM 8.3* 8.2* 8.7       Hepatic Function Panel:    Recent Labs     01/13/23 0548 01/14/23 0459   ALKPHOS 102 98   ALT 12 10   AST 17 12   PROT 5.6* 5.5*   BILITOT 1.0* 0.9*   LABALBU 3.2* 3.1*       Magnesium:    Recent Labs     01/13/23  0548 01/14/23  0459 01/15/23  0833   MG 2.0 1.7 2.0       PT/INR:    No results for input(s): PROTIME, INR in the last 72 hours. Lipids:    Recent Labs     01/13/23 0548   CHOL 176   TRIG 99   HDL 44   LDLCALC 112       Principal Problem:    Unable to ambulate  Active Problems:    Elevated troponin    Adjustment disorder  Resolved Problems:    * No resolved hospital problems.  *           Electronically signed by Stephanie Salazar MD on 1/15/2023 at 12:17 PM

## 2023-01-15 NOTE — PROGRESS NOTES
Progress Note  Date:1/15/2023       Room:[place] [id number]  Patient Mahi Chaney     YOB: 1945     Age:77 y.o. Subjective      Vital signs stable on room air overnight. Mildly hypertensive, otherwise VSS on RA. Morning labs fairly reassuring. Patient still intermittently confused and poor historian, but less hypoactive than previous day. Denies any acute dyspnea or pain. He is concerned about his legs persistently being very weak, and his repeated falls at home. Does not appear very willing to engage in discussion about his alcohol utilization or contribution of alcohol towards his current issues. Objective         Vitals Last 24 Hours:  TEMPERATURE:  Temp  Av.2 °F (36.8 °C)  Min: 97.3 °F (36.3 °C)  Max: 99 °F (37.2 °C)  RESPIRATIONS RANGE: Resp  Av.5  Min: 16  Max: 18  PULSE OXIMETRY RANGE: SpO2  Av %  Min: 95 %  Max: 99 %  PULSE RANGE: Pulse  Av  Min: 60  Max: 60  BLOOD PRESSURE RANGE: Systolic (40MMD), PQ , Min:125 , ZDM:634   ; Diastolic (38UCV), MWH:78, Min:63, Max:80    I/O (24Hr): Intake/Output Summary (Last 24 hours) at 1/15/2023 1317  Last data filed at 1/15/2023 0500  Gross per 24 hour   Intake --   Output 600 ml   Net -600 ml       Objective:  Vital signs: (most recent): Blood pressure (!) 154/80, pulse 60, temperature 97.3 °F (36.3 °C), temperature source Oral, resp. rate 16, height 5' 11\" (1.803 m), weight 276 lb (125.2 kg), SpO2 99 %. Constitutional: Obese elderly adult male reclining in bed in no acute distress  Head: NCAT  Eyes: PERRLA, EOMI  Neck: Trachea midline, phonation normal  Cardiovascular: RRR with occasional extra beats. No significant murmurs appreciated. Warm well perfused peripherally. +1 bilateral pretibial pitting edema. Pacemaker site noted on left chest.  Pulmonary: Normal rate and effort of respiration on room air. Bibasilar diminished lung sounds, R >L.   No coughing during exam.  Abdomen: Obese, soft, nontense. Bowel sounds appreciated. Neurologic: Awake, fairly alert, mentating better than previous day but still with some intermittent confusion and poor short and long-term recall. Psychiatric: Calm and cooperative. Flattened affect. MSK/integumentary: Bilateral lower extremity skin wounds consistent with excoriation. Labs/Imaging/Diagnostics    Labs:  CBC:  Recent Labs     01/13/23 0548 01/14/23 0459 01/15/23  0833   WBC 8.1 9.2 10.0   RBC 4.48* 4.33* 4.79   HGB 14.2 13.9* 15.5   HCT 41.4* 40.0* 44.2   MCV 92.3* 92.5* 92.4*   RDW 15.8* 15.6* 15.8*    256 296       CHEMISTRIES:  Recent Labs     01/13/23  0548 01/14/23  0459 01/15/23  0833    139 136   K 3.5 3.7 3.9    103 98   CO2 21 22 26   BUN 18 20 19   CREATININE 0.78 0.75 0.80   GLUCOSE 106* 111* 148*   PHOS 3.2 3.0  --    MG 2.0 1.7 2.0       PT/INR:  No results for input(s): PROTIME, INR in the last 72 hours. APTT:  No results for input(s): APTT in the last 72 hours. LIVER PROFILE:  Recent Labs     01/13/23 0548 01/14/23 0459   AST 17 12   ALT 12 10   BILITOT 1.0* 0.9*   ALKPHOS 102 98         Imaging Last 24 Hours:  CT HEAD WO CONTRAST    Result Date: 1/11/2023  EXAMINATION: CT OF THE HEAD WITHOUT CONTRAST  1/11/2023 10:16 pm TECHNIQUE: CT of the head was performed without the administration of intravenous contrast. Automated exposure control, iterative reconstruction, and/or weight based adjustment of the mA/kV was utilized to reduce the radiation dose to as low as reasonably achievable. COMPARISON: None. HISTORY: ORDERING SYSTEM PROVIDED HISTORY: fall, intox TECHNOLOGIST PROVIDED HISTORY: Reason for exam:->fall, intox Has a \"code stroke\" or \"stroke alert\" been called? ->No Decision Support Exception - unselect if not a suspected or confirmed emergency medical condition->Emergency Medical Condition (MA) What reading provider will be dictating this exam?->CRC FINDINGS: BRAIN/VENTRICLES: There is no acute intracranial hemorrhage, mass effect or midline shift. No abnormal extra-axial fluid collection. The gray-white differentiation is maintained without evidence of an acute infarct. There is no evidence of hydrocephalus. Moderate atrophy. ORBITS: The visualized portion of the orbits demonstrate no acute abnormality. SINUSES: The visualized paranasal sinuses and mastoid air cells demonstrate no acute abnormality. SOFT TISSUES/SKULL:  No acute abnormality of the visualized skull or soft tissues. No acute intracranial abnormality. RECOMMENDATIONS: Careful clinical correlation and follow up recommended. CT CHEST W CONTRAST    Result Date: 1/11/2023  EXAMINATION: CT OF THE CHEST WITH CONTRAST 1/11/2023 10:16 pm TECHNIQUE: CT of the chest was performed with the administration of intravenous contrast. Multiplanar reformatted images are provided for review. Automated exposure control, iterative reconstruction, and/or weight based adjustment of the mA/kV was utilized to reduce the radiation dose to as low as reasonably achievable. COMPARISON: None. HISTORY: ORDERING SYSTEM PROVIDED HISTORY: fall, sternal pain TECHNOLOGIST PROVIDED HISTORY: Reason for exam:->fall, sternal pain Decision Support Exception - unselect if not a suspected or confirmed emergency medical condition->Emergency Medical Condition (MA) What reading provider will be dictating this exam?->CRC FINDINGS: Mediastinum: Normal size heart. No pericardial effusion. Pacemaker leads. Normal caliber and contour of the thoracic aorta. No pulmonary emboli identified. No adenopathy. Lungs/pleura: Moderate right pleural effusion and associated compressive atelectasis. No pulmonary infiltrates. Upper Abdomen: No acute process. Soft Tissues/Bones: No acute osseous or body wall soft tissue abnormalities. Chronic bilateral rib inferolateral fracture deformities. 1. No acute thoracic injury. Sternum intact.  2. Moderate right pleural effusion and associated compressive atelectasis. RECOMMENDATIONS: Careful clinical correlation and follow up recommended. CT CERVICAL SPINE WO CONTRAST    Result Date: 1/11/2023  EXAMINATION: CT OF THE CERVICAL SPINE WITHOUT CONTRAST 1/11/2023 10:16 pm TECHNIQUE: CT of the cervical spine was performed without the administration of intravenous contrast. Multiplanar reformatted images are provided for review. Automated exposure control, iterative reconstruction, and/or weight based adjustment of the mA/kV was utilized to reduce the radiation dose to as low as reasonably achievable. COMPARISON: Radiographs of July 26, 2022 CT examination of April 17, 2022 HISTORY: ORDERING SYSTEM PROVIDED HISTORY: fall, intox TECHNOLOGIST PROVIDED HISTORY: Reason for exam:->fall, intox Decision Support Exception - unselect if not a suspected or confirmed emergency medical condition->Emergency Medical Condition (MA) What reading provider will be dictating this exam?->CRC FINDINGS: BONES/ALIGNMENT: There is no acute fracture or traumatic malalignment. Chronic type 2 odontoid fracture with minimal 2 mm retrolisthesis of the odontoid fragment, which is unchanged from prior examinations. DEGENERATIVE CHANGES: Moderate disc degenerative changes C5-C6 and C6-C7, with chronic severe bilateral foraminal narrowing at C5-C6. No significant central canal narrowing. SOFT TISSUES: There is no prevertebral soft tissue swelling. 1. No acute abnormality of the cervical spine. 2. Chronic type 2 odontoid fracture with minimal 2 mm retrolisthesis of the odontoid fragment, which is unchanged from prior examinations. RECOMMENDATIONS: Careful clinical correlation and follow up recommended.      CT THORACIC SPINE WO CONTRAST    Result Date: 1/11/2023  EXAMINATION: CT OF THE THORACIC SPINE WITHOUT CONTRAST  1/11/2023 10:16 pm: TECHNIQUE: CT of the thoracic spine was performed without the administration of intravenous contrast. Multiplanar reformatted images are provided for review. Automated exposure control, iterative reconstruction, and/or weight based adjustment of the mA/kV was utilized to reduce the radiation dose to as low as reasonably achievable. COMPARISON: None. HISTORY: ORDERING SYSTEM PROVIDED HISTORY: fall TECHNOLOGIST PROVIDED HISTORY: Reason for exam:->fall What reading provider will be dictating this exam?->CRC FINDINGS: BONES/ALIGNMENT: There is normal alignment of the spine. The vertebral body heights are maintained. No osseous destructive lesion is seen. DEGENERATIVE CHANGES: No gross spinal canal stenosis or bony neural foraminal narrowing of the thoracic spine. SOFT TISSUES: Pacemaker. Moderate right effusion and associated atelectasis. 1. No acute disease of the thoracic spine. 2. Moderate right pleural effusion and associated right lung compressive atelectasis. RECOMMENDATIONS: Careful clinical correlation and follow up recommended. CT LUMBAR SPINE WO CONTRAST    Result Date: 1/11/2023  EXAMINATION: CT OF THE LUMBAR SPINE WITHOUT CONTRAST  1/11/2023 TECHNIQUE: CT of the lumbar spine was performed without the administration of intravenous contrast. Multiplanar reformatted images are provided for review. Adjustment of mA and/or kV according to patient size was utilized. Automated exposure control, iterative reconstruction, and/or weight based adjustment of the mA/kV was utilized to reduce the radiation dose to as low as reasonably achievable. COMPARISON: April 17, 2022. HISTORY: ORDERING SYSTEM PROVIDED HISTORY: fall TECHNOLOGIST PROVIDED HISTORY: Reason for exam:->fall Decision Support Exception - unselect if not a suspected or confirmed emergency medical condition->Emergency Medical Condition (MA) What reading provider will be dictating this exam?->CRC FINDINGS: BONES/ALIGNMENT: There is normal alignment of the spine. The vertebral body heights are maintained. No osseous destructive lesion is seen. Chronic L4-L5 decompression and fusion.  DEGENERATIVE CHANGES: Chronic straightening of the normal lumbar lordosis with diffuse degenerative change. Severe disc degenerative change L5-S1. Mild central stenosis is likely present at L2-L3. SOFT TISSUES/RETROPERITONEUM: Moderate right effusion and associated atelectasis. Saccular 3.2 cm infrarenal abdominal aortic aneurysm. No acute features. 1. No acute lumbar spine injury. 2. 3.2 cm saccular infrarenal abdominal aortic aneurysm. No acute features. Unchanged size in comparison to prior examination of April 17, 2022. RECOMMENDATIONS: Careful clinical correlation and follow up recommended. 3.2 cm infrarenal saccular abdominal aortic aneurysm. Recommend vascular consultation. Reference: J Vasc Surg 4472;33:4-68. CT ABDOMEN PELVIS W IV CONTRAST Additional Contrast? None    Result Date: 1/11/2023  EXAMINATION: CT OF THE ABDOMEN AND PELVIS WITH CONTRAST 1/11/2023 10:16 pm TECHNIQUE: CT of the abdomen and pelvis was performed with the administration of intravenous contrast. Multiplanar reformatted images are provided for review. Automated exposure control, iterative reconstruction, and/or weight based adjustment of the mA/kV was utilized to reduce the radiation dose to as low as reasonably achievable. COMPARISON: April 17, 2022 HISTORY: ORDERING SYSTEM PROVIDED HISTORY: fall, intox TECHNOLOGIST PROVIDED HISTORY: Reason for exam:->fall, intox Additional Contrast?->None Decision Support Exception - unselect if not a suspected or confirmed emergency medical condition->Emergency Medical Condition (MA) What reading provider will be dictating this exam?->CRC FINDINGS: Lower Chest: Moderate right pleural effusion and associated compressive atelectasis. Organs: Cholelithiasis. Otherwise, the Liver, biliary tree, bilateral adrenal glands, bilateral kidneys, spleen and pancreas are normal. GI/Bowel: No ileus or obstruction. No mesenteric contusion. No inflammatory bowel process.   Moderate descending and sigmoid colonic diverticulosis. No diverticulitis. Pelvis: No adenopathy or free fluid. Peritoneum/Retroperitoneum: No hernia, ascites, or hemorrhage. Saccular infrarenal abdominal aortic aneurysm measuring 3.2 cm unchanged from prior examination of April 17, 2022. No acute features. Bones/Soft Tissues: Spinal decompression and fusion. No acute osseous or body wall soft tissue abnormalities. 1. No acute disease. No acute abdominopelvic injury. 2. Cholelithiasis. 3. Saccular infrarenal abdominal aortic aneurysm measuring 3.2 cm unchanged from prior examination of April 17, 2022. No acute features. RECOMMENDATIONS: Careful clinical correlation and follow up recommended. XR CHEST PORTABLE    Result Date: 1/11/2023  EXAMINATION: ONE XRAY VIEW OF THE CHEST 1/11/2023 7:59 pm COMPARISON: 08/15/2022 HISTORY: ORDERING SYSTEM PROVIDED HISTORY: jesenia, CP TECHNOLOGIST PROVIDED HISTORY: Reason for exam:->fall, CP What reading provider will be dictating this exam?->CRC FINDINGS: The lungs are without acute focal process. There is no effusion or pneumothorax. Stable heart size. Left-sided transvenous pacer device the osseous structures are without acute process. No acute process. US DUP LOWER EXTREMITIES BILATERAL VENOUS    Result Date: 1/12/2023  EXAMINATION: DUPLEX VENOUS ULTRASOUND OF THE BILATERAL LOWER EXTREMITIES1/12/2023 7:43 am TECHNIQUE: Duplex ultrasound using B-mode/gray scaled imaging, Doppler spectral analysis and color flow Doppler was obtained of the deep venous structures of the lower bilateral extremities. COMPARISON: None. HISTORY: ORDERING SYSTEM PROVIDED HISTORY: r/o dvt TECHNOLOGIST PROVIDED HISTORY: Reason for exam:->r/o dvt What reading provider will be dictating this exam?->CRC FINDINGS: The visualized veins of the bilateral lower extremities are patent and free of echogenic thrombus. The veins demonstrate good compressibility with normal color flow study and spectral analysis.   Suboptimal visualization of the calf veins due to patient's body habitus. No evidence of DVT in the visualized veins of bilateral lower extremities. .     Assessment//Plan           Hospital Problems             Last Modified POA    * (Principal) Unable to ambulate 1/12/2023 Yes    Elevated troponin 1/13/2023 Yes    Adjustment disorder 1/13/2023 Yes   Assessment & Plan    Principal Problem:  Falls at home: Fall at home after mild dizziness and inability to ambulate afterwards. Pan CT scans unremarkable. Troponin elevated but at baseline. Likely 2/2 failure to thrive, medication noncompliance, and alcohol use. Will get PT OT. We will get case management for home health care versus SNF placement. 1/13: Neurology on consultation, appreciate recommendations when available. 1/14: MRI brain per neurology with no acute findings, but chronic appearing microvascular ischemic and likely age-related changes. MRI lumbar spine with evidence of prior lumbar spine surgeries, some localized fibrosis consistent with scar tissue from prior surgeries, as well as possible spinal stenosis which may account for some of patient's recurrent falls if contributory to her lower extremity weakness. She further neurology recommendations when available. Elevated troponin: Troponin 0.037 near baseline and repeat troponin 0.031. EKG shows a fib/paced ST elevation. We will cycle troponin and repeat EKG. Heart failure: Preserved EF of 19% with diastolic dysfunction and biatrial moderate enlargement. Patient on beta-blocker and diuretic. Will resume beta-blocker and administer Lasix IV. We will monitor fluid status closely. We will get echo. History of A. Fib: Patient on beta-blocker and [address]. Patient has presence of pacemaker. NSR vs paced rhythm on exam.  CAD: History of multiple stents. Patient on aspirin and statin. 1/13: Cardiology on consultation, appreciate recommendations.   They've expressed concern that moderate sized right pleural effusion noted on admission pan scan CT might not be purely cardiac in etiology, have recommended possible diagnostic/therapeutic thoracentesis for further evaluation. IR has been consulted for thoracentesis consideration. 1/14: Medication recommendations per cardiology. They are reluctant to resume anticoagulation and some additional potentially dangerous cardiac medications in setting of poor baseline patient compliance and repeated falls in setting of chronic alcoholism. Discussion of possibility of eventual watchman device placement in setting of simultaneous atrial fibrillation VTE risks as well as bleeding risk from recurrent falls and alcoholism. 1/15: No significant changes today    Chronic severe alcohol dependence: Patient reported 3 vodkas daily. We will keep patient on CIWA. Monitoring closely for Sx acute withdrawal.  Neurology and Psychiatry on consult. 1/14: No evidence of acute DTs as of yet. Appearance actually most consistent with hypoactive delirium on examination today. Encephalopathy in setting of above, superimposed on suspected baseline MCI versus early dementia (per family/friends). 1/13: Patient made concerning statement regarding bringing a gun whenever he was in the hospital next to \"shoot whoever would wake him up\" from sleep on 1/12. Psychiatry on consult, and now aware, as are remainder of care team including nursing house supervisor, , and charge nurse. Pending capacity evaluation and neurologic evaluation by psychiatry and neurology respectively. 1/14: Patient was evaluated by psychiatry service yesterday afternoon, who found him to be slightly altered but felt that at that time he did have overall decision-making capacity. On examination by this author this morning, patient is hypoactive and poorly interactive, and has severely impaired insight and judgment, and does not appear to have capacity to me this morning.   Discussion with neurology NP who had similar findings. No focal neurologic changes concerning for new neurologic event such as stroke. Presentation hypoactive, with no other signs/symptoms concerning for acute alcohol withdrawal either. Unclear etiology for hypoactivity and impairment this morning. Would likely suggest repeat psychiatric evaluation prior to discharge if this impaired mental status persists, as I personally do not feel that this patient possesses capacity at time of examination today, despite Psychiatry evaluation the previous afternoon finding that he seemed to possess capacity at that time. Appearance actually most consistent with hypoactive delirium today. We will continue to monitor, with frequent reorientation and day/night hygiene. 1/15: Mentation improved today, significantly less hypoactive though still intermittently confused and poor recall of both short and long-term events. Expresses concern regarding weakness in his legs, but does not appear very willing to engage in discussion of alcohol contribution to current medical issues. Would recommend psychiatry repeat evaluation for capacity later during this same admission. Strong feeling that patient lacked capacity on 1/14, and may still lack ability to fully give informed medical consent on 1/15 in setting of cognitive and other contributory medical issues. However, given psychiatry's initial finding of patient believed to possess capacity at time of that exam, a report was made to Adult Protective Services for severe recurrent self-neglect by patient, for them to appropriately evaluate if patient is to be discharged home. Also called and discussed at length with MPOA son Donovan Turner today. He would like  to contact him tomorrow with an email address where he could send a copy of most recently known MPOA form demonstrating him Donovan Turner) as the legal MPOA. I've contacted Case Management to reach out to him tomorrow for that purpose. HTN: PT on home meds to control. Will resume home meds, as tolerated. HLD: Patient on statin.   Will resume home meds       Electronically signed by Nathan Cabral DO on 1/15/23

## 2023-01-16 ENCOUNTER — APPOINTMENT (OUTPATIENT)
Dept: INTERVENTIONAL RADIOLOGY/VASCULAR | Age: 78
DRG: 640 | End: 2023-01-16
Payer: OTHER GOVERNMENT

## 2023-01-16 ENCOUNTER — APPOINTMENT (OUTPATIENT)
Dept: GENERAL RADIOLOGY | Age: 78
DRG: 640 | End: 2023-01-16
Payer: OTHER GOVERNMENT

## 2023-01-16 PROBLEM — F10.10 ETOH ABUSE: Status: ACTIVE | Noted: 2023-01-16

## 2023-01-16 PROBLEM — R26.0 ATAXIC GAIT: Status: ACTIVE | Noted: 2023-01-16

## 2023-01-16 PROBLEM — R29.6 RECURRENT FALLS: Status: ACTIVE | Noted: 2023-01-16

## 2023-01-16 LAB
ANION GAP SERPL CALCULATED.3IONS-SCNC: 11 MEQ/L (ref 9–15)
APPEARANCE FLUID: NORMAL
BASOPHILS ABSOLUTE: 0 K/UL (ref 0–0.2)
BASOPHILS RELATIVE PERCENT: 0.5 %
BUN BLDV-MCNC: 24 MG/DL (ref 8–23)
CALCIUM SERPL-MCNC: 8.4 MG/DL (ref 8.5–9.9)
CELL COUNT FLUID TYPE: NORMAL
CHLORIDE BLD-SCNC: 98 MEQ/L (ref 95–107)
CLOT EVALUATION: NORMAL
CO2: 26 MEQ/L (ref 20–31)
COLOR FLUID: YELLOW
CREAT SERPL-MCNC: 0.87 MG/DL (ref 0.7–1.2)
EOSINOPHILS ABSOLUTE: 0.1 K/UL (ref 0–0.7)
EOSINOPHILS RELATIVE PERCENT: 1.3 %
FLUID TYPE: NORMAL
FLUID TYPE: NORMAL
GFR SERPL CREATININE-BSD FRML MDRD: >60 ML/MIN/{1.73_M2}
GLUCOSE BLD-MCNC: 129 MG/DL (ref 70–99)
GLUCOSE, FLUID: 129.8 MG/DL
HCT VFR BLD CALC: 40.6 % (ref 42–52)
HEMOGLOBIN: 14.1 G/DL (ref 14–18)
LYMPHOCYTES ABSOLUTE: 0.6 K/UL (ref 1–4.8)
LYMPHOCYTES RELATIVE PERCENT: 6.7 %
LYMPHOCYTES, BODY FLUID: 84 %
MAGNESIUM: 2 MG/DL (ref 1.7–2.4)
MCH RBC QN AUTO: 31.4 PG (ref 27–31.3)
MCHC RBC AUTO-ENTMCNC: 34.7 % (ref 33–37)
MCV RBC AUTO: 90.5 FL (ref 79–92.2)
MONOCYTE, FLUID: 13 %
MONOCYTES ABSOLUTE: 1 K/UL (ref 0.2–0.8)
MONOCYTES RELATIVE PERCENT: 11.2 %
NEUTROPHIL, FLUID: 3 %
NEUTROPHILS ABSOLUTE: 7.3 K/UL (ref 1.4–6.5)
NEUTROPHILS RELATIVE PERCENT: 80.3 %
NUCLEATED CELLS FLUID: 342 /CUMM
NUMBER OF CELLS COUNTED FLUID: 100
PDW BLD-RTO: 15.7 % (ref 11.5–14.5)
PH, BODY FLUID: 7
PLATELET # BLD: 294 K/UL (ref 130–400)
POTASSIUM SERPL-SCNC: 3.8 MEQ/L (ref 3.4–4.9)
PROTEIN FLUID: 3.2 G/DL
RBC # BLD: 4.49 M/UL (ref 4.7–6.1)
RBC FLUID: 378 /CUMM
SODIUM BLD-SCNC: 135 MEQ/L (ref 135–144)
WBC # BLD: 9.1 K/UL (ref 4.8–10.8)

## 2023-01-16 PROCEDURE — 83735 ASSAY OF MAGNESIUM: CPT

## 2023-01-16 PROCEDURE — 2709999900 IR GUIDED THORACENTESIS PLEURAL

## 2023-01-16 PROCEDURE — 71046 X-RAY EXAM CHEST 2 VIEWS: CPT

## 2023-01-16 PROCEDURE — 87102 FUNGUS ISOLATION CULTURE: CPT

## 2023-01-16 PROCEDURE — 6370000000 HC RX 637 (ALT 250 FOR IP): Performed by: NURSE PRACTITIONER

## 2023-01-16 PROCEDURE — 84157 ASSAY OF PROTEIN OTHER: CPT

## 2023-01-16 PROCEDURE — 0W993ZZ DRAINAGE OF RIGHT PLEURAL CAVITY, PERCUTANEOUS APPROACH: ICD-10-PCS | Performed by: RADIOLOGY

## 2023-01-16 PROCEDURE — APPSS15 APP SPLIT SHARED TIME 0-15 MINUTES: Performed by: NURSE PRACTITIONER

## 2023-01-16 PROCEDURE — 99254 IP/OBS CNSLTJ NEW/EST MOD 60: CPT | Performed by: RADIOLOGY

## 2023-01-16 PROCEDURE — 85025 COMPLETE CBC W/AUTO DIFF WBC: CPT

## 2023-01-16 PROCEDURE — 87070 CULTURE OTHR SPECIMN AEROBIC: CPT

## 2023-01-16 PROCEDURE — 6360000002 HC RX W HCPCS: Performed by: INTERNAL MEDICINE

## 2023-01-16 PROCEDURE — 6370000000 HC RX 637 (ALT 250 FOR IP): Performed by: INTERNAL MEDICINE

## 2023-01-16 PROCEDURE — 99232 SBSQ HOSP IP/OBS MODERATE 35: CPT | Performed by: PSYCHIATRY & NEUROLOGY

## 2023-01-16 PROCEDURE — 89051 BODY FLUID CELL COUNT: CPT

## 2023-01-16 PROCEDURE — 2580000003 HC RX 258: Performed by: INTERNAL MEDICINE

## 2023-01-16 PROCEDURE — 83615 LACTATE (LD) (LDH) ENZYME: CPT

## 2023-01-16 PROCEDURE — 82945 GLUCOSE OTHER FLUID: CPT

## 2023-01-16 PROCEDURE — 32555 ASPIRATE PLEURA W/ IMAGING: CPT | Performed by: RADIOLOGY

## 2023-01-16 PROCEDURE — 83986 ASSAY PH BODY FLUID NOS: CPT

## 2023-01-16 PROCEDURE — 1210000000 HC MED SURG R&B

## 2023-01-16 PROCEDURE — 87116 MYCOBACTERIA CULTURE: CPT

## 2023-01-16 PROCEDURE — 87205 SMEAR GRAM STAIN: CPT

## 2023-01-16 PROCEDURE — 97535 SELF CARE MNGMENT TRAINING: CPT

## 2023-01-16 PROCEDURE — 80048 BASIC METABOLIC PNL TOTAL CA: CPT

## 2023-01-16 PROCEDURE — 97116 GAIT TRAINING THERAPY: CPT

## 2023-01-16 PROCEDURE — 2500000003 HC RX 250 WO HCPCS: Performed by: RADIOLOGY

## 2023-01-16 PROCEDURE — 36415 COLL VENOUS BLD VENIPUNCTURE: CPT

## 2023-01-16 PROCEDURE — 32555 ASPIRATE PLEURA W/ IMAGING: CPT

## 2023-01-16 RX ORDER — POTASSIUM CHLORIDE 20 MEQ/1
10 TABLET, EXTENDED RELEASE ORAL DAILY
Status: DISCONTINUED | OUTPATIENT
Start: 2023-01-16 | End: 2023-01-17

## 2023-01-16 RX ORDER — TORSEMIDE 20 MG/1
20 TABLET ORAL DAILY
Status: DISCONTINUED | OUTPATIENT
Start: 2023-01-16 | End: 2023-01-17 | Stop reason: HOSPADM

## 2023-01-16 RX ORDER — LIDOCAINE HYDROCHLORIDE 20 MG/ML
INJECTION, SOLUTION INFILTRATION; PERINEURAL
Status: COMPLETED | OUTPATIENT
Start: 2023-01-16 | End: 2023-01-16

## 2023-01-16 RX ORDER — SPIRONOLACTONE 25 MG/1
25 TABLET ORAL DAILY
Status: DISCONTINUED | OUTPATIENT
Start: 2023-01-16 | End: 2023-01-17 | Stop reason: HOSPADM

## 2023-01-16 RX ADMIN — KETOROLAC TROMETHAMINE 1 DROP: 5 SOLUTION/ DROPS OPHTHALMIC at 11:35

## 2023-01-16 RX ADMIN — LIDOCAINE HYDROCHLORIDE 7 ML: 20 INJECTION, SOLUTION INFILTRATION; PERINEURAL at 10:20

## 2023-01-16 RX ADMIN — TORSEMIDE 20 MG: 20 TABLET ORAL at 18:51

## 2023-01-16 RX ADMIN — HYDRALAZINE HYDROCHLORIDE 50 MG: 50 TABLET, FILM COATED ORAL at 19:54

## 2023-01-16 RX ADMIN — PANTOPRAZOLE SODIUM 40 MG: 40 TABLET, DELAYED RELEASE ORAL at 11:36

## 2023-01-16 RX ADMIN — KETOROLAC TROMETHAMINE 1 DROP: 5 SOLUTION/ DROPS OPHTHALMIC at 18:54

## 2023-01-16 RX ADMIN — MICONAZOLE NITRATE: 2 POWDER TOPICAL at 19:54

## 2023-01-16 RX ADMIN — HYDRALAZINE HYDROCHLORIDE 50 MG: 50 TABLET, FILM COATED ORAL at 11:36

## 2023-01-16 RX ADMIN — METOPROLOL TARTRATE 50 MG: 50 TABLET, FILM COATED ORAL at 19:54

## 2023-01-16 RX ADMIN — Medication 10 ML: at 11:37

## 2023-01-16 RX ADMIN — MUPIROCIN: 20 OINTMENT TOPICAL at 11:36

## 2023-01-16 RX ADMIN — FUROSEMIDE 20 MG: 10 INJECTION, SOLUTION INTRAMUSCULAR; INTRAVENOUS at 11:39

## 2023-01-16 RX ADMIN — APIXABAN 5 MG: 5 TABLET, FILM COATED ORAL at 19:54

## 2023-01-16 RX ADMIN — POTASSIUM CHLORIDE 10 MEQ: 1500 TABLET, EXTENDED RELEASE ORAL at 14:34

## 2023-01-16 RX ADMIN — TRAMADOL HYDROCHLORIDE 25 MG: 50 TABLET ORAL at 11:40

## 2023-01-16 RX ADMIN — Medication 10 ML: at 19:55

## 2023-01-16 RX ADMIN — Medication 400 MG: at 11:36

## 2023-01-16 RX ADMIN — KETOROLAC TROMETHAMINE 1 DROP: 5 SOLUTION/ DROPS OPHTHALMIC at 19:54

## 2023-01-16 RX ADMIN — TAMSULOSIN HYDROCHLORIDE 0.4 MG: 0.4 CAPSULE ORAL at 11:36

## 2023-01-16 RX ADMIN — SPIRONOLACTONE 25 MG: 25 TABLET ORAL at 14:33

## 2023-01-16 RX ADMIN — MICONAZOLE NITRATE: 2 POWDER TOPICAL at 11:36

## 2023-01-16 RX ADMIN — METOPROLOL TARTRATE 50 MG: 50 TABLET, FILM COATED ORAL at 11:36

## 2023-01-16 RX ADMIN — ROSUVASTATIN CALCIUM 40 MG: 40 TABLET, FILM COATED ORAL at 18:51

## 2023-01-16 RX ADMIN — TRAMADOL HYDROCHLORIDE 25 MG: 50 TABLET ORAL at 18:51

## 2023-01-16 RX ADMIN — LOSARTAN POTASSIUM 100 MG: 100 TABLET, FILM COATED ORAL at 11:37

## 2023-01-16 RX ADMIN — Medication 100 MG: at 11:36

## 2023-01-16 RX ADMIN — FOLIC ACID 1 MG: 1 TABLET ORAL at 11:36

## 2023-01-16 ASSESSMENT — ENCOUNTER SYMPTOMS
PHOTOPHOBIA: 0
EYES NEGATIVE: 1
COLOR CHANGE: 0
SHORTNESS OF BREATH: 1
CHEST TIGHTNESS: 1
SHORTNESS OF BREATH: 0
VOMITING: 0
WHEEZING: 0
DIARRHEA: 0
TROUBLE SWALLOWING: 0
CHEST TIGHTNESS: 0
CONSTIPATION: 0
ABDOMINAL PAIN: 0
COUGH: 0
GASTROINTESTINAL NEGATIVE: 1
NAUSEA: 0
BACK PAIN: 0

## 2023-01-16 ASSESSMENT — PAIN SCALES - GENERAL
PAINLEVEL_OUTOF10: 5
PAINLEVEL_OUTOF10: 7

## 2023-01-16 NOTE — OR NURSING
NO SEDATION    Ultrasound Guided Thoracentesis  VSS. 1010- Patient assisted to a sitting position on cart side leaning over tray table. Posterior lung fields scanned with ultrasound by Ultrasound technician. Images reviewed by performing Radiologist.     [phone]  Procedure explained, consent verified. Time out completed for ultrasound guided right thoracentesis. Site marked by Dr. Celestine Murhpy, then procedure site prepped with chloraprep, and draped with sterile drape and towels. 1020- Right  Posterior chest site then numbed with lidocaine 2% by Dr Celestine Murphy, Duo4Ravm Centesis 5F catheter placed into pleural space using US guidance. Fluid draining appears clear, yellow. Sample of fluid obtained and placed into specimen containers to be sent for testing as ordered. Catheter attached to [facility] machine to remove fluid. 1036- Total 1140 ml removed. Catheter removed. Bandaid applied. VSS. Pt tolerated well. Verbal and tactile reassurance given throughout. Patient taken for CXR and specimen fluid taken to lab. 1049- Report given to patient's nurse, Rylie Guajardo. Once patient's CXR is complete, he will go back to his room via transport.

## 2023-01-16 NOTE — PROGRESS NOTES
Assessment complete. VSS. Is complaining of neck pain; ultram was given per order. Micotin powder put on abdominal folds and groin area. Denies any other needs and call light is within reach.   Electronically signed by Jeri Sy RN on 1/15/2023 at 10:11 PM

## 2023-01-16 NOTE — PROGRESS NOTES
[facility] OF THE [facility] [place] Note      Patient: Jimena Head    Unit/Bed: T626/Y491-90  YOB: 1945  MRN: [id number]  [facility] Date:  1/11/2023  Hospital Day: 4    Rounding Date: 1/16/2023    Rounding Cardiologist:  LIAN Fowler MD    PRIMARY Cardiologist: Arturo Moreno    Subjective Complaint:   Denies any chest pain with exertion or at rest, palpitations, syncope, or edema.,  Is little pain where they did the thoracentesis. Flat affect. .     Physical Examination:     BP (!) 142/68   Pulse 73   Temp 99 °F (37.2 °C) (Oral)   Resp 16   Ht 5' 11\" (1.803 m)   Wt 264 lb (119.7 kg)   SpO2 97%   BMI 36.82 kg/m²       Intake/Output Summary (Last 24 hours) at 1/16/2023 1207  Last data filed at 1/15/2023 2344  Gross per 24 hour   Intake --   Output 850 ml   Net -850 ml                  Jimena Head examined at bedside in in no apparent distress and cooperative. Focused exam reveals:     Cardiac: Heart regular rate and rhythm     Lungs:  clear to auscultation bilaterally- no wheezes, rales or rhonchi, normal air movement, no respiratory distress    Extremities:   Trace edema    Telemetry:      atrial fibrillation - controlled and paced         LABS:   CBC:   Recent Labs     01/14/23  0459 01/15/23  0833 01/16/23  0633   WBC 9.2 10.0 9.1   HGB 13.9* 15.5 14.1    296 294      BMP:    Recent Labs     01/14/23  0459 01/15/23  0833 01/16/23  0633    136 135   K 3.7 3.9 3.8    98 98   CO2 22 26 26   BUN 20 19 24*   CREATININE 0.75 0.80 0.87   GLUCOSE 111* 148* 129*              Troponin: No results for input(s): TROPONINT in the last 72 hours. BNP: No results for input(s): PROBNP in the last 72 hours. INR: No results for input(s): INR in the last 72 hours. Mg:   Recent Labs     01/16/23  0633   MG 2.0       Cardia    Summary   Left ventricular ejection fraction is visually estimated at 60%. Mild to moderate concentric left ventricular hypertrophy.    Indeterminate diastolic relaxation   Mild (1+) mitral regurgitation is present. There is mild ( 1 +) tricuspid regurgitation with estimated RVSP of 50 mm   Hg.--moderate pulmonary hypertension   Moderately dilated left atrium. Normal right ventricular size with preserved RV function. Pacemaker Wire visualized in right ventricle. There is a (trivial) pericardial effusion.       Previous echo was 2014   c Testing:       Assessment:  Following spell  Mental status changes--possibly chronic  That is postthoracentesis  Atrial fibrillation    Plan:  Resume Eliquis  Spironolactone with torsemide  DC IV Lasix  Continue to follow labs  Most likely could benefit from rehab  Electronically signed by Luda Nagy MD on 1/16/2023 at 12:07 PM

## 2023-01-16 NOTE — PROGRESS NOTES
Shift assessment complete. VSS. Pt is AxOx4. Lungs diminished. Meds given per mar. Rt side bandage on back is CDI. Pt sitting up in chair. PCA to help pt wash up. Call light within reach. Will continue to monitor.      Electronically signed by Peggy Morgan RN on 1/16/2023 at 11:56 AM

## 2023-01-16 NOTE — PROGRESS NOTES
Physical Therapy Med Surg Daily Treatment Note  Facility/Department: [facility]  Room: [place] [address]       NAME: Thomas Castro  :  (19 y.o.)  MRN: [id number]  CODE STATUS: Full Code    Date of Service: 2023    Patient Diagnosis(es): Adult failure to thrive [R62.7]  Pleural effusion [J90]  Elevated troponin [R77.8]  Unable to ambulate [R26.2]  Fall, initial encounter [W19. XXXA]  Aortic aneurysm without rupture, unspecified portion of aorta (HCC) [I71.9]  Chronic congestive heart failure, unspecified heart failure type [facility]) [I50.9]   Chief Complaint   Patient presents with    Fall     Patient Active Problem List    Diagnosis Date Noted    Elevated troponin 2023    Adjustment disorder 2023    CHF (congestive heart failure), NYHA class I, acute on chronic, combined ([facility] [address] 2021    Unable to ambulate 12/10/2021    PAF (paroxysmal atrial fibrillation)  12/10/2021    Retinal hemorrhage, bilateral 2019    Intermediate stage nonexudative age-related macular degeneration of both eyes 2019    Hyperuricemia 01/10/2019    Chronic gastritis without bleeding 01/10/2019    Type 2 diabetes mellitus with microalbuminuria, without long-term current use of insulin ([facility] [address] 01/10/2019    Arthrodesis status 2017    Primary osteoarthritis, right ankle and foot 2017    Pain in right ankle 2017    Encounter for other orthopedic aftercare 2017    History of colon polyps 2017    Nuclear sclerosis of both eyes 2015    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, left eye 2015    Presbyopia 2015    Regular astigmatism 2015    History of prostate cancer 2014    Stented coronary artery 2013    Essential hypertension 2013    Numbness in feet 2013    RA (rheumatoid arthritis) ([facility] [address] 10/24/2011    Bradycardia     Mixed hyperlipidemia     Primary osteoarthritis involving multiple joints     Dysthymia CAD (coronary artery disease)         Past Medical History:   Diagnosis Date    Bradycardia     CAD (coronary artery disease)     Cancer (HCC)     prostate- radiation     CHF (congestive heart failure) (HCC)     Depression     HSV-2 (herpes simplex virus 2) infection     Hyperlipidemia     Hypertension     Left knee DJD     Osteoarthritis     Rheumatoid arthritis(714.0)     Type 2 diabetes mellitus without complication, without long-term current use of insulin ([facility] [address] 1/10/2019     Past Surgical History:   Procedure Laterality Date    ANKLE SURGERY Right     pin    APPENDECTOMY      ARTHRODESIS Right 10/31/2016    RIGHT ANKLE ARTHRODESIS OF RIGHT ANKLE AND SUBTALAR JOINT, C-ARM, ABHIJEET EQUIPMENT SYNTHETIC BONE GRAFT, ALLOGRAFT CANCELLOUS, SHARON ANKLE FUSION NAIL, SHANDA IMPLANT BONE STIMULATOR, CHOICE ANESTHESIA, BLOCK  performed by Ivan Mott MD at [facility]      stent x 5    COLONOSCOPY  7-19-11    FRACTURE SURGERY      right ankle    HARDWARE REMOVAL FOOT / ANKLE Right 11/6/2017    RIGHT ANKLE HARDWARE REMOVAL performed [facility] Dagoberto Holbrook MD [facility] Bilateral     knees    AK COLON CA SCRN NOT  [facility] N/A 7/11/2017    COLONOSCOPY performed by Ethan Moncada DO at [facility] Left     TONSILLECTOMY AND ADENOIDECTOMY         Chart Reviewed: Yes    Restrictions:  Restrictions/Precautions: Fall Risk    SUBJECTIVE:   Subjective: Patient reports sore buttocks. \"I'll get up in the chair\"    Pain  Pain: Pre 8/10 buttocks, no change at end of session. OBJECTIVE:        Bed mobility  Supine to Sit: Moderate assistance;Maximum assistance  Bed Mobility Comments: Step by step sequencing, assistance at trunk; significant time to compelte with increased processing time. Transfers  Sit to Stand: Minimal Assistance  Stand to Sit: Minimal Assistance  Comment: VCs for anterior weightshifting, decreased control with stand to sit.     Ambulation  Surface: Level tile  Device: Rolling Walker  Assistance: Contact guard assistance  Quality of Gait: Flexed posture with decreased step length and forward advancement; no LOB  Distance: 4ft to chair- declined further ambulation                 ASSESSMENT   Body Structures, Functions, Activity Limitations Requiring Skilled Therapeutic Intervention: Decreased functional mobility ; Decreased ADL status; Decreased body mechanics; Decreased strength;Decreased safe awareness;Decreased cognition;Decreased endurance;Decreased balance  Assessment: Patient requiring increased time to process directions with step by step cues provided. Increased effort completing bed mobility, assistance given at trunk. Decreased foot clearance and advancement of LEs. Patient up in the chair at end of session. Discharge Recommendations:  Continue to assess pending progress, Patient would benefit from continued therapy after discharge         Goals  Long Term Goals  Long Term Goal 1: Pt to complete bed mobility with Supervision  Long Term Goal 2: Pt to complete transfers with supervision  Long Term Goal 3: Pt to ambulate 50ft with LRD and supervision  Long Term Goal 4: Pt to manage ramp with LRD and SBA    PLAN    General Plan: 1 time a day 3-6 times a week  Safety Devices  Type of Devices: All fall risk precautions in place, Call light within reach, Left in chair, Nurse notified     [facility] ([address] [place] [address]) [address] Mobility Raw Score : 15     Therapy Time   Individual   Time In 0917   Time Out 0941   Minutes 24     Timed Code Treatment Minutes: 24 Minutes   Trans: 10  Gait: [facility], [place]INT[place], 01/16/23 at 9:48 AM     Electronically signed by Asher Howard PTA on 1/16/2023 at 9:49 AM      Definitions for assistance levels  Independent = pt does not require any physical supervision or assistance from another person for activity completion. Device may be needed.   Stand by assistance = pt requires verbal cues or instructions from another person, close to but not touching, to perform the activity  Minimal assistance= pt performs 75% or more of the activity; assistance is required to complete the activity  Moderate assistance= pt performs 50% of the activity; assistance is required to complete the activity  Maximal assistance = pt performs 25% of the activity; assistance is required to complete the activity  Dependent = pt requires total physical assistance to accomplish the task

## 2023-01-16 NOTE — PROGRESS NOTES
Hospitalist Progress Note      Date of Admission: 1/11/2023  Chief Complaint:    Chief Complaint   Patient presents with    Fall     Subjective:  No new complaints, nausea vomiting chest pain or headache    Medications:    Infusion Medications    sodium chloride      sodium chloride       Scheduled Medications    spironolactone  25 mg Oral Daily    torsemide  20 mg Oral Daily    potassium chloride  10 mEq Oral Daily    losartan  100 mg Oral Daily    mupirocin   Topical Daily    sodium chloride flush  5-40 mL IntraVENous 2 times per day    thiamine  100 mg Oral Daily    apixaban  5 mg Oral BID    hydrALAZINE  50 mg Oral BID    ketorolac  1 drop Both Eyes 4x Daily    pantoprazole  40 mg Oral Daily    rosuvastatin  40 mg Oral QPM    tamsulosin  0.4 mg Oral Daily    miconazole   Topical BID    lidocaine  3 patch TransDERmal Daily    sodium chloride flush  5-40 mL IntraVENous 2 times per day    metoprolol tartrate  50 mg Oral BID    magnesium oxide  400 mg Oral Daily     PRN Meds: sodium chloride flush, sodium chloride, ondansetron **OR** ondansetron, polyethylene glycol, acetaminophen **OR** acetaminophen, hydrALAZINE, labetalol, traMADol, sodium chloride flush, sodium chloride, LORazepam **OR** LORazepam **OR** LORazepam **OR** LORazepam **OR** LORazepam **OR** LORazepam **OR** LORazepam **OR** LORazepam, ketorolac    Intake/Output Summary (Last 24 hours) at 1/16/2023 1359  Last data filed at 1/15/2023 2344  Gross per 24 hour   Intake --   Output 850 ml   Net -850 ml     Exam:  BP (!) 142/68   Pulse 73   Temp 99 °F (37.2 °C) (Oral)   Resp 16   Ht 5' 11\" (1.803 m)   Wt 264 lb (119.7 kg)   SpO2 97%   BMI 36.82 kg/m²   Head: Normocephalic, atraumatic  Sclera clear  Neck JVD flat  Lungs: Normal effort of breathing    Labs:   Recent Labs     01/14/23 0459 01/15/23  0833 01/16/23  0633   WBC 9.2 10.0 9.1   HGB 13.9* 15.5 14.1   HCT 40.0* 44.2 40.6*    296 294     Recent Labs     01/14/23 0459 01/15/23  6050 01/16/23  0633    136 135   K 3.7 3.9 3.8    98 98   CO2 22 26 26   BUN 20 19 24*   CREATININE 0.75 0.80 0.87   CALCIUM 8.2* 8.7 8.4*   PHOS 3.0  --   --    AST 12  --   --    ALT 10  --   --    BILITOT 0.9*  --   --    ALKPHOS 98  --   --      No results for input(s): INR in the last 72 hours. Recent Labs     01/14/23  0459   CKTOTAL 87     Radiology:  XR CHEST (2 VW)   Final Result   1. Interval resolution of previously seen right-sided pleural effusion. Cardiac silhouette remains enlarged. Otherwise unremarkable. COMPARISON: CT of the chest dating January 11, 2023      DIAGNOSIS:  post right thoracentesis       COMMENTS: R62.7 Adult failure to thrive ICD10      TECHNIQUE: XR CHEST (2 VW)      FINDINGS:   The lungs are clear. No evidence of pleural fluid. The cardiac silhouette remains enlarged. Visualized soft tissues, and osseous structures are unremarkable. MRI LUMBAR SPINE WO CONTRAST   Final Result   1. Status post decompressive laminectomies with posterior interbody fusion   at L4-5.   2. Hypointense signal in the left peridural space at L4-5 likely represents   fibrosis, which extends to the lateral recess on the left L5 nerve. 3. No significant central canal stenosis. 4.  Multilevel neural foraminal stenoses, worst (severe) at the left L4-5 and   left L5-S1 levels. RECOMMENDATIONS:   Unavailable         MRI BRAIN WO CONTRAST   Final Result   1. No acute intracranial abnormality. 2. Mild-to-moderate cerebral volume loss with mild-to-moderate chronic   microvascular ischemic changes. RECOMMENDATIONS:   Unavailable         US DUP LOWER EXTREMITIES BILATERAL VENOUS   Final Result   No evidence of DVT in the visualized veins of bilateral lower extremities. .         CT HEAD WO CONTRAST   Final Result   No acute intracranial abnormality. RECOMMENDATIONS:   Careful clinical correlation and follow up recommended.          CT CERVICAL SPINE WO CONTRAST   Final Result 1. No acute abnormality of the cervical spine. 2. Chronic type 2 odontoid fracture with minimal 2 mm retrolisthesis of the   odontoid fragment, which is unchanged from prior examinations. RECOMMENDATIONS:   Careful clinical correlation and follow up recommended. CT THORACIC SPINE WO CONTRAST   Final Result   1. No acute disease of the thoracic spine. 2. Moderate right pleural effusion and associated right lung compressive   atelectasis. RECOMMENDATIONS:   Careful clinical correlation and follow up recommended. CT LUMBAR SPINE WO CONTRAST   Final Result   1. No acute lumbar spine injury. 2. 3.2 cm saccular infrarenal abdominal aortic aneurysm. No acute features. Unchanged size in comparison to prior examination of April 17, 2022. RECOMMENDATIONS:   Careful clinical correlation and follow up recommended. 3.2 cm infrarenal saccular abdominal aortic aneurysm. Recommend vascular   consultation. Reference: J Vasc Surg 5974;57:7-22. CT CHEST W CONTRAST   Final Result   1. No acute thoracic injury. Sternum intact. 2. Moderate right pleural effusion and associated compressive atelectasis. RECOMMENDATIONS:   Careful clinical correlation and follow up recommended. CT ABDOMEN PELVIS W IV CONTRAST Additional Contrast? None   Final Result   1. No acute disease. No acute abdominopelvic injury. 2. Cholelithiasis. 3. Saccular infrarenal abdominal aortic aneurysm measuring 3.2 cm unchanged   from prior examination of April 17, 2022. No acute features. RECOMMENDATIONS:   Careful clinical correlation and follow up recommended. XR CHEST PORTABLE   Final Result   No acute process. IR GUIDED THORACENTESIS PLEURAL    (Results Pending)     Assessment/Plan:    Right pleural effusion: For thoracentesis today. Eliquis on hold. Cardiology primarily managing anticoagulation. Anticoagulation for atrial fibrillation. Acute on chronic CHF. Continuing with IV diuresis. Oxygen continues to improve. EtOH user, at risk for withdrawal.  Continue with CIWA protocol. Hypertension: Monitor blood pressure, adjust medications as needed.     35 minutes total care time, >1/2 in unit/floor time and care coordination         Aiyana Samuel MD ,MD

## 2023-01-16 NOTE — PROGRESS NOTES
[id number] [facility] Neurology Daily Progress Note  Name: Toshia Person  Age: 68 y.o. Gender: male  CodeStatus: Full Code  Allergies: Hylan G-F 20  Ace Inhibitors  Statins Depletion Therapy    Chief Complaint:Fall    Primary Care Provider: Alphonse Aase, MD  InpatientTreatment Team: Treatment Team: Attending Provider: Mickie Saavedra MD; Consulting Physician: Faviola Reyes MD; Utilization Reviewer: Niurka Valentin RN; Margarita Amado Nurse: Toni Patterson RN; Consulting Physician: Kimberlee Babinski, MD; Registered Nurse: Amy Yao RN; : Rico Chance, DAMIEN; Consulting Physician: Wyvonnia Boxer, MD; : Judith Dahl. DAMIEN Azul; Utilization Reviewer: Candice Boyd RN  Admission Date: 1/11/2023      HPI   Pt seen and examined for neuro follow up. Currently alert and oriented x2 at best, no acute distress, cooperative. Flat affect. Somewhat bradykinetic. Generalized weakness worse in the lower extremities. Nonfocal.  Denies headache or vision changes. No dysphagia. Afebrile. Thoracentesis completed today. Exam as noted above. No notable findings noted he had a thoracentesis at 6 today    Vitals:    01/16/23 1013   BP: (!) 142/68   Pulse: 73   Resp: 16   Temp:    SpO2: 97%        Review of Systems   Constitutional:  Negative for appetite change and fever. HENT:  Negative for hearing loss and trouble swallowing. Eyes:  Negative for visual disturbance. Respiratory:  Negative for cough, chest tightness, shortness of breath and wheezing. Cardiovascular:  Negative for chest pain, palpitations and leg swelling. Gastrointestinal:  Negative for nausea and vomiting. Musculoskeletal:  Positive for gait problem. Negative for back pain, neck pain and neck stiffness. Skin:  Negative for color change and rash. Neurological:  Positive for weakness. Negative for dizziness, tremors, seizures, syncope, facial asymmetry, speech difficulty, light-headedness, numbness and headaches. Psychiatric/Behavioral:  Positive for confusion. Negative for agitation and hallucinations. The patient is not nervous/anxious. Physical Exam  Vitals and nursing note reviewed. Constitutional:       General: He is not in acute distress. Appearance: He is not diaphoretic. HENT:      Head: Normocephalic. Eyes:      Extraocular Movements: Extraocular movements intact. Pupils: Pupils are equal, round, and reactive to light. Cardiovascular:      Rate and Rhythm: Normal rate and regular rhythm. Pulmonary:      Effort: Pulmonary effort is normal. No respiratory distress. Breath sounds: Normal breath sounds. Abdominal:      General: Bowel sounds are normal. There is no distension. Palpations: Abdomen is soft. Tenderness: There is no abdominal tenderness. Skin:     General: Skin is warm and dry. Neurological:      Mental Status: He is alert. He is disoriented. Cranial Nerves: No cranial nerve deficit. Motor: Weakness present. No tremor or seizure activity.       Coordination: Coordination normal.      Gait: Gait abnormal.      Deep Tendon Reflexes: Reflexes abnormal.     Exam as noted above        Medications:  Reviewed    Infusion Medications:    sodium chloride      sodium chloride       Scheduled Medications:    spironolactone  25 mg Oral Daily    torsemide  20 mg Oral Daily    potassium chloride  10 mEq Oral Daily    losartan  100 mg Oral Daily    mupirocin   Topical Daily    sodium chloride flush  5-40 mL IntraVENous 2 times per day    thiamine  100 mg Oral Daily    apixaban  5 mg Oral BID    hydrALAZINE  50 mg Oral BID    ketorolac  1 drop Both Eyes 4x Daily    pantoprazole  40 mg Oral Daily    rosuvastatin  40 mg Oral QPM    tamsulosin  0.4 mg Oral Daily    miconazole   Topical BID    lidocaine  3 patch TransDERmal Daily    sodium chloride flush  5-40 mL IntraVENous 2 times per day    metoprolol tartrate  50 mg Oral BID    magnesium oxide  400 mg Oral Daily PRN Meds: sodium chloride flush, sodium chloride, ondansetron **OR** ondansetron, polyethylene glycol, acetaminophen **OR** acetaminophen, hydrALAZINE, labetalol, traMADol, sodium chloride flush, sodium chloride, LORazepam **OR** LORazepam **OR** LORazepam **OR** LORazepam **OR** LORazepam **OR** LORazepam **OR** LORazepam **OR** LORazepam, ketorolac    Labs:   Recent Labs     01/14/23  0459 01/15/23  0833 01/16/23  0633   WBC 9.2 10.0 9.1   HGB 13.9* 15.5 14.1   HCT 40.0* 44.2 40.6*    296 294       Recent Labs     01/14/23  0459 01/15/23  0833 01/16/23  0633    136 135   K 3.7 3.9 3.8    98 98   CO2 22 26 26   BUN 20 19 24*   CREATININE 0.75 0.80 0.87   CALCIUM 8.2* 8.7 8.4*   PHOS 3.0  --   --        Recent Labs     01/14/23 0459   AST 12   ALT 10   BILITOT 0.9*   ALKPHOS 98       No results for input(s): INR in the last 72 hours. Recent Labs     01/14/23 0459   CKTOTAL 87         Urinalysis:   Lab Results   Component Value Date/Time    NITRU Negative 01/11/2023 07:45 PM    WBCUA 0-2 01/11/2023 07:45 PM    WBCUA <1 08/05/2022 09:34 PM    BACTERIA Negative 01/11/2023 07:45 PM    RBCUA 0-2 01/11/2023 07:45 PM    RBCUA 1 08/05/2022 09:34 PM    BLOODU Negative 01/11/2023 07:45 PM    SPECGRAV 1.015 01/11/2023 07:45 PM    GLUCOSEU Negative 01/11/2023 07:45 PM    GLUCOSEU NEG 06/07/2012 03:53 PM       Radiology:   Most recent    EEG No valid procedures specified. MRI of Brain No results found for this or any previous visit. Results for orders placed during the hospital encounter of 01/11/23    MRI BRAIN WO CONTRAST    Narrative  EXAMINATION:  MRI OF THE BRAIN WITHOUT CONTRAST  1/13/2023 3:40 pm    TECHNIQUE:  Multiplanar multisequence MRI of the brain was performed without the  administration of intravenous contrast.    COMPARISON:  None.     HISTORY:  ORDERING SYSTEM PROVIDED HISTORY: Recurrent falls, gait ataxia, left lower  extremity weakness, history of A. home noncompliant with anticoagulation  TECHNOLOGIST PROVIDED HISTORY:  Reason for exam:->Recurrent falls, gait ataxia, left lower extremity  weakness, history of A. fib noncompliant with anticoagulation  What reading provider will be dictating this exam?->CRC    FINDINGS:  INTRACRANIAL STRUCTURES/VENTRICLES: There is no acute infarct. No mass  effect, edema or hemorrhage is seen. Mild-to-moderate cerebral volume loss  is noted with mild-to-moderate chronic microvascular ischemic changes. No  hydrocephalus or extra-axial fluid is seen. ORBITS: Prosthetic lenses are seen in the globes bilaterally. The orbits are  otherwise grossly unremarkable. SINUSES: Mild mucosal thickening in the paranasal sinuses. Small right  mastoid effusion. BONES/SOFT TISSUES: The bone marrow signal intensity appears normal. The soft  tissues demonstrate no acute abnormality. Impression  1. No acute intracranial abnormality. 2. Mild-to-moderate cerebral volume loss with mild-to-moderate chronic  microvascular ischemic changes. RECOMMENDATIONS:  Unavailable                            MRA of the Head and Neck: No results found for this or any previous visit. No results found for this or any previous visit. No results found for this or any previous visit. CT of the Head: Results for orders placed during the hospital encounter of 01/11/23    CT HEAD WO CONTRAST    Narrative  EXAMINATION:  CT OF THE HEAD WITHOUT CONTRAST  1/11/2023 10:16 pm    TECHNIQUE:  CT of the head was performed without the administration of intravenous  contrast. Automated exposure control, iterative reconstruction, and/or weight  based adjustment of the mA/kV was utilized to reduce the radiation dose to as  low as reasonably achievable. COMPARISON:  None. HISTORY:  ORDERING SYSTEM PROVIDED HISTORY: fall, intox  TECHNOLOGIST PROVIDED HISTORY:  Reason for exam:->fall, intox  Has a \"code stroke\" or \"stroke alert\" been called? ->No  Decision Support Exception - unselect if not a suspected or confirmed  emergency medical condition->Emergency Medical Condition (MA)  What reading provider will be dictating this exam?->CRC    FINDINGS:  BRAIN/VENTRICLES: There is no acute intracranial hemorrhage, mass effect or  midline shift. No abnormal extra-axial fluid collection. The gray-white  differentiation is maintained without evidence of an acute infarct. There is  no evidence of hydrocephalus. Moderate atrophy. ORBITS: The visualized portion of the orbits demonstrate no acute abnormality. SINUSES: The visualized paranasal sinuses and mastoid air cells demonstrate  no acute abnormality. SOFT TISSUES/SKULL:  No acute abnormality of the visualized skull or soft  tissues. Impression  No acute intracranial abnormality. RECOMMENDATIONS:  Careful clinical correlation and follow up recommended. No results found for this or any previous visit. No results found for this or any previous visit. Carotid duplex: No results found for this or any previous visit. No results found for this or any previous visit. Results for orders placed during the hospital encounter of 12/10/21    US CAROTID ARTERY BILATERAL    Narrative  History:  carotid stenosis    Technique:  US CAROTID ARTERY BILATERAL    Comparison: March 16, 2016      Findings: On the right calcified plaque is seen in the bulb and extends into the internal carotid artery. On the left mild mixed plaque is seen in the bulb. Calcified plaque is seen in the distal common carotid artery. There is a large amount of calcified plaque  is seen in the internal carotid artery. Increased velocity is seen in the proximal left internal carotid artery. Normal antegrade flow is seen in the vertebral arteries.     Doppler findings:  ARTERIAL BLOOD FLOW VELOCITY    RIGHT PS    Prox CCA 124cm/s  Mid CCA 109cm/s  Dist CCA 89cm/s  Prox ICA 89cm/s  Mid ICA 118cm/s  Dist ICA 74cm/s  Prox ECA 162cm/s  Dist VERT 54cm/s  Bulb  ICA/CCA 1.1    LEFT PS    Prox CCA 93cm/s  Mid CCA 83cm/s  Dist CCA 83cm/s  Prox ICA 173cm/s  Mid ICA 108cm/s  Dist ICA 105cm/s  Prox ECA 125cm/s  Prox VERT 37cm/s  Bulb  ICA/CCA 2.1    Impression  Impression: 1. No hemodynamic significant stenosis on the right   2. 50-69% stenosis seen on the left    Validated velocity measurements with angiographic measurements, velocity criteria are extrapolated from diameter data as defined by the Society of radiologist in ultrasound consensus conference radiology 2003; 229; 340-346. Echo No results found for this or any previous visit. Assessment/Plan:    1/13/23:  Patient is a 59-year-old  male with past medical history of alcohol use, medication noncompliance, diabetes mellitus type 2, rheumatoid arthritis, hypertension, hyperlipidemia, HSV-2, depression, CHF, prostate cancer, CAD status post multiple cardiac stents, PAF on Eliquis, pacemaker placement who presented to [facility] on 1/11/2023 after a fall at home. Patient reports increasing falls recently preceded by dizziness. Denies head strike or loss of consciousness. He also reports increasing bilateral lower extremity weakness recently and bowel and bladder incontinence. Initial CT of the head was negative for any acute findings. On exam patient has left lower extremity weakness distal 3 out of 5 and generalized weakness at 4 out of 5. Patient was noncompliant with medications prior to admission including his Eliquis for paroxysmal atrial fibrillation putting him at risk for CVA. .  Given medication noncompliance, multiple risk factors for CVD, and left lower extremity weakness we will obtain MRI of the brain. Given patient's weakness, recurrent falls and areflexia in the lower extremities we will obtain MRI of the lumbar spine. Will have to see if pacemaker is compatible with MRI. Will assess orthostatic blood pressures.   Patient does have ongoing confusion and cognitive impairment. Baseline is unknown. Thyroid studies are normal.  We will check B12 and folate given chronic alcohol use. Will initiate on high-dose thiamine. MRI of the brain per above. Further recommendations to follow. I have personally performed a face to face diagnostic evaluation on this patient, reviewed all data and investigations, and am the sole provider of all clinical decisions on the neurological status of this patient. Patient seen and examined. Multiple medical issues and complication going on. We will try and obtain MRI as patient is on Eliquis and has atrial fibrillation underlying shower of emboli cannot be ruled out. Depending on the pacemaker we will get the MRI. 60% time spent on evaluating the patient myself     2023:  Fall with gait ataxia  lower extremity weakness  ETOH abuse   MRI of the brain negative for acute findings. MRI of the lumbar spine negative for significant canal stenosis but shows chronic postsurgical and degenerative changes likely contributing to gait ataxia. Vitamin B12 borderline low. Will replace. Patient with ongoing confusion. Will need to get a better understanding of patient's baseline. Called and spoke with son Vinnie Tapia who reports pt as had ongoing confusion with concern for dementia for at least 6 months to a year. Pt wife  last year and son also concerned he is depressed. Son reports pt \"going down hill quickly\" since wife . Patient noncompliant with medications at home. Continues to drink daily. Making poor decisions. Will need discharge planning as he is likely not safe to go home alone. 23:  Falls with gait ataxia. MRI of the brain and lumbar spine negative. Patient with underlying cognitive impairment. Will need further outpatient evaluation for underlying dementia. Called and discussed this with his son per above. Chronic alcohol use. High-dose thiamine has been ordered. Neurology to sign off.   Call with questions or concerns. I have personally performed a face to face diagnostic evaluation on this patient, reviewed all data and investigations, and am the sole provider of all clinical decisions on the neurological status of this patient. Patient appears to be still quite ataxic. Brain MRI and lumbar MRI did not show thing Sequent. He has cognitive issues and likely has dementia. There appears to be a session of chronic alcohol use. 60% time spent on evaluating the patient      Dhruv R. Monico Nageotte, MD, [place], American Board of Psychiatry & Neurology  Board Certified in Vascular Neurology  Board Certified in Neuromuscular Medicine  Certified in Neurorehabilitation     Collaborating physicians: Dr Monico Nageotte    Electronically signed by GIUSEPPE Melton CNP on 1/16/2023 at 2:48 PM

## 2023-01-16 NOTE — CONSULTS
CC:   Chief Complaint   Patient presents with    Fall        HPI:  Patient is a pleasant 19-year-old gentleman with a history of coronary artery disease, hyperlipidemia, hypertension, diabetes which presented after falling. Patient has recently had multiple falls. He said that he had gotten dizzy at home and fell and hit his chest on the stool. Patient was on the floor for more than 6 hours. His neighborhood checked on him and called EMS. During admission, patient is gradual complaining of increased shortness of breath. Imaging demonstrated a moderate right pleural effusion present. Interventional radiology was consulted for therapeutic and diagnostic right thoracentesis. Family History   Problem Relation Age of Onset    Heart Attack Father     Cancer Father         colon    High Cholesterol Mother     Heart Disease Mother     Macular Degen Mother     Arthritis Sister         hip replacement    Other Brother         vertigo    No Known Problems Son     No Known Problems Son        Past Surgical History:   Procedure Laterality Date    ANKLE SURGERY Right     pin    APPENDECTOMY      ARTHRODESIS Right 10/31/2016    RIGHT ANKLE ARTHRODESIS OF RIGHT ANKLE AND SUBTALAR JOINT, C-ARM, ABHIJEET EQUIPMENT SYNTHETIC BONE GRAFT, ALLOGRAFT CANCELLOUS, SHARON ANKLE FUSION NAIL, SHANDA IMPLANT BONE STIMULATOR, CHOICE ANESTHESIA, BLOCK  performed by Vik Duval MD at [facility]      stent x 5    COLONOSCOPY  07/19/2011    FRACTURE SURGERY      right ankle    HARDWARE REMOVAL FOOT / ANKLE Right 11/06/2017    RIGHT ANKLE HARDWARE REMOVAL performed by Inge Romero MD at [address] [facility] Bilateral     knees    MI COLON CA SCRN NOT  W [address] [place] [address] N/A 07/11/2017    COLONOSCOPY performed by Ferd Bosworth, DO at [facility] Left     THORACENTESIS Right 01/16/2023    1140ml removed by Dr. Jadon Fatima. Diagnostics sent to lab.     TONSILLECTOMY AND ADENOIDECTOMY          Past Medical History: Diagnosis Date    Bradycardia     CAD (coronary artery disease)     Cancer (HCC)     prostate- radiation     CHF (congestive heart failure) (HCC)     Depression     HSV-2 (herpes simplex virus 2) infection     Hyperlipidemia     Hypertension     Left knee DJD     Osteoarthritis     Rheumatoid arthritis(714.0)     Type 2 diabetes mellitus without complication, without long-term current use of insulin ([facility].) 1/10/2019       Social History     Socioeconomic History    Marital status:      Spouse name: None    Number of children: None    Years of education: None    Highest education level: None   Tobacco Use    Smoking status: Former     Packs/day: 0.25     Years: 7.00     Pack years: 1.75     Types: Cigarettes     Start date: 5     Quit date: 6/3/1992     Years since quittin.6    Smokeless tobacco: Never   Vaping Use    Vaping Use: Never used   Substance and Sexual Activity    Alcohol use: Yes     Alcohol/week: 2.0 standard drinks     Types: 2 Shots of liquor per week     Comment: daily, 2 glassess of vodka    Drug use: No    Sexual activity: Never     Social Determinants of Health     Financial Resource Strain: Low Risk     Difficulty of Paying Living Expenses: Not hard at all   Food Insecurity: No Food Insecurity    Worried About Running Out of Food in the Last Year: Never true    Ran Out of Food in the Last Year: Never true       Allergies   Allergen Reactions    Hylan G-F 20 Other (See Comments) and Swelling     Swelling in leg    Ace Inhibitors      cough    Statins Depletion Therapy Other (See Comments)     OKAY WITH CRESTOR, weakness       No current facility-administered medications on file prior to encounter.      Current Outpatient Medications on File Prior to Encounter   Medication Sig Dispense Refill    hydrALAZINE (APRESOLINE) 50 MG tablet Take 50 mg by mouth in the morning and at bedtime      pantoprazole (PROTONIX) 40 MG tablet Take 40 mg by mouth daily      traMADol (ULTRAM) 50 MG tablet Take 50 mg by mouth every 6 hours as needed for Pain. furosemide (LASIX) 20 MG tablet Take 20 mg by mouth in the morning and 20 mg before bedtime. nitroGLYCERIN (NITROSTAT) 0.4 MG SL tablet Place 0.4 mg under the tongue every 5 minutes as needed for Chest pain up to max of 3 total doses. If no relief after 1 dose, call 911. losartan (COZAAR) 25 MG tablet Take 1 tablet by mouth daily (Patient taking differently: Take 25 mg by mouth daily Indications: High Blood Pressure Disorder) 30 tablet 3    apixaban (ELIQUIS) 5 MG TABS tablet Take 1 tablet by mouth 2 times daily (Patient taking differently: Take 5 mg by mouth 2 times daily Indications: Atrial Fibrillation) 60 tablet 3    rosuvastatin (CRESTOR) 40 MG tablet Take 40 mg by mouth every evening Indications: High Amount of Fats in the Blood       ketorolac 0.4 % SOLN ophthalmic solution Place 1 drop into both eyes 2 times daily Indications: Eye Pain   1    tamsulosin (FLOMAX) 0.4 MG capsule Take 1 capsule by mouth daily (Patient taking differently: Take 0.4 mg by mouth daily Indications: Urinary Retention) 90 capsule 3    acetaminophen (TYLENOL) 325 MG tablet Take 500 mg by mouth every 4 hours as needed for Pain or Fever         Review of Systems   Constitutional:  Positive for fatigue. Negative for chills, diaphoresis and fever. HENT: Negative. Negative for congestion, ear pain, hearing loss and tinnitus. Eyes: Negative. Negative for photophobia. Respiratory:  Positive for chest tightness and shortness of breath. Negative for cough and wheezing. Cardiovascular: Negative. Negative for chest pain, palpitations and leg swelling. Gastrointestinal: Negative. Negative for abdominal pain, constipation, diarrhea, nausea and vomiting. Genitourinary: Negative. Negative for dysuria, flank pain, frequency, hematuria and urgency. Musculoskeletal: Negative. Negative for back pain and neck pain. Skin: Negative. Negative for rash. Allergic/Immunologic: Negative for environmental allergies. Neurological: Negative. Negative for dizziness, tremors, weakness and headaches. Hematological:  Does not bruise/bleed easily. Psychiatric/Behavioral: Negative. Negative for hallucinations and suicidal ideas. The patient is not nervous/anxious. OBJECTIVE:  BP (!) 142/68   Pulse 73   Temp 99 °F (37.2 °C) (Oral)   Resp 16   Ht 5' 11\" (1.803 m)   Wt 264 lb (119.7 kg)   SpO2 97%   BMI 36.82 kg/m²     Physical Exam  Constitutional:       General: He is not in acute distress. Appearance: He is well-developed. He is not diaphoretic. HENT:      Head: Normocephalic and atraumatic. Nose: Nose normal.      Mouth/Throat:      Pharynx: No oropharyngeal exudate. Eyes:      General: No scleral icterus. Right eye: No discharge. Left eye: No discharge. Conjunctiva/sclera: Conjunctivae normal.   Neck:      Thyroid: No thyromegaly. Vascular: No JVD. Trachea: No tracheal deviation. Cardiovascular:      Rate and Rhythm: Normal rate and regular rhythm. Heart sounds: Normal heart sounds. No murmur heard. No friction rub. No gallop. Pulmonary:      Effort: No respiratory distress. Breath sounds: No stridor. Examination of the right-middle field reveals decreased breath sounds. Examination of the right-lower field reveals decreased breath sounds. Decreased breath sounds present. No wheezing or rales. Chest:      Chest wall: No tenderness. Abdominal:      General: Bowel sounds are normal. There is no distension. Palpations: Abdomen is soft. There is no mass. Tenderness: There is no abdominal tenderness. There is no guarding or rebound. Hernia: No hernia is present. Musculoskeletal:         General: No tenderness or deformity. Cervical back: Neck supple. Skin:     General: Skin is dry. Coloration: Skin is not pale. Findings: No erythema or rash.    Neurological: Mental Status: He is alert and oriented to person, place, and time. Cranial Nerves: No cranial nerve deficit. Psychiatric:         Behavior: Behavior normal.         Thought Content: Thought content normal.         Judgment: Judgment normal.       Lab Results   Component Value Date/Time    WBC 9.1 01/16/2023 06:33 AM    HGB 14.1 01/16/2023 06:33 AM    HCT 40.6 01/16/2023 06:33 AM     01/16/2023 06:33 AM    MCV 90.5 01/16/2023 06:33 AM       XR CHEST (2 VW)    Result Date: 1/16/2023  1. Interval resolution of previously seen right-sided pleural effusion. Cardiac silhouette remains enlarged. Otherwise unremarkable. COMPARISON: CT of the chest dating January 11, 2023 DIAGNOSIS:  post right thoracentesis COMMENTS: R62.7 Adult failure to thrive ICD10 TECHNIQUE: XR CHEST (2 VW) FINDINGS: The lungs are clear. No evidence of pleural fluid. The cardiac silhouette remains enlarged. Visualized soft tissues, and osseous structures are unremarkable. ASSESSMENT ANDPLAN:    ASSESSMENT: Patient with right-sided pleural effusion and shortness of breath    PLAN: Diagnostic and therapeutic ultrasound-guided right-sided thoracentesis. The risks of bleeding, infection, pneumothorax which may require surgical intervention were explained to the patient. Patient agrees to proceed with the procedure.     Luis Antonio Murphy MD, [facility]

## 2023-01-16 NOTE — BRIEF OP NOTE
Preliminary  Procedure Note, Full Note To Follow in PACS  Vascular and Interventional Radiology      IMPRESSION:  Technically successful ultrasound-guided thoracentesis without immediate complications. HISTORY: Rochelle Woodruff is a Male of 68 years age. DIAGNOSIS: Right pleural effusion    COMMENTS: R62.7 Adult failure to thrive ICD10      PROCEDURE:  Following the discussion of procedure, risks versus benefits, possible complications, informed consent was obtained. Following universal protocol, patient and site verification was performed with a \"timeout\" prior to the procedure. Brief survey of the patient's right hemithorax demonstrate moderate amount of pleural effusion. After selection of an appropriate site for thoracentesis, the thoracic skin was prepped and draped in usual sterile fashion. Under ultrasound guidance, using lidocaine for local anesthesia, a 19-gauge Cmuneeh catheter was inserted in the pleural cavity until effusion was aspirated. Using Vacutainer bottles, 1140 mL of clear yellow effusion was drained. The catheter was removed and the aspiration site dressed. The patient tolerated procedure well. No immediate complications were identified. Subsequent chest radiograph demonstrated no evidence of pneumothorax. The patient left the radiology department in stable condition. Radiology nurse monitored patient's vital signs throughout the procedure which lasted approximately 30 minutes.

## 2023-01-16 NOTE — PROGRESS NOTES
[facility] Occupational Therapy      Date: 2023  Patient Name: Mago Hurley        MRN: [id number]  Account: [de-identified]   : 1945  (68 y.o.)  [facility]    Chart reviewed, attempted OT at [phone] for treatment. Patient not seen 2° to:    Pt. declined, stating: \"I was already up and moving today. \"    Spoke to DAMIEN Valdes aware. Will attempt again when able.     Electronically signed by JACOB Julien on  at 2:17 PM

## 2023-01-16 NOTE — DISCHARGE INSTR - COC
Continuity of Care Form    Patient Name: Prince Yeh   :    MRN:  [id number]    Admit date:  2023  Discharge date:  ***    Code Status Order: Full Code   Advance Directives:     Admitting Physician:   Josie Vital MD  PCP: Everett Maldonado MD    Discharging Nurse: [facility] Unit/Room#: E839/G156-89  Discharging Unit Phone Number: ***    Emergency Contact:   Extended Emergency Contact Information  Primary Emergency Contact: [address] Phone: [phone]  Mobile Phone: [phone]  Relation: Child  Secondary Emergency Contact: Mian villarreal  Mobile Phone: [phone]  Relation: Child    Past Surgical History:  Past Surgical History:   Procedure Laterality Date    ANKLE SURGERY Right     pin    APPENDECTOMY      ARTHRODESIS Right 10/31/2016    RIGHT ANKLE ARTHRODESIS OF RIGHT ANKLE AND SUBTALAR JOINT, C-ARM, ABHIJEET EQUIPMENT SYNTHETIC BONE GRAFT, ALLOGRAFT CANCELLOUS, SHARON ANKLE FUSION NAIL, SHANDA IMPLANT BONE STIMULATOR, CHOICE ANESTHESIA, BLOCK  performed by Rosy Payne MD at [place] [address]      stent x 5    COLONOSCOPY  11    FRACTURE SURGERY      right ankle    HARDWARE REMOVAL FOOT / ANKLE Right 2017    RIGHT ANKLE HARDWARE REMOVAL performed by Jae Pimentel MD at [facility] [address] Bilateral     knees    [place] COLON CA SCRN NOT  [address] IND N/A 2017    COLONOSCOPY performed by Jonnie Salinas DO at [facility] Left     TONSILLECTOMY AND ADENOIDECTOMY         Immunization History:   Immunization History   Administered Date(s) Administered    Influenza Vaccine, unspecified formulation 2016    Influenza, High Dose (Fluzone 65 yrs and older) 2015, 10/20/2018    Influenza, Triv, 3 Years and older, IM, PF (Afluria 5yrs and older) 2016    Pneumococcal Conjugate 13-valent (Glennda Pacheco) 01/10/2017    Zoster Live (Zostavax) 2012, 2018, 2018    Zoster Recombinant (Shingrix) 2018, 2018       Active Problems:  Patient Active Problem List   Diagnosis Code    Bradycardia R00.1    Mixed hyperlipidemia E78.2    Primary osteoarthritis involving multiple joints M15.9    Dysthymia F34.1    CAD (coronary artery disease) I25.10    RA (rheumatoid arthritis) ([facility] [address] M06.9    Essential hypertension I10    Numbness in feet R20.0    Stented coronary artery Z95.5    History of prostate cancer Z85.46    History of colon polyps Z86.010    Nuclear sclerosis of both eyes H25.13    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, 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    Presbyopia H52.4    Regular astigmatism H52.229    Hyperuricemia E79.0    Chronic gastritis without bleeding K29.50    Type 2 diabetes mellitus with microalbuminuria, without long-term current use of insulin ([facility]) E11.29, R80.9    Retinal hemorrhage, bilateral H35.63    Intermediate stage nonexudative age-related macular degeneration of both eyes H35.3132    Primary osteoarthritis, right ankle and foot M19.071    Pain in right ankle M25.571    Encounter for other orthopedic aftercare Z47.89    Arthrodesis status Z98.1    Unable to ambulate R26.2    PAF (paroxysmal atrial fibrillation)  I48.0    CHF (congestive heart failure), NYHA class I, acute on chronic, combined ([facility]) I50.43    Elevated troponin R77.8    Adjustment disorder F43.20       Isolation/Infection:   Isolation            No Isolation          Patient Infection Status       Infection Onset Added Last Indicated Last Indicated By Review Planned Expiration Resolved Resolved By    None active    Resolved    COVID-19 (Rule Out) 22 COVID-19, Rapid (Ordered)   22 Rule-Out Test Resulted    COVID-19 21 COVID-19 Ambulatory   21     COVID-19 (Rule Out) 21 COVID-19 Ambulatory (Ordered)   21 Rule-Out Test Resulted            Nurse Assessment:  Last Vital Signs: BP (!) 142/68   Pulse 73   Temp 99 °F (37.2 °C) (Oral)   Resp 16   Ht 5' 11\" (1.803 m)   Wt 264 lb (119.7 kg)   SpO2 97%   BMI 36.82 kg/m²     Last documented pain score (0-10 scale): Pain Level: 8  Last Weight:   Wt Readings from Last 1 Encounters:   23 264 lb (119.7 kg)     Mental Status:  {IP PT MENTAL STATUS:36494}    IV Access:  { NOMAN IV ACCESS:746140447}    Nursing Mobility/ADLs:  Walking   {CHP DME BAKP:261942680}  Transfer  {CHP DME NEEU:373140986}  Bathing  {CHP DME DFUU:923224418}  Dressing  {CHP DME NRHO:849491900}  Toileting  {CHP DME EOEX:877549264}  Feeding  {CHP DME KRFM:364676176}  Med Admin  {P DME CDYD:383231773}  Med Delivery   { NOMAN MED Delivery:000959926}    Wound Care Documentation and Therapy:        Elimination:  Continence:    Bowel: {YES / AC:00751}  Bladder: {YES / ND:40663}  Urinary Catheter: {Urinary Catheter:028492926}   Colostomy/Ileostomy/Ileal Conduit: {YES / VK:41831}       Date of Last BM: ***    Intake/Output Summary (Last 24 hours) at 2023 1031  Last data filed at 1/15/2023 2344  Gross per 24 hour   Intake --   Output 850 ml   Net -850 ml     I/O last 3 completed shifts:  In: -   Out: 1450 [Urine:1450]    Safety Concerns:     [facility] [employer] Safety Concerns:196537030}    Impairments/Disabilities:      [employer] Impairments/Disabilities:156993452}    Nutrition Therapy:  Current Nutrition Therapy:   [facility] [employer] Diet List:314726595}    Routes of Feeding: {CHP DME Other Feedings:643803794}  Liquids: {Slp liquid thickness:21879}  Daily Fluid Restriction: {CHP DME Yes amt example:578441171}  Last Modified Barium Swallow with Video (Video Swallowing Test): {[facility]191368[facility]}    Treatments at the Time of Hospital Discharge:   Respiratory Treatments: ***  Oxygen Therapy:  {Therapy; copd oxygen:76093}  Ventilator:    { BI Vent UTLU:122799614}    Rehab Therapies: {THERAPEUTIC INTERVENTION:3454128763}  Weight Bearing Status/Restrictions: [address] Sarah PAT Weight Bearin}  Other Medical Equipment (for information only, NOT a DME order):  {EQUIPMENT:395017290}  Other Treatments: ***    Patient's personal belongings (please select all that are sent with patient):  {CHP DME Belongings:319725284}    RN SIGNATURE:  {Esignature:586297755}    CASE MANAGEMENT/SOCIAL WORK SECTION    Inpatient Status Date: ***    Readmission Risk Assessment Score:  Readmission Risk              Risk of Unplanned Readmission:  15           Discharging to Facility/ Agency   Name: [facility]   Address:  Phone: [phone]  Fax:    Dialysis Facility (if applicable)   Name:  Address:  Dialysis Schedule:  Phone:  Fax:    / signature:   Electronically signed by ALEXIA Brito LSW on 1/16/23 at 10:32 AM EST  PHYSICIAN SECTION    Prognosis: Good    Condition at Discharge: Stable    Rehab Potential (if transferring to Rehab): Good    Recommended Labs or Other Treatments After Discharge:     Physician Certification: I certify the above information and transfer of Jimena Monteiro  is necessary for the continuing treatment of the diagnosis listed and that he requires [facility] for less 30 days.      Update Admission H&P: No change in H&P    PHYSICIAN SIGNATURE:  Electronically signed by Alie Cosme MD on 1/17/23 at 4:23 PM EST

## 2023-01-17 VITALS
HEART RATE: 62 BPM | OXYGEN SATURATION: 96 % | TEMPERATURE: 97.3 F | DIASTOLIC BLOOD PRESSURE: 55 MMHG | BODY MASS INDEX: 36.96 KG/M2 | HEIGHT: 71 IN | SYSTOLIC BLOOD PRESSURE: 111 MMHG | WEIGHT: 264 LBS | RESPIRATION RATE: 18 BRPM

## 2023-01-17 LAB
ANION GAP SERPL CALCULATED.3IONS-SCNC: 17 MEQ/L (ref 9–15)
BASOPHILS ABSOLUTE: 0 K/UL (ref 0–0.2)
BASOPHILS RELATIVE PERCENT: 0.4 %
BUN BLDV-MCNC: 31 MG/DL (ref 8–23)
CALCIUM SERPL-MCNC: 8.6 MG/DL (ref 8.5–9.9)
CHLORIDE BLD-SCNC: 95 MEQ/L (ref 95–107)
CO2: 23 MEQ/L (ref 20–31)
CREAT SERPL-MCNC: 1.06 MG/DL (ref 0.7–1.2)
EOSINOPHILS ABSOLUTE: 0.2 K/UL (ref 0–0.7)
EOSINOPHILS RELATIVE PERCENT: 1.6 %
GFR SERPL CREATININE-BSD FRML MDRD: >60 ML/MIN/{1.73_M2}
GLUCOSE BLD-MCNC: 156 MG/DL (ref 70–99)
HCT VFR BLD CALC: 42.1 % (ref 42–52)
HEMOGLOBIN: 14.3 G/DL (ref 14–18)
LACTATE DEHYDROGENASE, FLUID: 109 U/L
LYMPHOCYTES ABSOLUTE: 0.7 K/UL (ref 1–4.8)
LYMPHOCYTES RELATIVE PERCENT: 6.8 %
MAGNESIUM: 2.1 MG/DL (ref 1.7–2.4)
MCH RBC QN AUTO: 31 PG (ref 27–31.3)
MCHC RBC AUTO-ENTMCNC: 33.9 % (ref 33–37)
MCV RBC AUTO: 91.6 FL (ref 79–92.2)
MONOCYTES ABSOLUTE: 0.9 K/UL (ref 0.2–0.8)
MONOCYTES RELATIVE PERCENT: 8.8 %
NEUTROPHILS ABSOLUTE: 8.9 K/UL (ref 1.4–6.5)
NEUTROPHILS RELATIVE PERCENT: 82.4 %
PDW BLD-RTO: 16 % (ref 11.5–14.5)
PLATELET # BLD: 350 K/UL (ref 130–400)
POTASSIUM SERPL-SCNC: 3.8 MEQ/L (ref 3.4–4.9)
PRELIMINARY: NORMAL
RBC # BLD: 4.6 M/UL (ref 4.7–6.1)
SODIUM BLD-SCNC: 135 MEQ/L (ref 135–144)
SPECIMEN TYPE: NORMAL
WBC # BLD: 10.8 K/UL (ref 4.8–10.8)

## 2023-01-17 PROCEDURE — 2580000003 HC RX 258: Performed by: NURSE PRACTITIONER

## 2023-01-17 PROCEDURE — 85025 COMPLETE CBC W/AUTO DIFF WBC: CPT

## 2023-01-17 PROCEDURE — 36415 COLL VENOUS BLD VENIPUNCTURE: CPT

## 2023-01-17 PROCEDURE — 80048 BASIC METABOLIC PNL TOTAL CA: CPT

## 2023-01-17 PROCEDURE — 6370000000 HC RX 637 (ALT 250 FOR IP): Performed by: NURSE PRACTITIONER

## 2023-01-17 PROCEDURE — 6370000000 HC RX 637 (ALT 250 FOR IP): Performed by: INTERNAL MEDICINE

## 2023-01-17 PROCEDURE — 2580000003 HC RX 258: Performed by: INTERNAL MEDICINE

## 2023-01-17 PROCEDURE — 83735 ASSAY OF MAGNESIUM: CPT

## 2023-01-17 RX ORDER — SPIRONOLACTONE 25 MG/1
25 TABLET ORAL DAILY
Qty: 30 TABLET | Refills: 3 | DISCHARGE
Start: 2023-01-18

## 2023-01-17 RX ORDER — TORSEMIDE 20 MG/1
20 TABLET ORAL DAILY
Qty: 30 TABLET | Refills: 3 | DISCHARGE
Start: 2023-01-18

## 2023-01-17 RX ORDER — POTASSIUM CHLORIDE 20 MEQ/1
20 TABLET, EXTENDED RELEASE ORAL DAILY
Status: DISCONTINUED | OUTPATIENT
Start: 2023-01-18 | End: 2023-01-17 | Stop reason: HOSPADM

## 2023-01-17 RX ORDER — LOSARTAN POTASSIUM 100 MG/1
100 TABLET ORAL DAILY
Qty: 30 TABLET | Refills: 3 | DISCHARGE
Start: 2023-01-18

## 2023-01-17 RX ADMIN — KETOROLAC TROMETHAMINE 1 DROP: 5 SOLUTION/ DROPS OPHTHALMIC at 15:41

## 2023-01-17 RX ADMIN — TRAMADOL HYDROCHLORIDE 25 MG: 50 TABLET ORAL at 04:37

## 2023-01-17 RX ADMIN — KETOROLAC TROMETHAMINE 1 DROP: 5 SOLUTION/ DROPS OPHTHALMIC at 10:58

## 2023-01-17 RX ADMIN — LOSARTAN POTASSIUM 100 MG: 100 TABLET, FILM COATED ORAL at 10:58

## 2023-01-17 RX ADMIN — Medication 10 ML: at 10:56

## 2023-01-17 RX ADMIN — PANTOPRAZOLE SODIUM 40 MG: 40 TABLET, DELAYED RELEASE ORAL at 10:58

## 2023-01-17 RX ADMIN — TORSEMIDE 20 MG: 20 TABLET ORAL at 10:58

## 2023-01-17 RX ADMIN — METOPROLOL TARTRATE 50 MG: 50 TABLET, FILM COATED ORAL at 10:58

## 2023-01-17 RX ADMIN — SPIRONOLACTONE 25 MG: 25 TABLET ORAL at 10:58

## 2023-01-17 RX ADMIN — TAMSULOSIN HYDROCHLORIDE 0.4 MG: 0.4 CAPSULE ORAL at 10:58

## 2023-01-17 RX ADMIN — ACETAMINOPHEN 650 MG: 325 TABLET ORAL at 15:40

## 2023-01-17 RX ADMIN — APIXABAN 5 MG: 5 TABLET, FILM COATED ORAL at 10:58

## 2023-01-17 RX ADMIN — POTASSIUM CHLORIDE 10 MEQ: 1500 TABLET, EXTENDED RELEASE ORAL at 10:58

## 2023-01-17 RX ADMIN — HYDRALAZINE HYDROCHLORIDE 50 MG: 50 TABLET, FILM COATED ORAL at 10:58

## 2023-01-17 RX ADMIN — MICONAZOLE NITRATE: 2 POWDER TOPICAL at 10:58

## 2023-01-17 RX ADMIN — Medication 400 MG: at 10:56

## 2023-01-17 ASSESSMENT — PAIN DESCRIPTION - DESCRIPTORS: DESCRIPTORS: ACHING;DISCOMFORT

## 2023-01-17 ASSESSMENT — PAIN SCALES - GENERAL
PAINLEVEL_OUTOF10: 2
PAINLEVEL_OUTOF10: 7

## 2023-01-17 ASSESSMENT — PAIN DESCRIPTION - LOCATION: LOCATION: BACK;NECK

## 2023-01-17 NOTE — PROGRESS NOTES
Physical Therapy Missed Treatment   Facility/Department: [facility] MED SURG T716/K066-50    NAME: Mago Hurley    : [birth year] (69 y.o.)  MRN: [id number]    Account: [de-identified]  Gender: male        [x] Patient Declines PT Treatment: \"I'm tired. I don't want to hurt my self. \"   Provided encouragement. Patient unreceptive. Continues to decline. States he is fearful of pain caused by  wounds.            Electronically signed by August Griffin PTA on 23 at 3:31 PM EST

## 2023-01-17 NOTE — PROGRESS NOTES
[facility] Note      Patient: Alyssa Worthington    Unit/Bed: D701/J430-40  YOB: 1945  MRN: [id number]  [facility] Date:  1/11/2023  Hospital Day: 5    Rounding Date: 1/17/2023    Rounding Cardiologist:  LIAN Gilman MD    PRIMARY Cardiologist: Maci Ryan    Subjective Complaint:   Denies any chest pain with exertion or at rest, palpitations, syncope, or edema.,  But states he is somewhat depressed    Physical Examination:     BP (!) 111/55   Pulse 62   Temp 97.3 °F (36.3 °C) (Oral)   Resp 18   Ht 5' 11\" (1.803 m)   Wt 264 lb (119.7 kg)   SpO2 96%   BMI 36.82 kg/m²       Intake/Output Summary (Last 24 hours) at 1/17/2023 1517  Last data filed at 1/17/2023 0444  Gross per 24 hour   Intake 720 ml   Output 1900 ml   Net -1180 ml                  Alyssa Worthington examined at bedside in in no apparent distress. Focused exam reveals:     Cardiac: Heart regular rate and rhythm with occasional ectopy     Lungs:  clear to auscultation bilaterally- no wheezes, rales or rhonchi, normal air movement, no respiratory distress    Extremities:   1+ edema    Telemetry:      atrial fibrillation - controlled and paced         LABS:   CBC:   Recent Labs     01/15/23  0833 01/16/23  0633 01/17/23  0522   WBC 10.0 9.1 10.8   HGB 15.5 14.1 14.3    294 350      BMP:    Recent Labs     01/15/23  0833 01/16/23  0633 01/17/23  0522    135 135   K 3.9 3.8 3.8   CL 98 98 95   CO2 26 26 23   BUN 19 24* 31*   CREATININE 0.80 0.87 1.06   GLUCOSE 148* 129* 156*              Troponin: No results for input(s): TROPONINT in the last 72 hours. BNP: No results for input(s): PROBNP in the last 72 hours. INR: No results for input(s): INR in the last 72 hours. Mg:   Recent Labs     01/17/23  0522   MG 2.1       Cardiac Testing:    Summary   Left ventricular ejection fraction is visually estimated at 60%. Mild to moderate concentric left ventricular hypertrophy.    Indeterminate diastolic relaxation   Mild (1+) mitral regurgitation is present. There is mild ( 1 +) tricuspid regurgitation with estimated RVSP of 50 mm   Hg.--moderate pulmonary hypertension   Moderately dilated left atrium. Normal right ventricular size with preserved RV function. Pacemaker Wire visualized in right ventricle. There is a (trivial) pericardial effusion. Previous echo was 2014    Assessment:  Patient's cardiovascular status is stable  Chronic atrial fibrillation though has been cardioversion in the past  Status post pacemaker placement  Difficulty ambulating-etiology unclear  Possible depression  Plan:   Will defer depression to primary service and possibly psychiatry-I believe they saw him for left wrist dependency in the past  Increase potassium  See orders  Electronically signed by Shandra Velásquez MD on 1/17/2023 at 3:17 PM

## 2023-01-17 NOTE — PROGRESS NOTES
PSYCHE CONSULT CALLED TO  [facility] PERFECT SERVE Electronically signed by Britton Alpers on 1/17/2023 at 4:11 PM

## 2023-01-17 NOTE — PROGRESS NOTES
Assessment complete. Patient is alert and oriented to self and place. VSS. Denies any SOB, N/V, dizziness or pain at this time. Micotin powder placed on abdominal folds and groin. Denies any other needs and call light is within reach.   Electronically signed by Jeb Fish RN on 1/16/2023 at 7:54 PM

## 2023-01-18 LAB
AFB STAIN: NORMAL
PRELIMINARY: NORMAL

## 2023-01-18 NOTE — PROGRESS NOTES
Call sent to security regarding pt's belongings by [employer] - no call back. Tag is at .      Electronically signed by Kael Caraballo RN on 1/17/23 at 8:01 PM EST

## 2023-01-22 LAB — BODY FLUID CULTURE, STERILE: NORMAL

## 2023-01-22 NOTE — PROGRESS NOTES
Physician Progress Note      Mariangel Jimenez  CSN #:                  [id number]  :                       1945  ADMIT DATE:       2023 7:19 PM  [address] DATE:        2023 6:46 PM  RESPONDING  PROVIDER #:        Maycol Rooney MD          QUERY TEXT:    Pt admitted with fall, weakness, and failure to thrive. Pt with \"cognitive   issues and likely has dementia\" per neurology  PN. \"Chronic severe alcohol   dependence\" per attending 1/15 note. If possible, please document in the   progress notes and discharge summary if you are evaluating and / or treating   any of the following: The medical record reflects the following:  Risk Factors: 69 yo male, daily alcohol use, CAD, HLD, DM, CHF, afib,   medication noncompliance, PRAVEEN, rheumatoid arthritis  Clinical Indicators:  neuro \"Patient does have ongoing confusion and   cognitive impairment\",  \"Fall with gait ataxia  lower extremity weakness\",  \"He has cognitive issues and likely has   dementia. \"  Dr. Rishi Colmenares : MRI brain per neurology with no acute findings, but chronic   appearing microvascular ischemic and likely age-related changes. MRI lumbar   spine with evidence of prior lumbar spine surgeries, some localized fibrosis   consistent with scar tissue from prior surgeries, as well as possible spinal   stenosis which may account for some of patient's recurrent falls if   contributory to her lower extremity weakness\", 1/15 \"Chronic severe alcohol   dependence: Patient reported 3 vodkas daily. \"  Treatment: Cyanocobalamin, Folic acid, PT/OT, CT/MRI head/brain/spine,   neurology consult, psych eval    Thank you, Yvonne Marques RN BSN CDS  [phone]  Options provided:  -- Age Related Physical Debility  -- Falls, weakness and failure to thrive likely due to dementia  -- Falls, weakness and failure to thrive likely due to dementia, alcohol abuse  -- Falls, weakness and failure to thrive likely due to dementia, alcohol abuse   and spinal stenosis  -- [Falls, weakness and failure to thrive due to other, Please document other   cause. -- Frailty  -- Other - I will add my own diagnosis  -- Disagree - Not applicable / Not valid  -- Disagree - Clinically unable to determine / Unknown  -- Refer to Clinical Documentation Reviewer    PROVIDER RESPONSE TEXT:    This patient has age related physical debility.     Query created by: Wendall Koyanagi on 1/19/2023 3:37 PM      Electronically signed by:  Michelle Richter MD 1/22/2023 1:08 PM

## 2023-01-28 LAB — PRELIMINARY: NORMAL

## 2023-02-12 PROBLEM — R79.89 ELEVATED TROPONIN: Status: RESOLVED | Noted: 2023-01-13 | Resolved: 2023-02-12

## 2023-02-12 PROBLEM — R77.8 ELEVATED TROPONIN: Status: RESOLVED | Noted: 2023-01-13 | Resolved: 2023-02-12

## 2023-02-17 ENCOUNTER — HOSPITAL ENCOUNTER (INPATIENT)
Age: 78
LOS: 13 days | Discharge: NURSING, REHAB OR LONG-TERM CARE | End: 2023-03-02
Attending: EMERGENCY MEDICINE
Payer: OTHER GOVERNMENT

## 2023-02-17 DIAGNOSIS — I95.9 HYPOTENSION, UNSPECIFIED HYPOTENSION TYPE: Primary | ICD-10-CM

## 2023-02-17 DIAGNOSIS — N17.9 AKI (ACUTE KIDNEY INJURY) (HCC): ICD-10-CM

## 2023-02-17 LAB
ADENOVIRUS BY PCR: NOT DETECTED
ALBUMIN SERPL-MCNC: 3.8 G/DL (ref 3.5–4.6)
ALP BLD-CCNC: 105 U/L (ref 35–104)
ALT SERPL-CCNC: 14 U/L (ref 0–41)
ANION GAP SERPL CALCULATED.3IONS-SCNC: 16 MEQ/L (ref 9–15)
AST SERPL-CCNC: 16 U/L (ref 0–40)
BASOPHILS ABSOLUTE: 0 K/UL (ref 0–0.2)
BASOPHILS RELATIVE PERCENT: 0.4 %
BILIRUB SERPL-MCNC: 0.5 MG/DL (ref 0.2–0.7)
BORDETELLA PARAPERTUSSIS BY PCR: NOT DETECTED
BORDETELLA PERTUSSIS BY PCR: NOT DETECTED
BUN BLDV-MCNC: 52 MG/DL (ref 8–23)
CALCIUM SERPL-MCNC: 9.6 MG/DL (ref 8.5–9.9)
CHLAMYDOPHILIA PNEUMONIAE BY PCR: NOT DETECTED
CHLORIDE BLD-SCNC: 99 MEQ/L (ref 95–107)
CO2: 24 MEQ/L (ref 20–31)
CORONAVIRUS 229E BY PCR: NOT DETECTED
CORONAVIRUS HKU1 BY PCR: NOT DETECTED
CORONAVIRUS NL63 BY PCR: NOT DETECTED
CORONAVIRUS OC43 BY PCR: NOT DETECTED
CREAT SERPL-MCNC: 1.6 MG/DL (ref 0.7–1.2)
EOSINOPHILS ABSOLUTE: 0.2 K/UL (ref 0–0.7)
EOSINOPHILS RELATIVE PERCENT: 1.5 %
GFR SERPL CREATININE-BSD FRML MDRD: 44 ML/MIN/{1.73_M2}
GLOBULIN: 2.9 G/DL (ref 2.3–3.5)
GLUCOSE BLD-MCNC: 100 MG/DL (ref 70–99)
HCT VFR BLD CALC: 41.5 % (ref 42–52)
HEMOGLOBIN: 14.1 G/DL (ref 14–18)
HUMAN METAPNEUMOVIRUS BY PCR: NOT DETECTED
HUMAN RHINOVIRUS/ENTEROVIRUS BY PCR: NOT DETECTED
INFLUENZA A BY PCR: NOT DETECTED
INFLUENZA B BY PCR: NOT DETECTED
LYMPHOCYTES ABSOLUTE: 0.7 K/UL (ref 1–4.8)
LYMPHOCYTES RELATIVE PERCENT: 5.9 %
MAGNESIUM: 2.1 MG/DL (ref 1.7–2.4)
MCH RBC QN AUTO: 31.4 PG (ref 27–31.3)
MCHC RBC AUTO-ENTMCNC: 33.9 % (ref 33–37)
MCV RBC AUTO: 92.5 FL (ref 79–92.2)
MONOCYTES ABSOLUTE: 0.9 K/UL (ref 0.2–0.8)
MONOCYTES RELATIVE PERCENT: 7.5 %
MYCOPLASMA PNEUMONIAE BY PCR: NOT DETECTED
NEUTROPHILS ABSOLUTE: 9.7 K/UL (ref 1.4–6.5)
NEUTROPHILS RELATIVE PERCENT: 84.7 %
PARAINFLUENZA VIRUS 1 BY PCR: NOT DETECTED
PARAINFLUENZA VIRUS 2 BY PCR: NOT DETECTED
PARAINFLUENZA VIRUS 3 BY PCR: NOT DETECTED
PARAINFLUENZA VIRUS 4 BY PCR: NOT DETECTED
PDW BLD-RTO: 16.7 % (ref 11.5–14.5)
PLATELET # BLD: 255 K/UL (ref 130–400)
POTASSIUM SERPL-SCNC: 4.3 MEQ/L (ref 3.4–4.9)
RBC # BLD: 4.48 M/UL (ref 4.7–6.1)
RESPIRATORY SYNCYTIAL VIRUS BY PCR: NOT DETECTED
SARS-COV-2, PCR: NOT DETECTED
SODIUM BLD-SCNC: 139 MEQ/L (ref 135–144)
TOTAL PROTEIN: 6.7 G/DL (ref 6.3–8)
WBC # BLD: 11.5 K/UL (ref 4.8–10.8)

## 2023-02-17 PROCEDURE — 93005 ELECTROCARDIOGRAM TRACING: CPT | Performed by: EMERGENCY MEDICINE

## 2023-02-17 PROCEDURE — 2580000003 HC RX 258: Performed by: EMERGENCY MEDICINE

## 2023-02-17 PROCEDURE — 1210000000 HC MED SURG R&B

## 2023-02-17 PROCEDURE — 99285 EMERGENCY DEPT VISIT HI MDM: CPT

## 2023-02-17 PROCEDURE — 6370000000 HC RX 637 (ALT 250 FOR IP): Performed by: INTERNAL MEDICINE

## 2023-02-17 PROCEDURE — 2580000003 HC RX 258: Performed by: INTERNAL MEDICINE

## 2023-02-17 PROCEDURE — 83735 ASSAY OF MAGNESIUM: CPT

## 2023-02-17 PROCEDURE — 36415 COLL VENOUS BLD VENIPUNCTURE: CPT

## 2023-02-17 PROCEDURE — 96360 HYDRATION IV INFUSION INIT: CPT

## 2023-02-17 PROCEDURE — 80053 COMPREHEN METABOLIC PANEL: CPT

## 2023-02-17 PROCEDURE — 0202U NFCT DS 22 TRGT SARS-COV-2: CPT

## 2023-02-17 PROCEDURE — 85025 COMPLETE CBC W/AUTO DIFF WBC: CPT

## 2023-02-17 RX ORDER — SODIUM CHLORIDE 9 MG/ML
INJECTION, SOLUTION INTRAVENOUS PRN
Status: DISCONTINUED | OUTPATIENT
Start: 2023-02-17 | End: 2023-03-02 | Stop reason: HOSPADM

## 2023-02-17 RX ORDER — ONDANSETRON 2 MG/ML
4 INJECTION INTRAMUSCULAR; INTRAVENOUS EVERY 6 HOURS PRN
Status: DISCONTINUED | OUTPATIENT
Start: 2023-02-17 | End: 2023-03-02 | Stop reason: HOSPADM

## 2023-02-17 RX ORDER — SODIUM CHLORIDE 0.9 % (FLUSH) 0.9 %
5-40 SYRINGE (ML) INJECTION PRN
Status: DISCONTINUED | OUTPATIENT
Start: 2023-02-17 | End: 2023-03-02 | Stop reason: HOSPADM

## 2023-02-17 RX ORDER — POLYETHYLENE GLYCOL 3350 17 G/17G
17 POWDER, FOR SOLUTION ORAL DAILY PRN
Status: DISCONTINUED | OUTPATIENT
Start: 2023-02-17 | End: 2023-03-02 | Stop reason: HOSPADM

## 2023-02-17 RX ORDER — ONDANSETRON 4 MG/1
4 TABLET, ORALLY DISINTEGRATING ORAL EVERY 8 HOURS PRN
Status: DISCONTINUED | OUTPATIENT
Start: 2023-02-17 | End: 2023-03-02 | Stop reason: HOSPADM

## 2023-02-17 RX ORDER — ACETAMINOPHEN 650 MG/1
650 SUPPOSITORY RECTAL EVERY 6 HOURS PRN
Status: DISCONTINUED | OUTPATIENT
Start: 2023-02-17 | End: 2023-03-02 | Stop reason: HOSPADM

## 2023-02-17 RX ORDER — SODIUM CHLORIDE 0.9 % (FLUSH) 0.9 %
5-40 SYRINGE (ML) INJECTION EVERY 12 HOURS SCHEDULED
Status: DISCONTINUED | OUTPATIENT
Start: 2023-02-17 | End: 2023-03-02 | Stop reason: HOSPADM

## 2023-02-17 RX ORDER — 0.9 % SODIUM CHLORIDE 0.9 %
500 INTRAVENOUS SOLUTION INTRAVENOUS ONCE
Status: COMPLETED | OUTPATIENT
Start: 2023-02-17 | End: 2023-02-17

## 2023-02-17 RX ORDER — ACETAMINOPHEN 325 MG/1
650 TABLET ORAL EVERY 6 HOURS PRN
Status: DISCONTINUED | OUTPATIENT
Start: 2023-02-17 | End: 2023-03-02 | Stop reason: HOSPADM

## 2023-02-17 RX ORDER — SODIUM CHLORIDE 9 MG/ML
INJECTION, SOLUTION INTRAVENOUS CONTINUOUS
Status: DISPENSED | OUTPATIENT
Start: 2023-02-17 | End: 2023-02-18

## 2023-02-17 RX ADMIN — SODIUM CHLORIDE: 9 INJECTION, SOLUTION INTRAVENOUS at 20:40

## 2023-02-17 RX ADMIN — APIXABAN 5 MG: 5 TABLET, FILM COATED ORAL at 20:37

## 2023-02-17 RX ADMIN — SODIUM CHLORIDE 500 ML: 9 INJECTION, SOLUTION INTRAVENOUS at 17:44

## 2023-02-17 RX ADMIN — SODIUM CHLORIDE 500 ML: 9 INJECTION, SOLUTION INTRAVENOUS at 16:17

## 2023-02-17 ASSESSMENT — PAIN - FUNCTIONAL ASSESSMENT
PAIN_FUNCTIONAL_ASSESSMENT: NONE - DENIES PAIN

## 2023-02-17 NOTE — ACP (ADVANCE CARE PLANNING)
Advance Care Planning     Advance Care Planning Activator (Inpatient)  Conversation Note      Date of ACP Conversation: 2/17/2023     Conversation Conducted with: Patient with Decision Making Capacity    ACP Activator: Steve Ba RN    Pt has acp papers on this chart and verifies that they are current with his wishes.     Health Care Decision Maker:     Current Designated Health Care Decision Maker:     Primary Decision Maker (Active): Steph Rodas - [phone]    Primary Decision Maker: Damián Ham  [phone]

## 2023-02-17 NOTE — H&P
Hospital Medicine  History and Physical    Patient:  Josefina Luis  MRN: [id number]    CHIEF COMPLAINT:      History Obtained From:  Patient, EMR  Primary Care Physician: Lauri Fong MD    HISTORY OF PRESENT ILLNESS:   The patient is a 68 y.o. male with PMH of CAD s/p remote PCI to RCA,  PAF, SSS s/ PPM, chronic diastolic HF, HTN, AAA, DM2, obesity, PRAVEEN, RA, BPH with LUTS, prostate cancer, ETOH use,  gait ataxia who presented with the above CC. Patient was recently managed at [facility] from 1/11-1/17 for acute / chronic diastolic HF with IV lasix and right thoracentesis on 1/16 with drainage of 1100 ml , started on Torsemide and Aldactone, Losartan was increased and sent to [facility]. Patient has been feeling weak and tired for the last couple of days, was feeling lightheaded with ambulation today while working with therapy, denies chest pressure or shortness of breath, his BP was noted to be low per report and was sent to ED for fort evaluation. BP was 89/62 on arrival to ED, saline bolus was administered, initial w/u showed RON, BP was still in the 90s, another bolus was administered and patient was admitted for further management.     Past Medical History:      Diagnosis Date    Bradycardia     CAD (coronary artery disease)     Cancer (HCC)     prostate- radiation     CHF (congestive heart failure) (HCC)     Depression     HSV-2 (herpes simplex virus 2) infection     Hyperlipidemia     Hypertension     Left knee DJD     Osteoarthritis     Rheumatoid arthritis(714.0)     Type 2 diabetes mellitus without complication, without long-term current use of insulin ([facility] [address] 1/10/2019       Past Surgical History:      Procedure Laterality Date    ANKLE SURGERY Right     pin    APPENDECTOMY      ARTHRODESIS Right 10/31/2016    RIGHT ANKLE ARTHRODESIS OF RIGHT ANKLE AND SUBTALAR JOINT, C-ARM, ABHIJEET EQUIPMENT SYNTHETIC BONE GRAFT, ALLOGRAFT CANCELLOUS, SHARON ANKLE FUSION NAIL, SHANDA IMPLANT BONE STIMULATOR, CHOICE ANESTHESIA, BLOCK  performed by Casey Alejandra MD at [facility]      stent x 5    COLONOSCOPY  07/19/2011    FRACTURE SURGERY      right ankle    HARDWARE REMOVAL FOOT / ANKLE Right 11/06/2017    RIGHT ANKLE HARDWARE REMOVAL performed by Charito Bowles MD at [address] [place] [address]. Bilateral     knees    CT COLON CA SCRN NOT  [address]  IND N/A 07/11/2017    COLONOSCOPY performed by Artem Juan DO at [facility] Left     THORACENTESIS Right 01/16/2023    1140ml removed by Dr. Robyn Reaves. Diagnostics sent to lab. TONSILLECTOMY AND ADENOIDECTOMY         Medications Prior to Admission:    Prior to Admission medications    Medication Sig Start Date End Date Taking? Authorizing Provider   losartan (COZAAR) 100 MG tablet Take 1 tablet by mouth daily 1/18/23   Benita Garcia MD   spironolactone (ALDACTONE) 25 MG tablet Take 1 tablet by mouth daily 1/18/23   Benita Garcia MD   torsemide (DEMADEX) 20 MG tablet Take 1 tablet by mouth daily 1/18/23   Benita Garcia MD   hydrALAZINE (APRESOLINE) 50 MG tablet Take 50 mg by mouth in the morning and at bedtime 4/10/21   Historical Provider, MD   pantoprazole (PROTONIX) 40 MG tablet Take 40 mg by mouth daily 3/8/21   Historical Provider, MD   nitroGLYCERIN (NITROSTAT) 0.4 MG SL tablet Place 0.4 mg under the tongue every 5 minutes as needed for Chest pain up to max of 3 total doses. If no relief after 1 dose, call 911.     Historical Provider, MD   apixaban (ELIQUIS) 5 MG TABS tablet Take 1 tablet by mouth 2 times daily  Patient taking differently: Take 5 mg by mouth 2 times daily Indications: Atrial Fibrillation 12/15/21   Wynette Cushing, MD   rosuvastatin (CRESTOR) 40 MG tablet Take 40 mg by mouth every evening Indications: High Amount of Fats in the Blood  4/14/20   Historical Provider, MD   ketorolac 0.4 % SOLN ophthalmic solution Place 1 drop into both eyes 2 times daily Indications: Eye Pain  5/19/19   Historical Provider, MD tamsulosin (FLOMAX) 0.4 MG capsule Take 1 capsule by mouth daily  Patient taking differently: Take 0.4 mg by mouth daily Indications: Urinary Retention 3/7/19   Dolph Hashimoto, MD   acetaminophen (TYLENOL) 325 MG tablet Take 500 mg by mouth every 4 hours as needed for Pain or Fever    Historical Provider, MD       Allergies:  Hylan g-f 20, Ace inhibitors, and Statins depletion therapy    Social History:   TOBACCO:   reports that he quit smoking about 30 years ago. His smoking use included cigarettes. He started smoking about 38 years ago. He has a 1.75 pack-year smoking history. He has never used smokeless tobacco.  ETOH:   reports current alcohol use of about 2.0 standard drinks per week. Family History:       Problem Relation Age of Onset    Heart Attack Father     Cancer Father         colon    High Cholesterol Mother     Heart Disease Mother     Macular Degen Mother     Arthritis Sister         hip replacement    Other Brother         vertigo    No Known Problems Son     No Known Problems Son        REVIEW OF SYSTEMS:  Ten systems reviewed and negative except for stated in HPI    Physical Exam:    Vitals: BP 90/74   Pulse 66   Temp 98 °F (36.7 °C)   Resp 19   Ht 5' 6\" (1.676 m)   Wt 300 lb (136.1 kg)   SpO2 98%   BMI 48.42 kg/m²   General appearance: alert, cooperative  Skin: Skin color, texture, turgor normal.   HEENT: Head: Normocephalic, no lesions, without obvious abnormality. Eye: Normal external eye, conjunctiva, lids cornea, GILBERTO.   Neck: supple, symmetrical, trachea midline  Lungs: clear to auscultation bilaterally  Heart: S1/S2,RRR  Abdomen: soft, active BS  Extremities:  no edema or cyanosis  Neurologic: Mental status: Alert, oriented, thought content appropriate     Recent Labs     02/17/23  1600   WBC 11.5*   HGB 14.1        Recent Labs     02/17/23  1600      K 4.3   CL 99   CO2 24   BUN 52*   CREATININE 1.60*   GLUCOSE 100*   AST 16   ALT 14   BILITOT 0.5   ALKPHOS 105*     Troponin T: No results for input(s): TROPONINI in the last 72 hours. ABGs: No results found for: PHART, PO2ART, BFB4KLR  INR: No results for input(s): INR in the last 72 hours. URINALYSIS:No results for input(s): NITRITE, COLORU, PHUR, LABCAST, WBCUA, RBCUA, MUCUS, TRICHOMONAS, YEAST, BACTERIA, CLARITYU, SPECGRAV, LEUKOCYTESUR, UROBILINOGEN, BILIRUBINUR, BLOODU, GLUCOSEU, AMORPHOUS in the last 72 hours. Invalid input(s): Orpah Chabrel  -----------------------------------------------------------------   No results found.         Assessment and Plan     68 y.o. male with PMH of CAD s/p remote PCI to RCA,  PAF, SSS s/ PPM, chronic diastolic HF, HTN, AAA, DM2, obesity, PRAVEEN, RA, BPH with LUTS, prostate cancer, ETOH use,  gait ataxia who presented with:    Hypotension   - likely medication related  - with SBP in the 80-90s on arrival  - saline boluses administered in ED  - hold antihypertensive/diuretic regimen  - cardiology consult  - monitor BP closely    RON  - Cr 1.6 on arrival, baseline around 0.8  - hold diuretic regimen  - continue IVFs  - monitor renal function     CAD s/p remote PCI to RCA  - continue statin therapy    PAF, SSS s/ PPM  - continue Apixaban  - monitor on telemetry    Chronic diastolic HF   - hold diuretic regimen due to RON    DM2  - HbA1c in the 6.1-6.6 range over the last 5 years  - monitor glucose levels    ETOH use disorder  - monitor for s/s of withdrawal    Gait ataxia  - continue PT/OT        Munira Norton MD, MD  Admitting Hospitalist

## 2023-02-17 NOTE — ED PROVIDER NOTES
[facility] ED  EMERGENCY DEPARTMENT ENCOUNTER      Pt Name: Naomy Blackmon  MRN: [id number]  Veliagfzack [birth year]  Date of evaluation: 2/17/2023  Provider: Jenn Bocanegra MD    CHIEF COMPLAINT     No chief complaint on file. HISTORY OF PRESENT ILLNESS   (Location/Symptom, Timing/Onset, Context/Setting, Quality, Duration, Modifying Factors, Severity)  Note limiting factors. 79-year-old male presenting with lightheadedness. Sent here from rehab for hypotension. Patient notes no other specific symptoms. Unsure if he has had any changes in his medications. Denies any pain. Nursing Notes were reviewed. REVIEW OF SYSTEMS    (2-9 systems for level 4, 10 or more for level 5)     Review of Systems   Neurological:  Positive for light-headedness. All other systems reviewed and are negative. Except as noted above the remainder of the review of systems was reviewed and negative.        PAST MEDICAL HISTORY     Past Medical History:   Diagnosis Date    Bradycardia     CAD (coronary artery disease)     Cancer (HCC)     prostate- radiation     CHF (congestive heart failure) (HCC)     Depression     HSV-2 (herpes simplex virus 2) infection     Hyperlipidemia     Hypertension     Left knee DJD     Osteoarthritis     Rheumatoid arthritis(714.0)     Type 2 diabetes mellitus without complication, without long-term current use of insulin ([facility] [address] 1/10/2019         SURGICAL HISTORY       Past Surgical History:   Procedure Laterality Date    ANKLE SURGERY Right     pin    APPENDECTOMY      ARTHRODESIS Right 10/31/2016    RIGHT ANKLE ARTHRODESIS OF RIGHT ANKLE AND SUBTALAR JOINT, C-ARM, ABHIJEET EQUIPMENT SYNTHETIC BONE GRAFT, ALLOGRAFT CANCELLOUS, SHARON ANKLE FUSION NAIL, SHANDA IMPLANT BONE STIMULATOR, CHOICE ANESTHESIA, BLOCK  performed by Kirti Valente MD at [facility] 96      stent x 5    COLONOSCOPY  07/19/2011    FRACTURE SURGERY      right ankle    HARDWARE REMOVAL FOOT / ANKLE Right 11/06/2017    RIGHT ANKLE HARDWARE REMOVAL performed by Janice Arellano MD at [address] Bilateral     knees    FL COLON CA SCRN NOT  [address]  IND N/A 07/11/2017    COLONOSCOPY performed by Cole Adame DO at [facility] Left     THORACENTESIS Right 01/16/2023    1140ml removed by Dr. Bennie Irwin. Diagnostics sent to lab. TONSILLECTOMY AND ADENOIDECTOMY           CURRENT MEDICATIONS       Previous Medications    ACETAMINOPHEN (TYLENOL) 325 MG TABLET    Take 500 mg by mouth every 4 hours as needed for Pain or Fever    APIXABAN (ELIQUIS) 5 MG TABS TABLET    Take 1 tablet by mouth 2 times daily    HYDRALAZINE (APRESOLINE) 50 MG TABLET    Take 50 mg by mouth in the morning and at bedtime    KETOROLAC 0.4 % SOLN OPHTHALMIC SOLUTION    Place 1 drop into both eyes 2 times daily Indications: Eye Pain     LOSARTAN (COZAAR) 100 MG TABLET    Take 1 tablet by mouth daily    NITROGLYCERIN (NITROSTAT) 0.4 MG SL TABLET    Place 0.4 mg under the tongue every 5 minutes as needed for Chest pain up to max of 3 total doses. If no relief after 1 dose, call 911.     PANTOPRAZOLE (PROTONIX) 40 MG TABLET    Take 40 mg by mouth daily    ROSUVASTATIN (CRESTOR) 40 MG TABLET    Take 40 mg by mouth every evening Indications: High Amount of Fats in the Blood     SPIRONOLACTONE (ALDACTONE) 25 MG TABLET    Take 1 tablet by mouth daily    TAMSULOSIN (FLOMAX) 0.4 MG CAPSULE    Take 1 capsule by mouth daily    TORSEMIDE (DEMADEX) 20 MG TABLET    Take 1 tablet by mouth daily       ALLERGIES     Hylan g-f 20, Ace inhibitors, and Statins depletion therapy    FAMILY HISTORY       Family History   Problem Relation Age of Onset    Heart Attack Father     Cancer Father         colon    High Cholesterol Mother     Heart Disease Mother     Macular Degen Mother     Arthritis Sister         hip replacement    Other Brother         vertigo    No Known Problems Son     No Known Problems Son           SOCIAL HISTORY Social History     Socioeconomic History    Marital status:      Spouse name: None    Number of children: None    Years of education: None    Highest education level: None   Tobacco Use    Smoking status: Former     Packs/day: 0.25     Years: 7.00     Pack years: 1.75     Types: Cigarettes     Start date: 5     Quit date: 6/3/1992     Years since quittin.7    Smokeless tobacco: Never   Vaping Use    Vaping Use: Never used   Substance and Sexual Activity    Alcohol use: Yes     Alcohol/week: 2.0 standard drinks     Types: 2 Shots of liquor per week     Comment: daily, 2 glassess of vodka    Drug use: No    Sexual activity: Never     Social Determinants of Health     Financial Resource Strain: Low Risk     Difficulty of Paying Living Expenses: Not hard at all   Food Insecurity: No Food Insecurity    Worried About [address] in the Last Year: Never true    [address] [facility] burrp! in the Last Year: Never true       SCREENINGS    Andrez Coma Scale  Eye Opening: Spontaneous  Best Verbal Response: Oriented  Best Motor Response: Obeys commands  Casper Coma Scale Score: 15          PHYSICAL EXAM    (up to 7 for level 4, 8 or more for level 5)     ED Triage Vitals [23 1559]   BP Temp Temp src Heart Rate Resp SpO2 Height Weight   89/62 97 °F (36.1 °C) -- 74 19 92 % 5' 6\" (1.676 m) 300 lb (136.1 kg)       Physical Exam  Vitals and nursing note reviewed. Constitutional:       General: He is not in acute distress. Appearance: Normal appearance. He is well-developed. He is not ill-appearing. HENT:      Head: Normocephalic and atraumatic. Mouth/Throat:      Mouth: Mucous membranes are moist.      Pharynx: Oropharynx is clear. Eyes:      Extraocular Movements: Extraocular movements intact. Conjunctiva/sclera: Conjunctivae normal.   Cardiovascular:      Rate and Rhythm: Normal rate and regular rhythm.    Pulmonary:      Effort: Pulmonary effort is normal.      Breath sounds: Normal breath sounds. Abdominal:      General: Bowel sounds are normal.      Palpations: Abdomen is soft. Tenderness: There is no abdominal tenderness. Musculoskeletal:         General: No deformity. Normal range of motion. Cervical back: Normal range of motion and neck supple. Skin:     General: Skin is warm and dry. Capillary Refill: Capillary refill takes less than 2 seconds. Neurological:      General: No focal deficit present. Mental Status: He is alert and oriented to person, place, and time. Mental status is at baseline. Cranial Nerves: No cranial nerve deficit. Psychiatric:         Thought Content:  Thought content normal.       DIAGNOSTIC RESULTS     EKG: All EKG's are interpreted by the Emergency Department Physician who either signs or Co-signs this chart in the absence of a cardiologist.    Paced, rate 65, normal intervals, no ST elevation/ depression, unchanged from previous    RADIOLOGY:   Non-plain film images such as CT, Ultrasound and MRI are read by the radiologist. Plain radiographic images are visualized and preliminarily interpreted by the emergency physician with the below findings:    Interpretation per the Radiologist below, if available at the time of this note:    No orders to display       LABS:  Labs Reviewed   CBC WITH AUTO DIFFERENTIAL - Abnormal; Notable for the following components:       Result Value    WBC 11.5 (*)     RBC 4.48 (*)     Hematocrit 41.5 (*)     MCV 92.5 (*)     MCH 31.4 (*)     RDW 16.7 (*)     Neutrophils Absolute 9.7 (*)     Lymphocytes Absolute 0.7 (*)     Monocytes Absolute 0.9 (*)     All other components within normal limits   COMPREHENSIVE METABOLIC PANEL - Abnormal; Notable for the following components:    Anion Gap 16 (*)     Glucose 100 (*)     BUN 52 (*)     Creatinine 1.60 (*)     Est, Glom Filt Rate 44.0 (*)     Alkaline Phosphatase 105 (*)     All other components within normal limits   MAGNESIUM       All other labs were within normal range or not returned as of this dictation. EMERGENCY DEPARTMENT COURSE and DIFFERENTIAL DIAGNOSIS/MDM:   Vitals:    Vitals:    02/17/23 1559 02/17/23 1720   BP: 89/62 90/74   Pulse: 74 66   Resp: 19 19   Temp: 97 °F (36.1 °C) 98 °F (36.7 °C)   SpO2: 92% 98%   Weight: 300 lb (136.1 kg)    Height: 5' 6\" (1.676 m)        MDM  Number of Diagnoses or Management Options  RON (acute kidney injury) ([facility] [address]  Hypotension, unspecified hypotension type  Diagnosis management comments: Blood pressure does slightly improve with fluid bolus. Patient still hypotensive afterwards and noted to have RON, with creatinine of 1.6. Spoke with Gwendolyn Vallejo, hospitalist who agrees for observation admission. Request that we give another 500 fluid bolus. Patient otherwise stable throughout ED course. Procedures    CRITICAL CARE TIME   Total Critical Care time was 0 minutes, excluding separately reportable procedures. There was a high probability of clinically significant/life threatening deterioration in the patient's condition which required my urgent intervention. FINAL IMPRESSION      1. Hypotension, unspecified hypotension type    2.  RON (acute kidney injury) [facility])          [address] Admitted 02/17/2023 05:48:09 PM      (Please note that portions of this note were completed with a voice recognition program.  Efforts were made to edit the dictations but occasionally words are mis-transcribed.)    Timi Walden MD (electronically signed)  Attending Emergency Physician        Timi Walden MD  02/17/23 [id number]

## 2023-02-17 NOTE — CARE COORDINATION
[facility] Case Management Initial Discharge Assessment    If patient is discharged prior to next notation, then this note serves as note for discharge by case management. Met with Patient to discuss discharge plan. Initial Discharge Plan? (Note: please see concurrent daily documentation for any updates after initial note). Pt is at [address] home currently. I did not contact [facility] to see if he is a bed hold due to time of day. [facility] secure voicemail left and the patient's chart was faxed.     Readmission Risk              Risk of Unplanned Readmission:  0         Electronically signed by Tamy Puckett RN on 2/17/2023 at 6:34 PM

## 2023-02-18 LAB
ALBUMIN SERPL-MCNC: 3.6 G/DL (ref 3.5–4.6)
ANION GAP SERPL CALCULATED.3IONS-SCNC: 13 MEQ/L (ref 9–15)
BASOPHILS ABSOLUTE: 0.1 K/UL (ref 0–0.2)
BASOPHILS RELATIVE PERCENT: 0.7 %
BUN BLDV-MCNC: 44 MG/DL (ref 8–23)
CALCIUM SERPL-MCNC: 9 MG/DL (ref 8.5–9.9)
CHLORIDE BLD-SCNC: 102 MEQ/L (ref 95–107)
CO2: 24 MEQ/L (ref 20–31)
CREAT SERPL-MCNC: 1.31 MG/DL (ref 0.7–1.2)
EOSINOPHILS ABSOLUTE: 0.2 K/UL (ref 0–0.7)
EOSINOPHILS RELATIVE PERCENT: 2.2 %
GFR SERPL CREATININE-BSD FRML MDRD: 55.9 ML/MIN/{1.73_M2}
GLUCOSE BLD-MCNC: 114 MG/DL (ref 70–99)
HCT VFR BLD CALC: 38.8 % (ref 42–52)
HEMOGLOBIN: 13.2 G/DL (ref 14–18)
LYMPHOCYTES ABSOLUTE: 0.7 K/UL (ref 1–4.8)
LYMPHOCYTES RELATIVE PERCENT: 7.5 %
MAGNESIUM: 1.9 MG/DL (ref 1.7–2.4)
MCH RBC QN AUTO: 31.5 PG (ref 27–31.3)
MCHC RBC AUTO-ENTMCNC: 34 % (ref 33–37)
MCV RBC AUTO: 92.4 FL (ref 79–92.2)
MONOCYTES ABSOLUTE: 0.8 K/UL (ref 0.2–0.8)
MONOCYTES RELATIVE PERCENT: 8.1 %
NEUTROPHILS ABSOLUTE: 8 K/UL (ref 1.4–6.5)
NEUTROPHILS RELATIVE PERCENT: 81.5 %
PDW BLD-RTO: 16.6 % (ref 11.5–14.5)
PHOSPHORUS: 3.6 MG/DL (ref 2.3–4.8)
PLATELET # BLD: 251 K/UL (ref 130–400)
POTASSIUM SERPL-SCNC: 3.7 MEQ/L (ref 3.4–4.9)
RBC # BLD: 4.2 M/UL (ref 4.7–6.1)
SODIUM BLD-SCNC: 139 MEQ/L (ref 135–144)
WBC # BLD: 9.8 K/UL (ref 4.8–10.8)

## 2023-02-18 PROCEDURE — 85025 COMPLETE CBC W/AUTO DIFF WBC: CPT

## 2023-02-18 PROCEDURE — 2580000003 HC RX 258: Performed by: INTERNAL MEDICINE

## 2023-02-18 PROCEDURE — 36415 COLL VENOUS BLD VENIPUNCTURE: CPT

## 2023-02-18 PROCEDURE — 80069 RENAL FUNCTION PANEL: CPT

## 2023-02-18 PROCEDURE — 6370000000 HC RX 637 (ALT 250 FOR IP): Performed by: INTERNAL MEDICINE

## 2023-02-18 PROCEDURE — 83735 ASSAY OF MAGNESIUM: CPT

## 2023-02-18 PROCEDURE — 1210000000 HC MED SURG R&B

## 2023-02-18 RX ORDER — SODIUM CHLORIDE 9 MG/ML
INJECTION, SOLUTION INTRAVENOUS CONTINUOUS
Status: DISPENSED | OUTPATIENT
Start: 2023-02-18 | End: 2023-02-19

## 2023-02-18 RX ORDER — ROSUVASTATIN CALCIUM 40 MG/1
40 TABLET, COATED ORAL EVERY EVENING
Status: DISCONTINUED | OUTPATIENT
Start: 2023-02-18 | End: 2023-03-02 | Stop reason: HOSPADM

## 2023-02-18 RX ORDER — PANTOPRAZOLE SODIUM 40 MG/1
40 TABLET, DELAYED RELEASE ORAL DAILY
Status: DISCONTINUED | OUTPATIENT
Start: 2023-02-18 | End: 2023-03-02 | Stop reason: HOSPADM

## 2023-02-18 RX ORDER — KETOROLAC TROMETHAMINE 5 MG/ML
1 SOLUTION OPHTHALMIC 2 TIMES DAILY
Status: DISCONTINUED | OUTPATIENT
Start: 2023-02-18 | End: 2023-03-02 | Stop reason: HOSPADM

## 2023-02-18 RX ORDER — TAMSULOSIN HYDROCHLORIDE 0.4 MG/1
0.4 CAPSULE ORAL DAILY
Status: DISCONTINUED | OUTPATIENT
Start: 2023-02-18 | End: 2023-03-02 | Stop reason: HOSPADM

## 2023-02-18 RX ADMIN — Medication 5 ML: at 22:08

## 2023-02-18 RX ADMIN — PANTOPRAZOLE SODIUM 40 MG: 40 TABLET, DELAYED RELEASE ORAL at 13:41

## 2023-02-18 RX ADMIN — SODIUM CHLORIDE: 9 INJECTION, SOLUTION INTRAVENOUS at 16:46

## 2023-02-18 RX ADMIN — ROSUVASTATIN CALCIUM 40 MG: 40 TABLET, FILM COATED ORAL at 16:45

## 2023-02-18 RX ADMIN — TAMSULOSIN HYDROCHLORIDE 0.4 MG: 0.4 CAPSULE ORAL at 13:41

## 2023-02-18 RX ADMIN — KETOROLAC TROMETHAMINE 1 DROP: 5 SOLUTION/ DROPS OPHTHALMIC at 22:08

## 2023-02-18 RX ADMIN — KETOROLAC TROMETHAMINE 1 DROP: 5 SOLUTION/ DROPS OPHTHALMIC at 13:41

## 2023-02-18 RX ADMIN — Medication 10 ML: at 09:00

## 2023-02-18 RX ADMIN — APIXABAN 5 MG: 5 TABLET, FILM COATED ORAL at 20:04

## 2023-02-18 RX ADMIN — APIXABAN 5 MG: 5 TABLET, FILM COATED ORAL at 13:41

## 2023-02-18 NOTE — PROGRESS NOTES
Hospitalist Progress Note      PCP: Cherie Almaraz MD    Date of Admission: 2/17/2023    Chief Complaint:  no acute events, afebrile, SBP in the 90-100s overnight, on RA, u/o - 850 ml overnight    Medications:  Reviewed    Infusion Medications    sodium chloride       Scheduled Medications    sodium chloride flush  5-40 mL IntraVENous 2 times per day    apixaban  5 mg Oral BID     PRN Meds: sodium chloride flush, sodium chloride, ondansetron **OR** ondansetron, polyethylene glycol, acetaminophen **OR** acetaminophen      Intake/Output Summary (Last 24 hours) at 2/18/2023 1231  Last data filed at 2/18/2023 0543  Gross per 24 hour   Intake 999.98 ml   Output 850 ml   Net 149.98 ml       Exam:    BP (!) 94/48   Pulse 61   Temp 98.1 °F (36.7 °C)   Resp 18   Ht 5' 6\" (1.676 m)   Wt 300 lb (136.1 kg)   SpO2 98%   BMI 48.42 kg/m²     General appearance: alert, cooperative  Lungs: clear to auscultation bilaterally  Heart: S1/S2,RRR  Abdomen: soft, active BS  Extremities:  no edema       Labs:   Recent Labs     02/17/23  1600 02/18/23  1055   WBC 11.5* 9.8   HGB 14.1 13.2*   HCT 41.5* 38.8*    251     Recent Labs     02/17/23  1600      K 4.3   CL 99   CO2 24   BUN 52*   CREATININE 1.60*   CALCIUM 9.6     Recent Labs     02/17/23  1600   AST 16   ALT 14   BILITOT 0.5   ALKPHOS 105*     No results for input(s): INR in the last 72 hours. No results for input(s): Blank [employer] in the last 72 hours.     Urinalysis:      Lab Results   Component Value Date/Time    NITRU Negative 01/11/2023 07:45 PM    WBCUA 0-2 01/11/2023 07:45 PM    WBCUA <1 08/05/2022 09:34 PM    BACTERIA Negative 01/11/2023 07:45 PM    RBCUA 0-2 01/11/2023 07:45 PM    RBCUA 1 08/05/2022 09:34 PM    BLOODU Negative 01/11/2023 07:45 PM    SPECGRAV 1.015 01/11/2023 07:45 PM    GLUCOSEU Negative 01/11/2023 07:45 PM    GLUCOSEU NEG 06/07/2012 03:53 PM       Radiology:  No orders to display           Assessment/Plan:    68 y.o. male with PMH of CAD s/p remote PCI to RCA,  PAF, SSS s/ PPM, chronic diastolic HF, HTN, AAA, DM2, obesity, PRAVEEN, RA, BPH with LUTS, prostate cancer, ETOH use,  gait ataxia who presented with:     Hypotension   - likely medication related  - SBP in the 90-100s s/p IV hydration  - holding antihypertensive/diuretic regimen  - cardiology consult  - monitor BP closely     RON  - Cr 1.6 on arrival, baseline around 0.8  - holding diuretic regimen  - continue IVFs  - monitor renal function      CAD s/p remote PCI to RCA  - continue statin therapy     PAF, SSS s/ PPM  - continue Apixaban  - monitor on telemetry     Chronic diastolic HF   - holding diuretic regimen due to RON     DM2  - HbA1c in the 6.1-6.6 range over the last 5 years  - monitor glucose levels     ETOH use disorder  - monitor for s/s of withdrawal     Gait ataxia  - continue PT/OT                        Diet: ADULT DIET;  Regular    Code Status: Full Code              Electronically signed by Rhea Zhu MD on 2/18/2023 at 12:31 PM

## 2023-02-18 NOTE — CONSULTS
[facility] OF THE [facility] Heart Progress note      Patient:  Olesya Leon  YOB: 1945  MRN: [id number]   Acct: [de-identified]  Admit Date:  2/17/2023  Primary Cardiologist:Dr. [facility] Cardiologist: Kemi Ford MD      Impression/Plan:     Hypotension: Related to poor p.o. intake. Also very likely does not need the dosage of medications he was taking. Given his diabetes would like to resume AR-2 blocker assuming hydration allows his renal function to return to normal, however today bp still on low side  Atrial fibrillation: Remains in A-fib controlled rate suggesting AV node disease. He has no need of negative chronotropic agents. He has occasional hematochezia related to hemorrhoids hemoglobin is normal would continue anticoagulation  Coronary artery disease: No angina    Chief Complaint/Active Symptoms: No angina/dyspnea/palpitations      Consult Hx  Nancylee Oppenheim is 68years old recently admitted last month with fall and recurrent atrial fibrillation with decision to pursue rate control strategy. Had been noncompliant with medications and actively drinking alcohol. He was treated with Eliquis in the hospital but concerns as to outpatient use quite high because of his alcohol consumption and he was to consider Watchman device as an outpatient. Also had presented with moderate right pleural effusion possibly related to his admitting fall. Blood pressure control was optimized during that hospital stay. Diastolic CHF had also been optimized as he had presented with hypertension and volume overload. He had been staying at [facility]. Again presented to the emergency department last evening with lightheadedness. He had been on rehab and was hypotensive. Initial blood pressure 89/62. Heart rate 74. O2 saturation 92% respiratory rate 19. Creatinine increased to 1.6 he was felt volume deplete. Pressure had improved with hydration.   Creatinine at discharge January 17 1.06. He says that at [facility] he has something to drink with meals but in between he does not and he does not like asking people for anything to drink. He believes he has not been drinking as much as he should. He is adamant that he has no chest pain shortness of breath palpitation lightheadedness syncope or lower extremity edema. He says occasionally he has some mild rectal bleeding if he is constipated. Review of Systems:   Chronically fatigued no specific complaints    CARDIAC HISTORY:  8/1/2022 dual-chamber pacemaker; Medtronic Aziza XT  MRI  CAD: 2013 Left heart cath: that show moderate diffuse disease with areas of ectatic in the right coronary artery with previously deployed patent stents in the proximal and mid right coronary artery. The posterior descending artery has severe tandem proximal disease and is 100% occluded in the midsegment. Distal vessel fills delayed by left to right collaterals. The left main coronary artery was normal. The left anterior descending artery had mid 50% stenosis. Major diagonal branch has subtotal occlusion stenting of the ostium fillin by left to right collaterals. Ramus intermedius is a large and normal with a left circumflex artery appears normal except for mild luminal irregularities. Medical therapy was recommended. Left ventricular ejection fraction was 80%. Persistent atrial fibrillation  Carotid Art Dis  2022 <65%  Echo 1/12/2023: EF 60%. LVH. 1+ MR.  1+ TR. Pulmonary hypertension RVSP 50 mm.     Past Medical History:   Diagnosis Date    Bradycardia     CAD (coronary artery disease)     Cancer (HCC)     prostate- radiation     CHF (congestive heart failure) (HCC)     Depression     HSV-2 (herpes simplex virus 2) infection     Hyperlipidemia     Hypertension     Left knee DJD     Osteoarthritis     Rheumatoid arthritis(714.0)     Type 2 diabetes mellitus without complication, without long-term current use of insulin ([facility] [address] 1/10/2019 Sleep apnea noncompliant with CPAP    Past Surgical History:   Procedure Laterality Date    ANKLE SURGERY Right     pin    APPENDECTOMY      ARTHRODESIS Right 10/31/2016    RIGHT ANKLE ARTHRODESIS OF RIGHT ANKLE AND SUBTALAR JOINT, C-ARM, ABHIJEET EQUIPMENT SYNTHETIC BONE GRAFT, ALLOGRAFT CANCELLOUS, SHARON ANKLE FUSION NAIL, SHANDA IMPLANT BONE STIMULATOR, CHOICE ANESTHESIA, BLOCK  performed by Julian Mon MD at [facility] 96      stent x 5    COLONOSCOPY  07/19/2011    FRACTURE SURGERY      right ankle    HARDWARE REMOVAL FOOT / ANKLE Right 11/06/2017    RIGHT ANKLE HARDWARE REMOVAL performed by Lynnette Kerns MD at [address] [place] [address] Bilateral     knees    DE COLON CA SCRN NOT  [address] IND N/A 07/11/2017    COLONOSCOPY performed by Donny Dias DO at [facility] Left     THORACENTESIS Right 01/16/2023    1140ml removed by Dr. Lizzeth Brody. Diagnostics sent to lab. TONSILLECTOMY AND ADENOIDECTOMY       No current facility-administered medications on file prior to encounter. Current Outpatient Medications on File Prior to Encounter   Medication Sig Dispense Refill    losartan (COZAAR) 100 MG tablet Take 1 tablet by mouth daily 30 tablet 3    spironolactone (ALDACTONE) 25 MG tablet Take 1 tablet by mouth daily 30 tablet 3    torsemide (DEMADEX) 20 MG tablet Take 1 tablet by mouth daily 30 tablet 3    hydrALAZINE (APRESOLINE) 50 MG tablet Take 50 mg by mouth in the morning and at bedtime      pantoprazole (PROTONIX) 40 MG tablet Take 40 mg by mouth daily      nitroGLYCERIN (NITROSTAT) 0.4 MG SL tablet Place 0.4 mg under the tongue every 5 minutes as needed for Chest pain up to max of 3 total doses.  If no relief after 1 dose, call 911.      apixaban (ELIQUIS) 5 MG TABS tablet Take 1 tablet by mouth 2 times daily (Patient taking differently: Take 5 mg by mouth 2 times daily Indications: Atrial Fibrillation) 60 tablet 3    rosuvastatin (CRESTOR) 40 MG tablet Take 40 mg by mouth every evening Indications: High Amount of Fats in the Blood       ketorolac 0.4 % SOLN ophthalmic solution Place 1 drop into both eyes 2 times daily Indications: Eye Pain   1    tamsulosin (FLOMAX) 0.4 MG capsule Take 1 capsule by mouth daily (Patient taking differently: Take 0.4 mg by mouth daily Indications: Urinary Retention) 90 capsule 3    acetaminophen (TYLENOL) 325 MG tablet Take 500 mg by mouth every 4 hours as needed for Pain or Fever          Allergies   Allergen Reactions    Hylan G-F 20 Other (See Comments) and Swelling     Swelling in leg    Ace Inhibitors      cough    Statins Depletion Therapy Other (See Comments)     OKAY WITH CRESTOR, weakness     Family history: Cancer    Social history: Alcohol abuse. Quit tobacco 1992. No drug abuse lives alone            Objective:   Vitals:    02/17/23 2034 02/18/23 0242 02/18/23 0539 02/18/23 0858   BP: (!) 103/52 116/61 (!) 107/58 (!) 94/48   Pulse: 61 59 60 61   Resp: 20 18 18    Temp: 97.5 °F (36.4 °C) 97.7 °F (36.5 °C) 97.4 °F (36.3 °C) 98.1 °F (36.7 °C)   TempSrc: Oral Oral Oral    SpO2: 93% 99% 98% 98%   Weight:       Height:          Wt Readings from Last 3 Encounters:   02/17/23 300 lb (136.1 kg)   01/16/23 264 lb (119.7 kg)   08/24/22 276 lb (125.2 kg)       TELEMETRY: a.  Fib  v pacing  av rate 65    Physical Exam:  General Appearance: alert and oriented to person, place and time, in no acute distress  Cardiovascular: normal rate, regular rhythm, normal S1 and S2, no murmurs, rubs, clicks, or gallops,  no JVD  Pulmonary/Chest: clear to auscultation bilaterally- no wheezes, rales or rhonchi, normal air movement, no respiratory distress  Abdomen: soft, non-tender, non-distended, normal bowel sounds, no masses   Extremities: no cyanosis, clubbing or edema  Skin: warm and dry, no rash or erythema  Eyes: EOMI  Neck: supple and non-tender without mass, no thyromegaly   Neurological: alert, oriented, normal speech, no focal findings or movement disorder noted    Allergies: Allergies   Allergen Reactions    Hylan G-F 20 Other (See Comments) and Swelling     Swelling in leg    Ace Inhibitors      cough    Statins Depletion Therapy Other (See Comments)     OKAY WITH CRESTOR, weakness        Medications:    sodium chloride flush  5-40 mL IntraVENous 2 times per day    apixaban  5 mg Oral BID      sodium chloride       sodium chloride flush, 5-40 mL, PRN  sodium chloride, , PRN  ondansetron, 4 mg, Q8H PRN   Or  ondansetron, 4 mg, Q6H PRN  polyethylene glycol, 17 g, Daily PRN  acetaminophen, 650 mg, Q6H PRN   Or  acetaminophen, 650 mg, Q6H PRN        Diagnostics:  EKG:  v paced      CXR:     Lab Data:    Cardiac Enzymes:  No results for input(s): CKTOTAL, CKMB, CKMBINDEX, TROPONINI in the last 72 hours. ProBNP:   Lab Results   Component Value Date/Time    PROBNP 2,030 01/11/2023 08:32 PM       CBC:   Recent Labs     02/17/23  1600   WBC 11.5*   RBC 4.48*   HGB 14.1   HCT 41.5*          CMP:    Recent Labs     02/17/23  1600      K 4.3   CL 99   CO2 24   BUN 52*   CREATININE 1.60*   LABGLOM 44.0*   GLUCOSE 100*   CALCIUM 9.6       Hepatic Function Panel:    Recent Labs     02/17/23  1600   ALKPHOS 105*   ALT 14   AST 16   PROT 6.7   BILITOT 0.5   LABALBU 3.8       Magnesium:    Recent Labs     02/17/23  1600   MG 2.1       PT/INR:  No results for input(s): PROTIME, INR in the last 72 hours. Lipids:  No results for input(s): CHOL, TRIG, HDL, LDLCALC, LABVLDL in the last 72 hours. Principal Problem:    Hypotension  Resolved Problems:    * No resolved hospital problems.  *           Electronically signed by Celio Mcgee MD on 2/18/2023 at 9:06 AM

## 2023-02-18 NOTE — PROGRESS NOTES
Physical Therapy      Patient: Jules Arrington MRN: [id number]  : 45      New PT order received & reviewed. Per chart, patient has had low BPs this date that are contraindicated for PT. Hold acute PT evaluation until BP stable. Will continue to follow patient for next appropriate time to attempt PT evaluation.     Electronically signed by Aida Lewis PT on 2023 at 1:01 PM

## 2023-02-19 LAB
ALBUMIN SERPL-MCNC: 3.1 G/DL (ref 3.5–4.6)
ANION GAP SERPL CALCULATED.3IONS-SCNC: 13 MEQ/L (ref 9–15)
BASOPHILS ABSOLUTE: 0.1 K/UL (ref 0–0.2)
BASOPHILS RELATIVE PERCENT: 0.5 %
BUN BLDV-MCNC: 32 MG/DL (ref 8–23)
CALCIUM SERPL-MCNC: 8.7 MG/DL (ref 8.5–9.9)
CHLORIDE BLD-SCNC: 106 MEQ/L (ref 95–107)
CO2: 19 MEQ/L (ref 20–31)
CREAT SERPL-MCNC: 0.83 MG/DL (ref 0.7–1.2)
EKG ATRIAL RATE: 58 BPM
EKG Q-T INTERVAL: 472 MS
EKG QRS DURATION: 156 MS
EKG QTC CALCULATION (BAZETT): 490 MS
EKG R AXIS: -67 DEGREES
EKG T AXIS: 98 DEGREES
EKG VENTRICULAR RATE: 65 BPM
EOSINOPHILS ABSOLUTE: 0.2 K/UL (ref 0–0.7)
EOSINOPHILS RELATIVE PERCENT: 2.3 %
GFR SERPL CREATININE-BSD FRML MDRD: >60 ML/MIN/{1.73_M2}
GLUCOSE BLD-MCNC: 98 MG/DL (ref 70–99)
HCT VFR BLD CALC: 37.3 % (ref 42–52)
HEMOGLOBIN: 12.6 G/DL (ref 14–18)
LYMPHOCYTES ABSOLUTE: 0.8 K/UL (ref 1–4.8)
LYMPHOCYTES RELATIVE PERCENT: 8.7 %
MAGNESIUM: 1.9 MG/DL (ref 1.7–2.4)
MCH RBC QN AUTO: 31 PG (ref 27–31.3)
MCHC RBC AUTO-ENTMCNC: 33.8 % (ref 33–37)
MCV RBC AUTO: 91.5 FL (ref 79–92.2)
MONOCYTES ABSOLUTE: 0.8 K/UL (ref 0.2–0.8)
MONOCYTES RELATIVE PERCENT: 8.2 %
NEUTROPHILS ABSOLUTE: 7.7 K/UL (ref 1.4–6.5)
NEUTROPHILS RELATIVE PERCENT: 80.3 %
PDW BLD-RTO: 16.7 % (ref 11.5–14.5)
PHOSPHORUS: 3.7 MG/DL (ref 2.3–4.8)
PLATELET # BLD: 241 K/UL (ref 130–400)
POTASSIUM SERPL-SCNC: 4.6 MEQ/L (ref 3.4–4.9)
RBC # BLD: 4.08 M/UL (ref 4.7–6.1)
SODIUM BLD-SCNC: 138 MEQ/L (ref 135–144)
WBC # BLD: 9.6 K/UL (ref 4.8–10.8)

## 2023-02-19 PROCEDURE — 97166 OT EVAL MOD COMPLEX 45 MIN: CPT

## 2023-02-19 PROCEDURE — 1210000000 HC MED SURG R&B

## 2023-02-19 PROCEDURE — 6370000000 HC RX 637 (ALT 250 FOR IP): Performed by: INTERNAL MEDICINE

## 2023-02-19 PROCEDURE — 2580000003 HC RX 258: Performed by: INTERNAL MEDICINE

## 2023-02-19 PROCEDURE — 93010 ELECTROCARDIOGRAM REPORT: CPT | Performed by: INTERNAL MEDICINE

## 2023-02-19 PROCEDURE — 36415 COLL VENOUS BLD VENIPUNCTURE: CPT

## 2023-02-19 PROCEDURE — 85025 COMPLETE CBC W/AUTO DIFF WBC: CPT

## 2023-02-19 PROCEDURE — 83735 ASSAY OF MAGNESIUM: CPT

## 2023-02-19 PROCEDURE — 80069 RENAL FUNCTION PANEL: CPT

## 2023-02-19 RX ADMIN — PANTOPRAZOLE SODIUM 40 MG: 40 TABLET, DELAYED RELEASE ORAL at 09:00

## 2023-02-19 RX ADMIN — KETOROLAC TROMETHAMINE 1 DROP: 5 SOLUTION/ DROPS OPHTHALMIC at 20:50

## 2023-02-19 RX ADMIN — Medication 10 ML: at 20:51

## 2023-02-19 RX ADMIN — ACETAMINOPHEN 650 MG: 325 TABLET ORAL at 20:50

## 2023-02-19 RX ADMIN — Medication 10 ML: at 09:00

## 2023-02-19 RX ADMIN — APIXABAN 5 MG: 5 TABLET, FILM COATED ORAL at 20:50

## 2023-02-19 RX ADMIN — KETOROLAC TROMETHAMINE 1 DROP: 5 SOLUTION/ DROPS OPHTHALMIC at 09:00

## 2023-02-19 RX ADMIN — APIXABAN 5 MG: 5 TABLET, FILM COATED ORAL at 09:00

## 2023-02-19 RX ADMIN — ROSUVASTATIN CALCIUM 40 MG: 40 TABLET, FILM COATED ORAL at 20:50

## 2023-02-19 RX ADMIN — ACETAMINOPHEN 650 MG: 325 TABLET ORAL at 04:32

## 2023-02-19 RX ADMIN — TAMSULOSIN HYDROCHLORIDE 0.4 MG: 0.4 CAPSULE ORAL at 08:59

## 2023-02-19 ASSESSMENT — PAIN SCALES - GENERAL
PAINLEVEL_OUTOF10: 3
PAINLEVEL_OUTOF10: 3

## 2023-02-19 ASSESSMENT — PAIN DESCRIPTION - LOCATION
LOCATION: NECK
LOCATION: NECK

## 2023-02-19 NOTE — PROGRESS NOTES
Physical Therapy Missed Treatment   Facility/Department: [facility] MED SURG Q614/E279-90    NAME: Destini Liz    : [birth year] (43 y.o.)  MRN: [id number]    Account: [de-identified]  Gender: male      PT referral received. Chart reviewed. PT eval attempted at 1520. Pt declined PT eval at this time due to \"too tired\". Will follow and attempt PT evaluation again at earliest availability.        Lamonte Boyd, PT, 23 at 3:44 PM

## 2023-02-19 NOTE — PROGRESS NOTES
[facility] OCCUPATIONAL THERAPY EVALUATION - ACUTE     NAME: David Mc  : 1945 (77 y.o.)  MRN: [id number]  CODE STATUS: Full Code  Room: 90/90-01    Date of Service: 2023    Patient Diagnosis(es): Hypotension [I95.9]  RON (acute kidney injury) ([facility]) [N17.9]  Hypotension, unspecified hypotension type [I95.9]   Patient Active Problem List    Diagnosis Date Noted    Hypotension 2023    Recurrent falls 2023    Ataxic gait 2023    ETOH abuse 2023    Adjustment disorder 2023    CHF (congestive heart failure), NYHA class I, acute on chronic, combined ([facility]) 2021    Unable to ambulate 12/10/2021    PAF (paroxysmal atrial fibrillation)  12/10/2021    Retinal hemorrhage, bilateral 2019    Intermediate stage nonexudative age-related macular degeneration of both eyes 2019    Hyperuricemia 01/10/2019    Chronic gastritis without bleeding 01/10/2019    Type 2 diabetes mellitus with microalbuminuria, without long-term current use of insulin ([facility]) 01/10/2019    Arthrodesis status 2017    Primary osteoarthritis, right ankle and foot 2017    Pain in right ankle 2017    Encounter for other orthopedic aftercare 2017    History of colon polyps 2017    Nuclear sclerosis of both eyes 2015    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, left eye 2015    Presbyopia 2015    Regular astigmatism 2015    History of prostate cancer 2014    Stented coronary artery 2013    Essential hypertension 2013    Numbness in feet 2013    RA (rheumatoid arthritis) ([facility]) 10/24/2011    Bradycardia     Mixed hyperlipidemia     Primary osteoarthritis involving multiple joints     Dysthymia     CAD (coronary artery disease)         Past Medical History:   Diagnosis Date    Bradycardia     CAD (coronary artery disease)     Cancer ([facility])     prostate- radiation     CHF (congestive heart failure) ([facility])   Depression     HSV-2 (herpes simplex virus 2) infection     Hyperlipidemia     Hypertension     Left knee DJD     Osteoarthritis     Rheumatoid arthritis(714.0)     Type 2 diabetes mellitus without complication, without long-term current use of insulin ([facility] [address] 1/10/2019     Past Surgical History:   Procedure Laterality Date    ANKLE SURGERY Right     pin    APPENDECTOMY      ARTHRODESIS Right 10/31/2016    RIGHT ANKLE ARTHRODESIS OF RIGHT ANKLE AND SUBTALAR JOINT, C-ARM, ABHIJEET EQUIPMENT SYNTHETIC BONE GRAFT, ALLOGRAFT CANCELLOUS, SHARON ANKLE FUSION NAIL, SHANDA IMPLANT BONE STIMULATOR, CHOICE ANESTHESIA, BLOCK  performed by Lisa Aleman MD at [facility]      stent x 5    COLONOSCOPY  07/19/2011    FRACTURE SURGERY      right ankle    HARDWARE REMOVAL FOOT / ANKLE Right 11/06/2017    RIGHT ANKLE HARDWARE REMOVAL performed by Stan Hudson [facility]. Bilateral     knees    KS COLON CA SCRN NOT  [address]  IND N/A 07/11/2017    COLONOSCOPY performed by Rosie Garcia DO at [facility] Left     THORACENTESIS Right 01/16/2023    1140ml removed by Dr. Jakub Joseph. Diagnostics sent to lab. TONSILLECTOMY AND ADENOIDECTOMY          Restrictions  Restrictions/Precautions: Fall Risk              Safety Devices: Safety Devices  Type of Devices:  All fall risk precautions in place     Patient's date of birth confirmed: Yes    General:  Diagnosis: Hypotension    Subjective  Subjective: \"I had low blood pressure and some dr wanted to make sure I did not go into kidney failure\"       Pain at start of treatment: Yes: 3/10    Pain at end of treatment: Yes: 2/10    Location: B feet  Nursing notified: Declined  RN: N/A  Intervention: None    Prior Level of Function:  Social/Functional History  Lives With: Alone  Type of Home: House (Patient has most recently been staying at a nursing home but he is unable to report how long other than stating \"a week or 2\")  Home Layout: Multi-level (split level home with laundry on the lowest level)  Home Access: Stairs to enter with rails  Entrance Stairs - Number of Steps: 1  Bathroom Shower/Tub: Walk-in shower (lowest level shower)  Bathroom Equipment: Shower chair, Hand-held shower  Home Equipment: [place], rolling, [address], [address], [address] (Patient has chair lifts between each level of the home)  Has the patient had two or more falls in the past year or any fall with injury in the past year?: Yes  ADL Assistance: [place]: Independent  Ambulation Assistance: Independent (uses cane mostly but will use the walker when he feels as though he needs to)  Transfer Assistance: Independent  Active : Yes  Mode of Transportation: SUV  Occupation: Retired  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  Additional Comments: no pets currently    OBJECTIVE:     Orientation Status:  Orientation  Overall Orientation Status: Within Normal Limits    Observation:  Observation/Palpation  Observation: Patient noted to have decreased eye contact during the evaluation    Cognition Status:  Cognition  Cognition Comment: Patient was noted to have decreased processing of information requiring extra time for all tasks    Perception Status:  Perception  Overall Perceptual Status: WFL    Vision and Hearing Status:  Vision  Vision Exceptions: Wears glasses for reading  Hearing  Hearing: Within functional limits   Vision - Basic Assessment  Visual History: Macular degeneration (history of retinal detachment)    GROSS ASSESSMENT AROM/PROM:  AROM: Generally decreased, functional  PROM: Generally decreased, functional    ROM:        UE STRENGTH:  Strength: Generally decreased, functional    UE COORDINATION:  Coordination: Generally decreased, functional    UE TONE:  Tone: Normal    UE SENSATION:  Sensation: Intact    Hand Dominance:  Hand Dominance  Hand Dominance: Right    ADL Status:  ADL  Feeding: Setup  Grooming: Setup  UE Dressing Skilled Clinical Factors: needed assistance to change his soiled gown for a new gown  LE Dressing: Dependent/Total  Toileting Skilled Clinical Factors: Patient was grossly incontinent requiring full bedding change. Toilet Transfers  Toilet Transfers Comments: NT patient reported that he has been using the [facility] but was incontinent so transfer was not assessed    Functional Mobility:    Transfers  Sit to stand: Moderate assistance  Stand to sit: Minimal assistance    Patient ambulated to head of bed with Handheld assist at Mod A level. Bed Mobility  Bed mobility  Rolling to Left: Minimal assistance  Supine to Sit: Stand by assistance  Sit to Supine: Minimal assistance    Seated and Standing Balance:  Balance  Sitting: Impaired  Sitting - Static: Occasional  Standing: Impaired  Standing - Static: Constant support    Functional Endurance:  Activity Tolerance  Activity Tolerance: Patient limited by fatigue    D/C Recommendations:  OT D/C RECOMMENDATIONS  REQUIRES OT FOLLOW-UP: Yes    Equipment Recommendations:  OT Equipment Recommendations  Other: to be assessed    OT Education:   Patient Education  Education Given To: Patient  Education Provided: Role of Therapy;Plan of Care  Education Method: Verbal  Barriers to Learning: Cognition  Education Outcome: Continued education needed    OT Follow Up:   OT D/C RECOMMENDATIONS  REQUIRES OT FOLLOW-UP: Yes       Assessment/Discharge Disposition:  Assessment: Patient is a 68year old male from NH where he has been staying. Patient was noted to have cognitive and physical issues.  Patient will benefit from OT to address these areas  Performance deficits / Impairments: Decreased functional mobility , Decreased safe awareness, Decreased balance, Decreased posture, Decreased ADL status, Decreased cognition, Decreased endurance, Decreased high-level IADLs, Decreased strength  Prognosis: Fair  Discharge Recommendations: Continue to assess pending progress  Decision Making: Medium Complexity  History: moderate chart review  Exam: 9 areas of deficit  Assistance / Modification: mod modification needed    [facility]) Self care Score   How much help is needed for putting on and taking off regular lower body clothing?: A Lot  How much help is needed for bathing (which includes washing, rinsing, drying)?: A Lot  How much help is needed for toileting (which includes using toilet, bedpan, or urinal)?: Total  How much help is needed for putting on and taking off regular upper body clothing?: A Lot  How much help is needed for taking care of personal grooming?: A Little  How much help for eating meals?: A Little  AM-[facility] Inpatient Daily Activity Raw Score: 13  AM-PAC Inpatient ADL T-Scale Score : 32.03  ADL Inpatient CMS 0-100% Score: 63.03    Therapy key for assistance levels -   Independent/Mod I = Pt. is able to perform task with no assistance but may require a device   Stand by assistance = Pt. does not perform task at an independent level but does not need physical assistance, requires verbal cues  Minimal, Moderate, Maximal Assistance = Pt. requires physical assistance (25%, 50%, 75% assist from helper) for task but is able to actively participate in task   Dependent = Pt. requires total assistance with task and is not able to actively participate with task completion     Plan:  Occupational Therapy Plan  Times Per Week: 1-4  Therapy Duration:  (length of acute stay)  Current Treatment Recommendations: Strengthening, Balance training, Cognitive/Perceptual training, Home management training, Self-Care / ADL, Patient/Caregiver education & training, Equipment evaluation, education, & procurement, Safety education & training, Endurance training    Goals:   Patient will:    - Improve functional endurance to tolerate/complete 15-30 mins of ADL's  - Be set up in UB ADLs   - Be min assist in LB ADLs  - Be min assist in ADL transfers without LOB  - Be min assist in toileting tasks  - Improve B UE strength and endurance to WNLs in order to participate in self-care activities as projected.   - Sequence self-care tasks with no verbal cues    Patient Goal:    Patient did not state a goal     Discussed and agreed upon: Yes Comments:       Therapy Time:   Individual   Time In 0934   Time Out 1005   Minutes 31          Eval: 31 minutes     Electronically signed by:    JACOB Monet,   2/19/2023, 10:14 AM

## 2023-02-19 NOTE — PROGRESS NOTES
Hospitalist Progress Note      PCP: Mildred Toledo MD    Date of Admission: 2/17/2023    Chief Complaint:  no acute events, afebrile, BP is improving, on RA    Medications:  Reviewed    Infusion Medications    sodium chloride       Scheduled Medications    ketorolac  1 drop Both Eyes BID    pantoprazole  40 mg Oral Daily    rosuvastatin  40 mg Oral QPM    tamsulosin  0.4 mg Oral Daily    sodium chloride flush  5-40 mL IntraVENous 2 times per day    apixaban  5 mg Oral BID     PRN Meds: sodium chloride flush, sodium chloride, ondansetron **OR** ondansetron, polyethylene glycol, acetaminophen **OR** acetaminophen      Intake/Output Summary (Last 24 hours) at 2/19/2023 1351  Last data filed at 2/19/2023 0039  Gross per 24 hour   Intake 120 ml   Output 360 ml   Net -240 ml         Exam:    /74   Pulse 60   Temp 97.7 °F (36.5 °C)   Resp 17   Ht 5' 6\" (1.676 m)   Wt 291 lb (132 kg)   SpO2 97%   BMI 46.97 kg/m²     General appearance: alert, cooperative  Lungs: clear to auscultation bilaterally  Heart: S1/S2,RRR  Abdomen: soft, active BS  Extremities:  no edema       Labs:   Recent Labs     02/17/23  1600 02/18/23  1055 02/19/23  0639   WBC 11.5* 9.8 9.6   HGB 14.1 13.2* 12.6*   HCT 41.5* 38.8* 37.3*    251 241       Recent Labs     02/17/23  1600 02/18/23  1058 02/19/23  0639    139 138   K 4.3 3.7 4.6   CL 99 102 106   CO2 24 24 19*   BUN 52* 44* 32*   CREATININE 1.60* 1.31* 0.83   CALCIUM 9.6 9.0 8.7   PHOS  --  3.6 3.7       Recent Labs     02/17/23  1600   AST 16   ALT 14   BILITOT 0.5   ALKPHOS 105*       No results for input(s): INR in the last 72 hours. No results for input(s): [place] Stefano in the last 72 hours.     Urinalysis:      Lab Results   Component Value Date/Time    NITRU Negative 01/11/2023 07:45 PM    WBCUA 0-2 01/11/2023 07:45 PM    WBCUA <1 08/05/2022 09:34 PM    BACTERIA Negative 01/11/2023 07:45 PM    RBCUA 0-2 01/11/2023 07:45 PM    RBCUA 1 08/05/2022 09:34 PM BLOODU Negative 01/11/2023 07:45 PM    SPECGRAV 1.015 01/11/2023 07:45 PM    GLUCOSEU Negative 01/11/2023 07:45 PM    GLUCOSEU NEG 06/07/2012 03:53 PM       Radiology:  No orders to display           Assessment/Plan:    68 y.o. male with PMH of CAD s/p remote PCI to RCA,  PAF, SSS s/ PPM, chronic diastolic HF, HTN, AAA, DM2, obesity, PRAVEEN, RA, BPH with LUTS, prostate cancer, ETOH use,  gait ataxia who presented with:     Hypotension   - likely medication related  - BP improving, s/p IV hydration  - holding antihypertensive/diuretic regimen  - cardiology following     RON  - Cr 1.6 on arrival, baseline around 0.8  - holding diuretic regimen  - resolved with IVFs      CAD s/p remote PCI to RCA  - continue statin therapy     PAF, SSS s/ PPM  - continue Apixaban  - monitor on telemetry     Chronic diastolic HF   - holding diuretic regimen due to RON     DM2  - HbA1c in the 6.1-6.6 range over the last 5 years  - monitor glucose levels     ETOH use disorder  - monitor for s/s of withdrawal     Gait ataxia  - continue PT/OT        Diet: ADULT DIET;  Regular    Code Status: Full Code      Disposition - home likely tomorrow per cardiology        Electronically signed by John Ríos MD on 2/19/2023 at 1:51 PM

## 2023-02-19 NOTE — PROGRESS NOTES
[facility] Occupational Therapy      Date: 2023  Patient Name: Sarkis Desouza        MRN: [id number]  Account: [de-identified]   : 1945  (68 y.o.)  Room: [place] [address]    Chart reviewed, attempted OT at [phone] for evaluation. Patient sleeping with breakfast out of his reach. Patient was awaken and provided with food in front of him. Patient required assistance to open his apple juice and coffee creamers but after set up he was independent in self feeding.  Will allow time for patient to finish his meal and then will complete the eval.     Spent 5 minutes with patient at that time    Electronically signed by MAGGIE Mnoet/L on 2023 at 8:41 AM

## 2023-02-20 LAB
ALBUMIN SERPL-MCNC: 3.3 G/DL (ref 3.5–4.6)
ANION GAP SERPL CALCULATED.3IONS-SCNC: 12 MEQ/L (ref 9–15)
BASOPHILS ABSOLUTE: 0 K/UL (ref 0–0.2)
BASOPHILS RELATIVE PERCENT: 0.5 %
BUN BLDV-MCNC: 26 MG/DL (ref 8–23)
CALCIUM SERPL-MCNC: 8.9 MG/DL (ref 8.5–9.9)
CHLORIDE BLD-SCNC: 108 MEQ/L (ref 95–107)
CO2: 20 MEQ/L (ref 20–31)
CREAT SERPL-MCNC: 0.89 MG/DL (ref 0.7–1.2)
EOSINOPHILS ABSOLUTE: 0.3 K/UL (ref 0–0.7)
EOSINOPHILS RELATIVE PERCENT: 3.2 %
GFR SERPL CREATININE-BSD FRML MDRD: >60 ML/MIN/{1.73_M2}
GLUCOSE BLD-MCNC: 100 MG/DL (ref 70–99)
HCT VFR BLD CALC: 35.5 % (ref 42–52)
HEMOGLOBIN: 12.1 G/DL (ref 14–18)
LYMPHOCYTES ABSOLUTE: 0.8 K/UL (ref 1–4.8)
LYMPHOCYTES RELATIVE PERCENT: 10.2 %
MAGNESIUM: 1.8 MG/DL (ref 1.7–2.4)
MCH RBC QN AUTO: 31.1 PG (ref 27–31.3)
MCHC RBC AUTO-ENTMCNC: 34.2 % (ref 33–37)
MCV RBC AUTO: 91 FL (ref 79–92.2)
MONOCYTES ABSOLUTE: 0.6 K/UL (ref 0.2–0.8)
MONOCYTES RELATIVE PERCENT: 7.9 %
NEUTROPHILS ABSOLUTE: 6.3 K/UL (ref 1.4–6.5)
NEUTROPHILS RELATIVE PERCENT: 78.2 %
PDW BLD-RTO: 16.5 % (ref 11.5–14.5)
PHOSPHORUS: 3.3 MG/DL (ref 2.3–4.8)
PLATELET # BLD: 241 K/UL (ref 130–400)
POTASSIUM SERPL-SCNC: 4.2 MEQ/L (ref 3.4–4.9)
RBC # BLD: 3.9 M/UL (ref 4.7–6.1)
SODIUM BLD-SCNC: 140 MEQ/L (ref 135–144)
WBC # BLD: 8.1 K/UL (ref 4.8–10.8)

## 2023-02-20 PROCEDURE — 83735 ASSAY OF MAGNESIUM: CPT

## 2023-02-20 PROCEDURE — 80069 RENAL FUNCTION PANEL: CPT

## 2023-02-20 PROCEDURE — 85025 COMPLETE CBC W/AUTO DIFF WBC: CPT

## 2023-02-20 PROCEDURE — 6370000000 HC RX 637 (ALT 250 FOR IP): Performed by: NURSE PRACTITIONER

## 2023-02-20 PROCEDURE — 2580000003 HC RX 258: Performed by: INTERNAL MEDICINE

## 2023-02-20 PROCEDURE — 1210000000 HC MED SURG R&B

## 2023-02-20 PROCEDURE — 97162 PT EVAL MOD COMPLEX 30 MIN: CPT

## 2023-02-20 PROCEDURE — 6370000000 HC RX 637 (ALT 250 FOR IP): Performed by: INTERNAL MEDICINE

## 2023-02-20 PROCEDURE — 36415 COLL VENOUS BLD VENIPUNCTURE: CPT

## 2023-02-20 RX ORDER — LOSARTAN POTASSIUM 25 MG/1
25 TABLET ORAL DAILY
Status: DISCONTINUED | OUTPATIENT
Start: 2023-02-20 | End: 2023-03-02 | Stop reason: HOSPADM

## 2023-02-20 RX ORDER — LOSARTAN POTASSIUM 25 MG/1
25 TABLET ORAL DAILY
Qty: 30 TABLET | Refills: 3 | Status: SHIPPED | OUTPATIENT
Start: 2023-02-20

## 2023-02-20 RX ADMIN — PANTOPRAZOLE SODIUM 40 MG: 40 TABLET, DELAYED RELEASE ORAL at 10:24

## 2023-02-20 RX ADMIN — Medication 10 ML: at 09:00

## 2023-02-20 RX ADMIN — ROSUVASTATIN CALCIUM 40 MG: 40 TABLET, FILM COATED ORAL at 21:34

## 2023-02-20 RX ADMIN — KETOROLAC TROMETHAMINE 1 DROP: 5 SOLUTION/ DROPS OPHTHALMIC at 21:49

## 2023-02-20 RX ADMIN — APIXABAN 5 MG: 5 TABLET, FILM COATED ORAL at 21:34

## 2023-02-20 RX ADMIN — APIXABAN 5 MG: 5 TABLET, FILM COATED ORAL at 10:24

## 2023-02-20 RX ADMIN — TAMSULOSIN HYDROCHLORIDE 0.4 MG: 0.4 CAPSULE ORAL at 10:24

## 2023-02-20 RX ADMIN — Medication 5 ML: at 21:35

## 2023-02-20 RX ADMIN — ACETAMINOPHEN 650 MG: 325 TABLET ORAL at 21:48

## 2023-02-20 RX ADMIN — LOSARTAN POTASSIUM 25 MG: 25 TABLET, FILM COATED ORAL at 21:34

## 2023-02-20 RX ADMIN — KETOROLAC TROMETHAMINE 1 DROP: 5 SOLUTION/ DROPS OPHTHALMIC at 10:25

## 2023-02-20 ASSESSMENT — PAIN SCALES - GENERAL: PAINLEVEL_OUTOF10: 3

## 2023-02-20 NOTE — PROGRESS NOTES
[facility] Daily Progress Note  Name: Klever Nichole  Age: 68 y.o. Gender: male  CodeStatus: Full Code    Primary Cardiology : Dr. Dario Lacy MD/ Dr. Alma Lynn MD  [address] [facility] Cardiology : Boxford Files APRn-CNP  Primary Care Provider: Uche Hill MD  Admission Date: 2/17/2023    Chief complaint/Associated symptoms: Alycia Anthony was seen and examined at bedside     He is currently resting comfortably in bed without any specific complaints. States that he worked with therapy this morning and denies shortness of breath, chest pain, lightheadedness or dizziness. Assessment:  Hypotension: Resolved. Related to poor p.o. intake. He has been encouraged to increase his PO intake. Also very likely does not need the dosage of medications he was taking. Given his history of diabetes would like to resume AR-2 blocker. Atrial fibrillation: Remains in A-fib controlled rate suggesting AV node disease. He has no need of negative chronotropic agents. He has occasional hematochezia related to hemorrhoids hemoglobin is normal would continue anticoagulation  Coronary artery disease: No angina  Cardiology to sign off, follow up arranged with Dr. Hayley Richey Hx  Alycia Anthony is 68years old recently admitted last month with fall and recurrent atrial fibrillation with decision to pursue rate control strategy. Had been noncompliant with medications and actively drinking alcohol. He was treated with Eliquis in the hospital but concerns as to outpatient use quite high because of his alcohol consumption and he was to consider Watchman device as an outpatient. Also had presented with moderate right pleural effusion possibly related to his admitting fall. Blood pressure control was optimized during that hospital stay. Diastolic CHF had also been optimized as he had presented with hypertension and volume overload. He had been staying at [facility].      Again presented to the emergency department last evening with lightheadedness. He had been on rehab and was hypotensive. Initial blood pressure 89/62. Heart rate 74. O2 saturation 92% respiratory rate 19. Creatinine increased to 1.6 he was felt volume deplete. Pressure had improved with hydration. Creatinine at discharge January 17 1.06. He says that at [facility] he has something to drink with meals but in between he does not and he does not like asking people for anything to drink. He believes he has not been drinking as much as he should. He is adamant that he has no chest pain shortness of breath palpitation lightheadedness syncope or lower extremity edema. He says occasionally he has some mild rectal bleeding if he is constipated. CARDIAC HISTORY:  8/1/2022 dual-chamber pacemaker; [employer] Aziza XT DR LAUREN  CAD: 2013 Left heart cath: that show moderate diffuse disease with areas of ectatic in the right coronary artery with previously deployed patent stents in the proximal and mid right coronary artery. The posterior descending artery has severe tandem proximal disease and is 100% occluded in the midsegment. Distal vessel fills delayed by left to right collaterals. The left main coronary artery was normal. The left anterior descending artery had mid 50% stenosis. Major diagonal branch has subtotal occlusion stenting of the ostium fillin by left to right collaterals. Ramus intermedius is a large and normal with a left circumflex artery appears normal except for mild luminal irregularities. Medical therapy was recommended. Left ventricular ejection fraction was 80%. Persistent atrial fibrillation  Carotid Art Dis  2022 <65%  Echo 1/12/2023: EF 60%. LVH. 1+ MR.  1+ TR. Pulmonary hypertension RVSP 50 mm. Physical Exam  Constitutional:  Well developed, awake/alert/oriented x3, no distress, alert and cooperative.     Respiratory/Thorax: Patent airways, CTAB,  normal breath sounds with good chest expansion, thorax symmetric. Cardiovascular: Regular, rate and rhythm,  normal S1 and S2, PMI non displaced. Gastrointestinal:  Non distended, soft, non-tender, no rebound tenderness or guarding, ly obese. Genitourinary:  deferred  Musculoskeletal:  No apparent injury. Extremities:  No cyanosis, edema,   Neurological:  Alert and oriented x3. Moves extremities spontaneous with purpose  Psychological:  Appropriate mood and behavior  Skin:  Warm and dry,  no lesions or rashes. Allergies: Hylan G-F 20  Ace Inhibitors  Statins Depletion Therapy    Medications:  Reviewed  Home Medications    Infusion Medications:    sodium chloride       Scheduled Medications:    ketorolac  1 drop Both Eyes BID    pantoprazole  40 mg Oral Daily    rosuvastatin  40 mg Oral QPM    tamsulosin  0.4 mg Oral Daily    sodium chloride flush  5-40 mL IntraVENous 2 times per day    apixaban  5 mg Oral BID     PRN Meds: sodium chloride flush, sodium chloride, ondansetron **OR** ondansetron, polyethylene glycol, acetaminophen **OR** acetaminophen    Vitals  Vitals:    02/20/23 0732   BP: 126/68   Pulse: 60   Resp: 20   Temp: 97.7 °F (36.5 °C)   SpO2: 98%       I&O      Telemetry:  Paced    Labs:   Recent Labs     02/18/23  1055 02/19/23  0639 02/20/23  0612   WBC 9.8 9.6 8.1   HGB 13.2* 12.6* 12.1*   HCT 38.8* 37.3* 35.5*    241 241     Recent Labs     02/18/23  1058 02/19/23  0639 02/20/23  0612    138 140   K 3.7 4.6 4.2    106 108*   CO2 24 19* 20   BUN 44* 32* 26*   CREATININE 1.31* 0.83 0.89   CALCIUM 9.0 8.7 8.9   PHOS 3.6 3.7  --      Recent Labs     02/17/23  1600   AST 16   ALT 14   BILITOT 0.5   ALKPHOS 105*     No results for input(s): INR in the last 72 hours. No results for input(s): Rafaela Fitting in the last 72 hours.     Urinalysis:   Lab Results   Component Value Date/Time    NITRU Negative 01/11/2023 07:45 PM    WBCUA 0-2 01/11/2023 07:45 PM    WBCUA <1 08/05/2022 09:34 PM    BACTERIA Negative 01/11/2023 07:45 PM RBCUA 0-2 01/11/2023 07:45 PM    RBCUA 1 08/05/2022 09:34 PM    BLOODU Negative 01/11/2023 07:45 PM    SPECGRAV 1.015 01/11/2023 07:45 PM    GLUCOSEU Negative 01/11/2023 07:45 PM    GLUCOSEU NEG 06/07/2012 03:53 PM       Radiology:   Most recent    Chest CT      WITH CONTRAST:Results for orders placed during the hospital encounter of 01/11/23    CT CHEST W CONTRAST    Narrative  EXAMINATION:  CT OF THE CHEST WITH CONTRAST 1/11/2023 10:16 pm    TECHNIQUE:  CT of the chest was performed with the administration of intravenous  contrast. Multiplanar reformatted images are provided for review. Automated  exposure control, iterative reconstruction, and/or weight based adjustment of  the mA/kV was utilized to reduce the radiation dose to as low as reasonably  achievable. COMPARISON:  None. HISTORY:  ORDERING SYSTEM PROVIDED HISTORY: fall, sternal pain  TECHNOLOGIST PROVIDED HISTORY:  Reason for exam:->fall, sternal pain  Decision Support Exception - unselect if not a suspected or confirmed  emergency medical condition->Emergency Medical Condition (MA)  What reading provider will be dictating this exam?->CRC    FINDINGS:  Mediastinum: Normal size heart. No pericardial effusion. Pacemaker leads. Normal caliber and contour of the thoracic aorta. No pulmonary emboli  identified. No adenopathy. Lungs/pleura: Moderate right pleural effusion and associated compressive  atelectasis. No pulmonary infiltrates. Upper Abdomen: No acute process. Soft Tissues/Bones: No acute osseous or body wall soft tissue abnormalities. Chronic bilateral rib inferolateral fracture deformities. Impression  1. No acute thoracic injury. Sternum intact. 2. Moderate right pleural effusion and associated compressive atelectasis. RECOMMENDATIONS:  Careful clinical correlation and follow up recommended.          CXR      2-view: Results for orders placed during the hospital encounter of 01/11/23    XR CHEST (2 VW)    Narrative  EXAMINATION:  TWO XRAY VIEWS OF THE CHEST    1/16/2023 9:43 am    COMPARISON:  None. HISTORY:  ORDERING SYSTEM PROVIDED HISTORY: post right thoracentesis  TECHNOLOGIST PROVIDED HISTORY:  Reason for exam:->post right thoracentesis  What reading provider will be dictating this exam?->CRC    FINDINGS:  Small bilateral pleural effusions. Unchanged cardiomediastinal silhouette  with normal pulmonary vascularity. Unchanged left-sided pacemaker. Impression  Small bilateral pleural effusions. Results for orders placed in visit on 08/15/22    XR CHEST STANDARD (2 VW)    Narrative  DIAGNOSIS:J18.9 Pneumonia due to infectious organism, unspecified laterality, unspecified part of lung ICD10    COMPARISON: April 17, 2022    TECHNIQUE: PA and lateral chest    FINDINGS: The right costophrenic angle is blunted. Mild airspace disease is seen in both bases. There has been interval improvement in the pleural effusion and right lower lobe when compared to previous study. The heart is slightly enlarged. Dual lead  pacemaker is seen in place. The leads project over the right atrium and right ventricle. The osseous structures are grossly intact. Impression  Tiny pleural effusion is seen on the right and mild by basilar infiltrates or atelectasis is seen. Portable: Results for orders placed during the hospital encounter of 01/11/23    XR CHEST PORTABLE    Narrative  EXAMINATION:  ONE XRAY VIEW OF THE CHEST    1/11/2023 7:59 pm    COMPARISON:  08/15/2022    HISTORY:  ORDERING SYSTEM PROVIDED HISTORY: fall, CP  TECHNOLOGIST PROVIDED HISTORY:  Reason for exam:->fall, CP  What reading provider will be dictating this exam?->CRC    FINDINGS:  The lungs are without acute focal process. There is no effusion or  pneumothorax. Stable heart size. Left-sided transvenous pacer device the  osseous structures are without acute process. Impression  No acute process.         Active Hospital Problems    Diagnosis Date Noted    Hypotension [I95.9] 02/17/2023     Priority: Medium       Additional work up or/and treatment plan may be added today or then after based on clinical progression. I am managing a portion of pt care. Some medical issues are handled by other specialists. Additional work up and treatment should be done in out pt setting by pt PCP and other out pt providers. In addition to examining and evaluating pt, I spent additional time explaining care, normaland abnormal findings, and treatment plan. All of pt questions were answered. Counseling, diet and education were provided. Case will be discussed with nursing staff when appropriate. Family will be updated if and whenappropriate.       Electronically signed by GIUSEPPE Yo CNP on 2/20/2023 at 10:57 AM

## 2023-02-20 NOTE — DISCHARGE INSTRUCTIONS
YOU HAVE A FOLLOW UP APPOINTMENT SCHEDULED WITH DR Kylee Gutierrez AT  [facility] OFFICE, IT IS VERY IMPORTANT THAT YOU KEEP THIS APPOINTMENT AS YOUR MEDICATIONS HAVE BEEN ADJUSTED HERE AT [address] AND THEY MAY REQUIRE FURTHER ADJUSTMENT. YOU  WILL NEED TO HAVE LABS DRAWN PRIOR TO [facility] A LAB ORDER HAS BEEN PROVIDED FOR YOU AT DISCHARGE.

## 2023-02-20 NOTE — CARE COORDINATION
ELISSAW met with patient to discuss discharge needs. Patient confirmed he plans to return to [facility] to continue his therapy. Miranda was contacted and she stated patient is not a bed hold. Pre cert will be started today.

## 2023-02-20 NOTE — PROGRESS NOTES
Hospitalist Progress Note      PCP: Josue Orellana MD    Date of Admission: 2/17/2023    Chief Complaint:  Pt seen and examined. Awaiting precert for [facility] Pt    Medications:  Reviewed    Infusion Medications    sodium chloride       Scheduled Medications    losartan  25 mg Oral Daily    ketorolac  1 drop Both Eyes BID    pantoprazole  40 mg Oral Daily    rosuvastatin  40 mg Oral QPM    tamsulosin  0.4 mg Oral Daily    sodium chloride flush  5-40 mL IntraVENous 2 times per day    apixaban  5 mg Oral BID     PRN Meds: sodium chloride flush, sodium chloride, ondansetron **OR** ondansetron, polyethylene glycol, acetaminophen **OR** acetaminophen      Intake/Output Summary (Last 24 hours) at 2/20/2023 1734  Last data filed at 2/20/2023 0640  Gross per 24 hour   Intake 450 ml   Output 825 ml   Net -375 ml       Exam:    /68   Pulse 60   Temp 97.7 °F (36.5 °C) (Oral)   Resp 20   Ht 5' 6\" (1.676 m)   Wt 289 lb (131.1 kg)   SpO2 98%   BMI 46.65 kg/m²     General appearance: alert, cooperative  Lungs: clear to auscultation bilaterally  Heart: S1/S2,RRR  Abdomen: soft, active BS  Extremities:  no edema       Labs:   Recent Labs     02/18/23  1055 02/19/23  0639 02/20/23  0612   WBC 9.8 9.6 8.1   HGB 13.2* 12.6* 12.1*   HCT 38.8* 37.3* 35.5*    241 241     Recent Labs     02/18/23  1058 02/19/23  0639 02/20/23  0612    138 140   K 3.7 4.6 4.2    106 108*   CO2 24 19* 20   BUN 44* 32* 26*   CREATININE 1.31* 0.83 0.89   CALCIUM 9.0 8.7 8.9   PHOS 3.6 3.7 3.3     No results for input(s): AST, ALT, BILIDIR, BILITOT, ALKPHOS in the last 72 hours. No results for input(s): INR in the last 72 hours. No results for input(s): Roslynn Angst in the last 72 hours.     Urinalysis:      Lab Results   Component Value Date/Time    NITRU Negative 01/11/2023 07:45 PM    WBCUA 0-2 01/11/2023 07:45 PM    WBCUA <1 08/05/2022 09:34 PM    BACTERIA Negative 01/11/2023 07:45 PM    RBCUA 0-2 01/11/2023 07:45 PM RBCUA 1 08/05/2022 09:34 PM    BLOODU Negative 01/11/2023 07:45 PM    SPECGRAV 1.015 01/11/2023 07:45 PM    GLUCOSEU Negative 01/11/2023 07:45 PM    GLUCOSEU NEG 06/07/2012 03:53 PM       Radiology:  No orders to display           Assessment/Plan:    68 y.o. male with PMH of CAD s/p remote PCI to RCA,  PAF, SSS s/ PPM, chronic diastolic HF, HTN, AAA, DM2, obesity, PRAVEEN, RA, BPH with LUTS, prostate cancer, ETOH use,  gait ataxia who presented with:     # Hypotension - improved  - likely medication related  - BP improving, s/p IV hydration  - holding antihypertensive/diuretic regimen  - cardiology following     # RON- resolved  - Cr 1.6 on arrival, baseline around 0.8  - holding diuretic regimen  - resolved with IVFs      # CAD s/p remote PCI to RCA  - continue statin therapy     # PAF, SSS s/ PPM  - continue Apixaban  - monitor on telemetry     # Chronic diastolic HF   - holding diuretic regimen due to RON. Resume per cardiology     # DM2  - HbA1c in the 6.1-6.6 range over the last 5 years  - monitor glucose levels     # ETOH use disorder  - monitor for s/s of withdrawal     # Gait ataxia  - continue PT/OT        Diet: ADULT DIET; Regular    Code Status: Full Code      Disposition - [facility] Pt when pre-cert obtained. [facility] for discharge per cardiology. Will discharge once accepted.        [facility] DO  Internal Medicine   Hospitalist    >45 minutes in total care time

## 2023-02-20 NOTE — DISCHARGE INSTR - COC
Continuity of Care Form    Patient Name: Abhishek Cano   :    MRN:  [id number]    [facility] date:  2023  Discharge date:  3/2/23    Code Status Order: Full Code   Advance Directives:     Admitting Physician:  Brenton Cummins MD  PCP: Maile Encarnacion MD    Discharging Nurse: Carina Dong Unit/Room#: C559/K246-18  Discharging Unit Phone Number: [phone]    Emergency Contact:   Extended Emergency Contact Information  Primary Emergency Contact: [address] Phone: [phone]  Mobile Phone: [phone]  Relation: Child  Secondary Emergency Contact: Mian villarreal  Mobile Phone: [phone]  Relation: Child    Past Surgical History:  Past Surgical History:   Procedure Laterality Date    ANKLE SURGERY Right     pin    APPENDECTOMY      ARTHRODESIS Right 10/31/2016    RIGHT ANKLE ARTHRODESIS OF RIGHT ANKLE AND SUBTALAR JOINT, C-ARM, ABHIJEET EQUIPMENT SYNTHETIC BONE GRAFT, ALLOGRAFT CANCELLOUS, SHARON ANKLE FUSION NAIL, SHANDA IMPLANT BONE STIMULATOR, CHOICE ANESTHESIA, BLOCK  performed by Casey Alejandra MD at [facility]      stent x 5    COLONOSCOPY  2011    FRACTURE SURGERY      right ankle    HARDWARE REMOVAL FOOT / ANKLE Right 2017    RIGHT ANKLE HARDWARE [facility]OVAL [facility]. Bilateral     knees    ND COLON CA SCRN NOT  [facility]A 2017    COLONOSCOPY performed by Artem Juan DO at [facility] Left     THORACENTESIS Right 2023    1140ml removed by Dr. Robyn Reaves. Diagnostics sent to lab.     TONSILLECTOMY AND ADENOIDECTOMY         Immunization History:   Immunization History   Administered Date(s) Administered    Influenza Vaccine, unspecified formulation 2016    Influenza, High Dose (Fluzone 65 yrs and older) 2015, 10/20/2018    Influenza, Triv, 3 Years and older, IM, PF (Afluria 5yrs and older) 2016    Pneumococcal Conjugate 13-tyrone Oswald) 01/10/2017    Zoster Live (Zostavax) 08/27/2012, 08/01/2018, 11/18/2018    Zoster Recombinant (Shingrix) 06/29/2018, 11/09/2018       Active Problems:  Patient Active Problem List   Diagnosis Code    Bradycardia R00.1    Mixed hyperlipidemia E78.2    Primary osteoarthritis involving multiple joints M15.9    Dysthymia F34.1    CAD (coronary artery disease) I25.10    RA [facility]umat[facility]) [facility] M06.9    Essential hypertension I10    Numbness in feet R20.0    Stented coronary artery Z95.5    History of prostate cancer Z85.46    History of colon polyps Z86.010    Nuclear sclerosis of both eyes H25.13    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, 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    Presbyopia H52.4    Regular astigmatism H52.229    Hyperuricemia E79.0    Chronic gastritis without bleeding K29.50    Type 2 diabetes mellitus with microalbuminuria, without long-term current use of insulin (HCC) E11.29, R80.9    Retinal hemorrhage, bilateral H35.63    Intermediate stage nonexudative age-related macular degeneration of both eyes H35.3132    Primary osteoarthritis, right ankle and foot M19.071    Pain in right ankle M25.571    Encounter for other orthopedic aftercare Z47.89    Arthrodesis status Z98.1    Unable to ambulate R26.2    PAF (paroxysmal atrial fibrillation)  I48.0    CHF (congestive heart failure), NYHA class I, acute on chronic, combined ([facility] I50.43    Adjustment disorder F43.20    Recurrent falls R29.6    Ataxic gait R26.0    ETOH abuse F10.10    Hypotension I95.9       Isolation/Infection:   Isolation            No Isolation          Patient Infection Status       Infection Onset Added Last Indicated Last Indicated By Review Planned Expiration Resolved Resolved By    None active    Resolved    COVID-19 (Rule Out) 02/17/23 02/17/23 02/17/23 Respiratory Panel, Molecular, with COVID-19 (Restricted: peds pts or suitable admitted adults) (Ordered)   02/17/23 Rule-Out Test Resulted COVID-19 (Rule Out) 22 COVID-19, Rapid (Ordered)   22 Rule-Out Test Resulted    COVID-19 21 COVID-19 Ambulatory   21     COVID-19 (Rule Out) 21 COVID-19 Ambulatory (Ordered)   21 Rule-Out Test Resulted            Nurse Assessment:  Last Vital Signs: /68   Pulse 60   Temp 97.7 °F (36.5 °C) (Oral)   Resp 20   Ht 5' 6\" (1.676 m)   Wt 289 lb (131.1 kg)   SpO2 98%   BMI 46.65 kg/m²     Last documented pain score (0-10 scale): Pain Level: 3  Last Weight:   Wt Readings from Last 1 Encounters:   23 289 lb (131.1 kg)     Mental Status:  oriented, alert, coherent, and able to concentrate and follow conversation    IV Access:  - None    Nursing Mobility/ADLs:  Walking   Assisted  Transfer  Assisted  Bathing  Assisted  Dressing  Assisted  Toileting  Assisted  Feeding  Assisted  Med [address]  Independent  Med Delivery   whole    Wound Care Documentation and Therapy:        Elimination:  Continence: Bowel: Yes  Bladder: Yes  Urinary Catheter: None   Colostomy/Ileostomy/Ileal Conduit: No       Date of Last BM: 3/1/23    Intake/Output Summary (Last 24 hours) at 2023 1056  Last data filed at 2023 0640  Gross per 24 hour   Intake 450 ml   Output 825 ml   Net -375 ml     I/O last 3 completed shifts: In: 18 [P.O.:570]  Out: 1185 [Urine:1185]    Safety Concerns: At Risk for Falls    Impairments/Disabilities:      None    Nutrition Therapy:  Current Nutrition Therapy:   - Oral Diet:  General    Routes of Feeding: Oral  Liquids: Thin Liquids  Daily Fluid Restriction: no  Last Modified Barium Swallow with Video (Video Swallowing Test): not done    Treatments at the Time of Hospital Discharge:   Respiratory Treatments: none  Oxygen Therapy:  is not on home oxygen therapy.   Ventilator:    - No ventilator support    Rehab Therapies: Physical Therapy and Occupational Therapy  Weight Bearing Status/Restrictions: No weight bearing restrictions  Other Medical Equipment (for information only, NOT a DME order):  walker and bedside commode  Other Treatments: ***    Patient's personal belongings (please select all that are sent with patient):  None    RN SIGNATURE:  Electronically signed by Codi Lockett RN on 3/2/23 at 11:48 AM EST    CASE MANAGEMENT/SOCIAL WORK SECTION    Inpatient Status Date: 2/17/23    Readmission Risk Assessment Score:  Readmission Risk              Risk of Unplanned Readmission:  16           Discharging to Facility/ Agency   Name: [facility]  Address:  Phone: [phone]  Fax:    Dialysis Facility (if applicable)   Name:  Address:  Dialysis Schedule:  Phone:  Fax:    / signature: Electronically signed by BRADLEY Dominguez on 2/20/23 at 10:56 AM EST    PHYSICIAN SECTION    Prognosis: {Prognosis:8772081285}    Condition at Discharge: [address] Patient Condition:[id number]}    Rehab Potential (if transferring to Rehab): {Prognosis:6286378047}    Recommended Labs or Other Treatments After Discharge: ***    Physician Certification: I certify the above information and transfer of Jody Xie  is necessary for the continuing treatment of the diagnosis listed and that he requires {Admit to Appropriate Level of Care:61385} for {GREATER/LESS:959788118} 30 days.      Update Admission H&P: {CHP DME Changes in EKFVA:784974634}    PHYSICIAN SIGNATURE:  Electronically signed by Pj Mix DO on 2/21/23 at 12:16 PM EST

## 2023-02-20 NOTE — PROGRESS NOTES
Physical Therapy Med Surg Initial Assessment  Facility/Department: [facility]  Room: 90/W190-       NAME: Roselia Ruiz  :  (74 y.o.)  MRN: [id number]  CODE STATUS: Full Code    Date of Service: 2023    Patient Diagnosis(es): Hypotension [I95.9]  RON (acute kidney injury) ([facility] [address] [N17.9]  Hypotension, unspecified hypotension type [I95.9]   No chief complaint on file.     Patient Active Problem List    Diagnosis Date Noted    Hypotension 2023    Recurrent falls 2023    Ataxic gait 2023    ETOH abuse 2023    Adjustment disorder 2023    CHF (congestive heart failure), NYHA class I, acute on chronic, combined ([facility] 2021    Unable to ambulate 12/10/2021    PAF (paroxysmal atrial fibrillation)  12/10/2021    Retinal hemorrhage, bilateral 2019    Intermediate stage nonexudative age-related macular degeneration of both eyes 2019    Hyperuricemia 01/10/2019    Chronic gastritis without bleeding 01/10/2019    Type 2 diabetes mellitus with microalbuminuria, without long-term current use of insulin ([address] 01/10/2019    Arthrodesis status 2017    Primary osteoarthritis, right ankle and foot 2017    Pain in right ankle 2017    Encounter for other orthopedic aftercare 2017    History of colon polyps 2017    Nuclear sclerosis of both eyes 2015    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, left eye 2015    Presbyopia 2015    Regular astigmatism 2015    History of prostate cancer 2014    Stented coronary artery 2013    Essential hypertension 2013    Numbness in feet 2013    RA (rheumatoid arthritis) ([address] 10/24/2011    Bradycardia     Mixed hyperlipidemia     Primary osteoarthritis involving multiple joints     Dysthymia     CAD (coronary artery disease)         Past Medical History:   Diagnosis Date    Bradycardia     CAD (coronary artery disease)     Cancer ([address] prostate- radiation     CHF (congestive heart failure) (HCC)     Depression     HSV-2 (herpes simplex virus 2) infection     Hyperlipidemia     Hypertension     Left knee DJD     Osteoarthritis     Rheumatoid arthritis(714.0)     Type 2 diabetes mellitus without complication, without long-term current use of insulin ([facility] 1/10/2019     Past Surgical History:   Procedure Laterality Date    ANKLE SURGERY Right     pin    APPENDECTOMY      ARTHRODESIS Right 10/31/2016    RIGHT ANKLE ARTHRODESIS OF RIGHT ANKLE AND SUBTALAR JOINT, C-ARM, ABHIJEET EQUIPMENT SYNTHETIC BONE GRAFT, ALLOGRAFT CANCELLOUS, SHARON ANKLE FUSION NAIL, SHANDA IMPLANT BONE STIMULATOR, CHOICE ANESTHESIA, BLOCK  performed by Milagro Stevens MD at [facility]      stent x 5    COLONOSCOPY  07/19/2011    FRACTURE SURGERY      right ankle    HARDWARE REMOVAL FOOT / ANKLE Right 11/06/2017    RIGHT ANKLE HARDWARE REMOVAL performed by iKmmy Olivera MD at [address] [place] [address]. Bilateral     knees    [place] COLON CA SCRN NOT  [address] IND N/A 07/11/2017    COLONOSCOPY performed by John Gao DO at [facility] Left     THORACENTESIS Right 01/16/2023    1140ml removed by Dr. Jacob Shah. Diagnostics sent to lab.     TONSILLECTOMY AND ADENOIDECTOMY         Patient assessed for rehabilitation services?: Yes  Family / Caregiver Present: No    Restrictions:  Restrictions/Precautions: Fall Risk     SUBJECTIVE:   Pain   0/10    Prior Level of Function:  Social/Functional History  Lives With: Alone  Type of Home: House (Patient has most recently been staying at a nursing home but he is unable to report how long other than stating \"a week or 2\")  Home Layout: Multi-level (split level home with laundry on the lowest level)  Home Access: Stairs to enter with rails  Entrance Stairs - Number of Steps: 1  Bathroom Shower/Tub: Walk-in shower (lowest level shower)  Bathroom Equipment: Shower chair, Hand-held shower  Home Equipment: Jesusita Landry rolling, Cane, Reacher (Patient has chair lifts between each level of the home)  Has the patient had two or more falls in the past year or any fall with injury in the past year?: Yes  ADL Assistance: Independent  Homemaking Assistance: Independent  Ambulation Assistance: Independent (uses cane mostly but will use the walker when he feels as though he needs to)  Transfer Assistance: Independent  Active : Yes  Mode of Transportation: SUV  Occupation: Retired  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  Additional Comments: no pets currently    OBJECTIVE:   Vision  Vision: Impaired  Vision Exceptions: Wears glasses for reading  Hearing: Within functional limits    Cognition:  Overall Orientation Status: Within Functional Limits  Follows Commands: Within Functional Limits  Cognition Comment: Patient was noted to have decreased processing of information requiring extra time for all tasks    Observation/Palpation  Posture: Fair  Observation: Patient noted to have decreased eye contact during the evaluation; mild dizziness sitting edge of bed which resolved with seated rest  Edema: B LEs    ROM:  RLE AROM: WFL  LLE AROM : WFL    Strength:  Strength RLE  Comment: grossly 4-/5  Strength LLE  Comment: grossly 4-/5  Strength Other  Other: core strength 3/5    Neuro:  Balance  Sitting - Static: Good  Sitting - Dynamic: Good  Standing - Static: Fair;-  Standing - Dynamic: Poor    Sensation: Impaired (B feet)    Bed mobility  Supine to Sit: Moderate assistance  Bed Mobility Comments: HOB flat; increased time and effort    Transfers  Sit to Stand: Moderate Assistance  Stand to Sit: Moderate Assistance  Comment: B HHA; heavy posterior lean  initially; no AD used; standing tolerance with SBA X 1 min to use urinal    Ambulation  Surface: Level tile  Device: No Device;Hand-Held Assist  Assistance:  Moderate assistance  Quality of Gait: heavy use of UE support  Gait Deviations: Slow Gretchen; Increased MIKKI; Decreased step length;Decreased step height  Distance: 3 side steps toward [facility]    Stairs/Curb  Stairs?: No    Activity Tolerance  Activity Tolerance: Patient limited by fatigue;Patient limited by endurance    Patient Education  Education Given To: Patient  Education Provided: Role of Therapy;Plan of Care  Education Method: Verbal  Education Outcome: Continued education needed       ASSESSMENT:   Body Structures, Functions, Activity Limitations Requiring Skilled Therapeutic Intervention: Decreased functional mobility ; Decreased strength;Decreased endurance;Decreased balance  Decision Making: Medium Complexity  History: high  Exam: med  Clinical Presentation: med    Therapy Prognosis: Good    DISCHARGE RECOMMENDATIONS:  Discharge Recommendations: Continue to assess pending progress    Assessment: Pt is 68 y.o. male to hospital due to hypotension. Pt exhibits decreased strength, balance, and activity tolerance with carryover to pt requiring increased assistance with all mobility at this time. Continued PT is required to progress pt to indep level with stairs due to pt lives alone in split level home.   Requires PT Follow-Up: Yes       PLAN OF CARE:  Physcial Therapy Plan  General Plan: 1 time a day 3-6 times a week  Current Treatment Recommendations: Strengthening, ROM, Functional mobility training, Balance training, Transfer training, Endurance training, Gait training, Stair training, Neuromuscular re-education, Home exercise program, Safety education & training, Patient/Caregiver education & training, Equipment evaluation, education, & procurement, Positioning, Therapeutic activities    Safety Devices  Type of Devices: Call light within reach, Bed alarm in place, Left in bed    Goals:  Long Term Goals  Long Term Goal 1: Pt will demonstrate bed mobility indep  Long Term Goal 2: Pt will demonstrate transfers mod indep with safest AD  Long Term Goal 3: Pt will demonstrate amb >/= 75ft mod indep with safest AD  Long Term Goal 4: Pt will demonstrate stair negotiation 6 steps with 1 rail mod indep  Long Term Goal 5: Pt will demonstrate TUG </= 20 sec for decreased risk for falls    AMPAC (6 CLICK) BASIC MOBILITY  AM-PAC Inpatient Mobility Raw Score : 11     Therapy Time:   Individual   Time In 1036   Time Out 1053   Minutes 17       Eval X 17 min    Caron Livingston, PT, 02/20/23 at 11:02 AM         Definitions for assistance levels  Independent = pt does not require any physical supervision or assistance from another person for activity completion. Device may be needed.   Stand by assistance = pt requires verbal cues or instructions from another person, close to but not touching, to perform the activity  Minimal assistance= pt performs 75% or more of the activity; assistance is required to complete the activity  Moderate assistance= pt performs 50% of the activity; assistance is required to complete the activity  Maximal assistance = pt performs 25% of the activity; assistance is required to complete the activity  Dependent = pt requires total physical assistance to accomplish the task

## 2023-02-20 NOTE — PROGRESS NOTES
I had seen patient yesterday and forgot to write note. Cardiovascularly was stable with controlled a.fib  No cardiac med changes recommended yesterday  If continues stable plan to return to NH with lower antihypertensive doses.   He also needs encouragement to stay hydrated, he said wasn't drinking well at [facility]

## 2023-02-21 PROCEDURE — 2580000003 HC RX 258: Performed by: INTERNAL MEDICINE

## 2023-02-21 PROCEDURE — 1210000000 HC MED SURG R&B

## 2023-02-21 PROCEDURE — 6370000000 HC RX 637 (ALT 250 FOR IP): Performed by: NURSE PRACTITIONER

## 2023-02-21 PROCEDURE — 97116 GAIT TRAINING THERAPY: CPT

## 2023-02-21 PROCEDURE — 97535 SELF CARE MNGMENT TRAINING: CPT

## 2023-02-21 PROCEDURE — 6370000000 HC RX 637 (ALT 250 FOR IP): Performed by: INTERNAL MEDICINE

## 2023-02-21 RX ADMIN — APIXABAN 5 MG: 5 TABLET, FILM COATED ORAL at 08:59

## 2023-02-21 RX ADMIN — Medication 10 ML: at 08:59

## 2023-02-21 RX ADMIN — LOSARTAN POTASSIUM 25 MG: 25 TABLET, FILM COATED ORAL at 08:59

## 2023-02-21 RX ADMIN — Medication 10 ML: at 21:30

## 2023-02-21 RX ADMIN — TAMSULOSIN HYDROCHLORIDE 0.4 MG: 0.4 CAPSULE ORAL at 08:59

## 2023-02-21 RX ADMIN — ROSUVASTATIN CALCIUM 40 MG: 40 TABLET, FILM COATED ORAL at 16:30

## 2023-02-21 RX ADMIN — KETOROLAC TROMETHAMINE 1 DROP: 5 SOLUTION/ DROPS OPHTHALMIC at 21:29

## 2023-02-21 RX ADMIN — KETOROLAC TROMETHAMINE 1 DROP: 5 SOLUTION/ DROPS OPHTHALMIC at 08:59

## 2023-02-21 RX ADMIN — POLYETHYLENE GLYCOL 3350 17 G: 17 POWDER, FOR SOLUTION ORAL at 16:31

## 2023-02-21 RX ADMIN — PANTOPRAZOLE SODIUM 40 MG: 40 TABLET, DELAYED RELEASE ORAL at 08:59

## 2023-02-21 RX ADMIN — APIXABAN 5 MG: 5 TABLET, FILM COATED ORAL at 21:23

## 2023-02-21 RX ADMIN — ACETAMINOPHEN 650 MG: 325 TABLET ORAL at 21:25

## 2023-02-21 ASSESSMENT — PAIN SCALES - GENERAL
PAINLEVEL_OUTOF10: 2
PAINLEVEL_OUTOF10: 0
PAINLEVEL_OUTOF10: 3

## 2023-02-21 NOTE — PLAN OF CARE
Problem: Discharge Planning  Goal: Discharge to home or other facility with appropriate resources  Outcome: Progressing  Flowsheets (Taken 2/20/2023 2115)  Discharge to home or other facility with appropriate resources:   Identify barriers to discharge with patient and caregiver   Identify discharge learning needs (meds, wound care, etc)     Problem: ABCDS Injury Assessment  Goal: Absence of physical injury  Outcome: Progressing  Flowsheets (Taken 2/21/2023 0400)  Absence of Physical Injury: Implement safety measures based on patient assessment     Problem: Safety - Adult  Goal: Free from fall injury  Outcome: Progressing  Flowsheets (Taken 2/21/2023 0400)  Free From Fall Injury: Instruct family/caregiver on patient safety     Problem: Chronic Conditions and Co-morbidities  Goal: Patient's chronic conditions and co-morbidity symptoms are monitored and maintained or improved  Outcome: Progressing  Flowsheets (Taken 2/20/2023 2115)  Care Plan - Patient's Chronic Conditions and Co-Morbidity Symptoms are Monitored and Maintained or Improved: Monitor and assess patient's chronic conditions and comorbid symptoms for stability, deterioration, or improvement     Problem: Skin/Tissue Integrity  Goal: Absence of new skin breakdown  Description: 1. Monitor for areas of redness and/or skin breakdown  2. Assess vascular access sites hourly  3. Every 4-6 hours minimum:  Change oxygen saturation probe site  4. Every 4-6 hours:  If on nasal continuous positive airway pressure, respiratory therapy assess nares and determine need for appliance change or resting period.   Outcome: Progressing

## 2023-02-21 NOTE — CARE COORDINATION
ELISSAW spoke with Miranda from [facility] [employer] [facility] who said pre cert is still pending at this time.

## 2023-02-21 NOTE — PROGRESS NOTES
Physical Therapy Med Surg Daily Treatment Note  Facility/Department: [facility]  Room: W190/W190-       NAME: Abhishek Cano  : [birth year] (14 y.o.)  MRN: [id number]  CODE STATUS: Full Code    Date of Service: 2023    Patient Diagnosis(es): Hypotension [I95.9]  RON (acute kidney injury) ([facility] [address] [N17.9]  Hypotension, unspecified hypotension type [I95.9]   No chief complaint on file.     Patient Active Problem List    Diagnosis Date Noted    Hypotension 2023    Recurrent falls 2023    Ataxic gait 2023    ETOH abuse 2023    Adjustment disorder 2023    CHF (congestive heart failure), NYHA class I, acute on chronic, combined ([facility] [address] 2021    Unable to ambulate 12/10/2021    PAF (paroxysmal atrial fibrillation)  12/10/2021    Retinal hemorrhage, bilateral 2019    Intermediate stage nonexudative age-related macular degeneration of both eyes 2019    Hyperuricemia 01/10/2019    Chronic gastritis without bleeding 01/10/2019    Type 2 diabetes mellitus with microalbuminuria, without long-term current use of insulin ([facility] [address] 01/10/2019    Arthrodesis status 2017    Primary osteoarthritis, right ankle and foot 2017    Pain in right ankle 2017    Encounter for other orthopedic aftercare 2017    History of colon polyps 2017    Nuclear sclerosis of both eyes 2015    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, left eye 2015    Presbyopia 2015    Regular astigmatism 2015    History of prostate cancer 2014    Stented coronary artery 2013    Essential hypertension 2013    Numbness in feet 2013    RA (rheumatoid arthritis) ([facility] [address] 10/24/2011    Bradycardia     Mixed hyperlipidemia     Primary osteoarthritis involving multiple joints     Dysthymia     CAD (coronary artery disease)         Past Medical History:   Diagnosis Date    Bradycardia     CAD (coronary artery disease)     Cancer [facility])     prostate- radiation     CHF (congestive heart failure) (HCC)     Depression     HSV-2 (herpes simplex virus 2) infection     Hyperlipidemia     Hypertension     Left knee DJD     Osteoarthritis     Rheumatoid arthritis(714.0)     Type 2 diabetes mellitus without complication, without long-term current use of insulin ([facility] [address] 1/10/2019     Past Surgical History:   Procedure Laterality Date    ANKLE SURGERY Right     pin    APPENDECTOMY      ARTHRODESIS Right 10/31/2016    RIGHT ANKLE ARTHRODESIS OF RIGHT ANKLE AND SUBTALAR JOINT, C-ARM, ABHIJEET EQUIPMENT SYNTHETIC BONE GRAFT, ALLOGRAFT CANCELLOUS, SHARON ANKLE FUSION NAIL, SHANDA IMPLANT BONE STIMULATOR, CHOICE ANESTHESIA, BLOCK  performed by Ledy Neri MD at [facility] 96      stent x 5    COLONOSCOPY  07/19/2011    FRACTURE SURGERY      right ankle    HARDWARE REMOVAL FOOT / ANKLE Right 11/06/2017    RIGHT ANKLE HARDWARE REMOVAL performed by Rach Villanueva MD at [address] [place] [address]. Bilateral     knees    [place] COLON CA SCRN NOT  [address] IND N/A 07/11/2017    COLONOSCOPY performed by Kary Lal DO at [facility] Left     THORACENTESIS Right 01/16/2023    1140ml removed by Dr. Zena Og. Diagnostics sent to lab. TONSILLECTOMY AND ADENOIDECTOMY          Restrictions:  Restrictions/Precautions: Fall Risk    SUBJECTIVE:   Subjective: \" I already did my therapy today\"    Pain  Pain: 1/10 at rest L foot 10/10 with weight bearing    OBJECTIVE:      Bed Mobility Training  Bed Mobility Training: Yes  Rolling: Stand-by assistance  Supine to Sit: Moderate assistance  Sit to Supine:  Moderate assistance    Transfer Training  Transfer Training: Yes  Sit to Stand: Minimum assistance  Stand to Sit: Contact-guard assistance  Bed to Chair: Contact-guard assistance (Cus to complete turn prior to sitting)    Gait Training: Yes  Overall Level of Assistance: Contact-guard assistance  Distance (ft): 20 Feet  Assistive Device: Laurel Aguilar rolling  Interventions: Verbal cues  Speed/Gretchen: Pace decreased (< 100 feet/min); Slow  Step Length: Right shortened  Swing Pattern: Left asymmetrical  Stance: Left decreased  Gait Abnormalities: Antalgic    ASSESSMENT   Assessment: Patient requires encouragement to participate in therapy session. Patient continues to require modA for bed mobility and Katherine to complete STS transfers. Discharge Recommendations:  Continue to assess pending progress    Goals  Long Term Goals  Long Term Goal 1: Pt will demonstrate bed mobility indep  Long Term Goal 2: Pt will demonstrate transfers mod indep with safest AD  Long Term Goal 3: Pt will demonstrate amb >/= 75ft mod indep with safest AD  Long Term Goal 4: Pt will demonstrate stair negotiation 6 steps with 1 rail mod indep  Long Term Goal 5: Pt will demonstrate TUG </= 20 sec for decreased risk for falls    PLAN    General Plan: 1 time a day 3-6 times a week        AMPAC (6 CLICK) BASIC MOBILITY  AM-PAC Inpatient Mobility Raw Score : 11     Therapy Time   Individual   Time In 1621   Time Out 1645   Minutes 24      Gait 10   Transfer [address] [place] [address], 02/21/23 at 4:53 PM     Definitions for assistance levels  Independent = pt does not require any physical supervision or assistance from another person for activity completion. Device may be needed.   Stand by assistance = pt requires verbal cues or instructions from another person, close to but not touching, to perform the activity  Minimal assistance= pt performs 75% or more of the activity; assistance is required to complete the activity  Moderate assistance= pt performs 50% of the activity; assistance is required to complete the activity  Maximal assistance = pt performs 25% of the activity; assistance is required to complete the activity  Dependent = pt requires total physical assistance to accomplish the task

## 2023-02-21 NOTE — PROGRESS NOTES
[facility] MED SURG TREATMENT NOTE     Date: 2023  Patient Name: Huseyin Bradley        MRN: [id number]  Account: [de-identified]   : 1945  (68 y.o.)  Room: [place] [address]    Chart Review:    Restrictions:Fall        Safety:  Safety Devices  Type of Devices: All fall risk precautions in place;Call light within reach; Left in bed;Nurse notified    Patient's birthday verified: Yes    Subjective: Jovan Horner were supposed to give me something for my bowels. \"            Pain at start of treatment: No    Pain at end of treatment: Yes: Pt. unable to rate pain-      Location: left foot  Description jabbing  Nursing notified: Yes  RN:   Intervention: Repositioned    Objective:    ADL Status:  ADL  Grooming: Setup; Increased time to complete  UE Bathing: Setup; Increased time to complete  LE Bathing: Contact guard assistance (Pt completed pericare standing at sink.)  UE Dressing:  (Gown only. Doffed soiled gown and donned clean gown)  LE Dressing:  (Dependent for brief management)  Toileting: Dependent/Total    CESAR Hose donned: No  If no - why  N/A    Therapy key for assistance levels -   Independent/Mod I = Pt. is able to perform task with no assistance but may require a device   Stand by assistance = Pt. does not perform task at an independent level but does not need physical assistance, requires verbal cues  Minimal, Moderate, Maximal Assistance = Pt. requires physical assistance (25%, 50%, 75% assist from helper) for task but is able to actively participate in task   Dependent = Pt. requires total assistance with task and is not able to actively participate with task completion    Orientation Status:WFL       Observation:       Cognition Status: Follows commands consistently       Perception Status:WFL       Vision and Hearing Status:WFL            Functional Mobility:  Patient ambulated to/from bathroom with WW at SBA level.      Transfers:Min A Sit<->stand and toilet transfers       Bed Mobility:Min A supine->sit, Mod A sit->Supine       Seated and Standing Balance:  Balance  Sitting: Intact    Functional Endurance:Fair       Treatment consisted of:    ADL training    Assessment/Discharge Disposition:   Pt increasing ability to complete ADL       [employer]  How much help is needed for putting on and taking off regular lower body clothing?: A Lot  How much help is needed for bathing (which includes washing, rinsing, drying)?: A Lot  How much help is needed for toileting (which includes using toilet, bedpan, or urinal)?: Total  How much help is needed for putting on and taking off regular upper body clothing?: A Lot  How much help is needed for taking care of personal grooming?: A Little  How much help for eating meals?: A Little  AM-PAC Inpatient Daily Activity Raw Score: 13  AM-PAC Inpatient ADL T-Scale Score : 32.03  ADL Inpatient CMS 0-100% Score: 63.03  ADL Inpatient CMS G-Code Modifier : CL    Plan:    Continue OT per POC    Patient Education:       Equipment recommendations:       Goals/Plan of care addressed during this session:   ADL     Patient Goal:      Improve [place] with ADLs and Improve [place] with Functional Transfers    Therapy Time:   Individual Group Co-Treat   Time In 1355       Time Out 1508         Minutes 73                ADL/IADL trainin minutes    Electronically signed by:    JACOB Ramirez    2610, 3:11 PM Electronically signed by JACOB Ramirez on 16 at 3:14 PM EST

## 2023-02-22 LAB
GLUCOSE BLD-MCNC: 131 MG/DL (ref 70–99)
GLUCOSE BLD-MCNC: 149 MG/DL (ref 70–99)
PERFORMED ON: ABNORMAL
PERFORMED ON: ABNORMAL

## 2023-02-22 PROCEDURE — 97110 THERAPEUTIC EXERCISES: CPT

## 2023-02-22 PROCEDURE — 2580000003 HC RX 258: Performed by: INTERNAL MEDICINE

## 2023-02-22 PROCEDURE — 1210000000 HC MED SURG R&B

## 2023-02-22 PROCEDURE — 97116 GAIT TRAINING THERAPY: CPT

## 2023-02-22 PROCEDURE — 6370000000 HC RX 637 (ALT 250 FOR IP): Performed by: NURSE PRACTITIONER

## 2023-02-22 PROCEDURE — 6370000000 HC RX 637 (ALT 250 FOR IP): Performed by: INTERNAL MEDICINE

## 2023-02-22 RX ADMIN — APIXABAN 5 MG: 5 TABLET, FILM COATED ORAL at 20:49

## 2023-02-22 RX ADMIN — KETOROLAC TROMETHAMINE 1 DROP: 5 SOLUTION/ DROPS OPHTHALMIC at 20:49

## 2023-02-22 RX ADMIN — Medication 10 ML: at 20:49

## 2023-02-22 RX ADMIN — ROSUVASTATIN CALCIUM 40 MG: 40 TABLET, FILM COATED ORAL at 17:50

## 2023-02-22 RX ADMIN — Medication 10 ML: at 09:53

## 2023-02-22 RX ADMIN — LOSARTAN POTASSIUM 25 MG: 25 TABLET, FILM COATED ORAL at 09:53

## 2023-02-22 RX ADMIN — TAMSULOSIN HYDROCHLORIDE 0.4 MG: 0.4 CAPSULE ORAL at 09:53

## 2023-02-22 RX ADMIN — KETOROLAC TROMETHAMINE 1 DROP: 5 SOLUTION/ DROPS OPHTHALMIC at 09:56

## 2023-02-22 RX ADMIN — PANTOPRAZOLE SODIUM 40 MG: 40 TABLET, DELAYED RELEASE ORAL at 09:53

## 2023-02-22 RX ADMIN — APIXABAN 5 MG: 5 TABLET, FILM COATED ORAL at 09:53

## 2023-02-22 ASSESSMENT — PAIN SCALES - GENERAL: PAINLEVEL_OUTOF10: 0

## 2023-02-22 NOTE — PROGRESS NOTES
Physical Therapy Med Surg Daily Treatment Note  [facility]  Room: [place] [id number]       NAME: Dion Cota  : [birth year] (53 y.o.)  MRN: [id number]  CODE STATUS: Full Code    Date of Service: 2023    Patient Diagnosis(es): Hypotension [I95.9]  RON (acute kidney injury) ([facility] [address] [N17.9]  Hypotension, unspecified hypotension type [I95.9]   No chief complaint on file.     Patient Active Problem List    Diagnosis Date Noted    Hypotension 2023    Recurrent falls 2023    Ataxic gait 2023    ETOH abuse 2023    Adjustment disorder 2023    CHF (congestive heart failure), NYHA class I, acute on chronic, combined ([facility] [address] 2021    Unable to ambulate 12/10/2021    PAF (paroxysmal atrial fibrillation)  12/10/2021    Retinal hemorrhage, bilateral 2019    Intermediate stage nonexudative age-related macular degeneration of both eyes 2019    Hyperuricemia 01/10/2019    Chronic gastritis without bleeding 01/10/2019    Type 2 diabetes mellitus with microalbuminuria, without long-term current use of insulin ([facility] [address] 01/10/2019    Arthrodesis status 2017    Primary osteoarthritis, right ankle and foot 2017    Pain in right ankle 2017    Encounter for other orthopedic aftercare 2017    History of colon polyps 2017    Nuclear sclerosis of both eyes 2015    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, left eye 2015    Presbyopia 2015    Regular astigmatism 2015    History of prostate cancer 2014    Stented coronary artery 2013    Essential hypertension 2013    Numbness in feet 2013    RA (rheumatoid arthritis) ([facility] [address] 10/24/2011    Bradycardia     Mixed hyperlipidemia     Primary osteoarthritis involving multiple joints     Dysthymia     CAD (coronary artery disease)         Past Medical History:   Diagnosis Date    Bradycardia     CAD (coronary artery disease)     Cancer [facility])     prostate- radiation     CHF (congestive heart failure) (HCC)     Depression     HSV-2 (herpes simplex virus 2) infection     Hyperlipidemia     Hypertension     Left knee DJD     Osteoarthritis     Rheumatoid arthritis(714.0)     Type 2 diabetes mellitus without complication, without long-term current use of insulin ([facility] [address] 1/10/2019     Past Surgical History:   Procedure Laterality Date    ANKLE SURGERY Right     pin    APPENDECTOMY      ARTHRODESIS Right 10/31/2016    RIGHT ANKLE ARTHRODESIS OF RIGHT ANKLE AND SUBTALAR JOINT, C-ARM, ABHIJEET EQUIPMENT SYNTHETIC BONE GRAFT, ALLOGRAFT CANCELLOUS, SHARON ANKLE FUSION NAIL, SHANDA IMPLANT BONE STIMULATOR, CHOICE ANESTHESIA, BLOCK  performed by Hardik Orlando MD at [facility]      stent x 5    COLONOSCOPY  07/19/2011    FRACTURE SURGERY      right ankle    HARDWARE REMOVAL FOOT / ANKLE Right 11/06/2017    RIGHT ANKLE HARDWARE REMOVAL performed by Calvin Villegas MD at [address] [place] [address]. Bilateral     knees    AR COLON CA SCRN NOT  [address] IND N/A 07/11/2017    COLONOSCOPY performed by Sahra Abrams DO at [facility] Left     THORACENTESIS Right 01/16/2023    1140ml removed by Dr. Brando Wolf. Diagnostics sent to lab. TONSILLECTOMY AND ADENOIDECTOMY         Chart Reviewed: Yes  Family / Caregiver Present: No    Restrictions:  Restrictions/Precautions: Fall Risk    SUBJECTIVE:   Subjective: \"I guess I should move. \"    Pain  Pain: Pt denies pain when NWBing on L foot, 8/10 pain WBing on L foot pre and post session. OBJECTIVE:        Bed mobility  Rolling to Right: Stand by assistance  Supine to Sit: Contact guard assistance;Stand by assistance  Sit to Supine:  (Pt left in chair)  Bed Mobility Comments: HOB flat with use of bed rails. Increased time and effort to complete. Transfers  Sit to Stand: Minimal Assistance; Moderate Assistance  Stand to Sit: Minimal Assistance  Bed to Chair: Contact guard assistance;Stand by assistance  Comment: With [employer]. Multiple attempt before standing d/t posterior lean. Bed raised to assist with transfer. VC's for hand placement and sequencing. Ambulation  Surface: Level tile  Device: Rolling Walker  Assistance: Stand by assistance  Quality of Gait: Heavy use of UE support on [employer]  Gait Deviations: Slow Gretchen; Increased MIKKI; Decreased step length;Decreased step height  Distance: 25ft x2 with turns  Comments: Increased time to complete with mutilpe standing rest breaks d/t increased L foot pain. PT Exercises  Exercise Treatment: Seated AP/ Marching/ LAQ/ Hip ABD/ Hip ADD x10          Activity Tolerance  Activity Tolerance: Patient limited by fatigue;Patient limited by endurance          ASSESSMENT   Assessment: Pt demonstrated difficulty with STS from low surfaces. Bed raised to assist with STS and improve technique. When ambulating pt displayed heavy bracing on [employer]. VC's for [employer] management required. Increased time to complete all mobility this session. Pt left in chair end of treatment to encourage OOB activities. Discharge Recommendations:  Continue to assess pending progress         Goals  Long Term Goals  Long Term Goal 1: Pt will demonstrate bed mobility indep  Long Term Goal 2: Pt will demonstrate transfers mod indep with safest AD  Long Term Goal 3: Pt will demonstrate amb >/= 75ft mod indep with safest AD  Long Term Goal 4: Pt will demonstrate stair negotiation 6 steps with 1 rail mod indep  Long Term Goal 5: Pt will demonstrate TUG </= 20 sec for decreased risk for falls    PLAN    General Plan: 1 time a day 3-6 times a week  Safety Devices  Type of Devices:  All fall risk precautions in place, Call light within reach, Chair alarm in place, Left in chair     [facility] (6 CLICK) [address] Mobility Raw Score : 13     Therapy Time   Individual   Time In 0919   Time Out 0944   Minutes 25      Therex: 8   Gait: 10   BM/ Transfers: 7       MARQUES DONNIE, PTA, 02/22/23 at 9:55 AM         Definitions for assistance levels  Independent = pt does not require any physical supervision or assistance from another person for activity completion. Device may be needed.   Stand by assistance = pt requires verbal cues or instructions from another person, close to but not touching, to perform the activity  Minimal assistance= pt performs 75% or more of the activity; assistance is required to complete the activity  Moderate assistance= pt performs 50% of the activity; assistance is required to complete the activity  Maximal assistance = pt performs 25% of the activity; assistance is required to complete the activity  Dependent = pt requires total physical assistance to accomplish the task

## 2023-02-22 NOTE — PROGRESS NOTES
Hospitalist Progress Note      PCP: Maile Encarnacion MD    Date of Admission: 2/17/2023    Chief Complaint:  Pt seen and examined. Lying in bed. Insurance denied SNF after P2P. CM aware and working on placement    Medications:  Reviewed    Infusion Medications    sodium chloride       Scheduled Medications    losartan  25 mg Oral Daily    ketorolac  1 drop Both Eyes BID    pantoprazole  40 mg Oral Daily    rosuvastatin  40 mg Oral QPM    tamsulosin  0.4 mg Oral Daily    sodium chloride flush  5-40 mL IntraVENous 2 times per day    apixaban  5 mg Oral BID     PRN Meds: sodium chloride flush, sodium chloride, ondansetron **OR** ondansetron, polyethylene glycol, acetaminophen **OR** acetaminophen      Intake/Output Summary (Last 24 hours) at 2/22/2023 1602  Last data filed at 2/22/2023 1202  Gross per 24 hour   Intake 960 ml   Output 1150 ml   Net -190 ml       Exam:    /62   Pulse 61   Temp 97.3 °F (36.3 °C)   Resp 18   Ht 5' 6\" (1.676 m)   Wt 289 lb (131.1 kg)   SpO2 99%   BMI 46.65 kg/m²     General appearance: alert, cooperative  Lungs: clear to auscultation bilaterally  Heart: S1/S2,RRR  Abdomen: soft, active BS  Extremities:  no edema       Labs:   Recent Labs     02/20/23  0612   WBC 8.1   HGB 12.1*   HCT 35.5*        Recent Labs     02/20/23  0612      K 4.2   *   CO2 20   BUN 26*   CREATININE 0.89   CALCIUM 8.9   PHOS 3.3     No results for input(s): AST, ALT, BILIDIR, BILITOT, ALKPHOS in the last 72 hours. No results for input(s): INR in the last 72 hours. No results for input(s): Ricardo Chalet in the last 72 hours.     Urinalysis:      Lab Results   Component Value Date/Time    NITRU Negative 01/11/2023 07:45 PM    WBCUA 0-2 01/11/2023 07:45 PM    WBCUA <1 08/05/2022 09:34 PM    BACTERIA Negative 01/11/2023 07:45 PM    RBCUA 0-2 01/11/2023 07:45 PM    RBCUA 1 08/05/2022 09:34 PM    BLOODU Negative 01/11/2023 07:45 PM    SPECGRAV 1.015 01/11/2023 07:45 PM    GLUCOSEU Negative 01/11/2023 07:45 PM    GLUCOSEU NEG 06/07/2012 03:53 PM       Radiology:  No orders to display           Assessment/Plan:    68 y.o. male with PMH of CAD s/p remote PCI to RCA,  PAF, SSS s/ PPM, chronic diastolic HF, HTN, AAA, DM2, obesity, PRAVEEN, RA, BPH with LUTS, prostate cancer, ETOH use,  gait ataxia who presented with:     # Hypotension - improved  - likely medication related  - BP improving, s/p IV hydration  - holding antihypertensive/diuretic regimen  - cardiology following     # RON- resolved  - Cr 1.6 on arrival, baseline around 0.8  - holding diuretic regimen  - resolved with IVFs      # CAD s/p remote PCI to RCA  - continue statin therapy     # PAF, SSS s/ PPM  - continue Apixaban  - monitor on telemetry     # Chronic diastolic HF   - holding diuretic regimen due to RON. Resume per cardiology     # DM2  - HbA1c in the 6.1-6.6 range over the last 5 years  - monitor glucose levels     # ETOH use disorder  - monitor for s/s of withdrawal     # Gait ataxia  - continue PT/OT        Diet: ADULT DIET; Regular    Code Status: Full Code      Disposition - Denied SNF after P2P. CM aware and working on placement.  Avoidable days noted      Ilan Pérez DO  Internal Medicine   Hospitalist    >45 minutes in total care time

## 2023-02-22 NOTE — CARE COORDINATION
LSW met with patient to inform him that insurance denied pre cert for him to return to [facility]. Patient expressed concern that he was denied and said he still feels weak. Patient stated he would like to appeal denial if possible but if not would be agreeable to any c in network with his insurance. LSW waiting to hear back from Ba Melton at [facility] about patient's appeal options. Expedited appeal initiated with Leland Schilling at [facility] [employer] ([phone]). Reference #: [id number].

## 2023-02-22 NOTE — CARE COORDINATION
CALL FROM DR Anca Damon [facility] STATES THE P2P WAS DENIED.  STATES PT IS AT HIS BASELINE AND THAT IS WHY IT WAS DENIED

## 2023-02-22 NOTE — CARE COORDINATION
LSW spoke with Miranda at [facility]. Pre cert is still pending at this time. Informed by Dillon Merrill that insurance is requesting a P2P before 4:00 today. Phone number is [phone] option 5, reference [id number]. Physician informed.

## 2023-02-22 NOTE — CARE COORDINATION
DURING QUALITY ROUNDS MD STATES THE P2P LINE WANTED EXCESSIVE PATIENT INFO HE DID NOT HAVE. CALL TO P2P LINE AND THEY WILL NOT ALLOW ME TO ARRANGE THE P2P WITH MD'S NUMBER.  DR Gibran Davey GIVEN ALL INFO NEEDED FOR P2P TO BE DONE BEFORE 4PM

## 2023-02-23 LAB
GLUCOSE BLD-MCNC: 96 MG/DL (ref 70–99)
PERFORMED ON: NORMAL

## 2023-02-23 PROCEDURE — 2580000003 HC RX 258: Performed by: INTERNAL MEDICINE

## 2023-02-23 PROCEDURE — 1210000000 HC MED SURG R&B

## 2023-02-23 PROCEDURE — 6370000000 HC RX 637 (ALT 250 FOR IP): Performed by: INTERNAL MEDICINE

## 2023-02-23 PROCEDURE — 6370000000 HC RX 637 (ALT 250 FOR IP): Performed by: NURSE PRACTITIONER

## 2023-02-23 RX ADMIN — KETOROLAC TROMETHAMINE 1 DROP: 5 SOLUTION/ DROPS OPHTHALMIC at 10:22

## 2023-02-23 RX ADMIN — LOSARTAN POTASSIUM 25 MG: 25 TABLET, FILM COATED ORAL at 10:20

## 2023-02-23 RX ADMIN — KETOROLAC TROMETHAMINE 1 DROP: 5 SOLUTION/ DROPS OPHTHALMIC at 20:36

## 2023-02-23 RX ADMIN — Medication 10 ML: at 20:36

## 2023-02-23 RX ADMIN — ACETAMINOPHEN 650 MG: 325 TABLET ORAL at 20:36

## 2023-02-23 RX ADMIN — APIXABAN 5 MG: 5 TABLET, FILM COATED ORAL at 10:20

## 2023-02-23 RX ADMIN — ROSUVASTATIN CALCIUM 40 MG: 40 TABLET, FILM COATED ORAL at 19:34

## 2023-02-23 RX ADMIN — APIXABAN 5 MG: 5 TABLET, FILM COATED ORAL at 20:36

## 2023-02-23 RX ADMIN — PANTOPRAZOLE SODIUM 40 MG: 40 TABLET, DELAYED RELEASE ORAL at 10:20

## 2023-02-23 RX ADMIN — Medication 10 ML: at 10:21

## 2023-02-23 RX ADMIN — TAMSULOSIN HYDROCHLORIDE 0.4 MG: 0.4 CAPSULE ORAL at 10:20

## 2023-02-23 NOTE — CARE COORDINATION
Expedited appeal for [facility] SNF started yesterday with [place]. Awaiting [employer]'s decision.

## 2023-02-23 NOTE — PLAN OF CARE
Problem: Discharge Planning  Goal: Discharge to home or other facility with appropriate resources  Outcome: Progressing  Flowsheets (Taken 2/23/2023 0804)  Discharge to home or other facility with appropriate resources: Identify barriers to discharge with patient and caregiver     Problem: ABCDS Injury Assessment  Goal: Absence of physical injury  Outcome: Progressing     Problem: Safety - Adult  Goal: Free from fall injury  Outcome: Progressing     Problem: Chronic Conditions and Co-morbidities  Goal: Patient's chronic conditions and co-morbidity symptoms are monitored and maintained or improved  Outcome: Progressing  Flowsheets (Taken 2/23/2023 0804)  Care Plan - Patient's Chronic Conditions and Co-Morbidity Symptoms are Monitored and Maintained or Improved: Monitor and assess patient's chronic conditions and comorbid symptoms for stability, deterioration, or improvement     Problem: Skin/Tissue Integrity  Goal: Absence of new skin breakdown  Description: 1. Monitor for areas of redness and/or skin breakdown  2. Assess vascular access sites hourly  3. Every 4-6 hours minimum:  Change oxygen saturation probe site  4. Every 4-6 hours:  If on nasal continuous positive airway pressure, respiratory therapy assess nares and determine need for appliance change or resting period.   Outcome: Progressing     Problem: Pain  Goal: Verbalizes/displays adequate comfort level or baseline comfort level  Outcome: Progressing

## 2023-02-23 NOTE — PROGRESS NOTES
Physical Therapy Missed Treatment   Facility/Department: [facility] MED SURG [id number]/[id number]    NAME: Manolo Mejia    :  (68 y.o.)  MRN: [id number]    Account: [de-identified]  Gender: male    Chart reviewed, attempted PT at 14:14. Patient unavailable 2° to:        [x] Pt sleeping in upon arrival. Pt declined after multiple attempts and encouragement. Pt stated he was too tired today and could barely keep his eyes open. After explaining to the pt the importance of OOB activities, the pt continued to decline and asked to check back tomorrow. [x] Nsg notified           Will attempt PT treatment again at earliest convenience.       Electronically signed by Asiya Peters PTA on 23 at 2:57 PM EST

## 2023-02-23 NOTE — PROGRESS NOTES
Hospitalist Progress Note      PCP: Cherie Almaraz MD    Date of Admission: 2/17/2023    Chief Complaint:  Pt seen and examined. Lying in bed. Insurance denied SNF after P2P. Awaiting appeal    Medications:  Reviewed    Infusion Medications    sodium chloride       Scheduled Medications    losartan  25 mg Oral Daily    ketorolac  1 drop Both Eyes BID    pantoprazole  40 mg Oral Daily    rosuvastatin  40 mg Oral QPM    tamsulosin  0.4 mg Oral Daily    sodium chloride flush  5-40 mL IntraVENous 2 times per day    apixaban  5 mg Oral BID     PRN Meds: sodium chloride flush, sodium chloride, ondansetron **OR** ondansetron, polyethylene glycol, acetaminophen **OR** acetaminophen      Intake/Output Summary (Last 24 hours) at 2/23/2023 1711  Last data filed at 2/23/2023 1617  Gross per 24 hour   Intake 490 ml   Output 650 ml   Net -160 ml       Exam:    BP (!) 101/54   Pulse 61   Temp 97.4 °F (36.3 °C) (Oral)   Resp 16   Ht 5' 6\" (1.676 m)   Wt 293 lb 12.8 oz (133.3 kg)   SpO2 97%   BMI 47.42 kg/m²     General appearance: alert, cooperative  Lungs: clear to auscultation bilaterally  Heart: S1/S2,RRR  Abdomen: soft, active BS  Extremities:  no edema       Labs:   No results for input(s): WBC, HGB, HCT, PLT in the last 72 hours. No results for input(s): NA, K, CL, CO2, BUN, CREATININE, CALCIUM, PHOS in the last 72 hours. Invalid input(s): MAGNES    No results for input(s): AST, ALT, BILIDIR, BILITOT, ALKPHOS in the last 72 hours. No results for input(s): INR in the last 72 hours. No results for input(s): Blank [employer] in the last 72 hours.     Urinalysis:      Lab Results   Component Value Date/Time    NITRU Negative 01/11/2023 07:45 PM    WBCUA 0-2 01/11/2023 07:45 PM    WBCUA <1 08/05/2022 09:34 PM    BACTERIA Negative 01/11/2023 07:45 PM    RBCUA 0-2 01/11/2023 07:45 PM    RBCUA 1 08/05/2022 09:34 PM    BLOODU Negative 01/11/2023 07:45 PM    SPECGRAV 1.015 01/11/2023 07:45 PM    GLUCOSEU Negative 01/11/2023 07:45 PM    GLUCOSEU NEG 06/07/2012 03:53 PM       Radiology:  No orders to display           Assessment/Plan:    77 y.o. male with PMH of CAD s/p remote PCI to RCA,  PAF, SSS s/ PPM, chronic diastolic HF, HTN, AAA, DM2, obesity, PRAVEEN, RA, BPH with LUTS, prostate cancer, ETOH use,  gait ataxia who presented with:     # Hypotension - improved  - likely medication related  - BP improving, s/p IV hydration  - holding antihypertensive/diuretic regimen  - cardiology following     # RON- resolved  - Cr 1.6 on arrival, baseline around 0.8  - holding diuretic regimen  - resolved with IVFs      # CAD s/p remote PCI to RCA  - continue statin therapy     # PAF, SSS s/ PPM  - continue Apixaban  - monitor on telemetry     # Chronic diastolic HF   - holding diuretic regimen due to RON. Resume per cardiology     # DM2  - HbA1c in the 6.1-6.6 range over the last 5 years  - monitor glucose levels     # ETOH use disorder  - monitor for s/s of withdrawal     # Gait ataxia  - continue PT/OT        Diet: ADULT DIET; Regular    Code Status: Full Code      Disposition - Denied SNF after P2P. CM aware and awaiting expedited appeal. Avoidable days noted      Tyshawn West DO  Internal Medicine   Hospitalist    >45 minutes in total care time

## 2023-02-24 PROCEDURE — 97116 GAIT TRAINING THERAPY: CPT

## 2023-02-24 PROCEDURE — 2580000003 HC RX 258: Performed by: INTERNAL MEDICINE

## 2023-02-24 PROCEDURE — 6370000000 HC RX 637 (ALT 250 FOR IP): Performed by: INTERNAL MEDICINE

## 2023-02-24 PROCEDURE — 6370000000 HC RX 637 (ALT 250 FOR IP): Performed by: NURSE PRACTITIONER

## 2023-02-24 PROCEDURE — 1210000000 HC MED SURG R&B

## 2023-02-24 PROCEDURE — 97535 SELF CARE MNGMENT TRAINING: CPT

## 2023-02-24 RX ADMIN — APIXABAN 5 MG: 5 TABLET, FILM COATED ORAL at 20:34

## 2023-02-24 RX ADMIN — KETOROLAC TROMETHAMINE 1 DROP: 5 SOLUTION/ DROPS OPHTHALMIC at 20:34

## 2023-02-24 RX ADMIN — ROSUVASTATIN CALCIUM 40 MG: 40 TABLET, FILM COATED ORAL at 17:25

## 2023-02-24 RX ADMIN — Medication 10 ML: at 20:34

## 2023-02-24 RX ADMIN — Medication 10 ML: at 10:32

## 2023-02-24 RX ADMIN — APIXABAN 5 MG: 5 TABLET, FILM COATED ORAL at 10:31

## 2023-02-24 RX ADMIN — KETOROLAC TROMETHAMINE 1 DROP: 5 SOLUTION/ DROPS OPHTHALMIC at 10:35

## 2023-02-24 RX ADMIN — LOSARTAN POTASSIUM 25 MG: 25 TABLET, FILM COATED ORAL at 10:31

## 2023-02-24 RX ADMIN — PANTOPRAZOLE SODIUM 40 MG: 40 TABLET, DELAYED RELEASE ORAL at 10:31

## 2023-02-24 RX ADMIN — TAMSULOSIN HYDROCHLORIDE 0.4 MG: 0.4 CAPSULE ORAL at 10:33

## 2023-02-24 NOTE — PROGRESS NOTES
Physical Therapy Med Surg Daily Treatment Note  Facility/Department: [facility] TELEMETRY  Room: [place] [id number]       NAME: Fide George  :  (13 y.o.)  MRN: [id number]  CODE STATUS: Full Code    Date of Service: 2023    Patient Diagnosis(es): Hypotension [I95.9]  RON (acute kidney injury) ([facility] [address] [N17.9]  Hypotension, unspecified hypotension type [I95.9]   No chief complaint on file.     Patient Active Problem List    Diagnosis Date Noted    Hypotension 2023    Recurrent falls 2023    Ataxic gait 2023    ETOH abuse 2023    Adjustment disorder 2023    CHF (congestive heart failure), NYHA class I, acute on chronic, combined ([facility] [address] 2021    Unable to ambulate 12/10/2021    PAF (paroxysmal atrial fibrillation)  12/10/2021    Retinal hemorrhage, bilateral 2019    Intermediate stage nonexudative age-related macular degeneration of both eyes 2019    Hyperuricemia 01/10/2019    Chronic gastritis without bleeding 01/10/2019    Type 2 diabetes mellitus with microalbuminuria, without long-term current use of insulin ([facility] [address] 01/10/2019    Arthrodesis status 2017    Primary osteoarthritis, right ankle and foot 2017    Pain in right ankle 2017    Encounter for other orthopedic aftercare 2017    History of colon polyps 2017    Nuclear sclerosis of both eyes 2015    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, left eye 2015    Presbyopia 2015    Regular astigmatism 2015    History of prostate cancer 2014    Stented coronary artery 2013    Essential hypertension 2013    Numbness in feet 2013    RA (rheumatoid arthritis) ([address] 10/24/2011    Bradycardia     Mixed hyperlipidemia     Primary osteoarthritis involving multiple joints     Dysthymia     CAD (coronary artery disease)         Past Medical History:   Diagnosis Date    Bradycardia     CAD (coronary artery disease)     Cancer [facility])     prostate- radiation     CHF (congestive heart failure) (HCC)     Depression     HSV-2 (herpes simplex virus 2) infection     Hyperlipidemia     Hypertension     Left knee DJD     Osteoarthritis     Rheumatoid arthritis(714.0)     Type 2 diabetes mellitus without complication, without long-term current use of insulin ([facility] [address] 1/10/2019     Past Surgical History:   Procedure Laterality Date    ANKLE SURGERY Right     pin    APPENDECTOMY      ARTHRODESIS Right 10/31/2016    RIGHT ANKLE ARTHRODESIS OF RIGHT ANKLE AND SUBTALAR JOINT, C-ARM, ABHIJEET EQUIPMENT SYNTHETIC BONE GRAFT, ALLOGRAFT CANCELLOUS, SHARON ANKLE FUSION NAIL, SHANDA IMPLANT BONE STIMULATOR, CHOICE ANESTHESIA, BLOCK  performed by Suzy Pham MD at [address]      stent x 5    COLONOSCOPY  07/19/2011    FRACTURE SURGERY      right ankle    HARDWARE REMOVAL FOOT / ANKLE Right 11/06/2017    RIGHT ANKLE HARDWARE REMOVAL performed by Jose Hernandez MD at [address] Bilateral     knees    NH COLON CA SCRN NOT  [address] IND N/A 07/11/2017    COLONOSCOPY performed by Jose Knutson DO at [facility] Left     THORACENTESIS Right 01/16/2023    1140ml removed by Dr. Graham Alvares. Diagnostics sent to lab. TONSILLECTOMY AND ADENOIDECTOMY         Chart Reviewed: Yes  Family / Caregiver Present: No    Restrictions:  Restrictions/Precautions: Fall Risk    SUBJECTIVE:   Subjective: \"I was hoping you would be here in the afternoon. Patient agreeable to tx. Response To Previous Treatment: Patient with no complaints from previous session. Pain  Patient denies pain     OBJECTIVE:        Bed mobility  Rolling to Right: Minimal assistance; Moderate assistance  Supine to Sit: Moderate assistance  Sit to Supine:  (Pt left in chair)  Scooting: Moderate assistance  Bed Mobility Comments: HOB slightly elevated.  Patient requires significant increase in time/ effort to complete    Transfers  Sit to Stand: Minimal Assistance; Moderate Assistance  Stand to Sit: Minimal Assistance  Comment: Patient requires multiple attempts to obtain upright stance. Request height of bed elevated. Requires significant increase in time to complete. Difficulty transitioning from stand to sit onto toilet. Ambulation  Surface: Level tile  Device: Rolling Walker  Assistance: Minimal assistance  Quality of Gait: significant decrease in speed and fluidity, decreased trunk rotation, inconsistent shuffling steps, several standing RB. Gait Deviations: Slow Gretchen; Increased MIKKI; Decreased step length;Decreased step height  Distance: 15 feet x2       ASSESSMENT   Assessment: Patient requires significant increase in time and effort to complete mobility training. Assist level varies throughout session. Noted decline in ability to complete supine/sit and stand from bed since previous session. Discharge Recommendations:  Continue to assess pending progress         Goals  Long Term Goals  Long Term Goal 1: Pt will demonstrate bed mobility indep  Long Term Goal 2: Pt will demonstrate transfers mod indep with safest AD  Long Term Goal 3: Pt will demonstrate amb >/= 75ft mod indep with safest AD  Long Term Goal 4: Pt will demonstrate stair negotiation 6 steps with 1 rail mod indep  Long Term Goal 5: Pt will demonstrate TUG </= 20 sec for decreased risk for falls    PLAN    General Plan: 1 time a day 3-6 times a week  Safety Devices  Type of Devices: All fall risk precautions in place, Call light within reach, Chair alarm in place, Left in chair     [facility] (6 CLICK) [address] Mobility Raw Score : 13     Therapy Time   Individual   Time In 0900   Time Out 0938   Minutes 38     Timed Code Treatment Minutes: 45 Minutes  Tr 18  Gt [address] , [facility], 02/24/23 at 10:50 AM         Definitions for assistance levels  Independent = pt does not require any physical supervision or assistance from another person for activity completion. Device may be needed.   Stand by assistance = pt requires verbal cues or instructions from another person, close to but not touching, to perform the activity  Minimal assistance= pt performs 75% or more of the activity; assistance is required to complete the activity  Moderate assistance= pt performs 50% of the activity; assistance is required to complete the activity  Maximal assistance = pt performs 25% of the activity; assistance is required to complete the activity  Dependent = pt requires total physical assistance to accomplish the task

## 2023-02-24 NOTE — PROGRESS NOTES
Nutrition Assessment     Type and Reason for Visit: RD Nutrition Re-Screen/LOS    Nutrition Recommendations/Plan:   Continue current plan of care  Add Carb control 4, if glucose > 140     Malnutrition Assessment:  Malnutrition Status: No malnutrition    Nutrition Assessment:  Nutritional status appears adequate at this time , based on available information, noted hx of DM, but glucose < 120    Nutrition Related Findings:     Wound Type: None    Current Nutrition Therapies:    ADULT DIET;  Regular    Anthropometric Measures:  Height: 5' 6\" (167.6 cm)  Current Body Wt: 293 lb (132.9 kg) (* edema)   BMI: 47.3    Nutrition Diagnosis:   No nutrition diagnosis at this time     Nutrition Interventions:   Food and/or Nutrient Delivery: Continue Current Diet  Nutrition Education/Counseling: Education not indicated  Coordination of Nutrition Care: Continue to monitor while inpatient       Goals:     Goals: PO intake 75% or greater, by next RD assessment       Nutrition Monitoring and Evaluation:   Behavioral-Environmental Outcomes: None Identified  Food/Nutrient Intake Outcomes: Food and Nutrient Intake  Physical Signs/Symptoms Outcomes: Biochemical Data, Meal Time Behavior    Discharge Planning:    No discharge needs at this time     Korina Perez, RD, LD

## 2023-02-24 NOTE — CARE COORDINATION
SPOKE WITH JOSUÉ ABDULLAHI AND SHE STATES DUE DATE FOR EXPEDITED APPEAL IS FOR 2/25/2023 WHICH IS A Saturday. STATES WE SHOULD EXPECT TO HEAR FROM THEM ON Monday.

## 2023-02-24 NOTE — PROGRESS NOTES
Hospitalist Progress Note      PCP: Candi Lanier MD    Date of Admission: 2/17/2023    Chief Complaint:  Pt seen and examined. Lying in bed. Insurance denied SNF after P2P. Awaiting appeal    Medications:  Reviewed    Infusion Medications    sodium chloride       Scheduled Medications    losartan  25 mg Oral Daily    ketorolac  1 drop Both Eyes BID    pantoprazole  40 mg Oral Daily    rosuvastatin  40 mg Oral QPM    tamsulosin  0.4 mg Oral Daily    sodium chloride flush  5-40 mL IntraVENous 2 times per day    apixaban  5 mg Oral BID     PRN Meds: sodium chloride flush, sodium chloride, ondansetron **OR** ondansetron, polyethylene glycol, acetaminophen **OR** acetaminophen      Intake/Output Summary (Last 24 hours) at 2/24/2023 1537  Last data filed at 2/24/2023 1254  Gross per 24 hour   Intake 240 ml   Output 400 ml   Net -160 ml       Exam:    /61   Pulse 59   Temp 97.2 °F (36.2 °C) (Oral)   Resp 20   Ht 5' 6\" (1.676 m)   Wt 293 lb 12.8 oz (133.3 kg)   SpO2 98%   BMI 47.42 kg/m²     General appearance: alert, cooperative  Lungs: clear to auscultation bilaterally  Heart: S1/S2,RRR  Abdomen: soft, active BS  Extremities:  no edema       Labs:   No results for input(s): WBC, HGB, HCT, PLT in the last 72 hours. No results for input(s): NA, K, CL, CO2, BUN, CREATININE, CALCIUM, PHOS in the last 72 hours. Invalid input(s): MAGNES    No results for input(s): AST, ALT, BILIDIR, BILITOT, ALKPHOS in the last 72 hours. No results for input(s): INR in the last 72 hours. No results for input(s): Mikala Hernández in the last 72 hours.     Urinalysis:      Lab Results   Component Value Date/Time    NITRU Negative 01/11/2023 07:45 PM    WBCUA 0-2 01/11/2023 07:45 PM    WBCUA <1 08/05/2022 09:34 PM    BACTERIA Negative 01/11/2023 07:45 PM    RBCUA 0-2 01/11/2023 07:45 PM    RBCUA 1 08/05/2022 09:34 PM    BLOODU Negative 01/11/2023 07:45 PM    SPECGRAV 1.015 01/11/2023 07:45 PM    GLUCOSEU Negative 01/11/2023 07:45 PM    GLUCOSEU NEG 06/07/2012 03:53 PM       Radiology:  No orders to display           Assessment/Plan:    68 y.o. male with PMH of CAD s/p remote PCI to RCA,  PAF, SSS s/ PPM, chronic diastolic HF, HTN, AAA, DM2, obesity, PRAVEEN, RA, BPH with LUTS, prostate cancer, ETOH use,  gait ataxia who presented with:     # Hypotension - improved  - likely medication related  - BP improving, s/p IV hydration  - holding antihypertensive/diuretic regimen  - cardiology following     # RON- resolved  - Cr 1.6 on arrival, baseline around 0.8  - holding diuretic regimen  - resolved with IVFs      # CAD s/p remote PCI to RCA  - continue statin therapy     # PAF, SSS s/ PPM  - continue Apixaban  - monitor on telemetry     # Chronic diastolic HF   - holding diuretic regimen due to RON. Resume per cardiology     # DM2  - HbA1c in the 6.1-6.6 range over the last 5 years  - monitor glucose levels     # ETOH use disorder  - monitor for s/s of withdrawal     # Gait ataxia  - continue PT/OT        Diet: ADULT DIET; Regular    Code Status: Full Code      Disposition - Denied SNF after P2P.  CM aware and awaiting expedited appeal. Avoidable days noted      Ilan Pérez DO  Internal Medicine   Hospitalist    >45 minutes in total care time

## 2023-02-25 PROCEDURE — 2580000003 HC RX 258: Performed by: INTERNAL MEDICINE

## 2023-02-25 PROCEDURE — 6370000000 HC RX 637 (ALT 250 FOR IP): Performed by: INTERNAL MEDICINE

## 2023-02-25 PROCEDURE — 6370000000 HC RX 637 (ALT 250 FOR IP): Performed by: NURSE PRACTITIONER

## 2023-02-25 PROCEDURE — 1210000000 HC MED SURG R&B

## 2023-02-25 RX ADMIN — LOSARTAN POTASSIUM 25 MG: 25 TABLET, FILM COATED ORAL at 08:56

## 2023-02-25 RX ADMIN — Medication 10 ML: at 22:07

## 2023-02-25 RX ADMIN — TAMSULOSIN HYDROCHLORIDE 0.4 MG: 0.4 CAPSULE ORAL at 08:56

## 2023-02-25 RX ADMIN — KETOROLAC TROMETHAMINE 1 DROP: 5 SOLUTION/ DROPS OPHTHALMIC at 09:01

## 2023-02-25 RX ADMIN — KETOROLAC TROMETHAMINE 1 DROP: 5 SOLUTION/ DROPS OPHTHALMIC at 22:07

## 2023-02-25 RX ADMIN — APIXABAN 5 MG: 5 TABLET, FILM COATED ORAL at 22:07

## 2023-02-25 RX ADMIN — Medication 10 ML: at 08:57

## 2023-02-25 RX ADMIN — ROSUVASTATIN CALCIUM 40 MG: 40 TABLET, FILM COATED ORAL at 22:07

## 2023-02-25 RX ADMIN — APIXABAN 5 MG: 5 TABLET, FILM COATED ORAL at 08:56

## 2023-02-25 RX ADMIN — ACETAMINOPHEN 650 MG: 325 TABLET ORAL at 09:56

## 2023-02-25 RX ADMIN — PANTOPRAZOLE SODIUM 40 MG: 40 TABLET, DELAYED RELEASE ORAL at 08:56

## 2023-02-25 ASSESSMENT — PAIN SCALES - GENERAL: PAINLEVEL_OUTOF10: 8

## 2023-02-25 NOTE — FLOWSHEET NOTE
Am nursing  assessment completed. Pt :     awake and resting in bed          Alert and oriented. Breakfast: completed  RESP:            even and non labored   Lung sounds:    diminished                             Oxygen: room air  Complaints of: generalized weakness  Pain: shoulder discomfort  IV: MIDLINE  TELE: PACED  Dressings:   Precautions:              Falls:     70   Hima: 19  Chart and meds reviewed. Noted Labs:   Plan for today:    Call light in reach. NOTES:   Prn tylenol per request and complaints of shoulder discomfort. Electronically signed by Umang Lowery RN on 2/25/2023 at 10:15 AM    1630 up to chair. Heavy assist x2. Incont stool. Pericare and complete linens change. Electronically signed by Umang Lowery RN on 2/25/2023 at 4:45 PM      1818 heavy 2 assist return to bed.  Electronically signed by Umang Lowery RN on 2/25/2023 at 6:18 PM

## 2023-02-25 NOTE — PROGRESS NOTES
Hospitalist Progress Note      PCP: Rl Franklin MD    Date of Admission: 2/17/2023    Chief Complaint:  Pt seen and examined. Lying in bed. Insurance denied SNF after P2P. Awaiting appeal which will be reviewed on Monday    Medications:  Reviewed    Infusion Medications    sodium chloride       Scheduled Medications    losartan  25 mg Oral Daily    ketorolac  1 drop Both Eyes BID    pantoprazole  40 mg Oral Daily    rosuvastatin  40 mg Oral QPM    tamsulosin  0.4 mg Oral Daily    sodium chloride flush  5-40 mL IntraVENous 2 times per day    apixaban  5 mg Oral BID     PRN Meds: sodium chloride flush, sodium chloride, ondansetron **OR** ondansetron, polyethylene glycol, acetaminophen **OR** acetaminophen      Intake/Output Summary (Last 24 hours) at 2/25/2023 1340  Last data filed at 2/25/2023 0440  Gross per 24 hour   Intake 180 ml   Output 400 ml   Net -220 ml       Exam:    BP (!) 104/46   Pulse 58   Temp 98.8 °F (37.1 °C)   Resp 18   Ht 5' 6\" (1.676 m)   Wt 293 lb 12.8 oz (133.3 kg)   SpO2 95%   BMI 47.42 kg/m²     General appearance: alert, cooperative  Lungs: clear to auscultation bilaterally  Heart: S1/S2,RRR  Abdomen: soft, active BS  Extremities:  no edema       Labs:   No results for input(s): WBC, HGB, HCT, PLT in the last 72 hours. No results for input(s): NA, K, CL, CO2, BUN, CREATININE, CALCIUM, PHOS in the last 72 hours. Invalid input(s): MAGNES    No results for input(s): AST, ALT, BILIDIR, BILITOT, ALKPHOS in the last 72 hours. No results for input(s): INR in the last 72 hours. No results for input(s): [place] Boxer in the last 72 hours.     Urinalysis:      Lab Results   Component Value Date/Time    NITRU Negative 01/11/2023 07:45 PM    WBCUA 0-2 01/11/2023 07:45 PM    WBCUA <1 08/05/2022 09:34 PM    BACTERIA Negative 01/11/2023 07:45 PM    RBCUA 0-2 01/11/2023 07:45 PM    RBCUA 1 08/05/2022 09:34 PM    BLOODU Negative 01/11/2023 07:45 PM    SPECGRAV 1.015 01/11/2023 07:45 PM    GLUCOSEU Negative 01/11/2023 07:45 PM    GLUCOSEU NEG 06/07/2012 03:53 PM       Radiology:  No orders to display           Assessment/Plan:    68 y.o. male with PMH of CAD s/p remote PCI to RCA,  PAF, SSS s/ PPM, chronic diastolic HF, HTN, AAA, DM2, obesity, PRAVEEN, RA, BPH with LUTS, prostate cancer, ETOH use,  gait ataxia who presented with:     # Hypotension - improved  - likely medication related  - BP improving, s/p IV hydration  - holding antihypertensive/diuretic regimen  - cardiology following     # RON- resolved  - Cr 1.6 on arrival, baseline around 0.8  - holding diuretic regimen  - resolved with IVFs      # CAD s/p remote PCI to RCA  - continue statin therapy     # PAF, SSS s/ PPM  - continue Apixaban  - monitor on telemetry     # Chronic diastolic HF   - holding diuretic regimen due to RON. Resume per cardiology     # DM2  - HbA1c in the 6.1-6.6 range over the last 5 years  - monitor glucose levels     # ETOH use disorder  - monitor for s/s of withdrawal     # Gait ataxia  - continue PT/OT        Diet: ADULT DIET; Regular    Code Status: Full Code      Disposition - Denied SNF after P2P. CM aware and awaiting expedited appeal. Avoidable days noted.  Appeal to be reviewed Monday      Pj Mix DO  Internal Medicine   Hospitalist    >45 minutes in total care time

## 2023-02-25 NOTE — CARE COORDINATION
Pt is discharged and plan is to go to [facility]. His insurance, [employer], has placed him on an expedite for placement but the soonest we will hear from them is Monday 2/27.

## 2023-02-26 PROCEDURE — 2580000003 HC RX 258: Performed by: INTERNAL MEDICINE

## 2023-02-26 PROCEDURE — 6370000000 HC RX 637 (ALT 250 FOR IP): Performed by: NURSE PRACTITIONER

## 2023-02-26 PROCEDURE — 6370000000 HC RX 637 (ALT 250 FOR IP): Performed by: INTERNAL MEDICINE

## 2023-02-26 PROCEDURE — 1210000000 HC MED SURG R&B

## 2023-02-26 RX ADMIN — KETOROLAC TROMETHAMINE 1 DROP: 5 SOLUTION/ DROPS OPHTHALMIC at 21:01

## 2023-02-26 RX ADMIN — PANTOPRAZOLE SODIUM 40 MG: 40 TABLET, DELAYED RELEASE ORAL at 08:56

## 2023-02-26 RX ADMIN — Medication 10 ML: at 21:02

## 2023-02-26 RX ADMIN — LOSARTAN POTASSIUM 25 MG: 25 TABLET, FILM COATED ORAL at 08:56

## 2023-02-26 RX ADMIN — APIXABAN 5 MG: 5 TABLET, FILM COATED ORAL at 21:01

## 2023-02-26 RX ADMIN — KETOROLAC TROMETHAMINE 1 DROP: 5 SOLUTION/ DROPS OPHTHALMIC at 09:13

## 2023-02-26 RX ADMIN — APIXABAN 5 MG: 5 TABLET, FILM COATED ORAL at 08:56

## 2023-02-26 RX ADMIN — ROSUVASTATIN CALCIUM 40 MG: 40 TABLET, FILM COATED ORAL at 21:01

## 2023-02-26 RX ADMIN — TAMSULOSIN HYDROCHLORIDE 0.4 MG: 0.4 CAPSULE ORAL at 08:56

## 2023-02-26 ASSESSMENT — PAIN SCALES - GENERAL
PAINLEVEL_OUTOF10: 0

## 2023-02-26 NOTE — PROGRESS NOTES
Hospitalist Progress Note      PCP: Jam Doty MD    Date of Admission: 2/17/2023    Chief Complaint:  Pt seen and lying in bed. Insurance denied SNF after P2P. Awaiting appeal which will be reviewed on Monday    Medications:  Reviewed    Infusion Medications    sodium chloride       Scheduled Medications    losartan  25 mg Oral Daily    ketorolac  1 drop Both Eyes BID    pantoprazole  40 mg Oral Daily    rosuvastatin  40 mg Oral QPM    tamsulosin  0.4 mg Oral Daily    sodium chloride flush  5-40 mL IntraVENous 2 times per day    apixaban  5 mg Oral BID     PRN Meds: sodium chloride flush, sodium chloride, ondansetron **OR** ondansetron, polyethylene glycol, acetaminophen **OR** acetaminophen      Intake/Output Summary (Last 24 hours) at 2/26/2023 1436  Last data filed at 2/26/2023 0015  Gross per 24 hour   Intake 480 ml   Output 850 ml   Net -370 ml       Exam:    BP (!) 119/52   Pulse 61   Temp 99 °F (37.2 °C)   Resp 18   Ht 5' 6\" (1.676 m)   Wt 293 lb 12.8 oz (133.3 kg)   SpO2 98%   BMI 47.42 kg/m²     General appearance: lying in bed  Lungs: non labored  Heart: RRR on tele  Abdomen: non distended  Extremities:  no edema       Labs:   No results for input(s): WBC, HGB, HCT, PLT in the last 72 hours. No results for input(s): NA, K, CL, CO2, BUN, CREATININE, CALCIUM, PHOS in the last 72 hours. Invalid input(s): MAGNES    No results for input(s): AST, ALT, BILIDIR, BILITOT, ALKPHOS in the last 72 hours. No results for input(s): INR in the last 72 hours. No results for input(s): Ceil Coke in the last 72 hours.     Urinalysis:      Lab Results   Component Value Date/Time    NITRU Negative 01/11/2023 07:45 PM    WBCUA 0-2 01/11/2023 07:45 PM    WBCUA <1 08/05/2022 09:34 PM    BACTERIA Negative 01/11/2023 07:45 PM    RBCUA 0-2 01/11/2023 07:45 PM    RBCUA 1 08/05/2022 09:34 PM    BLOODU Negative 01/11/2023 07:45 PM    SPECGRAV 1.015 01/11/2023 07:45 PM    GLUCOSEU Negative 01/11/2023 07:45 PM    GLUCOSEU NEG 06/07/2012 03:53 PM       Radiology:  No orders to display           Assessment/Plan:    68 y.o. male with PMH of CAD s/p remote PCI to RCA,  PAF, SSS s/ PPM, chronic diastolic HF, HTN, AAA, DM2, obesity, PRAVEEN, RA, BPH with LUTS, prostate cancer, ETOH use,  gait ataxia who presented with:     # Hypotension - improved  - likely medication related  - BP improving, s/p IV hydration  - holding antihypertensive/diuretic regimen  - cardiology following     # RON- resolved  - Cr 1.6 on arrival, baseline around 0.8  - holding diuretic regimen  - resolved with IVFs      # CAD s/p remote PCI to RCA  - continue statin therapy     # PAF, SSS s/ PPM  - continue Apixaban  - monitor on telemetry     # Chronic diastolic HF   - holding diuretic regimen due to RON. Resume per cardiology     # DM2  - HbA1c in the 6.1-6.6 range over the last 5 years  - monitor glucose levels     # ETOH use disorder  - monitor for s/s of withdrawal     # Gait ataxia  - continue PT/OT        Diet: ADULT DIET; Regular    Code Status: Full Code      Disposition - Denied SNF after P2P. CM aware and awaiting expedited appeal. Avoidable days noted.  Appeal to be reviewed Monday      Paul Shen DO  Internal Medicine   Hospitalist    >45 minutes in total care time

## 2023-02-26 NOTE — FLOWSHEET NOTE
Am nursing  assessment completed. Pt :     awake and resting in bed          Alert and oriented. Breakfast: completed  RESP:            even and non labored   Lung sounds:    diminished                             Oxygen: room air  Complaints of: generalized weakness  Pain: shoulder discomfort  IV: MIDLINE  TELE: PACED  Dressings:   Precautions:              Falls:     70   Hima: 19  Chart and meds reviewed. Noted Labs:   Plan for today:     Call light in reach. 1240 lunch completed. Incont large amount of urine. Bed bath and linen change. Electronically signed by Umang Lowery RN on 2/26/2023 at 12:51 PM    1400 resting quietly in bed. Pillow support and reposition. Electronically signed by Umang Lowery RN on 2/26/2023 at 8:21 PM    1800 awake and resting. Set up with late dinner, pt sleeping earlier. Electronically signed by Umang Lowery RN on 2/26/2023 at 8:22 PM    1900 continued care for additional 4 hour shift. Previous assessment reviewed and updated as needed. Electronically signed by Umang Lowery RN on 2/26/2023 at 8:22 PM    2100 assist with bedpan. Bm x 1. Incont urine. 2 assist to reposition and bed mobility.  Electronically signed by Umang Lowery RN on 2/26/2023 at 10:28 PM

## 2023-02-27 PROCEDURE — 1210000000 HC MED SURG R&B

## 2023-02-27 PROCEDURE — 2580000003 HC RX 258: Performed by: INTERNAL MEDICINE

## 2023-02-27 PROCEDURE — 6370000000 HC RX 637 (ALT 250 FOR IP): Performed by: INTERNAL MEDICINE

## 2023-02-27 PROCEDURE — 6370000000 HC RX 637 (ALT 250 FOR IP): Performed by: NURSE PRACTITIONER

## 2023-02-27 RX ADMIN — PANTOPRAZOLE SODIUM 40 MG: 40 TABLET, DELAYED RELEASE ORAL at 08:29

## 2023-02-27 RX ADMIN — APIXABAN 5 MG: 5 TABLET, FILM COATED ORAL at 21:37

## 2023-02-27 RX ADMIN — ACETAMINOPHEN 650 MG: 325 TABLET ORAL at 21:42

## 2023-02-27 RX ADMIN — ROSUVASTATIN CALCIUM 40 MG: 40 TABLET, FILM COATED ORAL at 18:48

## 2023-02-27 RX ADMIN — TAMSULOSIN HYDROCHLORIDE 0.4 MG: 0.4 CAPSULE ORAL at 08:29

## 2023-02-27 RX ADMIN — Medication 10 ML: at 21:38

## 2023-02-27 RX ADMIN — KETOROLAC TROMETHAMINE 1 DROP: 5 SOLUTION/ DROPS OPHTHALMIC at 08:29

## 2023-02-27 RX ADMIN — LOSARTAN POTASSIUM 25 MG: 25 TABLET, FILM COATED ORAL at 08:29

## 2023-02-27 RX ADMIN — KETOROLAC TROMETHAMINE 1 DROP: 5 SOLUTION/ DROPS OPHTHALMIC at 21:38

## 2023-02-27 RX ADMIN — APIXABAN 5 MG: 5 TABLET, FILM COATED ORAL at 08:29

## 2023-02-27 RX ADMIN — Medication 10 ML: at 08:29

## 2023-02-27 ASSESSMENT — PAIN SCALES - GENERAL: PAINLEVEL_OUTOF10: 0

## 2023-02-27 NOTE — PROGRESS NOTES
AM assessment completed, documentation in flowsheet. Lung sounds clear upon auscultation. HR60, v-paced on tele. Patient denies pain or discomfort at this time. Call light in reach.

## 2023-02-27 NOTE — PROGRESS NOTES
Physical Therapy Missed Treatment   Facility/Department: [facility] MED SURG M224/J062-12    NAME: Olesya Leon    :  (68 y.o.)  MRN: [id number]    Account: [de-identified]  Gender: male    Chart reviewed, attempted PT at 36. Patient unavailable 2° to: Pt sleeping. Able to arouse. Pt states \"It's way to early for therapy. Oriented to time and offered to assist pt to chair for dinner. Pt declined and went back to sleep.       Electronically signed by Liliana Lopez PTA on 23 at 4:31 PM EST

## 2023-02-27 NOTE — CARE COORDINATION
Expedited appeal is still pending. AOR form completed with patient and faxed back to [facility]. Miranda from [employer] [facility] updated. Updated notes faxed to [facility] ([phone]).

## 2023-02-27 NOTE — PROGRESS NOTES
Hospitalist Progress Note      PCP: Rl Franklin MD    Date of Admission: 2/17/2023    Chief Complaint:  no acute events, afebrile, stable HD, on RA    Medications:  Reviewed    Infusion Medications    sodium chloride       Scheduled Medications    losartan  25 mg Oral Daily    ketorolac  1 drop Both Eyes BID    pantoprazole  40 mg Oral Daily    rosuvastatin  40 mg Oral QPM    tamsulosin  0.4 mg Oral Daily    sodium chloride flush  5-40 mL IntraVENous 2 times per day    apixaban  5 mg Oral BID     PRN Meds: sodium chloride flush, sodium chloride, ondansetron **OR** ondansetron, polyethylene glycol, acetaminophen **OR** acetaminophen      Intake/Output Summary (Last 24 hours) at 2/27/2023 1340  Last data filed at 2/27/2023 1145  Gross per 24 hour   Intake 720 ml   Output 200 ml   Net 520 ml         Exam:    /64   Pulse 60   Temp 97.3 °F (36.3 °C) (Oral)   Resp 16   Ht 5' 6\" (1.676 m)   Wt 293 lb 12.8 oz (133.3 kg)   SpO2 98%   BMI 47.42 kg/m²     General appearance: alert, cooperative  Lungs: clear to auscultation bilaterally  Heart: S1/S2,RRR  Abdomen: soft, active BS  Extremities:  no edema       Labs:   No results for input(s): WBC, HGB, HCT, PLT in the last 72 hours. No results for input(s): NA, K, CL, CO2, BUN, CREATININE, CALCIUM, PHOS in the last 72 hours. Invalid input(s): MAGNES    No results for input(s): AST, ALT, BILIDIR, BILITOT, ALKPHOS in the last 72 hours. No results for input(s): INR in the last 72 hours. No results for input(s): [place] Boxer in the last 72 hours.     Urinalysis:      Lab Results   Component Value Date/Time    NITRU Negative 01/11/2023 07:45 PM    WBCUA 0-2 01/11/2023 07:45 PM    WBCUA <1 08/05/2022 09:34 PM    BACTERIA Negative 01/11/2023 07:45 PM    RBCUA 0-2 01/11/2023 07:45 PM    RBCUA 1 08/05/2022 09:34 PM    BLOODU Negative 01/11/2023 07:45 PM    SPECGRAV 1.015 01/11/2023 07:45 PM    GLUCOSEU Negative 01/11/2023 07:45 PM    GLUCOSEU NEG 06/07/2012 03:53 PM       Radiology:  No orders to display           Assessment/Plan:    68 y.o. male with PMH of CAD s/p remote PCI to RCA,  PAF, SSS s/ PPM, chronic diastolic HF, HTN, AAA, DM2, obesity, PRAVEEN, RA, BPH with LUTS, prostate cancer, ETOH use,  gait ataxia who presented with:     Hypotension   - likely medication related  - s/p IV hydration  - held antihypertensive/diuretic regimen  - BP improved  - Losartan resumed  - cardiology following     RON  - Cr 1.6 on arrival, baseline around 0.8  - resolved with IVFs      CAD s/p remote PCI to RCA  - continue statin therapy     PAF, SSS s/ PPM  - continue Apixaban  - monitor on telemetry     Chronic diastolic HF   - held diuretic regimen due to RON     DM2  - HbA1c in the 6.1-6.6 range over the last 5 years  - monitor glucose levels     ETOH use disorder  - monitor for s/s of withdrawal     Gait ataxia  - continue PT/OT        Diet: ADULT DIET;  Regular    Code Status: Full Code      Disposition - SNF, denied by insurance, awaiting appeal        Electronically signed by Shoemakervineet Borja MD on 2/27/2023 at 1:40 PM

## 2023-02-28 LAB
ALBUMIN SERPL-MCNC: 3.1 G/DL (ref 3.5–4.6)
ANION GAP SERPL CALCULATED.3IONS-SCNC: 11 MEQ/L (ref 9–15)
BUN BLDV-MCNC: 19 MG/DL (ref 8–23)
CALCIUM SERPL-MCNC: 8.7 MG/DL (ref 8.5–9.9)
CHLORIDE BLD-SCNC: 104 MEQ/L (ref 95–107)
CO2: 23 MEQ/L (ref 20–31)
CREAT SERPL-MCNC: 0.84 MG/DL (ref 0.7–1.2)
GFR SERPL CREATININE-BSD FRML MDRD: >60 ML/MIN/{1.73_M2}
GLUCOSE BLD-MCNC: 93 MG/DL (ref 70–99)
HCT VFR BLD CALC: 34.3 % (ref 42–52)
HEMOGLOBIN: 11.8 G/DL (ref 14–18)
MAGNESIUM: 2 MG/DL (ref 1.7–2.4)
MCH RBC QN AUTO: 31.2 PG (ref 27–31.3)
MCHC RBC AUTO-ENTMCNC: 34.3 % (ref 33–37)
MCV RBC AUTO: 90.9 FL (ref 79–92.2)
PDW BLD-RTO: 16.7 % (ref 11.5–14.5)
PHOSPHORUS: 4 MG/DL (ref 2.3–4.8)
PLATELET # BLD: 309 K/UL (ref 130–400)
POTASSIUM SERPL-SCNC: 4.3 MEQ/L (ref 3.4–4.9)
RBC # BLD: 3.78 M/UL (ref 4.7–6.1)
SODIUM BLD-SCNC: 138 MEQ/L (ref 135–144)
WBC # BLD: 9.5 K/UL (ref 4.8–10.8)

## 2023-02-28 PROCEDURE — 1210000000 HC MED SURG R&B

## 2023-02-28 PROCEDURE — 2580000003 HC RX 258: Performed by: INTERNAL MEDICINE

## 2023-02-28 PROCEDURE — 80069 RENAL FUNCTION PANEL: CPT

## 2023-02-28 PROCEDURE — 97116 GAIT TRAINING THERAPY: CPT

## 2023-02-28 PROCEDURE — 97535 SELF CARE MNGMENT TRAINING: CPT

## 2023-02-28 PROCEDURE — 85027 COMPLETE CBC AUTOMATED: CPT

## 2023-02-28 PROCEDURE — 6370000000 HC RX 637 (ALT 250 FOR IP): Performed by: INTERNAL MEDICINE

## 2023-02-28 PROCEDURE — 83735 ASSAY OF MAGNESIUM: CPT

## 2023-02-28 PROCEDURE — 6370000000 HC RX 637 (ALT 250 FOR IP): Performed by: NURSE PRACTITIONER

## 2023-02-28 PROCEDURE — 36415 COLL VENOUS BLD VENIPUNCTURE: CPT

## 2023-02-28 RX ADMIN — APIXABAN 5 MG: 5 TABLET, FILM COATED ORAL at 20:32

## 2023-02-28 RX ADMIN — ROSUVASTATIN CALCIUM 40 MG: 40 TABLET, FILM COATED ORAL at 18:53

## 2023-02-28 RX ADMIN — TAMSULOSIN HYDROCHLORIDE 0.4 MG: 0.4 CAPSULE ORAL at 08:23

## 2023-02-28 RX ADMIN — Medication 10 ML: at 20:32

## 2023-02-28 RX ADMIN — PANTOPRAZOLE SODIUM 40 MG: 40 TABLET, DELAYED RELEASE ORAL at 08:23

## 2023-02-28 RX ADMIN — LOSARTAN POTASSIUM 25 MG: 25 TABLET, FILM COATED ORAL at 08:23

## 2023-02-28 RX ADMIN — ACETAMINOPHEN 650 MG: 325 TABLET ORAL at 20:31

## 2023-02-28 RX ADMIN — APIXABAN 5 MG: 5 TABLET, FILM COATED ORAL at 08:23

## 2023-02-28 RX ADMIN — KETOROLAC TROMETHAMINE 1 DROP: 5 SOLUTION/ DROPS OPHTHALMIC at 20:30

## 2023-02-28 RX ADMIN — KETOROLAC TROMETHAMINE 1 DROP: 5 SOLUTION/ DROPS OPHTHALMIC at 08:23

## 2023-02-28 RX ADMIN — Medication 10 ML: at 08:24

## 2023-02-28 NOTE — CARE COORDINATION
RECEIVED CALL FROM RAUDEL AT [facility] AND SHE IS REQUESTING LAST 3 DAYS OF PROGRESS NOTES BE FAXED TO APPEALS DEPT FOR THE EXPEDITED APPEAL AT [phone]. ALL INFO FAXED AS REQUESTED.

## 2023-02-28 NOTE — PROGRESS NOTES
Physical Therapy Med Surg Daily Treatment Note  Facility/Department: [facility]  Room: 90/90-       NAME: Tad Allen  : [birth year] (21 y.o.)  MRN: [id number]  CODE STATUS: Full Code    Date of Service: 2023    Patient Diagnosis(es): Hypotension [I95.9]  RON (acute kidney injury) ([facility] [address] [N17.9]  Hypotension, unspecified hypotension type [I95.9]   No chief complaint on file.     Patient Active Problem List    Diagnosis Date Noted    Hypotension 2023    Recurrent falls 2023    Ataxic gait 2023    ETOH abuse 2023    Adjustment disorder 2023    CHF (congestive heart failure), NYHA class I, acute on chronic, combined ([facility] [address] 2021    Unable to ambulate 12/10/2021    PAF (paroxysmal atrial fibrillation)  12/10/2021    Retinal hemorrhage, bilateral 2019    Intermediate stage nonexudative age-related macular degeneration of both eyes 2019    Hyperuricemia 01/10/2019    Chronic gastritis without bleeding 01/10/2019    Type 2 diabetes mellitus with microalbuminuria, without long-term current use of insulin ([facility] [address] 01/10/2019    Arthrodesis status 2017    Primary osteoarthritis, right ankle and foot 2017    Pain in right ankle 2017    Encounter for other orthopedic aftercare 2017    History of colon polyps 2017    Nuclear sclerosis of both eyes 2015    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, left eye 2015    Presbyopia 2015    Regular astigmatism 2015    History of prostate cancer 2014    Stented coronary artery 2013    Essential hypertension 2013    Numbness in feet 2013    RA (rheumatoid arthritis) ([facility] [address] 10/24/2011    Bradycardia     Mixed hyperlipidemia     Primary osteoarthritis involving multiple joints     Dysthymia     CAD (coronary artery disease)         Past Medical History:   Diagnosis Date    Bradycardia     CAD (coronary artery disease)     Cancer [facility])     prostate- radiation     CHF (congestive heart failure) (HCC)     Depression     HSV-2 (herpes simplex virus 2) infection     Hyperlipidemia     Hypertension     Left knee DJD     Osteoarthritis     Rheumatoid arthritis(714.0)     Type 2 diabetes mellitus without complication, without long-term current use of insulin ([facility] [address] 1/10/2019     Past Surgical History:   Procedure Laterality Date    ANKLE SURGERY Right     pin    APPENDECTOMY      ARTHRODESIS Right 10/31/2016    RIGHT ANKLE ARTHRODESIS OF RIGHT ANKLE AND SUBTALAR JOINT, C-ARM, ABHIJEET EQUIPMENT SYNTHETIC BONE GRAFT, ALLOGRAFT CANCELLOUS, SHARON ANKLE FUSION NAIL, SHANDA IMPLANT BONE STIMULATOR, CHOICE ANESTHESIA, BLOCK  performed by Isaura Ruiz MD at [facility] 96      stent x 5    COLONOSCOPY  07/19/2011    FRACTURE SURGERY      right ankle    HARDWARE REMOVAL FOOT / ANKLE Right 11/06/2017    RIGHT ANKLE HARDWARE REMOVAL performed by Janice Arellano MD at [address] Bilateral     knees    OR COLON CA SCRN NOT  [address] IND N/A 07/11/2017    COLONOSCOPY performed by Cole Adame DO at [address] Left     THORACENTESIS Right 01/16/2023    1140ml removed by Dr. Bennie Irwin. Diagnostics sent to lab. TONSILLECTOMY AND ADENOIDECTOMY         Chart Reviewed: Yes    Restrictions:  Restrictions/Precautions: Fall Risk    SUBJECTIVE:   Subjective: Pt states he got up earlier to go to the bathoom. Pt denies pain pre and post.    Pain   Pt denies pain    OBJECTIVE:        Bed mobility  Rolling to Left: Stand by assistance (with use of bed rail)  Rolling to Right: Minimal assistance  Supine to Sit: Moderate assistance  Sit to Supine:  (NT. Pt seated in chair to promote out of bed activity.)  Bed Mobility Comments: HOB flat without rails. Pt requires VCs and increased time/effort to complete. Pt with c/o \"wooziness\" after sup to sit. Cleared after seated rest.    Transfers  Sit to Stand:  Moderate Assistance  Stand to Sit: Contact guard assistance  Comment: Completed with bed in lowest position. VCs for set up and technique. Requires increased time to complete. Decreased eccentric control with stand to sit. Ambulation  Surface: Level tile  Device: Rolling Walker  Assistance: Contact guard assistance  Quality of Gait: Increased time to complete with several standing rest breaks when turning to approach chair. Pt fatigued after. VCs for approach and alignment to chair. Gait Deviations: Slow Gretchen;Decreased step length;Decreased step height  Distance: 10ft           PT Exercises  Exercise Treatment: Seated AP L x10/ Marching x10/ LAQ x10/ Hip ABD with pillow x20/ Hip ADD with manual resistance x20  Static Standing Balance Exercises: Pt stood EOB with 0-2 UE support x5 min while being cleaned up. SBA without LOB. Dynamic Standing Balance Exercises: Reaching assisting with ADLs              ASSESSMENT   Assessment: Pt requires increased time to complete mobility. Pt incontinent of urine and bowel once standing. Pt able to tolerate increased standing time to complete ADLs without LOB. Fatigued quickly with gait limiting distance. Requires VCs and assist with bed mob and transfers. Discharge Recommendations:  Continue to assess pending progress     Goals  Long Term Goals  Long Term Goal 1: Pt will demonstrate bed mobility indep  Long Term Goal 2: Pt will demonstrate transfers mod indep with safest AD  Long Term Goal 3: Pt will demonstrate amb >/= 75ft mod indep with safest AD  Long Term Goal 4: Pt will demonstrate stair negotiation 6 steps with 1 rail mod indep  Long Term Goal 5: Pt will demonstrate TUG </= 20 sec for decreased risk for falls    PLAN    General Plan: 1 time a day 3-6 times a week  Additional Comments: Cont per POC.      [facility] (6 CLICK) BASIC MOBILITY  AM-PAC Inpatient Mobility Raw Score : 13     Therapy Time   Individual   Time In 0914   Time Out 0945   Minutes 31   Transfers 10  Gait 8  Therex [facility], PTA, 02/28/23 at 9:59 AM         Definitions for assistance levels  Independent = pt does not require any physical supervision or assistance from another person for activity completion. Device may be needed.   Stand by assistance = pt requires verbal cues or instructions from another person, close to but not touching, to perform the activity  Minimal assistance= pt performs 75% or more of the activity; assistance is required to complete the activity  Moderate assistance= pt performs 50% of the activity; assistance is required to complete the activity  Maximal assistance = pt performs 25% of the activity; assistance is required to complete the activity  Dependent = pt requires total physical assistance to accomplish the task

## 2023-02-28 NOTE — PROGRESS NOTES
Hospitalist Progress Note      PCP: Bennie Hernandez MD    Date of Admission: 2/17/2023    Chief Complaint:  no acute events, afebrile, stable HD, on RA    Medications:  Reviewed    Infusion Medications    sodium chloride       Scheduled Medications    losartan  25 mg Oral Daily    ketorolac  1 drop Both Eyes BID    pantoprazole  40 mg Oral Daily    rosuvastatin  40 mg Oral QPM    tamsulosin  0.4 mg Oral Daily    sodium chloride flush  5-40 mL IntraVENous 2 times per day    apixaban  5 mg Oral BID     PRN Meds: sodium chloride flush, sodium chloride, ondansetron **OR** ondansetron, polyethylene glycol, acetaminophen **OR** acetaminophen      Intake/Output Summary (Last 24 hours) at 2/28/2023 1155  Last data filed at 2/28/2023 0935  Gross per 24 hour   Intake --   Output 400 ml   Net -400 ml         Exam:    /60   Pulse 61   Temp 98.7 °F (37.1 °C)   Resp 16   Ht 5' 6\" (1.676 m)   Wt 293 lb 12.8 oz (133.3 kg)   SpO2 97%   BMI 47.42 kg/m²     General appearance: alert, cooperative  Lungs: clear to auscultation bilaterally  Heart: S1/S2,RRR  Abdomen: soft, active BS  Extremities:  no edema       Labs:   Recent Labs     02/28/23  0554   WBC 9.5   HGB 11.8*   HCT 34.3*          Recent Labs     02/28/23  0600      K 4.3      CO2 23   BUN 19   CREATININE 0.84   CALCIUM 8.7   PHOS 4.0       No results for input(s): AST, ALT, BILIDIR, BILITOT, ALKPHOS in the last 72 hours. No results for input(s): INR in the last 72 hours. No results for input(s): Janora Sauger in the last 72 hours.     Urinalysis:      Lab Results   Component Value Date/Time    NITRU Negative 01/11/2023 07:45 PM    WBCUA 0-2 01/11/2023 07:45 PM    WBCUA <1 08/05/2022 09:34 PM    BACTERIA Negative 01/11/2023 07:45 PM    RBCUA 0-2 01/11/2023 07:45 PM    RBCUA 1 08/05/2022 09:34 PM    BLOODU Negative 01/11/2023 07:45 PM    SPECGRAV 1.015 01/11/2023 07:45 PM    GLUCOSEU Negative 01/11/2023 07:45 PM    GLUCOSEU NEG 06/07/2012 03:53 PM       Radiology:  No orders to display           Assessment/Plan:    68 y.o. male with PMH of CAD s/p remote PCI to RCA,  PAF, SSS s/ PPM, chronic diastolic HF, HTN, AAA, DM2, obesity, PRAVEEN, RA, BPH with LUTS, prostate cancer, ETOH use,  gait ataxia who presented with:     Hypotension   - likely medication related  - s/p IV hydration  - held antihypertensive/diuretic regimen  - BP improved  - Losartan resumed by cardiology      RON  - Cr 1.6 on arrival, baseline around 0.8  - resolved with IVFs      CAD s/p remote PCI to RCA  - continue statin therapy     PAF, SSS s/ PPM  - continue Apixaban  - monitor on telemetry     Chronic diastolic HF   - held diuretic regimen due to RON on admission  - Losartan resumed     DM2  - HbA1c in the 6.1-6.6 range over the last 5 years  - monitor glucose levels     ETOH use disorder  - monitor for s/s of withdrawal     Gait ataxia  - continue PT/OT        Diet: ADULT DIET;  Regular    Code Status: Full Code      Disposition - SNF, denied by insurance, awaiting expedited appeal        Electronically signed by Jose Alejandro Moreno MD on 2/28/2023 at 11:55 AM

## 2023-02-28 NOTE — PROGRESS NOTES
[facility] Occupational Therapy      Date: 2023  Patient Name: Wicho Weinberg        MRN: [id number]  Account: [de-identified]   : 1945  (68 y.o.)  Room: [place] [address]    Chart reviewed, attempted OT at 1550. Patient not seen 2° to:    Pt in bed sleeping upon arrival. Pt wakes after Min verbal and tactile stimuli. Pt briefly opens eyes and closes them again. Pt too lethargic to appropriately participate at this time. Will attempt again when able.     Electronically signed by JOEL Lindsey on 2023 at 3:54 PM

## 2023-02-28 NOTE — PROGRESS NOTES
Patient taken off isolation precautions today. Restraints removed, patient placed on one to one sitter. Currently resting in bed.

## 2023-03-01 PROCEDURE — 97116 GAIT TRAINING THERAPY: CPT

## 2023-03-01 PROCEDURE — 2580000003 HC RX 258: Performed by: INTERNAL MEDICINE

## 2023-03-01 PROCEDURE — 6370000000 HC RX 637 (ALT 250 FOR IP): Performed by: INTERNAL MEDICINE

## 2023-03-01 PROCEDURE — 97535 SELF CARE MNGMENT TRAINING: CPT

## 2023-03-01 PROCEDURE — 6370000000 HC RX 637 (ALT 250 FOR IP): Performed by: NURSE PRACTITIONER

## 2023-03-01 PROCEDURE — 1210000000 HC MED SURG R&B

## 2023-03-01 RX ADMIN — KETOROLAC TROMETHAMINE 1 DROP: 5 SOLUTION/ DROPS OPHTHALMIC at 21:31

## 2023-03-01 RX ADMIN — Medication 10 ML: at 21:31

## 2023-03-01 RX ADMIN — ROSUVASTATIN CALCIUM 40 MG: 40 TABLET, FILM COATED ORAL at 18:47

## 2023-03-01 RX ADMIN — TAMSULOSIN HYDROCHLORIDE 0.4 MG: 0.4 CAPSULE ORAL at 08:53

## 2023-03-01 RX ADMIN — KETOROLAC TROMETHAMINE 1 DROP: 5 SOLUTION/ DROPS OPHTHALMIC at 08:54

## 2023-03-01 RX ADMIN — APIXABAN 5 MG: 5 TABLET, FILM COATED ORAL at 21:30

## 2023-03-01 RX ADMIN — PANTOPRAZOLE SODIUM 40 MG: 40 TABLET, DELAYED RELEASE ORAL at 08:53

## 2023-03-01 RX ADMIN — ACETAMINOPHEN 650 MG: 325 TABLET ORAL at 18:47

## 2023-03-01 RX ADMIN — Medication 10 ML: at 08:53

## 2023-03-01 RX ADMIN — LOSARTAN POTASSIUM 25 MG: 25 TABLET, FILM COATED ORAL at 08:53

## 2023-03-01 RX ADMIN — APIXABAN 5 MG: 5 TABLET, FILM COATED ORAL at 08:53

## 2023-03-01 ASSESSMENT — PAIN DESCRIPTION - DESCRIPTORS: DESCRIPTORS: ACHING;CRAMPING

## 2023-03-01 ASSESSMENT — PAIN DESCRIPTION - LOCATION: LOCATION: GENERALIZED

## 2023-03-01 ASSESSMENT — PAIN SCALES - GENERAL
PAINLEVEL_OUTOF10: 8
PAINLEVEL_OUTOF10: 0

## 2023-03-01 NOTE — PROGRESS NOTES
Hospitalist Progress Note      PCP: Esa Sams MD    Date of Admission: 2/17/2023    Chief Complaint:  no acute events, afebrile, stable HD, on RA    Medications:  Reviewed    Infusion Medications    sodium chloride       Scheduled Medications    losartan  25 mg Oral Daily    ketorolac  1 drop Both Eyes BID    pantoprazole  40 mg Oral Daily    rosuvastatin  40 mg Oral QPM    tamsulosin  0.4 mg Oral Daily    sodium chloride flush  5-40 mL IntraVENous 2 times per day    apixaban  5 mg Oral BID     PRN Meds: sodium chloride flush, sodium chloride, ondansetron **OR** ondansetron, polyethylene glycol, acetaminophen **OR** acetaminophen      Intake/Output Summary (Last 24 hours) at 3/1/2023 1239  Last data filed at 3/1/2023 0837  Gross per 24 hour   Intake 360 ml   Output 250 ml   Net 110 ml         Exam:    /64   Pulse 58   Temp 97.5 °F (36.4 °C) (Oral)   Resp 18   Ht 5' 6\" (1.676 m)   Wt 293 lb 12.8 oz (133.3 kg)   SpO2 100%   BMI 47.42 kg/m²     General appearance: alert, cooperative  Lungs: clear to auscultation bilaterally  Heart: S1/S2,RRR  Abdomen: soft, active BS  Extremities:  no edema       Labs:   Recent Labs     02/28/23  0554   WBC 9.5   HGB 11.8*   HCT 34.3*          Recent Labs     02/28/23  0600      K 4.3      CO2 23   BUN 19   CREATININE 0.84   CALCIUM 8.7   PHOS 4.0       No results for input(s): AST, ALT, BILIDIR, BILITOT, ALKPHOS in the last 72 hours. No results for input(s): INR in the last 72 hours. No results for input(s): Hermila Drones in the last 72 hours.     Urinalysis:      Lab Results   Component Value Date/Time    NITRU Negative 01/11/2023 07:45 PM    WBCUA 0-2 01/11/2023 07:45 PM    WBCUA <1 08/05/2022 09:34 PM    BACTERIA Negative 01/11/2023 07:45 PM    RBCUA 0-2 01/11/2023 07:45 PM    RBCUA 1 08/05/2022 09:34 PM    BLOODU Negative 01/11/2023 07:45 PM    SPECGRAV 1.015 01/11/2023 07:45 PM    GLUCOSEU Negative 01/11/2023 07:45 PM    GLUCOSEU NEG 06/07/2012 03:53 PM       Radiology:  No orders to display           Assessment/Plan:    68 y.o. male with PMH of CAD s/p remote PCI to RCA,  PAF, SSS s/ PPM, chronic diastolic HF, HTN, AAA, DM2, obesity, PRAVEEN, RA, BPH with LUTS, prostate cancer, ETOH use,  gait ataxia who presented with:     Hypotension   - likely medication related  - s/p IV hydration  - held antihypertensive/diuretic regimen  - BP improved  - Losartan resumed by cardiology      RON  - Cr 1.6 on arrival, baseline around 0.8  - resolved with IVFs      CAD s/p remote PCI to RCA  - continue statin therapy     PAF, SSS s/ PPM  - continue Apixaban  - monitor on telemetry     Chronic diastolic HF   - held diuretic regimen due to RON on admission  - Losartan resumed     DM2  - HbA1c in the 6.1-6.6 range over the last 5 years  - monitor glucose levels     ETOH use disorder  - monitor for s/s of withdrawal     Gait ataxia  - continue PT/OT        Diet: ADULT DIET;  Regular    Code Status: Full Code      Disposition - SNF, denied by insurance, awaiting expedited appeal        Electronically signed by Radha Sullivan MD on 3/1/2023 at 12:39 PM

## 2023-03-01 NOTE — PROGRESS NOTES
Physical Therapy Med Surg Daily Treatment Note  Facility/Department: [facility]  Room: 90/90[facility]       NAME: Donnie Mena  : [birth year] (47 y.o.)  MRN: [id number]  CODE STATUS: Full Code    Date of Service: 3/1/2023    Patient Diagnosis(es): Hypotension [I95.9]  RON (acute kidney injury) ([facility] [address] [N17.9]  Hypotension, unspecified hypotension type [I95.9]   No chief complaint on file.     Patient Active Problem List    Diagnosis Date Noted    Hypotension 2023    Recurrent falls 2023    Ataxic gait 2023    ETOH abuse 2023    Adjustment disorder 2023    CHF (congestive heart failure), NYHA class I, acute on chronic, combined ([facility] [address] 2021    Unable to ambulate 12/10/2021    PAF (paroxysmal atrial fibrillation)  12/10/2021    Retinal hemorrhage, bilateral 2019    Intermediate stage nonexudative age-related macular degeneration of both eyes 2019    Hyperuricemia 01/10/2019    Chronic gastritis without bleeding 01/10/2019    Type 2 diabetes mellitus with microalbuminuria, without long-term current use of insulin ([facility] [address] 01/10/2019    Arthrodesis status 2017    Primary osteoarthritis, right ankle and foot 2017    Pain in right ankle 2017    Encounter for other orthopedic aftercare 2017    History of colon polyps 2017    Nuclear sclerosis of both eyes 2015    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, left eye 2015    Presbyopia 2015    Regular astigmatism 2015    History of prostate cancer 2014    Stented coronary artery 2013    Essential hypertension 2013    Numbness in feet 2013    RA (rheumatoid arthritis) ([address] 10/24/2011    Bradycardia     Mixed hyperlipidemia     Primary osteoarthritis involving multiple joints     Dysthymia     CAD (coronary artery disease)         Past Medical History:   Diagnosis Date    Bradycardia     CAD (coronary artery disease)     Cancer ([address] prostate- radiation     CHF (congestive heart failure) (HCC)     Depression     HSV-2 (herpes simplex virus 2) infection     Hyperlipidemia     Hypertension     Left knee DJD     Osteoarthritis     Rheumatoid arthritis(714.0)     Type 2 diabetes mellitus without complication, without long-term current use of insulin ([facility] [address] 1/10/2019     Past Surgical History:   Procedure Laterality Date    ANKLE SURGERY Right     pin    APPENDECTOMY      ARTHRODESIS Right 10/31/2016    RIGHT ANKLE ARTHRODESIS OF RIGHT ANKLE AND SUBTALAR JOINT, C-ARM, ABHIJEET EQUIPMENT SYNTHETIC BONE GRAFT, ALLOGRAFT CANCELLOUS, SHARON ANKLE FUSION NAIL, SHANDA IMPLANT BONE STIMULATOR, CHOICE ANESTHESIA, BLOCK  performed by Joann Bingham MD at [facility] 96      stent x 5    COLONOSCOPY  07/19/2011    FRACTURE SURGERY      right ankle    HARDWARE REMOVAL FOOT / ANKLE Right 11/06/2017    RIGHT ANKLE HARDWARE REMOVAL [facility]. Bilateral     knees    MT COLON CA SCRN NOT  [address] IND N/A 07/11/2017    COLONOSCOPY performed by Rai Olivo DO at [facility] Left     THORACENTESIS Right 01/16/2023    1140ml removed by Dr. Missy Pearson. Diagnostics sent to lab. TONSILLECTOMY AND ADENOIDECTOMY         Chart Reviewed: Yes  Family / Caregiver Present: No    Restrictions:  Restrictions/Precautions: Fall Risk    SUBJECTIVE:   Subjective: Patient resting in bed. PCA present. Patient agreeable to tx. Response To Previous Treatment: Patient with no complaints from previous session. Pain  Denies       OBJECTIVE:        Bed mobility  Rolling to Left:  (with use of bed rail)  Supine to Sit: Contact guard assistance  Scooting: Moderate assistance  Bed Mobility Comments: Patient request HOB elevated. Requires significant increase in time and effort. Transfers  Sit to Stand:  Moderate Assistance;Minimal Assistance;Maximum Assistance  Stand to Sit: Contact guard assistance  Comment: Patient requires various assist for sit to stand transfers. Requires multiple attempts to obtain full upright stance. Significant increase in time to complete. Poor eccentric control with transition from stand to sit. Difficulty standing up from chair with arms. Unable to achieve at max assist with 2 attempts. Able to stand at min A with second person present for safety during third attempt. Provided cues for anterior weight shift. Ambulation  Surface: Level tile  Device: Rolling Walker  Assistance: Contact guard assistance  Quality of Gait: significant decrease in gait speed, inconsistent gait pattern, shuffling steps, flexed posture, nearly freezes during turns, no LOB  Gait Deviations: Slow Gretchen;Decreased step length;Decreased step height  Distance: 15 feet x2    ASSESSMENT   Body Structures, Functions, Activity Limitations Requiring Skilled Therapeutic Intervention: Decreased functional mobility ; Decreased strength;Decreased endurance;Decreased balance  Assessment: Patient continues to require significant increase in time and effort and assistance for bed mobility and transfers. Assistance required varies throughout session. Discharge Recommendations:  Continue to assess pending progress         Goals  Long Term Goals  Long Term Goal 1: Pt will demonstrate bed mobility indep  Long Term Goal 2: Pt will demonstrate transfers mod indep with safest AD  Long Term Goal 3: Pt will demonstrate amb >/= 75ft mod indep with safest AD  Long Term Goal 4: Pt will demonstrate stair negotiation 6 steps with 1 rail mod indep  Long Term Goal 5: Pt will demonstrate TUG </= 20 sec for decreased risk for falls    PLAN    General Plan: 1 time a day 3-6 times a week  Safety Devices  Type of Devices:  All fall risk precautions in place, Call light within reach, Chair alarm in place, Left in chair     [facility] (6 CLICK) BASIC MOBILITY  AM-PAC Inpatient Mobility Raw Score : 13     Therapy Time   Individual Time In 0952   Time Out 1017   Minutes 25     Timed Code Treatment Minutes: 25 Minutes  Gt 10  [address] Car[address] Kelly, PTA, 03/01/23 at 11:04 AM         Definitions for assistance levels  Independent = pt does not require any physical supervision or assistance from another person for activity completion. Device may be needed.   Stand by assistance = pt requires verbal cues or instructions from another person, close to but not touching, to perform the activity  Minimal assistance= pt performs 75% or more of the activity; assistance is required to complete the activity  Moderate assistance= pt performs 50% of the activity; assistance is required to complete the activity  Maximal assistance = pt performs 25% of the activity; assistance is required to complete the activity  Dependent = pt requires total physical assistance to accomplish the task

## 2023-03-01 NOTE — CARE COORDINATION
ELISSAW called [employer] ([phone]). Provided Reference #: [id number]. \  Per [employer] rep, \" it looks like is still pending. It normally take about 30-60 days for us to review. \"   LSW explain that pt is currently in the hospital and will need this sooner than 30 days. [employer] Rep report, \"You can call back tomorrow 3/2/2023 at 10:35 AM for the result. Please provide the Case Number: [id number]. \"     Notify TYSON and CESAR Bae provided LSW, [facility] number. [phone] Ext. [phone]. LSW called and left a voice mail. LSW will follow.      Electronically signed by BRADLEY Yang on 3/1/2023 at 2:42 PM

## 2023-03-01 NOTE — PROGRESS NOTES
[facility]CCUPATIONAL THERAPY MED SURG TREATMENT NOTE     Date: 3/1/2023  Patient Name: Kajal Price        MRN: [id number]  Account: [de-identified]   : 1945  (68 y.o.)  Room: [place] [address]    Chart Review:    Restrictions  Restrictions/Precautions  Restrictions/Precautions: Fall Risk     Safety:  Safety Devices  Type of Devices: All fall risk precautions in place    Patient's birthday verified: Yes    Subjective:    Subjective: \"Don't leave me. You're better than all those nurses. \"       Pain at start of treatment: No    Pain at end of treatment: No    Objective:    ADL Status:  ADL  Feeding: Setup  Grooming: Setup; Increased time to complete  LE Dressing: Dependent/Total  LE Dressing Skilled Clinical Factors: B socks - personal and non-slip hospital, hospital brief    Therapy key for assistance levels -   Independent/Mod I = Pt. is able to perform task with no assistance but may require a device   Stand by assistance = Pt. does not perform task at an independent level but does not need physical assistance, requires verbal cues  Minimal, Moderate, Maximal Assistance = Pt. requires physical assistance (25%, 50%, 75% assist from helper) for task but is able to actively participate in task   Dependent = Pt. requires total assistance with task and is not able to actively participate with task completion    Orientation Status:  Orientation  Overall Orientation Status: Within Functional Limits    Cognition Status:  Cognition  Cognition Comment: Patient was noted to have decreased processing of information requiring extra time for all tasks    Perception Status:  Perception  Overall Perceptual Status: WFL    Vision and Hearing Status:  Vision  Vision Exceptions: Wears glasses for reading  Hearing  Hearing: Within functional limits      ROM:   LUE AROM (degrees)  LUE AROM : WFL  Left Hand AROM (degrees)  Left Hand AROM: WFL  RUE AROM (degrees)  RUE AROM : WFL  Right Hand AROM (degrees)  Right Hand AROM: WFL    Functional Mobility:  Patient ambulated to head of bed with [employer] [facility] level. MOD A x 2 person to complete sit -stand following multiple attempts, verbal cueing for hand placement and technique. Bed Mobility  Bed mobility  Sit to Supine: Moderate assistance (elevate B LE's onto bed)  Scooting:  Moderate assistance  Bed Mobility Comments: HOB flat - bed rail utilized - verbal cues for hand placement and technique - increased time and effort for all tasks    Functional Endurance:  Activity Tolerance  Activity Tolerance: Patient Tolerated treatment well    Treatment consisted of:    ADL training    Assessment/Discharge Disposition:          [employer]  How much help is needed for putting on and taking off regular lower body clothing?: A Lot  How much help is needed for bathing (which includes washing, rinsing, drying)?: A Lot  How much help is needed for toileting (which includes using toilet, bedpan, or urinal)?: Total  How much help is needed for putting on and taking off regular upper body clothing?: A Lot  How much help is needed for taking care of personal grooming?: A Little  How much help for eating meals?: A Little  AM-[facility] Inpatient Daily Activity Raw Score: 13  AM-[facility] Inpatient ADL T-Scale Score : 32.03  ADL Inpatient CMS 0-100% Score: 63.03  ADL Inpatient CMS G-Code Modifier : CL    Plan:    Continue OT per POC    Goals/Plan of care addressed during this session:        Patient Goal:      Improve [place] with ADLs    Therapy Time:   Individual Group Co-Treat   Time In 1130       Time Out 1157         Minutes 27                ADL/IADL trainin minutes    Electronically signed by:    JOEL Lea    3/1/2023, 12:02 PM

## 2023-03-02 VITALS
SYSTOLIC BLOOD PRESSURE: 124 MMHG | HEIGHT: 66 IN | WEIGHT: 293.8 LBS | TEMPERATURE: 97.3 F | HEART RATE: 61 BPM | DIASTOLIC BLOOD PRESSURE: 66 MMHG | OXYGEN SATURATION: 97 % | BODY MASS INDEX: 47.22 KG/M2 | RESPIRATION RATE: 16 BRPM

## 2023-03-02 PROCEDURE — 6370000000 HC RX 637 (ALT 250 FOR IP): Performed by: NURSE PRACTITIONER

## 2023-03-02 PROCEDURE — 2580000003 HC RX 258: Performed by: INTERNAL MEDICINE

## 2023-03-02 PROCEDURE — 6370000000 HC RX 637 (ALT 250 FOR IP): Performed by: INTERNAL MEDICINE

## 2023-03-02 RX ADMIN — APIXABAN 5 MG: 5 TABLET, FILM COATED ORAL at 08:39

## 2023-03-02 RX ADMIN — LOSARTAN POTASSIUM 25 MG: 25 TABLET, FILM COATED ORAL at 08:39

## 2023-03-02 RX ADMIN — TAMSULOSIN HYDROCHLORIDE 0.4 MG: 0.4 CAPSULE ORAL at 08:39

## 2023-03-02 RX ADMIN — PANTOPRAZOLE SODIUM 40 MG: 40 TABLET, DELAYED RELEASE ORAL at 08:39

## 2023-03-02 RX ADMIN — Medication 10 ML: at 08:40

## 2023-03-02 NOTE — PROGRESS NOTES
Iv and tele removed for discharge. Care plan completed for discharge. Appropriate education addressed in discharge instructions. Instructions completed and reviewed with patient. Verbalize understanding of instructions and follow up. Personal belongings gathered. No complaints. Report called to [facility] at [address] point.  Patient set for discharge and pickup at 12 pm.     Electronically signed by Ferdinand Zazueta RN on 3/2/2023 at 11:58 AM

## 2023-03-02 NOTE — DISCHARGE INSTR - DIET
Good nutrition is important when healing from an illness, injury, or surgery. Follow any nutrition recommendations given to you during your hospital stay. If you were given an oral nutrition supplement while in the hospital, continue to take this supplement at home. You can take it with meals, in-between meals, and/or before bedtime. These supplements can be purchased at most local grocery stores, pharmacies, and chain [employer]-stores. If you have any questions about your diet or nutrition, call the hospital and ask for the dietitian. As tolerated. Regular.

## 2023-03-02 NOTE — PLAN OF CARE
Problem: Discharge Planning  Goal: Discharge to home or other facility with appropriate resources  Outcome: Adequate for Discharge     Problem: ABCDS Injury Assessment  Goal: Absence of physical injury  Outcome: Adequate for Discharge     Problem: Safety - Adult  Goal: Free from fall injury  Outcome: Adequate for Discharge     Problem: Chronic Conditions and Co-morbidities  Goal: Patient's chronic conditions and co-morbidity symptoms are monitored and maintained or improved  Outcome: Adequate for Discharge     Problem: Skin/Tissue Integrity  Goal: Absence of new skin breakdown  Description: 1. Monitor for areas of redness and/or skin breakdown  2. Assess vascular access sites hourly  3. Every 4-6 hours minimum:  Change oxygen saturation probe site  4. Every 4-6 hours:  If on nasal continuous positive airway pressure, respiratory therapy assess nares and determine need for appliance change or resting period.   Outcome: Adequate for Discharge     Problem: Pain  Goal: Verbalizes/displays adequate comfort level or baseline comfort level  Outcome: Adequate for Discharge

## 2023-03-02 NOTE — PROGRESS NOTES
Hospitalist Progress Note      PCP: Josue Orellana MD    Date of Admission: 2/17/2023    Chief Complaint:  no acute events, afebrile, stable HD, on RA    Medications:  Reviewed    Infusion Medications    sodium chloride       Scheduled Medications    losartan  25 mg Oral Daily    ketorolac  1 drop Both Eyes BID    pantoprazole  40 mg Oral Daily    rosuvastatin  40 mg Oral QPM    tamsulosin  0.4 mg Oral Daily    sodium chloride flush  5-40 mL IntraVENous 2 times per day    apixaban  5 mg Oral BID     PRN Meds: sodium chloride flush, sodium chloride, ondansetron **OR** ondansetron, polyethylene glycol, acetaminophen **OR** acetaminophen      Intake/Output Summary (Last 24 hours) at 3/2/2023 1138  Last data filed at 3/2/2023 0839  Gross per 24 hour   Intake 240 ml   Output 1800 ml   Net -1560 ml         Exam:    /66   Pulse 61   Temp 97.3 °F (36.3 °C)   Resp 18   Ht 5' 6\" (1.676 m)   Wt 293 lb 12.8 oz (133.3 kg)   SpO2 97%   BMI 47.42 kg/m²     General appearance: alert, cooperative  Lungs: clear to auscultation bilaterally  Heart: S1/S2,RRR  Abdomen: soft, active BS  Extremities:  no edema       Labs:   Recent Labs     02/28/23  0554   WBC 9.5   HGB 11.8*   HCT 34.3*          Recent Labs     02/28/23  0600      K 4.3      CO2 23   BUN 19   CREATININE 0.84   CALCIUM 8.7   PHOS 4.0       No results for input(s): AST, ALT, BILIDIR, BILITOT, ALKPHOS in the last 72 hours. No results for input(s): INR in the last 72 hours. No results for input(s): Roslynn Angst in the last 72 hours.     Urinalysis:      Lab Results   Component Value Date/Time    NITRU Negative 01/11/2023 07:45 PM    WBCUA 0-2 01/11/2023 07:45 PM    WBCUA <1 08/05/2022 09:34 PM    BACTERIA Negative 01/11/2023 07:45 PM    RBCUA 0-2 01/11/2023 07:45 PM    RBCUA 1 08/05/2022 09:34 PM    BLOODU Negative 01/11/2023 07:45 PM    SPECGRAV 1.015 01/11/2023 07:45 PM    GLUCOSEU Negative 01/11/2023 07:45 PM    GLUCOSEU NEG 06/07/2012 03:53 PM       Radiology:  No orders to display           Assessment/Plan:    68 y.o. male with PMH of CAD s/p remote PCI to RCA,  PAF, SSS s/ PPM, chronic diastolic HF, HTN, AAA, DM2, obesity, PRAVEEN, RA, BPH with LUTS, prostate cancer, ETOH use,  gait ataxia who presented with:     Hypotension   - likely medication related  - s/p IV hydration  - held antihypertensive/diuretic regimen  - BP improved  - Losartan resumed by cardiology      RON  - Cr 1.6 on arrival, baseline around 0.8  - resolved with IVFs      CAD s/p remote PCI to RCA  - continue statin therapy     PAF, SSS s/ PPM  - continue Apixaban  - monitor on telemetry     Chronic diastolic HF   - held diuretic regimen due to RON on admission  - Losartan resumed     DM2  - HbA1c in the 6.1-6.6 range over the last 5 years  - monitor glucose levels     ETOH use disorder  - monitor for s/s of withdrawal     Gait ataxia  - continue PT/OT        Diet: ADULT DIET;  Regular    Code Status: Full Code      Disposition - SNF today        Electronically signed by Cindy Bethea MD on 3/2/2023 at 11:38 AM

## 2023-03-02 NOTE — CARE COORDINATION
[facility] faxed from [facility] and placed on patient's chart. [facility] scheduled for today at 12:00. Nurse, patient, and facility informed. [id number] completed.

## 2023-03-08 ENCOUNTER — HOSPITAL ENCOUNTER (OUTPATIENT)
Dept: CARDIOLOGY | Age: 78
Discharge: HOME | End: 2023-03-08
Payer: MEDICARE

## 2023-03-08 PROCEDURE — 93280 PM DEVICE PROGR EVAL DUAL: CPT

## 2023-05-03 ENCOUNTER — TELEPHONE (OUTPATIENT)
Dept: FAMILY MEDICINE CLINIC | Age: 78
End: 2023-05-03

## 2023-05-03 NOTE — TELEPHONE ENCOUNTER
Alirio Parada from [facility] called asking if you will follow this patient for skilled nursing, pt and ot. Thank you.

## 2023-06-07 ENCOUNTER — HOSPITAL ENCOUNTER (OUTPATIENT)
Dept: CARDIOLOGY | Age: 78
Discharge: HOME | End: 2023-06-07
Payer: OTHER GOVERNMENT

## 2023-06-07 PROCEDURE — 93296 REM INTERROG EVL PM/IDS: CPT

## 2024-02-12 NOTE — ED NOTES
Flonase nasal spray  May continue allegra-D the next few days  Follow up for new or worsening symptoms or if not improving over the next 4-5 days.    Pt states that he feel last night around 10:30pm and hit head on couch arm. Pt states \"don't know what I hit my ear on\". Pt ha laceration to left ear with dried blood on at times of arrival. Pt states take \"water pill\" off and on. Pt has bilateral Lower extremity edema. Pt is active with cardiology. Pt is alert and oriented times 4 and was brought in by neighbor this morning. Pt able to amulbate at times of arrival. Pt denies any chest pain OR Sob at this time. Pt has old bruises to right ABD and chest and states they are from a previous fall 2 weeks ago.       Noel English, RN  04/17/22 20 [facility], RN  04/17/22 9534

## 2024-08-27 NOTE — PROGRESS NOTES
[address] [facility] [place], [facility] [place]    12/30/2021    Marleny Munoz  is a 68 y.o. in the [place] being seen for a f/u of   Chief Complaint   Patient presents with    Shortness of Breath     Cough and congestion       HPI per pediatrician at hospital  We are treating him last week for head cold now his head cold is gone down to his chest  He is complaining he is coughing congested wheezing  Denies hypoxia fever he does have some aches and pains he does feel short of breath at times  No nausea but he is producing a lot of mucus and swallowing it    He does not feel the medications that he had a last week were very effective but he did state that he is no longer having headache nasal congestion runny nose     they are eating and drinking less than their baseline  They have had no recent falls with injuries. They are not complaining of any un addressed pain issues. Have had no recent choking or dysphagia issues. NO agitation or unusual mental behavioral issues.      Past Medical History:   Diagnosis Date    Bradycardia     CAD (coronary artery disease)     Cancer (HCC)     prostate- radiation     CHF (congestive heart failure) (HCC)     Depression     HSV-2 (herpes simplex virus 2) infection     Hyperlipidemia     Hypertension     Left knee DJD     Osteoarthritis     Rheumatoid arthritis(714.0)     Type 2 diabetes mellitus without complication, without long-term current use of insulin ([facility] [address] 1/10/2019     Past Surgical History:   Procedure Laterality Date    ANKLE SURGERY Right     pin    APPENDECTOMY      ARTHRODESIS Right 10/31/2016    RIGHT ANKLE ARTHRODESIS OF RIGHT ANKLE AND SUBTALAR JOINT, C-ARM, ABHIJEET EQUIPMENT SYNTHETIC BONE GRAFT, ALLOGRAFT CANCELLOUS, SHARON ANKLE FUSION NAIL, SHANDA IMPLANT BONE STIMULATOR, CHOICE ANESTHESIA, BLOCK  performed by Justyn Martinez MD at [address]      stent x 5    COLONOSCOPY  7-19-11    FRACTURE SURGERY I recommend that Jessy increased levamir to     46 units in the am and 49 units in the pm    Please let Tamanna know.     Follow up in Dec as scheduled       right ankle    HARDWARE REMOVAL FOOT / ANKLE Right 2017    RIGHT ANKLE HARDWARE REMOVAL performed by Demetrio Finley MD at [address] Bilateral     knees    [facility]  [facility] 2017    COLONOSCOPY performed by Kaylen Chandler DO at [facility] Left     TONSILLECTOMY AND ADENOIDECTOMY       Family History   Problem Relation Age of Onset    Heart Attack Father     Cancer Father         colon    High Cholesterol Mother     Heart Disease Mother     Macular Degen Mother     Arthritis Sister         hip replacement    Other Brother         vertigo    No Known Problems Son     No Known Problems Son      Social History     Socioeconomic History    Marital status:      Spouse name: Not on file    Number of children: Not on file    Years of education: Not on file    Highest education level: Not on file   Occupational History    Not on file   Tobacco Use    Smoking status: Former Smoker     Packs/day: 0.25     Years: 7.00     Pack years: 1.75     Types: Cigarettes     Start date: 5     Quit date: 6/3/1992     Years since quittin.5    Smokeless tobacco: Never Used   Vaping Use    Vaping Use: Never used   Substance and Sexual Activity    Alcohol use: Yes     Alcohol/week: 0.0 standard drinks     Comment: weekly- 2 beers or wine    Drug use: No    Sexual activity: Never   Other Topics Concern    Not on file   Social History Narrative    Not on file     Social Determinants of Health     Financial Resource Strain:     Difficulty of Paying Living Expenses: Not on file   Food Insecurity: No Food Insecurity    Worried About Running Out of Food in the Last Year: Never true    Robert of Food in the Last Year: Never true   Transportation Needs: No Transportation Needs    Lack of Transportation (Medical): No    Lack of Transportation (Non-Medical):  No   Physical Activity:     Days of Exercise per Week: Not on file    Minutes of Exercise per Session: Not on file   Stress:     Feeling of Stress : Not on file   Social Connections:     Frequency of Communication with Friends and Family: Not on file    Frequency of Social Gatherings with Friends and Family: Not on file    Attends Jain Services: Not on file    Active Member of Clubs or Organizations: Not on file    Attends Club or Organization Meetings: Not on file    Marital Status: Not on file   Intimate Partner Violence:     Fear of Current or Ex-Partner: Not on file    Emotionally Abused: Not on file    Physically Abused: Not on file    Sexually Abused: Not on file   Housing Stability:     Unable to Pay for Housing in the Last Year: Not on file    Number of [place] in the Last Year: Not on file    Unstable Housing in the Last Year: Not on file       Allergies: Hylan g-f 20, Ace inhibitors, and Statins depletion therapy  NF MEDICATIONS REVIEWED    ROS:   Constitutional: There are no reports of behavioral issues, change in appetite, fever, or increased weakness. No bleeding issues. Respiratory: + SOB, dyspnea, dyspnea on exertion, change in exercise capacity  Cardiovascular: denies CP, lightheadedness, palpitations, PND, orthopnea, or claudication. GI: No N/V/D.    : no reports of dysuria,  continent of urine   Extremities: No reports of pain issues,   Psych: at baseline      Physical exam:   Vital signs  283 pounds  Temperature 96.5   pulse 72  Respirations 18  Blood pressure 108/77   O2 saturation 96% on room air  Constitutional: Awake and alert sitting up in chair, general weakness  HEENT: Normocephalic, intact facial symmetry, EOMs intact, sclera non icteric, pupils are equal and round, buccal mucosa pink and moist  Speech clear   NECK: - carotid bruit, euthyroid, no mass visualized  Cardiovascular: Regular rate S1S2 normal, NO S3S4  Respiratory: expiratory wheeze is noted he does have a moist cough at my exam, even unlabored respirations, no accessory muscle use noted  No cyanosis  GI: abdomen NT, +BS x 4 Q, ND  : continent of urine  Extremities: no ankle edema, PPP  SKELETAL: no redness, or swelling over joints. Limited ROM but spontaneous movement x 4   Psych: pleasant,  good judgement, normal thought content  SKIN: no gross skin lesions noted on visualized skin, warm and dry    ASSESSMENT:     Diagnosis Orders   1. Acute bronchitis, unspecified organism         PLAN:  Pt/POA agrees with POC   -As needed but will also add Mucinex 500 mg p.o. twice daily for 7 days  We will start him on Cipro 5 mg twice daily for 7 days  His previous meds antihistamine presents for 5 to 7 days and those were finished a few days ago he had those last week when he had the head cold symptoms  He continues to test negative for Covid is most likely bronchitis and is going around in house  symptom management    Return if symptoms worsen or fail to improve. Pertinent POC, medications, labs, have been reviewed, continue same. Encourage fluids and good nutrition. Stress fall prevention strategies. GIUSEPPE Dorsey-CNP      Tad Dent DNP, MSN, RN, GNP-BC, NP-C  Adult/[place] Nurse Practitioner  [address]  Department of Geriatrics  8:30am-4:30pm   [phone]  After hours Answering service [phone]      Please note this report is partially produced by using speech recognition hardware. It may contain errors related to the system, including grammar, punctuation and spelling as well as words and phrases that may seem inaccurate.   For any questions or concerns feel free to contact me for clarification

## 2025-04-30 NOTE — PROGRESS NOTES
Hospitalist Progress Note      PCP: Alvin Bhatia MD    Date of Admission: 2/17/2023    Chief Complaint:  Pt seen and examined. Awaiting precert for [facility] Pt    Medications:  Reviewed    Infusion Medications    sodium chloride       Scheduled Medications    losartan  25 mg Oral Daily    ketorolac  1 drop Both Eyes BID    pantoprazole  40 mg Oral Daily    rosuvastatin  40 mg Oral QPM    tamsulosin  0.4 mg Oral Daily    sodium chloride flush  5-40 mL IntraVENous 2 times per day    apixaban  5 mg Oral BID     PRN Meds: sodium chloride flush, sodium chloride, ondansetron **OR** ondansetron, polyethylene glycol, acetaminophen **OR** acetaminophen      Intake/Output Summary (Last 24 hours) at 2/21/2023 1404  Last data filed at 2/21/2023 0904  Gross per 24 hour   Intake 600 ml   Output 800 ml   Net -200 ml       Exam:    BP (!) 101/54   Pulse 62   Temp 97.9 °F (36.6 °C) (Oral)   Resp 18   Ht 5' 6\" (1.676 m)   Wt 289 lb (131.1 kg)   SpO2 96%   BMI 46.65 kg/m²     General appearance: alert, cooperative  Lungs: clear to auscultation bilaterally  Heart: S1/S2,RRR  Abdomen: soft, active BS  Extremities:  no edema       Labs:   Recent Labs     02/19/23  0639 02/20/23  0612   WBC 9.6 8.1   HGB 12.6* 12.1*   HCT 37.3* 35.5*    241     Recent Labs     02/19/23  0639 02/20/23  0612    140   K 4.6 4.2    108*   CO2 19* 20   BUN 32* 26*   CREATININE 0.83 0.89   CALCIUM 8.7 8.9   PHOS 3.7 3.3     No results for input(s): AST, ALT, BILIDIR, BILITOT, ALKPHOS in the last 72 hours. No results for input(s): INR in the last 72 hours. No results for input(s): Gonzalo Goldberg in the last 72 hours.     Urinalysis:      Lab Results   Component Value Date/Time    NITRU Negative 01/11/2023 07:45 PM    WBCUA 0-2 01/11/2023 07:45 PM    WBCUA <1 08/05/2022 09:34 PM    BACTERIA Negative 01/11/2023 07:45 PM    RBCUA 0-2 01/11/2023 07:45 PM    RBCUA 1 08/05/2022 09:34 PM    BLOODU Negative 01/11/2023 07:45 PM    SPECGRAV The patient's goals for the shift include pain relief    The clinical goals for the shift include Patient will remain free from pain         1.015 01/11/2023 07:45 PM    GLUCOSEU Negative 01/11/2023 07:45 PM    GLUCOSEU NEG 06/07/2012 03:53 PM       Radiology:  No orders to display           Assessment/Plan:    68 y.o. male with PMH of CAD s/p remote PCI to RCA,  PAF, SSS s/ PPM, chronic diastolic HF, HTN, AAA, DM2, obesity, PRAVEEN, RA, BPH with LUTS, prostate cancer, ETOH use,  gait ataxia who presented with:     # Hypotension - improved  - likely medication related  - BP improving, s/p IV hydration  - holding antihypertensive/diuretic regimen  - cardiology following     # RON- resolved  - Cr 1.6 on arrival, baseline around 0.8  - holding diuretic regimen  - resolved with IVFs      # CAD s/p remote PCI to RCA  - continue statin therapy     # PAF, SSS s/ PPM  - continue Apixaban  - monitor on telemetry     # Chronic diastolic HF   - holding diuretic regimen due to RON. Resume per cardiology     # DM2  - HbA1c in the 6.1-6.6 range over the last 5 years  - monitor glucose levels     # ETOH use disorder  - monitor for s/s of withdrawal     # Gait ataxia  - continue PT/OT        Diet: ADULT DIET; Regular    Code Status: Full Code      Disposition - [facility] Pt when pre-cert obtained. [id number] for discharge per cardiology. Will discharge once accepted.        Kelle Espino DO  Internal Medicine   Hospitalist    >45 minutes in total care time

## (undated) DEVICE — UNDERCAST PADDING: Brand: DEROYAL

## (undated) DEVICE — DRESSING PETRO W3XL8IN N ADH KNIT CELOS ACETT ADPTC

## (undated) DEVICE — ADAPTER FLSH PMP FLD MGMT GI IRRIG OFP 2 DISPOSABLE

## (undated) DEVICE — FLEXIBLE YANKAUER,LARGE TIP, NO VACUUM CONTROL: Brand: ARGYLE

## (undated) DEVICE — GLOVE ORANGE PI 8   MSG9080

## (undated) DEVICE — BANDAGE COMPR SINGLE LAYERED 9 FTX6 IN EXSANG WHT ESMARCH

## (undated) DEVICE — 2000CC GUARDIAN II: Brand: GUARDIAN

## (undated) DEVICE — PAD,ABDOMINAL,8"X10",ST,LF: Brand: MEDLINE

## (undated) DEVICE — Device: Brand: ENDO SMARTCAP

## (undated) DEVICE — BRUSH CLEANING ENDOSCOPE CHAN DISP

## (undated) DEVICE — FORCEPS BX L240CM JAW DIA2.4MM ORNG L CAP W/ NDL DISP RAD

## (undated) DEVICE — ELASTIC BANDAGE: Brand: DEROYAL

## (undated) DEVICE — CHLORAPREP 26ML ORANGE

## (undated) DEVICE — PACK ARTHRO III ST SIRUS

## (undated) DEVICE — 3M™ STERI-DRAPE™ U-DRAPE 1015: Brand: STERI-DRAPE™

## (undated) DEVICE — SMARTGOWN BREATHABLE SPECIALTY GOWN: Brand: CONVERTORS

## (undated) DEVICE — ENDO CARRY-ON PROCEDURE KIT: Brand: ENDO CARRY-ON PROCEDURE KIT

## (undated) DEVICE — SUTURE VCRL SZ 0 L36IN ABSRB UD L36MM CT-1 1/2 CIR J946H

## (undated) DEVICE — TUBING IRRIGATIONINSTRUMENT CHN ST DISPOSABLE

## (undated) DEVICE — GLOVE ORANGE PI 7 1/2   MSG9075

## (undated) DEVICE — STAPLER SKIN H3.9MM WIRE DIA0.58MM CRWN 6.9MM 35 STPL FIX

## (undated) DEVICE — SYRINGE BLB 50CC IRRIG PLIABLE FNGR FLNG GRAD FLSK DISP

## (undated) DEVICE — SONY PRINTER PAPER

## (undated) DEVICE — ELECTRODE PT RET AD L9FT HI MOIST COND ADH HYDRGEL CORDED

## (undated) DEVICE — INTENDED FOR TISSUE SEPARATION, AND OTHER PROCEDURES THAT REQUIRE A SHARP SURGICAL BLADE TO PUNCTURE OR CUT.: Brand: BARD-PARKER ® CARBON RIB-BACK BLADES

## (undated) DEVICE — SHEET,DRAPE,53X77,STERILE: Brand: MEDLINE

## (undated) DEVICE — PENCIL ES L3M BTTN SWCH HOLSTER W/ BLDE ELECTRD EDGE

## (undated) DEVICE — GOWN,AURORA,NONREINFORCED,LARGE: Brand: MEDLINE

## (undated) DEVICE — GAUZE SPONGES,12 PLY: Brand: CURITY

## (undated) DEVICE — DRAPE SURG C-ARM MOBILE XRAY LF

## (undated) DEVICE — SPONGE: LAP 4X18 W XR 200/CS: Brand: MEDICAL ACTION INDUSTRIES

## (undated) DEVICE — 3M™ COBAN™ STERILE SELF-ADHERENT WRAP, 1584S, 4 IN X 5 YD (10 CM X 4,5 M), 18 ROLLS/CASE: Brand: 3M™ COBAN™

## (undated) DEVICE — SKIN MARKER,REGULAR TIP WITH RULER: Brand: DEVON

## (undated) DEVICE — CONVERTED USE 268153 JELLY LUBE ST 4 OZ W/FLIP TOP MEDICHOICE

## (undated) DEVICE — MEDI-VAC NON-CONDUCTIVE SUCTION TUBING: Brand: CARDINAL HEALTH

## (undated) DEVICE — GLOVE SURG SZ 85 STD WHT LTX SYN POLYMER BEAD REINF ANTI RL

## (undated) DEVICE — SUTURE MCRYL + SZ 3-0 L36IN ABSRB UD CT-1 L36MM 1/2 CIR MCP944H

## (undated) DEVICE — 1842 FOAM BLOCK NEEDLE COUNTER: Brand: DEVON